# Patient Record
Sex: FEMALE | Race: WHITE | HISPANIC OR LATINO | Employment: OTHER | URBAN - METROPOLITAN AREA
[De-identification: names, ages, dates, MRNs, and addresses within clinical notes are randomized per-mention and may not be internally consistent; named-entity substitution may affect disease eponyms.]

---

## 2017-02-02 ENCOUNTER — ALLSCRIPTS OFFICE VISIT (OUTPATIENT)
Dept: OTHER | Facility: OTHER | Age: 72
End: 2017-02-02

## 2017-02-02 DIAGNOSIS — M54.2 CERVICALGIA: ICD-10-CM

## 2017-02-02 DIAGNOSIS — M54.50 LOW BACK PAIN: ICD-10-CM

## 2017-02-28 ENCOUNTER — GENERIC CONVERSION - ENCOUNTER (OUTPATIENT)
Dept: OTHER | Facility: OTHER | Age: 72
End: 2017-02-28

## 2017-03-28 ENCOUNTER — GENERIC CONVERSION - ENCOUNTER (OUTPATIENT)
Dept: OTHER | Facility: OTHER | Age: 72
End: 2017-03-28

## 2017-04-12 ENCOUNTER — GENERIC CONVERSION - ENCOUNTER (OUTPATIENT)
Dept: OTHER | Facility: OTHER | Age: 72
End: 2017-04-12

## 2017-04-17 ENCOUNTER — ALLSCRIPTS OFFICE VISIT (OUTPATIENT)
Dept: RADIOLOGY | Facility: MEDICAL CENTER | Age: 72
End: 2017-04-17
Payer: MEDICARE

## 2017-05-01 ENCOUNTER — GENERIC CONVERSION - ENCOUNTER (OUTPATIENT)
Dept: OTHER | Facility: OTHER | Age: 72
End: 2017-05-01

## 2017-08-23 ENCOUNTER — ALLSCRIPTS OFFICE VISIT (OUTPATIENT)
Dept: OTHER | Facility: OTHER | Age: 72
End: 2017-08-23

## 2017-08-23 DIAGNOSIS — Z12.31 ENCOUNTER FOR SCREENING MAMMOGRAM FOR MALIGNANT NEOPLASM OF BREAST: ICD-10-CM

## 2017-08-23 DIAGNOSIS — Z78.0 ASYMPTOMATIC MENOPAUSAL STATE: ICD-10-CM

## 2017-08-23 DIAGNOSIS — Z00.00 ENCOUNTER FOR GENERAL ADULT MEDICAL EXAMINATION WITHOUT ABNORMAL FINDINGS: ICD-10-CM

## 2017-08-24 ENCOUNTER — GENERIC CONVERSION - ENCOUNTER (OUTPATIENT)
Dept: OTHER | Facility: OTHER | Age: 72
End: 2017-08-24

## 2017-10-31 ENCOUNTER — ANESTHESIA EVENT (OUTPATIENT)
Dept: GASTROENTEROLOGY | Facility: HOSPITAL | Age: 72
End: 2017-10-31
Payer: MEDICARE

## 2017-11-02 ENCOUNTER — ANESTHESIA (OUTPATIENT)
Dept: GASTROENTEROLOGY | Facility: HOSPITAL | Age: 72
End: 2017-11-02
Payer: MEDICARE

## 2017-11-02 ENCOUNTER — GENERIC CONVERSION - ENCOUNTER (OUTPATIENT)
Dept: OTHER | Facility: OTHER | Age: 72
End: 2017-11-02

## 2017-11-02 ENCOUNTER — HOSPITAL ENCOUNTER (OUTPATIENT)
Facility: HOSPITAL | Age: 72
Setting detail: OUTPATIENT SURGERY
Discharge: HOME | End: 2017-11-02
Attending: INTERNAL MEDICINE | Admitting: INTERNAL MEDICINE
Payer: MEDICARE

## 2017-11-02 VITALS
DIASTOLIC BLOOD PRESSURE: 56 MMHG | OXYGEN SATURATION: 100 % | SYSTOLIC BLOOD PRESSURE: 108 MMHG | HEIGHT: 55 IN | BODY MASS INDEX: 37.03 KG/M2 | TEMPERATURE: 97.5 F | HEART RATE: 70 BPM | WEIGHT: 160 LBS | RESPIRATION RATE: 16 BRPM

## 2017-11-02 DIAGNOSIS — Z12.11 ENCOUNTER FOR SCREENING FOR MALIGNANT NEOPLASM OF COLON: ICD-10-CM

## 2017-11-02 DIAGNOSIS — Z12.12 ENCOUNTER FOR SCREENING FOR MALIGNANT NEOPLASM OF RECTUM: ICD-10-CM

## 2017-11-02 PROCEDURE — 88305 TISSUE EXAM BY PATHOLOGIST: CPT | Performed by: INTERNAL MEDICINE

## 2017-11-02 RX ORDER — SODIUM CHLORIDE 9 MG/ML
125 INJECTION, SOLUTION INTRAVENOUS CONTINUOUS
Status: DISCONTINUED | OUTPATIENT
Start: 2017-11-02 | End: 2017-11-02 | Stop reason: HOSPADM

## 2017-11-02 RX ORDER — PROPOFOL 10 MG/ML
INJECTION, EMULSION INTRAVENOUS AS NEEDED
Status: DISCONTINUED | OUTPATIENT
Start: 2017-11-02 | End: 2017-11-02 | Stop reason: SURG

## 2017-11-02 RX ORDER — IBUPROFEN 800 MG/1
600 TABLET ORAL AS NEEDED
COMMUNITY
End: 2019-01-04 | Stop reason: SDUPTHER

## 2017-11-02 RX ADMIN — PROPOFOL 20 MG: 10 INJECTION, EMULSION INTRAVENOUS at 14:05

## 2017-11-02 RX ADMIN — SODIUM CHLORIDE 125 ML/HR: 0.9 INJECTION, SOLUTION INTRAVENOUS at 13:15

## 2017-11-02 RX ADMIN — SODIUM CHLORIDE: 0.9 INJECTION, SOLUTION INTRAVENOUS at 13:45

## 2017-11-02 RX ADMIN — PROPOFOL 20 MG: 10 INJECTION, EMULSION INTRAVENOUS at 13:55

## 2017-11-02 RX ADMIN — PROPOFOL 80 MG: 10 INJECTION, EMULSION INTRAVENOUS at 13:52

## 2017-11-02 RX ADMIN — PROPOFOL 20 MG: 10 INJECTION, EMULSION INTRAVENOUS at 14:02

## 2017-11-02 RX ADMIN — PROPOFOL 20 MG: 10 INJECTION, EMULSION INTRAVENOUS at 13:59

## 2017-11-02 NOTE — H&P
History and Physical - [facility]  Radha Johnson 74 y o  female MRN: [id number]    HPI: Radha Johnson is a 74y o  year old female who presents for his screening colonoscopy  Her last colonoscopy was approximately more than 10 years ago  Review of Systems    Historical Information   Past Medical History:   Diagnosis Date    Arthritis     Chronic pain disorder     sciatic    Kidney stone      Past Surgical History:   Procedure Laterality Date    CHOLECYSTECTOMY      KNEE ARTHROSCOPY Left     TUBAL LIGATION       Social History   History   Alcohol Use No     History   Drug Use No     History   Smoking Status    Never Smoker   Smokeless Tobacco    Never Used     History reviewed  No pertinent family history  Meds/Allergies     Prescriptions Prior to Admission   Medication    ibuprofen (MOTRIN) 800 mg tablet    Multiple Vitamins-Minerals (WOMENS 50+ MULTI VITAMIN/MIN PO)       Allergies   Allergen Reactions    Diclofenac Sodium      Other reaction(s): GI Upset    Meperidine     Tramadol      Other reaction(s): GI Upset       Objective     Blood pressure 129/59, pulse 96, temperature 97 5 °F (36 4 °C), temperature source Tympanic, resp  rate 18, height 4' 6" (1 372 m), weight 72 6 kg (160 lb), SpO2 99 %  PHYSICAL EXAM    Gen: NAD  CV: RRR  CHEST: Clear  ABD: soft, NT/ND  EXT: no edema  Neuro: AAO      ASSESSMENT/PLAN:  This is a 74y o  year old female here for screening colonoscopy    Last colonoscopy approximately more than 10 years ago    PLAN:   Procedure:  Colonoscopy with biopsy and possible polypectomy

## 2017-11-02 NOTE — ANESTHESIA PREPROCEDURE EVALUATION
Review of Systems/Medical History  Patient summary reviewed        Cardiovascular   Pulmonary       GI/Hepatic       Kidney stones,        Endo/Other  Arthritis     GYN       Hematology   Musculoskeletal  Obesity  morbid obesity, Back pain , chronic back pain and lumbar pain,        Neurology   Psychology           Physical Exam    Airway    Mallampati score: III  TM Distance: >3 FB  Neck ROM: full     Dental   No notable dental hx     Cardiovascular  Rhythm: regular, Rate: normal, Cardiovascular exam normal    Pulmonary  Pulmonary exam normal Breath sounds clear to auscultation,     Other Findings        Anesthesia Plan  ASA Score- 2       Anesthesia Type- IV sedation with anesthesia with ASA Monitors  Additional Monitors:   Airway Plan:           Induction-       Informed Consent- Anesthetic plan and risks discussed with patient

## 2017-11-02 NOTE — DISCHARGE INSTRUCTIONS
Impression:    Two diminutive polyps removed from transverse and sigmoid colon  Severe left-sided diverticulosis  Internal hemorrhoids  Recommendations:  -high-fiber diet  Follow-up biopsy result  Colonoscopy recommended in 5 years if the pathology shows adenomatous polyp otherwise in 10 years  If you have abdominal pain, nausea, fever or bleeding call my office at [phone]            Colorectal Polyps   WHAT YOU NEED TO KNOW:   Colorectal polyps are small growths of tissue in the lining of the colon and rectum  Most polyps are hyperplastic polyps and are usually benign (noncancerous)  Certain types of polyps, called adenomatous polyps, may turn into cancer  DISCHARGE INSTRUCTIONS:   Follow up with your healthcare provider or gastroenterologist as directed: You may need to return for more tests, such as another colonoscopy  Write down your questions so you remember to ask them during your visits  Reduce your risk for colorectal polyps:   · Eat a variety of healthy foods:  Healthy foods include fruit, vegetables, whole-grain breads, low-fat dairy products, beans, lean meat, and fish  Ask if you need to be on a special diet  · Maintain a healthy weight:  Ask your healthcare provider if you need to lose weight and how much you need to lose  Ask for help with a weight loss program     · Exercise:  Begin to exercise slowly and do more as you get stronger  Talk with your healthcare provider before you start an exercise program      · Limit alcohol:  Your risk for polyps increases the more you drink  · Do not smoke: If you smoke, it is never too late to quit  Ask for information about how to stop  For support and more information:   · Rod Mccorm[facility] ([facility])  [address], [place] [address]  Phone: 2- [phone]  Web Address: www digestive  [facility]  Contact your healthcare provider or gastroenterologist if:   · You have a fever  · You have chills, a cough, or feel weak and achy  · You have abdominal pain that does not go away or gets worse after you take medicine  · Your abdomen is swollen  · You are losing weight without trying  · You have questions or concerns about your condition or care  Seek care immediately or call 911 if:   · You have sudden shortness of breath  · You have a fast heart rate, fast breathing, or are too dizzy to stand up  · You have severe abdominal pain  · You see blood in your bowel movement  © 2017 [facility] Information is for End User's use only and may not be sold, redistributed or otherwise used for commercial purposes  All illustrations and images included in CareNotes® are the copyrighted property of [employer] M [employer]  or [employer]  The above information is an  only  It is not intended as medical advice for individual conditions or treatments  Talk to your doctor, nurse or pharmacist before following any medical regimen to see if it is safe and effective for you  Diverticulosis   WHAT YOU NEED TO KNOW:   Diverticulosis is a condition that causes small pockets called diverticula to form in your intestine  These pockets make it difficult for bowel movements to pass through your digestive system  DISCHARGE INSTRUCTIONS:   Seek care immediately if:   · You have severe pain on the left side of your lower abdomen  · Your bowel movements are bright or dark red  Contact your healthcare provider if:   · You have a fever and chills  · You feel dizzy or lightheaded  · You have nausea, or you are vomiting  · You have a change in your bowel movements  · You have questions or concerns about your condition or care  Medicines:   · Medicines  to soften your bowel movements may be given  You may also need medicines to treat symptoms such as bloating and pain  · Take your medicine as directed    Contact your healthcare provider if you think your medicine is not helping or if you have side effects  Tell him or her if you are allergic to any medicine  Keep a list of the medicines, vitamins, and herbs you take  Include the amounts, and when and why you take them  Bring the list or the pill bottles to follow-up visits  Carry your medicine list with you in case of an emergency  Self-care: The goal of treatment is to manage any symptoms you have and prevent other problems such as diverticulitis  Diverticulitis is swelling or infection of the diverticula  Your healthcare provider may recommend any of the following:  · Eat a variety of high-fiber foods  High-fiber foods help you have regular bowel movements  High-fiber foods include cooked beans, fruits, vegetables, and some cereals  Most adults need 25 to 35 grams of fiber each day  Your healthcare provider may recommend that you have more  Ask your healthcare provider how much fiber you need  Increase fiber slowly  You may have abdominal discomfort, bloating, and gas if you add fiber to your diet too quickly  You may need to take a fiber supplement if you are not getting enough fiber from food  · Drink liquids as directed  You may need to drink 2 to 3 liters (8 to 12 cups) of liquids every day  Ask your healthcare provider how much liquid to drink each day and which liquids are best for you  · Apply heat  on your abdomen for 20 to 30 minutes every 2 hours for as many days as directed  Heat helps decrease pain and muscle spasms  Help prevent diverticulitis or other symptoms: The following may help decrease your risk for diverticulitis or symptoms, such as bleeding  Talk to your provider about these or other things you can do to prevent problems that may occur with diverticulosis  · Exercise regularly  Ask your healthcare provider about the best exercise plan for you  Exercise can help you have regular bowel movements   Get 30 minutes of exercise on most days of the week      · Maintain a healthy weight  Ask your healthcare provider how much you should weigh  Ask him or her to help you create a weight loss plan if you are overweight  · Do not smoke  Nicotine and other chemicals in cigarettes increase your risk for diverticulitis  Ask your healthcare provider for information if you currently smoke and need help to quit  E-cigarettes or smokeless tobacco still contain nicotine  Talk to your healthcare provider before you use these products  · Ask your healthcare provider if it is safe to take NSAIDs  NSAIDs may increase your risk of diverticulitis  Follow up with your healthcare provider as directed:  Write down your questions so you remember to ask them during your visits  © 2017 [address] [employer]  Information is for End User's use only and may not be sold, redistributed or otherwise used for commercial purposes  All illustrations and images included in CareNotes® are the copyrighted property of A [employer] M , Inc  or [employer] Mabry  The above information is an  only  It is not intended as medical advice for individual conditions or treatments  Talk to your doctor, nurse or pharmacist before following any medical regimen to see if it is safe and effective for you  Colonoscopy   WHAT YOU NEED TO KNOW:   A colonoscopy is a procedure to examine the inside of your colon (intestine) with a scope  Polyps or tissue growths may have been removed during your colonoscopy  It is normal to feel bloated and to have some abdominal discomfort  You should be passing gas  If you have hemorrhoids or you had polyps removed, you may have a small amount of bleeding  DISCHARGE INSTRUCTIONS:   Seek care immediately if:   · You have a large amount of bright red blood in your bowel movements  · Your abdomen is hard and firm and you have severe pain  · You have sudden trouble breathing    Contact your healthcare provider if:   · You develop a rash or hives  · You have a fever within 24 hours of your procedure  · You have not had a bowel movement for 3 days after your procedure  · You have questions or concerns about your condition or care  Activity:   · Do not lift, strain, or run  for 3 days after your procedure  · Rest after your procedure  You have been given medicine to relax you  Do not  drive or make important decisions until the day after your procedure  Return to your normal activity as directed  · Relieve gas and discomfort from bloating  by lying on your right side with a heating pad on your abdomen  You may need to take short walks to help the gas move out  Eat small meals until bloating is relieved  If you had polyps removed: For 7 days after your procedure:  · Do not  take aspirin  · Do not  go on long car rides  Help prevent constipation:   · Eat a variety of healthy foods  Healthy foods include fruit, vegetables, whole-grain breads, low-fat dairy products, beans, lean meat, and fish  Ask if you need to be on a special diet  Your healthcare provider may recommend that you eat high-fiber foods such as cooked beans  Fiber helps you have regular bowel movements  · Drink liquids as directed  Adults should drink between 9 and 13 eight-ounce cups of liquid every day  Ask what amount is best for you  For most people, good liquids to drink are water, juice, and milk  · Exercise as directed  Talk to your healthcare provider about the best exercise plan for you  Exercise can help prevent constipation, decrease your blood pressure and improve your health  Follow up with your healthcare provider as directed:  Write down your questions so you remember to ask them during your visits  © 2017 [employer]  Information is for End User's use only and may not be sold, redistributed or otherwise used for commercial purposes   All illustrations and images included in CareNotes® are the copyrighted property of A  [employer]  or [employer] Mab[employer]  The above information is an  only  It is not intended as medical advice for individual conditions or treatments  Talk to your doctor, nurse or pharmacist before following any medical regimen to see if it is safe and effective for you  High Fiber Diet   WHAT YOU NEED TO KNOW:   A high-fiber diet includes foods that have a high amount of fiber  Fiber is the part of fruits, vegetables, and grains that is not broken down by your body  Fiber keeps your bowel movements regular  Fiber can also help lower your cholesterol level, control blood sugar in people with diabetes, and relieve constipation  Fiber can also help you control your weight because it helps you feel full faster  Most adults should eat 25 to 35 grams of fiber each day  Talk to your dietitian or healthcare provider about the amount of fiber you need  DISCHARGE INSTRUCTIONS:   Good sources of fiber:   · Foods with at least 4 grams of fiber per serving:      ¨ ? to ½ cup of high-fiber cereal (check the nutrition label on the box)    ¨ ½ cup of blackberries or raspberries    ¨ 4 dried prunes    ¨ 1 cooked artichoke    ¨ ½ cup of cooked legumes, such as lentils, or red, kidney, and choi beans    · Foods with 1 to 3 grams of fiber per serving:      ¨ 1 slice of whole-wheat, pumpernickel, or rye bread    ¨ ½ cup of cooked brown rice    ¨ 4 whole-wheat crackers    ¨ 1 cup of oatmeal    ¨ ½ cup of cereal with 1 to 3 grams of fiber per serving (check the nutrition label on the box)    ¨ 1 small piece of fruit, such as an apple, banana, pear, kiwi, or orange    ¨ 3 dates    ¨ ½ cup of canned apricots, fruit cocktail, peaches, or pears    ¨ ½ cup of raw or cooked vegetables, such as carrots, cauliflower, cabbage, spinach, squash, or corn  Ways that you can increase fiber in your diet:   · Choose brown or wild rice instead of white rice       · Use whole wheat flour in recipes instead of white or all-purpose flour  · Add beans and peas to casseroles or soups  · Choose fresh fruit and vegetables with peels or skins on instead of juices  Other diet guidelines to follow:   · Add fiber to your diet slowly  You may have abdominal discomfort, bloating, and gas if you add fiber to your diet too quickly  · Drink plenty of liquids as you add fiber to your diet  You may have nausea or develop constipation if you do not drink enough water  Ask how much liquid to drink each day and which liquids are best for you  © 2017 [facility] Information is for End User's use only and may not be sold, redistributed or otherwise used for commercial purposes  All illustrations and images included in CareNotes® are the copyrighted property of [employer]  or [employer] Mabry  The above information is an  only  It is not intended as medical advice for individual conditions or treatments  Talk to your doctor, nurse or pharmacist before following any medical regimen to see if it is safe and effective for you

## 2017-11-02 NOTE — OP NOTE
OPERATIVE REPORT  PATIENT NAME: Anna Marie Hays    :  1945  MRN: [id number]  Pt Location: [facility] 01    SURGERY DATE: 2017    Surgeon(s) and Role:     * Ayaka Islas MD - Primary    Colonoscopy Procedure Note    Procedure: Colonoscopy    Sedation: Monitored anesthesia care, check anesthesia records      ASA Class: 2    INDICATIONS: Screening for Colon Cancer  Last colonoscopy 10 years ago  POST-OP DIAGNOSIS: See the impression below    Procedure Details     Informed consent was obtained for the procedure, including sedation  Risks of perforation, hemorrhage, adverse drug reaction and aspiration were discussed  The patient was placed in the left lateral decubitus position  Based on the pre-procedure assessment, including review of the patient's medical history, medications, allergies, and review of systems, she had been deemed to be an appropriate candidate for conscious sedation; she was therefore sedated with the medications listed below  The patient was monitored continuously with telemetry, pulse oximetry, blood pressure monitoring, and direct observations  A rectal examination was performed  The colonoscope was inserted into the rectum and advanced under direct vision to the cecum, which was identified by the ileocecal valve and appendiceal orifice  The quality of the colonic preparation was excellent  A careful inspection was made as the colonoscope was withdrawn, including a retroflexed view of the rectum; findings and interventions are described below  Findings:  Severe diverticulosis in the sigmoid and descending colon  Two flat polyps measuring 4 mm were removed from the transverse and sigmoid colon using cold biopsy forceps  Small internal hemorrhoids otherwise normal colonoscopy           Complications:  None; patient tolerated the procedure well  Impression:    Two diminutive polyps removed from transverse and sigmoid colon  Severe left-sided diverticulosis  Internal hemorrhoids  Recommendations:  -high-fiber diet  Follow-up biopsy result  Colonoscopy recommended in 5 years if the pathology shows adenomatous polyp otherwise in 10 years    If you have abdominal pain, nausea, fever or bleeding call my office at [phone]  SIGNATURE: Sammy Staton MD  DATE: November 2, 2017  TIME: 2:18 PM

## 2017-11-09 ENCOUNTER — GENERIC CONVERSION - ENCOUNTER (OUTPATIENT)
Dept: OTHER | Facility: OTHER | Age: 72
End: 2017-11-09

## 2018-01-10 NOTE — RESULT NOTES
Message   Please offer the patient bilateral L4-5, L5-S1 MBB #1      M [id number] [phone]   [id number], [id number]     Verified Results  * MRI LUMBAR SPINE WO CONTRAST [id number] 07:37AM Fili Randolph     Test Name Result Flag Reference   MRI LUMBAR SPINE WO CONTRAST (Report)     MRI LUMBAR SPINE WITHOUT CONTRAST     INDICATION: 1 month of low back pain  M 54 5, M 45 816  COMPARISON: None  TECHNIQUE: Sagittal T1, sagittal T2, sagittal inversion recovery, axial T1 and axial T2, coronal T2       IMAGE QUALITY: Diagnostic     FINDINGS:     ALIGNMENT: Normal alignment of the lumbar spine  No compression fracture  No spondylolysis or spondylolisthesis  No scoliosis  MARROW SIGNAL: Normal marrow signal is identified within the visualized bony structures  No discrete marrow lesion  DISTAL CORD AND CONUS: Normal size and signal within the distal cord and conus  The conus ends at the L1 level  PARASPINAL SOFT TISSUES: Paraspinal soft tissues are unremarkable  SACRUM: Normal signal within the sacrum  No evidence of insufficiency or stress fracture  LOWER THORACIC DISC SPACES: Normal disc height and signal  No disc herniation, canal stenosis or foraminal narrowing  LUMBAR DISC SPACES:      L1-L2:   Mild degenerative disc disease  Small broad-based right foraminal/lateral disc protrusion with endplate hypertrophic osteophyte formation  There is no canal stenosis  Minimal right foraminal narrowing without discrete nerve impingement  L2-L3: No disc herniation, canal stenosis or foraminal narrowing  L3-L4: Mild annular bulging  Slight facet degenerative change  No disc herniation  No canal stenosis  Mild bilateral foraminal narrowing  L4-L5: Mild annular bulging  Mild endplate and facet hypertrophic degenerative change  No canal stenosis  Mild right greater than left foraminal narrowing  L5-S1: Normal disc height and signal  Moderate facet hypertrophic degenerative change   No disc herniation, canal stenosis or foraminal narrowing  IMPRESSION:     Mild lumbar degenerative disc disease  Mild right foraminal narrowing at L1-2 and L4-5  No canal stenosis or cauda equina compression         Workstation performed: [id number]     Signed by:   Bruno Bassett DO   9/29/16

## 2018-01-11 NOTE — RESULT NOTES
Verified Results  (1) TISSUE EXAM [id number] 02:02PM Glenis Hernandez     Test Name Result Flag Reference   LAB AP CASE REPORT (Report)     Surgical Pathology Report             Case: [id number]                   Authorizing Provider: Dewey Rubi MD      Collected:      11/02/2017 1402        Ordering Location:   [facility]    Received:      11/02/2017 [address] [place] [address] Endoscopy                              Pathologist:      Rk Calvert MD                              Specimens:  A) - Colon, transverse colon polyp                                   B) - Large Intestine, Sigmoid Colon, polyp   LAB AP FINAL DIAGNOSIS (Report)     A  Colon, transverse, polyp:    - Hyperplastic polyp     - No dysplasia or carcinoma identified  B  Colon, sigmoid, polyp:    - Colonic mucosa with lymphoid aggregate consistent with redundant   mucosal fold  - Mild melanosis coli     - No dysplasia or neoplasia identified  Electronically signed by Rk Calvert MD on 11/8/2017 at 9:32 AM   LAB AP SURGICAL ADDITIONAL INFORMATION (Report)     All controls performed with the immunohistochemical stains reported above   reacted appropriately  These tests were developed and their performance   characteristics determined by [address] [place] [address] or   [facility]  They may not be cleared or approved by the [place]  Food and Drug Administration  The FDA has determined that such clearance   or approval is not necessary  These tests are used for clinical purposes  They should not be regarded as investigational or for research  This   laboratory has been approved by [place] 88, designated as a high-complexity   laboratory and is qualified to perform these tests  - Interpretation performed at [facility]   LAB AP GROSS DESCRIPTION (Report)     A   The specimen is received in formalin, labeled with the patient's name   and hospital number, and is designated transverse colon polyp  The   specimen consists of 2 tan soft tissue fragments measuring 0 2 and 0 3 cm   in greatest dimension  Entirely submitted in one cassette  B  The specimen is received in formalin, labeled with the patient's name   and hospital number, and is designated sigmoid polyp  The specimen   consists of 2 tan soft tissue fragments 0 3 and 0 4 cm in greatest   dimension  Entirely submitted in one cassette  Note: The estimated total formalin fixation time based upon information   provided by the submitting clinician and the standard processing schedule   is less than 72 hours      Kaley

## 2018-01-11 NOTE — MISCELLANEOUS
Message   Recorded as Task   Date: 10/26/2016 02:29 PM, Created By: Lupe Means   Task Name: Follow Up   Assigned To: [facility] clerical,Team   Regarding Patient: Lucian Rodriguez, Status: Active   Comment:    Lupe Means - 26 Oct 2016 2:29 PM     TASK CREATED  please schedule follow up to review lack of response to Eulalia Moffett - 27 Oct 2016 8:32 AM     TASK EDITED  Can you please schedule F/U per MIGUEL A javier   [facility] [place] - 28 Oct 2016 9:56 AM     TASK REPLIED TO: Previously Assigned To Lupe Means  Scheduled with [facility] on 11/9/16   Delroy Villavicencio - 28 Oct 2016 11:00 AM     TASK REPLIED TO: Previously Assigned To Lupe Means                      aware agree        Active Problems    1  Arthralgia (719 40) (M25 50)   2  Back pain (724 5) (M54 9)   3  Chronic low back pain (724 2,338 29) (M54 5,G89 29)   4  Cough (786 2) (R05)   5  Degenerative disc disease, thoracic (722 51) (M51 34)   6  Edema (782 3) (R60 9)   7  Flu vaccine need (V04 81) (Z23)   8  Hydronephrosis with renal and ureteral calculus obstruction (591) (N13 2)   9  Kidney stones (592 0) (N20 0)   10  Lumbar spondylosis (721 3) (M47 816)   11  Need for pneumococcal vaccination (V03 82) (Z23)   12  Nephrolithiasis (592 0) (N20 0)   13  Vertigo (780 4) (R42)   14  Weight gain (783 1) (R63 5)    Current Meds   1  Cyclobenzaprine HCl - 5 MG Oral Tablet; take one tablet three times daily as needed for   pain; Therapy: 83Wkt0090 to (Evaluate:23Oct2016)  Requested for: 18Qeo9261; Last   Rx:53Mtn9341 Ordered   2  Daily Multiple Vitamin TABS; Therapy: (Recorded:20Nov2015) to Recorded   3  Ibuprofen 800 MG Oral Tablet; take one tablet every 8 hours as needed for pain; Therapy: 82CVU0318 to (Evaluate:92Ydl1421)  Requested for: 16SVJ6979; Last   Rx:20Nov2015 Ordered   4  MethylPREDNISolone 4 MG Oral Tablet Therapy Pack; take as directed;    Therapy: 16Ghx0426 to (Evaluate:30Aug2016)  Requested for: 24Aug2016; Sunday Carlton Ordered    Allergies    1  TraMADol HCl TABS   2  Voltaren   3  Demerol TABS    Signatures   Electronically signed by :  Mario Pastor, ; Oct 28 2016 11:01AM EST                       (Author)

## 2018-01-12 NOTE — MISCELLANEOUS
Assessment    1  Back pain (724 5) (M54 9)   2  Degenerative disc disease, thoracic (722 51) (M51 34)    Plan  Back pain    · There are many exercise options for seniors ; Status:Complete;   Done: 95ZIB4793  12:55PM   Ordered; For:Back pain; Ordered By:Helena Ervin;  Back pain, Degenerative disc disease, thoracic    · Ketorolac Tromethamine 60 MG/2ML Injection Solution; INJECT 2  ML  Intramuscular; To Be Done: 04OUU6814   Rx By: Dee Banks; For: Back pain, Degenerative disc disease, thoracic; XOCHITL = N; Request Administration   · XR SPINE LUMBAR COMPLETE W BENDING MINIMUM 6 VIEWS; Status:Active; Requested [id number];    Perform: [facility]; VHS:45CJH6615;YRAOOGF; For:Back pain, Degenerative disc disease, thoracic; Ordered By:Helena Ervin;  Degenerative disc disease, thoracic    · *8 - [address] [place] Physician Referral  Consult  Status: Active  Requested  for: 89PKO2574   Ordered; For: Degenerative disc disease, thoracic; Ordered By: Dee Banks Performed:  Due: 21LTT0156  Care Summary provided  : Yes    Discussion/Summary  Discussion Summary:   Await xrays , give Toradol shot  Chief Complaint  Chief Complaint Free Text Note Form: pt is here for hospital follow up on back pain      History of Present Illness  TCM Communication [facility] records were reviewed  The date of discharge: 16  Diagnosis: STRAIN OF BACK MUSCLE  She was discharged to home  She scheduled a follow up appointment  Counseling was provided to the patient  Communication performed and completed by   [id number]: The patient is being seen for follow-up after hospitalization and Er visit back pain  Hospital records were not available  She was hospitalized at [facility]  She was discharged to home  She was discharged on the following medications: Tramadol  Symptoms:  shortness of breath, nausea and back pain     HPI: PATIENT STATES SHE Unable TO TOLERATE PAIN ANYMORE, UNABLE TO RIDE IN A CAR Without SYMPTOMS  Review of Systems  Complete-Female:   Constitutional: feeling poorly  Cardiovascular: no chest pain  Respiratory: shortness of breath  Gastrointestinal: no nausea and no vomiting  Musculoskeletal: arthralgias and BACK PAIN  Neurological: numbness, tingling, difficulty walking and RIGHT LEG  Psychiatric: no sleep disturbances  feelings of weakness   ROS Reviewed:   ROS reviewed  Active Problems    1  Arthralgia (719 40) (M25 50)   2  Back pain (724 5) (M54 9)   3  Cough (786 2) (R05)   4  Degenerative disc disease, thoracic (722 51) (M51 34)   5  Edema (782 3) (R60 9)   6  Flu vaccine need (V04 81) (Z23)   7  Hydronephrosis with renal and ureteral calculus obstruction (591) (N13 2)   8  Kidney stones (592 0) (N20 0)   9  Need for pneumococcal vaccination (V03 82) (Z23)   10  Nephrolithiasis (592 0) (N20 0)   11  Vertigo (780 4) (R42)   12  Weight gain (783 1) (R63 5)    Past Medical History    1  History of gastroesophageal reflux (GERD) (V12 79) (Z87 19)   2  History of osteoarthritis (V13 4) (Z87 39)   3  Personal history of osteoporosis (V13 59) (Z87 39)    Surgical History    1  History of Cholecystectomy   2  History of Knee Surgery   3  History of Tubal Ligation  Surgical History Reviewed: The surgical history was reviewed and updated today  Family History  Father    1  FH: mental illness (V17 0) (Z81 8)   2  Family history of Prostate cancer  Daughter    3  FH: mental illness (V17 0) (Z81 8)  Family History Reviewed: The family history was reviewed and updated today  Social History    · Denied: History of Alcohol use   · Daily caffeine consumption   · Does not have living will   · Denied: History of Exercises regularly   · Denied: History of domestic violence   ·    · Never a smoker   · No advance directives (V49 89) (Z78 9)   · Retired   · Two children  Social History Reviewed: The social history was reviewed and is unchanged        Current Meds   1  Cyclobenzaprine HCl - 5 MG Oral Tablet; take one tablet three times daily as needed for   pain; Therapy: 62Ojp7501 to (Evaluate:23Oct2016)  Requested for: 21Rbm5548; Last   Rx:86Xfz1791 Ordered   2  Daily Multiple Vitamin TABS; Therapy: (Recorded:20Nov2015) to Recorded   3  Ibuprofen 800 MG Oral Tablet; take one tablet every 8 hours as needed for pain; Therapy: 27FFG1429 to (Evaluate:35Bck0814)  Requested for: 80BEA8774; Last   Rx:20Nov2015 Ordered   4  MethylPREDNISolone 4 MG Oral Tablet Therapy Pack; take as directed; Therapy: 51Xsq8346 to (Evaluate:32Whd8151)  Requested for: 93Wel9208; Last   Rx:94Owm4279 Ordered  Medication List Reviewed: The medication list was reviewed and updated today  Allergies    1  TraMADol HCl TABS   2  Voltaren   3  Demerol TABS    Vitals  Signs   Recorded: 65NUB3203 04:38DZ   Systolic: 145  Diastolic: 84  Heart Rate: 87  Height: 4 ft 5 in  Weight: 164 lb 4 00 oz  BMI Calculated: 41 11  BSA Calculated: 1 57    Physical Exam    Constitutional   General appearance: No acute distress, well appearing and well nourished  Pulmonary   Respiratory effort: No increased work of breathing or signs of respiratory distress  Auscultation of lungs: Clear to auscultation  Cardiovascular   Auscultation of heart: Normal rate and rhythm, normal S1 and S2, without murmurs  Abdomen   Abdomen: Non-tender, no masses  Lymphatic   Palpation of lymph nodes in neck: No lymphadenopathy  Musculoskeletal   Gait and station: Abnormal   Gait evaluation demonstrated limping on the right  Inspection/palpation of joints, bones, and muscles: Abnormal   Appearance - right mid-lumbar, right lower lumbar, right sacral, L3, L4, L5, S1 and POINT TENDERNESS swelling, but no ecchymosis  Palpation - right mid-lumbar and right lower lumbar tenderness  POINT TENDERNESS AND MUSCLE TENSION ON PALPATION  PATIENT IN 8/10 PAIN     Neurologic   Reflexes: Abnormal   Deep tendon reflexes: 1+ right patella and 1+ left patella  Sensation: No sensory loss  Psychiatric   Orientation to person, place, and time: Normal     Mood and affect: Normal     Additional Exam:  PREVIOUS MRI 2009 SHOWS JOINT FACET CHANGES WITH MILD ARTHROPATHY, L3-S1 L1-L2 FORAMINAL CANAL NARROWING WITH MID DISC PROTRUSION  Results/Data  Falls Risk Assessment (Dx Z13 89 Screen for Neurologic Disorder) 08FHD3054 11:47AM User, Ahs     Test Name Result Flag Reference   Falls Risk      No falls in the past year     PHQ-9 Adult Depression Screening 60Zdv5030 11:47AM User, Ahs     Test Name Result Flag Reference   PHQ-9 Adult Depression Score 11     Over the last two weeks, how often have you been bothered by any of the following problems? Little interest or pleasure in doing things: Nearly every day - 3  Feeling down, depressed, or hopeless: Nearly every day - 3  Trouble falling or staying asleep, or sleeping too much: Not at all - 0  Feeling tired or having little energy: Nearly every day - 3  Poor appetite or over eating: Not at all - 0  Feeling bad about yourself - or that you are a failure or have let yourself or your family down: Several days - 1  Trouble concentrating on things, such as reading the newspaper or watching television: Not at all - 0  Moving or speaking so slowly that other people could have noticed   Or the opposite -  being so fidgety or restless that you have been moving around a lot more than usual: Several days - 1  Thoughts that you would be better off dead, or of hurting yourself in some way: Not at all - 0   PHQ-9 Adult Depression Screening Positive     PHQ-9 Difficulty Level Somewhat difficult     PHQ-9 Severity Moderate Depression         Future Appointments    Date/Time Provider Specialty Site   08/29/2017 11:15 AM Cynthia Tellez, [address] [place]  Urology [facility] FOR UROLOGY [facility]     Signatures   Electronically signed by : Nuno Mejia, ; Sep 16 2016 12:32PM EST                       (Author) Electronically signed by : Andry Pappas, ; Sep 16 2016 12:32PM EST                       (Author)    Electronically signed by :  Emily Pino MD; Sep 16 2016 12:58PM EST                       (Author)

## 2018-01-12 NOTE — RESULT NOTES
Message   Recorded as Task   Date: 04/12/2017 08:22 AM, Created By: Moises Juarez   Task Name: Miscellaneous   Assigned To: [facility] surgery sched,Team   Regarding Patient: Azalea Boas, Status: In Progress   MooseHallie Riddle - 12 Apr 2017 8:22 AM     TASK CREATED  Pt is having a lot of pain and would like to make an appt  possibly for a procedure  Not sure what she should do  Please call [phone]   Mitch Allred - 12 Apr 2017 9:09 AM     TASK REPLIED TO: Previously Assigned To Manjula Green  S/w pt  who states that since Friday, she has been experiencing pain again  Pt  states that it is not as severe as it was before her last inj , but she "can't move fast," pain increases when going to sit down, getting in and out of car, when pt  sneezes, etc  Pt  states that the pain currently is located in her R lower back and hip  Per pt  no pain currently on her L side  Pt  denies any recent injury  Pt  states that she is currently taking Motrin and Advil, but it is "messing up my stomach " Per pt  her current pain level is a 5/10  Pt  states that the last inj  she had really helped, and she is looking to see if she can repeat that before her pain continues to worsen  Pt  was advised that RN will relay information to Dr Estrella Hernandez and c/b w/ his recommendations  Pt  verbalized understanding and was appreciative  Please advise  Thank you     Manjula Green - 12 Apr 2017 11:02 AM     TASK REPLIED TO: Previously Assigned To Manjula Green                      yes ok to repeat (B) SIJ injection   Shira Malave - 12 Apr 2017 11:17 AM     TASK REASSIGNED: Previously Assigned To [id number] [place] [address] clinical,Team   Glo Garcia - 12 Apr 2017 12:18 PM     TASK EDITED  noted, will coordinate   Glo Garcia - 12 Apr 2017 3:19 PM     TASK EDITED  Spoke with patient and scheduled:     bilateral SIJ injection on 4/17    Reviewed instructions: , NPO 1 hour prior, loose-fitting/comfortable clothing, if ill/start abx/infx/fever to call and reschedule  She stated verbal understanding

## 2018-01-13 NOTE — PROGRESS NOTES
Assessment    1  Hydronephrosis with renal and ureteral calculus obstruction (591) (N13 2)   2  Nephrolithiasis (592 0) (N20 0)    Plan   Nephrolithiasis    · * XR ABDOMEN 1 VIEW KUB; Status:Active; Requested for:61Qks7371;    Perform:[facility]; SSQ:85TNN7390; Ordered;  For:Nephrolithiasis; Ordered By:Nye, Claudell Loyal;   · Urine Dip Non-Automated- POC; Status:Active - Perform Order; Requested  for:09Anc3740;    Performed: In Office; ERC:47KBO4917;ZDEEVJN;  For:Nephrolithiasis; Ordered By:Nye, Claudell Loyal; Follow-up visit in 1 year Evaluation and Treatment  Follow-up  Status: Hold For - Scheduling  Requested for: 18Jom7866  Ordered; For: Nephrolithiasis; Ordered By: Tulio Mobley  Performed:   Due: 60KGJ7198     Discussion/Summary  Discussion Summary:   Nephrolithiasis    The patient is doing well with no complaints  Her KUB was reviewed and there are two small left nonobstructing stones visualized  She will return in 1 year for follow up with a KUB  Diet and hydration modification to prevent stone formation discussed  All questions answered  Chief Complaint  Chief Complaint Free Text Note Form: Nephrolithiasis,Hydronephrosis      History of Present Illness  HPI: 65 y/o female with nephrolithiasis presents today for follow up  She was scheduled for ureteroscopy in January but then spontaneously passed her stone  She denies any stone episodes since  She denies any pain or gross hematuria  She denies any lower urinary tract symptoms  She is doing well with no complaints  Review of Systems  Complete-Female Urology:   Constitutional: No fever, no chills, feels well, no tiredness, no recent weight gain or weight loss  Respiratory: No complaints of shortness of breath, no wheezing, no cough, no SOB on exertion, no orthopnea, no PND  Cardiovascular: No complaints of slow heart rate, no fast heart rate, no chest pain, no palpitations, no leg claudication, no lower extremity edema     Gastrointestinal: No complaints of abdominal pain, no constipation, no nausea or vomiting, no diarrhea, no bloody stools  Genitourinary: Empty sensation and stream quality good, but no dysuria, no urinary hesitancy, no feelings of urinary urgency, no incontinence, no hematuria, no nocturia, urinary stream does not start and stop  Musculoskeletal: No complaints of arthralgias, no myalgias, no joint swelling or stiffness, no limb pain or swelling  Integumentary: No complaints of skin rash or lesions, no itching, no skin wounds, no breast pain or lump  Hematologic/Lymphatic: No complaints of swollen glands, no swollen glands in the neck, does not bleed easily, does not bruise easily  Neurological: No complaints of headache, no confusion, no convulsions, no numbness, no dizziness or fainting, no tingling, no limb weakness, no difficulty walking  ROS Reviewed:   ROS reviewed  Active Problems    1  Arthralgia (719 40) (M25 50)   2  Back pain (724 5) (M54 9)   3  Cough (786 2) (R05)   4  Degenerative disc disease, thoracic (722 51) (M51 34)   5  Edema (782 3) (R60 9)   6  Flu vaccine need (V04 81) (Z23)   7  Hydronephrosis with renal and ureteral calculus obstruction (591) (N13 2)   8  Kidney stones (592 0) (N20 0)   9  Need for pneumococcal vaccination (V03 82) (Z23)   10  Nephrolithiasis (592 0) (N20 0)   11  Vertigo (780 4) (R42)   12  Weight gain (783 1) (R63 5)    Past Medical History    1  History of gastroesophageal reflux (GERD) (V12 79) (Z87 19)   2  History of osteoarthritis (V13 4) (Z87 39)   3  Personal history of osteoporosis (V13 59) (Z87 39)  Active Problems And Past Medical History Reviewed: The active problems and past medical history were reviewed and updated today  Surgical History    1  History of Cholecystectomy   2  History of Knee Surgery   3  History of Tubal Ligation  Surgical History Reviewed: The surgical history was reviewed and updated today  Family History  Father    1   FH: mental illness (V17 0) (Z81 8)   2  Family history of Prostate cancer  Daughter    3  FH: mental illness (V17 0) (Z81 8)  Family History Reviewed: The family history was reviewed and updated today  Social History    · Denied: History of Alcohol use   · Daily caffeine consumption   · Denied: History of Exercises regularly   · Denied: History of domestic violence   ·    · Never a smoker   · Two children  Social History Reviewed: The social history was reviewed and updated today  The social history was reviewed and is unchanged  Current Meds   1  Cyclobenzaprine HCl - 5 MG Oral Tablet; take one tablet three times daily as needed for   pain; Therapy: 50Anp2620 to (Evaluate:23Oct2016)  Requested for: 90Vjl8148; Last   Rx:54Big6084 Ordered   2  Daily Multiple Vitamin TABS; Therapy: (Recorded:20Nov2015) to Recorded   3  Ibuprofen 800 MG Oral Tablet; take one tablet every 8 hours as needed for pain; Therapy: 12FAT6811 to (Evaluate:98Ahn6466)  Requested for: 22CSA2845; Last   Rx:20Nov2015 Ordered   4  MethylPREDNISolone 4 MG Oral Tablet Therapy Pack; take as directed; Therapy: 73Xsk2680 to (Evaluate:51Fbt3138)  Requested for: 67Uwx3786; Last   Rx:95Brt7164 Ordered  Medication List Reviewed: The medication list was reviewed and updated today  Allergies    1  TraMADol HCl TABS   2  Voltaren   3  Demerol TABS    Vitals  Vital Signs    Recorded: 61ENR0454 75:98CY   Systolic 499   Diastolic 80   Heart Rate 88   Height 4 ft 5 in   Weight 163 lb 4 00 oz   BMI Calculated 40 86   BSA Calculated 1 56     Physical Exam    Constitutional   General appearance: No acute distress, well appearing and well nourished  Pulmonary   Respiratory effort: No increased work of breathing or signs of respiratory distress  Cardiovascular   Palpation of heart: Normal PMI, no thrills  Examination of extremities for edema and/or varicosities: Normal     Abdomen   Abdomen: Non-tender, no masses  Musculoskeletal   Gait and station: Normal     Skin   Skin and subcutaneous tissue: Normal without rashes or lesions  Results/Data  * XR ABDOMEN 1 VIEW KUB 77YNF0984 10:51AM Sabrina Mejia    Order Number: [id number]     Test Name Result Flag Reference   XR ABDOMEN 1 VIEW KUB (Report)     ABDOMEN     INDICATION: History of previous calculus  COMPARISON: None     VIEWS: AP supine; 2 images     FINDINGS:     2 small calculi overlie the lower pole the left kidney, largest measures 4 mm  No definite right renal calculi  No ureteral calculi identified  Surgical clips are noted in the right upper quadrant of the abdomen  Nonobstructive bowel gas pattern  Mild to moderate degenerative changes, thoracolumbar junction  IMPRESSION:     Lower pole left renal calculi (4 mm)  Workstation performed: [id number]     Signed by:   Michael Sadler MD   5/24/16     Future Appointments    Date/Time Provider Specialty Site   08/29/2017 11:15 AM Carmine Woodson, 10 [facility] Urology [facility] FOR UROLOGY Manav Eller     Signatures   Electronically signed by : Mart Slaughter, 10 [facility];  Aug 26 2016 11:28AM EST                       (Author)    Electronically signed by : Juan Otoole MD; Aug 26 2016  3:58PM EST                       (Author)

## 2018-01-14 VITALS
HEIGHT: 55 IN | BODY MASS INDEX: 38.09 KG/M2 | SYSTOLIC BLOOD PRESSURE: 124 MMHG | WEIGHT: 164.6 LBS | HEART RATE: 74 BPM | DIASTOLIC BLOOD PRESSURE: 74 MMHG

## 2018-01-14 VITALS
SYSTOLIC BLOOD PRESSURE: 110 MMHG | HEART RATE: 70 BPM | WEIGHT: 160 LBS | DIASTOLIC BLOOD PRESSURE: 80 MMHG | BODY MASS INDEX: 37.03 KG/M2 | HEIGHT: 55 IN

## 2018-01-15 NOTE — MISCELLANEOUS
Message   Recorded as Task   Date: 09/29/2016 04:39 PM, Created By: Vania Stafford   Task Name: Call Patient with results   Assigned To: [id number] [place] [address] clinical,Team   Regarding Patient: Jose Guadalupe Arnett, Status: In Progress   Comment:    Vania Stafford - 29 Sep 2016 4:39 PM     Patient Phone: [phone]    Degenerative facet joint changes as expected    Please offer the patient bilateral L4-5, L5-S1 MBB #1    [phone], [id number]   Helena Cid - 30 Sep 2016 9:56 AM     TASK REASSIGNED: Previously Assigned To Yenni Durham - 86 Oct 2016 2:52 PM     TASK REPLIED TO: Previously Assigned To [id number] [place] [address] clinical,Team                        I spoke with pt, advised above  Pt did wish to proceed  Scheduled for B/L L4-5m L5-S1 MBB #1 10/19/16 at 1345  Pt will have , be NPO 1 hr prior, will have pain level 5 or greater and will not take pain medications the day of the procedure  Pt denies anticoag  Will call if she becomes ill or goes on antibx  Pt verbalizes understanding  *********   Helena Cid - 03 Oct 2016 2:53 PM     TASK IN PROGRESS        Active Problems    1  Arthralgia (719 40) (M25 50)   2  Back pain (724 5) (M54 9)   3  Chronic low back pain (724 2,338 29) (M54 5,G89 29)   4  Cough (786 2) (R05)   5  Degenerative disc disease, thoracic (722 51) (M51 34)   6  Edema (782 3) (R60 9)   7  Flu vaccine need (V04 81) (Z23)   8  Hydronephrosis with renal and ureteral calculus obstruction (591) (N13 2)   9  Kidney stones (592 0) (N20 0)   10  Lumbar spondylosis (721 3) (M47 816)   11  Need for pneumococcal vaccination (V03 82) (Z23)   12  Nephrolithiasis (592 0) (N20 0)   13  Vertigo (780 4) (R42)   14  Weight gain (783 1) (R63 5)    Current Meds   1  Cyclobenzaprine HCl - 5 MG Oral Tablet; take one tablet three times daily as needed for   pain; Therapy: 59Eda6594 to (Evaluate:23Oct2016)  Requested for: 00Emu3845; Last   Rx:31Ezh7934 Ordered   2   Daily Multiple Vitamin TABS; Therapy: (Recorded:20Nov2015) to Recorded   3  Ibuprofen 800 MG Oral Tablet; take one tablet every 8 hours as needed for pain; Therapy: 76OHW1456 to (Evaluate:71Mru5135)  Requested for: 44GGZ9497; Last   Rx:20Nov2015 Ordered   4  MethylPREDNISolone 4 MG Oral Tablet Therapy Pack; take as directed; Therapy: 90Jff7271 to (Evaluate:43Rqi1301)  Requested for: 69Zls1956; Last   Rx:52Www2110 Ordered    Allergies    1  TraMADol HCl TABS   2  Voltaren   3  Demerol TABS    Signatures   Electronically signed by :  Dallas Talley, ; Oct  3 2016  2:53PM EST                       (Author)

## 2018-01-15 NOTE — MISCELLANEOUS
Message     Recorded as Task   Date: 08/23/2017 04:12 PM, Created By: Silvia Metz   Task Name: Intake   Assigned To: GI Procedure,TEAM   Regarding Patient: Veronica Otto, Status: In Progress   Comment:    Nora Hernandez - 23 Aug 2017 4:12 PM     TASK CREATED  Date: [8/23/17  ]  Screened by: [place]   ]    Referring Provider: Yuni Rasmussen    Pre- Screening:   Has patient been referred for a routine screening Colonoscopy? Yes   Is the patient over 48years of age? Yes     If the answer is YES to both questions, proceed to the medical questions  Do you have a family history of colon cancer? No    Do you have a personal history of colon polyps? No    Do you have any of the following symptoms? Have you had a coronary or vascular stent within the last year? No    Have you had a heart attack or stroke in the last 6 months? No    Have you had intestinal surgery in the last 3 months? No    Do you have problems with:       Do you use:      Have you been hospitalized in the last Month? No    Have you been diagnosed with a bleeding disorder or anemia? No    Have you had chest pain (angina) or breathing problems  (COPD) in the last 3 months? No    Do you have any difficulty walking up a flight of stairs? No    Have you had Kidney failure or insufficiency? No    Have you had heart valve surgery? No    Are you confined to a wheelchair? No     Do you take,,,,, or other blood thinning medication? No    Have you been diagnosed with Diabetes or are you taking any   Diabetic medications? No    : If patient answers NO to medical questions, then schedule procedure  If patient answers YES to medical questions, then schedule office appointment  Previous Colonoscopy ( if yes)    Date and Facility of last colonoscopy?  [   ]    Patient scheduled for procedure: [ Yes ]  Scheduled by: [ Carmelo Lucasr ]  Date: [11/2/17  ]  Time: Jamaal Mann  ]  Provider: Michoacano Tian  ]  Location: [facility]  ]    Referral Obtained for procedure:  NO  Were instructions Mailed? YES  Was the prep sent to Pharmacy? NO    Comments: [  ]   Franck Helton - 24 Aug 2017 10:26 AM     TASK IN PROGRESS        Active Problems    1  Arthralgia (719 40) (M25 50)   2  Asymptomatic postmenopausal state (V49 81) (Z78 0)   3  Back pain (724 5) (M54 9)   4  Chronic low back pain (724 2,338 29) (M54 5,G89 29)   5  Cough (786 2) (R05)   6  Degenerative disc disease, thoracic (722 51) (M51 34)   7  Edema (782 3) (R60 9)   8  Encounter for colorectal cancer screening (V76 51) (Z12 11,Z12 12)   9  Encounter for screening mammogram for breast cancer (V76 12) (Z12 31)   10  Encounter for special screening examination for genitourinary disorder (V81 6) (Z13 89)   11  Flu vaccine need (V04 81) (Z23)   12  Hydronephrosis with renal and ureteral calculus obstruction (591) (N13 2)   13  Kidney stones (592 0) (N20 0)   14  Lumbar spondylosis (721 3) (M47 816)   15  Neck pain (723 1) (M54 2)   16  Need for pneumococcal vaccination (V03 82) (Z23)   17  Nephrolithiasis (592 0) (N20 0)   18  Sacroiliitis (720 2) (M46 1)   19  Vertigo (780 4) (R42)   20  Weight gain (783 1) (R63 5)    Current Meds   1  Daily Multiple Vitamin TABS; Therapy: (Recorded:20Nov2015) to Recorded   2  Gabapentin 100 MG Oral Capsule; 1 PO TID prn pain; Therapy: 76WVR9569 to (Evaluate:94Sam8618)  Requested for: 83HKN6787; Last   Rx:82Kkh3807 Ordered   3  Ibuprofen 800 MG Oral Tablet; take 1 tablet by mouth every 8 hours as needed for pain; Therapy: 24CHS7213 to (Evaluate:02Jun2017)  Requested for: 26MNQ0970; Last   Rx:25Xes1878 Ordered    Allergies    1  TraMADol HCl TABS   2  Voltaren   3  Demerol TABS    Plan  Encounter for colorectal cancer screening    · COLONOSCOPY (GI, SURG); Status:Active - Retrospective By Protocol Authorization;   Requested for:15Tti6395;     Signatures   Electronically signed by : Josefina Gonzalez, ; Aug 24 2017 11:02AM EST                       (Author)

## 2018-01-15 NOTE — RESULT NOTES
Message   Recorded as Task   Date: 04/12/2017 08:22 AM, Created By: Keny Marley   Task Name: Miscellaneous   Assigned To: [facility] surgery sched,Team   Regarding Patient: Mitch Lu, Status: In Progress   MooseClaudette Scott - 12 Apr 2017 8:22 AM     TASK CREATED  Pt is having a lot of pain and would like to make an appt  possibly for a procedure  Not sure what she should do  Please call [phone]   Ben Tracy - 12 Apr 2017 9:09 AM     TASK REPLIED TO: Previously Assigned To Rosa Sanchez  S/w pt  who states that since Friday, she has been experiencing pain again  Pt  states that it is not as severe as it was before her last inj , but she "can't move fast," pain increases when going to sit down, getting in and out of car, when pt  sneezes, etc  Pt  states that the pain currently is located in her R lower back and hip  Per pt  no pain currently on her L side  Pt  denies any recent injury  Pt  states that she is currently taking Motrin and Advil, but it is "messing up my stomach " Per pt  her current pain level is a 5/10  Pt  states that the last inj  she had really helped, and she is looking to see if she can repeat that before her pain continues to worsen  Pt  was advised that RN will relay information to Dr Faith Philippe and c/b w/ his recommendations  Pt  verbalized understanding and was appreciative  Please advise  Thank you     Rosa Sanchez - 12 Apr 2017 11:02 AM     TASK REPLIED TO: Previously Assigned To Rosa Sanchez                      yes ok to repeat (B) SIJ injection   Shira Malave - 12 Apr 2017 11:17 AM     TASK REASSIGNED: Previously Assigned To [id number] [place] [address] clinical,Team   Glo Garcia - 12 Apr 2017 12:18 PM     TASK EDITED  noted, will coordinate   Glo Garcia - 12 Apr 2017 3:19 PM     TASK EDITED  Spoke with patient and scheduled:     bilateral SIJ injection on 4/17    Reviewed instructions: , NPO 1 hour prior, loose-fitting/comfortable clothing, if ill/start abx/infx/fever to call and reschedule  She stated verbal understanding

## 2018-01-16 NOTE — RESULT NOTES
Message   Recorded as Task   Date: 04/21/2017 12:15 PM, Created By: Elidia Nova   Task Name: Follow Up   Assigned To: [id number] [address] end procedure,Team   Regarding Patient: Yuliana Rubio, Status: Active   Comment:    Trae Mata - 21 Apr 2017 12:15 PM     TASK CREATED  Pt  is S/P B/L SIJ INJ ON 4/17  F/U is PRN   Trae Mata - 24 Apr 2017 3:27 PM     TASK EDITED  Pt  reports no relief post inj  F/U is PRN  If she isn't feeling more relief next week she will call for an appt     Harpal Dallas - 01 May 2017 7:55 AM     TASK REPLIED TO: Previously Assigned To Harpal Dallas                      agree        Signatures   Electronically signed by : Fabian Smallwood, ; May  1 2017  1:26PM EST                       (Author)

## 2018-01-16 NOTE — RESULT NOTES
Message   Recorded as Task   Date: 11/23/2016 11:58 AM, Created By: Rock Deluna   Task Name: Follow Up   Assigned To: [id number] [address] end procedure,Team   Regarding Patient: Lucian Rodriguez, Status: Active   CommentJena Rodriguez - 23 Nov 2016 11:58 AM     TASK CREATED  Pt  is S/P B/L SIJ INJ on 11/14 by Dr Shimon Pan  F/U is 12/16  Rock Deluna - 25 Nov 2016 2:04 PM     TASK EDITED  Pt  reports 70% relief post inj , with no s/s of infection and can perform ADL's without pain  Pt  now reports right sided pain by the lowwer ribs  Pt  has F/U on 1/16     Vaishnavi Shah - 28 Nov 2016 8:28 AM     TASK REPLIED TO: Previously Assigned To Vaishnavi Shah                     aware follow up as planned        Signatures   Electronically signed by : Jolly Jasmine, ; Nov 28 2016 12:44PM EST                       (Author)

## 2018-01-16 NOTE — RESULT NOTES
Verified Results  XR SPINE LUMBAR COMPLETE W [facility] MINIMUM 6 VIEWS [id number] 01:53PM Christian Dwyer Order Number: [id number]     Test Name Result Flag Reference   XR SPINE LUMBAR COMPLETE W BENDING MINIMUM 6 VIEWS (Report)     LUMBAR SPINE     INDICATION: Mid back pain and right-sided back pain and some numbness for about a month  COMPARISON: None     VIEWS: AP, bilateral oblique, coned down and lateral projections in neutral, flexion and extension; 7 images     FINDINGS:     Alignment is normal      There is no subluxation and alignment is stable in flexion, extension, and neutral positioning  There is no radiographic evidence of acute fracture or destructive osseous lesion  There is degenerative arthritis of the facet joints from L3 to S1  Intervertebral disc spaces appear relatively well-maintained other than perhaps slight narrowing at L5-S1 and L1-L2  There is moderate vertebral endplate bone spur formation anteriorly    from L1 to L5  There appears to be some narrowing of the lower thoracic discs and some endplate bone spur formation in that region as well  Visualized soft tissues appear unremarkable  IMPRESSION:     Degenerative arthritis of the facet joints from L3 to S1  Disc spaces appear relatively well-maintained         Workstation performed: [id number]     Signed by:   Howie Joel MD   9/19/16

## 2018-01-18 NOTE — PROGRESS NOTES
Assessment    1  Encounter for preventive health examination (V70 0) (Z00 00)    Plan  Asymptomatic postmenopausal state    · * DXA BONE DENSITY SPINE HIP AND PELVIS; Status:Hold For - Scheduling; Requested  for:08Owi8286;   Encounter for colorectal cancer screening    · *1 -  [facility]  *  Status: Active   Requested for: 73Mdw2415  Care Summary provided  : Yes  Encounter for screening mammogram for breast cancer    · * MAMMO SCREENING BILATERAL W CAD; Status:Hold For - Scheduling; Requested  for:23Aug2017;   Encounter for special screening examination for genitourinary disorder    · *VB - Urinary Incontinence Screen (Dx Z13 89 Screen for UI); Status:Resulted - Requires  Verification,Retrospective By Protocol Authorization;   Done: 63MWA3243 10:06AM  Health Maintenance    · (1) COMPREHENSIVE METABOLIC PANEL; Status:Active; Requested for:86Rjk3014;    · (1) LIPID PANEL FASTING W DIRECT LDL REFLEX; Status:Active; Requested  for:19Jav3516;     Discussion/Summary    Rec tetanus shot and shingles shot-check prices at pharmacies, physical exam unremarkable  Impression: Initial Annual Wellness Visit, with preventive exam as well as age and risk appropriate counseling completed  Cardiovascular screening and counseling: counseling was given on maintaining a healthy diet, counseling was given on maintaining a healthy weight and due for a lipid panel  Diabetes screening and counseling: counseling was given on maintaining a healthy diet, counseling was given on maintaining a healthy weight and due for blood glucose  Colorectal cancer screening and counseling: due for a colonoscopy (low risk)  Breast cancer screening and counseling: due for a screening mammogram    Cervical cancer screening and counseling: screening not indicated  Osteoporosis screening and counseling: the risks and benefits of screening were discussed and due for DXA axial skeleton     Abdominal aortic aneurysm screening and counseling: screening not indicated  Glaucoma screening and counseling: screening is current  HIV screening and counseling: screening not indicated  Immunizations: the risks and benefits of influenza vaccination were discussed with the patient, the risks and benefits of the Zostavax vaccine were discussed with the patient and the risks and benefits of the Tdap vaccine were discussed with the patient  Advance Directive Planning: not complete, she was encouraged to follow-up with me to discuss her questions and/or decisions  Patient Discussion: follow-up visit needed in one year  Chief Complaint  pt is here for AWV      History of Present Illness  The patient is being seen for the initial annual wellness visit  Medicare Screening and Risk Factors   Hospitalizations: she has been previously hospitalizied and she has been hospitalized 7- 5 surgeries, 2 childbirth times  Medicare Screening Tests Risk Questions   Abdominal aortic aneurysm risk assessment: none indicated  Osteoporosis risk assessment: female gender and over 48years of age  HIV risk assessment: none indicated  Drug and Alcohol Use: The patient has never smoked cigarettes  The patient reports occasional alcohol use  She has never used illicit drugs  Diet and Physical Activity: Current diet includes well balanced meals, 1 servings of fruit per day, 1 servings of vegetables per day, 1 servings of meat per day and 2 cups of coffee per day  The patient does not exercise  Exercise: walking, goes up and down steps at home  Mood Disorder and Cognitive Impairment Screening: PHQ-9 Depression Scale   Over the past 2 weeks, how often have you been bothered by the following problems? 1 ) Little interest or pleasure in doing things? Not at all    2 ) Feeling down, depressed or hopeless? Not at all  Depression screening  no significant symptoms     Cognitive impairment screening: denies difficulty learning/retaining new information, denies difficulty handling complex tasks, denies difficulty with reasoning, denies difficulty with spatial ability and orientation, denies difficulty with language, denies difficulty with behavior and occ forgetful  Functional Ability/Level of Safety: Hearing is slightly decreased, slightly decreased in the right ear and significantly decreased in the left ear  She uses a hearing aid  The patient is currently able to do activities of daily living without limitations, able to do instrumental activities of daily living without limitations, able to participate in social activities without limitations and able to drive without limitations  Activities of daily living details: does not need help using the phone, no transportation help needed, does not need help shopping, no meal preparation help needed, does not need help doing housework, does not need help doing laundry, does not need help managing medications and does not need help managing money  Fall risk factors: The patient fell 0 times in the past 12 months  Injury History: antihypertensive use  Home safety risk factors:  no unfamiliar surroundings, no loose rugs, no poor household lighting, no uneven floors, no household clutter, grab bars in the bathroom and handrails on the stairs  Advance Directives: Advance directives: no living will, no durable power of  for health care directives and no advance directives  Co-Managers and Medical Equipment/Suppliers: See Patient Care Team     The patient currently has no urinary incontinence symptoms  Patient Care Team    Care Team Member Role Specialty Office Number   Ashia Salas MD  Urology [phone]   Liz Montgomery DO  Pain Management [phone]   Guille CURRAN  Pain Management [phone]     Review of Systems    Constitutional: no malaise and no fatigue  ENT: no nasal congestion  Cardiovascular: no chest pain  Respiratory: no shortness of breath  Gastrointestinal: no abdominal pain  Integumentary and Breasts: no rashes  Neurological: no headache  Hematologic and Lymphatic: no swollen glands  Active Problems    1  Arthralgia (719 40) (M25 50)   2  Back pain (724 5) (M54 9)   3  Chronic low back pain (724 2,338 29) (M54 5,G89 29)   4  Cough (786 2) (R05)   5  Degenerative disc disease, thoracic (722 51) (M51 34)   6  Edema (782 3) (R60 9)   7  Flu vaccine need (V04 81) (Z23)   8  Hydronephrosis with renal and ureteral calculus obstruction (591) (N13 2)   9  Kidney stones (592 0) (N20 0)   10  Lumbar spondylosis (721 3) (M47 816)   11  Neck pain (723 1) (M54 2)   12  Need for pneumococcal vaccination (V03 82) (Z23)   13  Nephrolithiasis (592 0) (N20 0)   14  Sacroiliitis (720 2) (M46 1)   15  Vertigo (780 4) (R42)   16  Weight gain (783 1) (R63 5)    Past Medical History    1  History of gastroesophageal reflux (GERD) (V12 79) (Z87 19)   2  History of osteoarthritis (V13 4) (Z87 39)   3  Personal history of osteoporosis (V13 59) (Z87 39)    The active problems and past medical history were reviewed and updated today  Surgical History    1  History of Cholecystectomy   2  History of Knee Surgery   3  History of Tubal Ligation    The surgical history was reviewed and updated today  Family History  Father    1  FH: mental illness (V17 0) (Z81 8)   2  Family history of Prostate cancer  Daughter    3  FH: mental illness (V17 0) (Z81 8)    The family history was reviewed and updated today  Social History    · Denied: History of Alcohol use   · Daily caffeine consumption   · Does not have living will   · Denied: History of Exercises regularly   · Denied: History of domestic violence   ·    · Never a smoker   · No advance directives (V49 89) (Z78 9)   · Retired   · Two children  The social history was reviewed and updated today  Current Meds   1  Daily Multiple Vitamin TABS; Therapy: (Recorded:69Irz2386) to Recorded   2  Gabapentin 100 MG Oral Capsule; 1 PO TID prn pain; Therapy: 08ECX6792 to (Evaluate:01Aug2017)  Requested for: 39TLE7505; Last   Rx:02Feb2017 Ordered   3  Ibuprofen 800 MG Oral Tablet; take 1 tablet by mouth every 8 hours as needed for pain; Therapy: 88AVR8242 to (Evaluate:02Jun2017)  Requested for: 57OID3861; Last   Rx:65Asi3916 Ordered    The medication list was reviewed and updated today  Allergies    1  TraMADol HCl TABS   2  Voltaren   3  Demerol TABS    Immunizations  Influenza --- Pablo Dux: 16-Oct-2014  (69y); Series2: 20-Nov-2015  (70y)   PCV --- Pablo Dux: 20-Nov-2015  (70y)   PPSV --- Pablo Dux: 30-Sep-2011  (66y)     Vitals  Signs   Recorded: 23Aug2017 10:03AM   Heart Rate: 70  Systolic: 010  Diastolic: 80  Height: 4 ft 5 in  Weight: 160 lb   BMI Calculated: 40 05  BSA Calculated: 1 55    Results/Data  Falls Risk Assessment (Dx Z13 89 Screen for Neurologic Disorder) 95Qcz5067 10:09AM User, Agrivis     Test Name Result Flag Reference   Falls Risk      No falls in the past year     PHQ-9 Adult Depression Screening 20Wbd8866 10:09AM User, Agrivis     Test Name Result Flag Reference   PHQ-9 Adult Depression Score 0     Over the last two weeks, how often have you been bothered by any of the following problems? Little interest or pleasure in doing things: Not at all - 0  Feeling down, depressed, or hopeless: Not at all - 0  Trouble falling or staying asleep, or sleeping too much: Not at all - 0  Feeling tired or having little energy: Not at all - 0  Poor appetite or over eating: Not at all - 0  Feeling bad about yourself - or that you are a failure or have let yourself or your family down: Not at all - 0  Trouble concentrating on things, such as reading the newspaper or watching television: Not at all - 0  Moving or speaking so slowly that other people could have noticed   Or the opposite -  being so fidgety or restless that you have been moving around a lot more than usual: Not at all - 0  Thoughts that you would be better off dead, or of hurting yourself in some way: Not at all - 0   PHQ-9 Adult Depression Screening Negative     PHQ-9 Difficulty Level Not difficult at all     PHQ-9 Severity No Depression       *VB - Urinary Incontinence Screen (Dx Z13 89 Screen for UI) 73Hsp6494 10:06AM Omid Ego     Test Name Result Flag Reference   Urinary Incontinence Assessment 56Gcj0689                     Future Appointments    Date/Time Provider Specialty Site   08/29/2017 11:15 AM Skinny Ryan, [address]  Urology [facility]     Signatures   Electronically signed by :  Camille Peter MD; Aug 23 2017 10:49AM EST                       (Author)

## 2018-01-24 ENCOUNTER — TELEPHONE (OUTPATIENT)
Dept: FAMILY MEDICINE CLINIC | Facility: CLINIC | Age: 73
End: 2018-01-24

## 2018-01-24 DIAGNOSIS — R42 DIZZINESS: Primary | ICD-10-CM

## 2018-01-24 RX ORDER — MECLIZINE HYDROCHLORIDE 25 MG/1
25 TABLET ORAL 3 TIMES DAILY PRN
Qty: 30 TABLET | Refills: 2 | Status: SHIPPED | OUTPATIENT
Start: 2018-01-24 | End: 2019-03-18 | Stop reason: SDUPTHER

## 2018-06-11 ENCOUNTER — OFFICE VISIT (OUTPATIENT)
Dept: PAIN MEDICINE | Facility: MEDICAL CENTER | Age: 73
End: 2018-06-11
Payer: MEDICARE

## 2018-06-11 VITALS
RESPIRATION RATE: 14 BRPM | HEART RATE: 68 BPM | HEIGHT: 55 IN | SYSTOLIC BLOOD PRESSURE: 140 MMHG | TEMPERATURE: 98.3 F | WEIGHT: 155.6 LBS | BODY MASS INDEX: 36.01 KG/M2 | DIASTOLIC BLOOD PRESSURE: 68 MMHG

## 2018-06-11 DIAGNOSIS — M46.1 SACROILIITIS (HCC): Primary | ICD-10-CM

## 2018-06-11 PROCEDURE — 99214 OFFICE O/P EST MOD 30 MIN: CPT | Performed by: NURSE PRACTITIONER

## 2018-06-11 RX ORDER — NAPROXEN 250 MG/1
250 TABLET ORAL AS NEEDED
COMMUNITY
End: 2019-03-08 | Stop reason: HOSPADM

## 2018-06-11 NOTE — PROGRESS NOTES
Pt reports for follow-up for L lower back, hip, leg, and foot pain  Assessment:  1  Sacroiliitis ([address] ) - Bilateral        Plan:  Based on patient report and physical exam, the patient's symptomatology does seem to be consistent with sacroiliac mediated pain from sacroiliitis  We will schedule the patient for a bilateral SIJ injection to decrease any inflammatory components of the patient's pain symptoms  Complete risks and benefits including bleeding, infection, tissue reaction, nerve injury and allergic reaction were discussed  The approach was demonstrated using models and literature was provided  Verbal and written consent was obtained  I did encourage the patient to use her 800 milligrams of ibuprofen until her procedure  She can take this up to 3 times a day as needed  [place] Prescription Drug Monitoring Program report was reviewed and was appropriate     Follow up after procedure  History of Present Illness: The patient is a 74 y o  female who presents for a follow up office visit in regards to Back Pain (L lower back); Hip Pain (L hip); Leg Pain (L leg); and Foot Pain (L foot w/ edema)  The patients current symptoms include low back pain over her bilateral PSIS regions with radiation down her posterior thigh mainly on the left side  The patient states that the left side is worse than the right at this point  She last had bilateral sacroiliac joint injections in April of 2017  The patient reports that her pain just returned about 2 weeks ago  She is here to be evaluated for repeat injection    Patient tells me that she does take Motrin or Aleve on an as-needed basis  I have personally reviewed and/or updated the patient's past medical history, past surgical history, family history, social history, current medications, allergies, and vital signs today  Review of Systems  Review of Systems   Musculoskeletal: Positive for gait problem          Decreased ROM, joint stiffness Neurological: Positive for dizziness  Past Medical History:   Diagnosis Date    Arthritis     Chronic pain disorder     sciatic    Kidney stone        Past Surgical History:   Procedure Laterality Date    CHOLECYSTECTOMY      KNEE ARTHROSCOPY Left     [place] COLONOSCOPY FLX DX W/COLLJ SPEC WHEN PFRMD N/A 11/2/2017    Procedure: COLONOSCOPY with polypectomy x2;  Surgeon: Phoenix Mcgill MD;  Location: [facility]; Service: Gastroenterology    TUBAL LIGATION         No family history on file  Social History     Occupational History    Not on file  Social History Main Topics    Smoking status: Never Smoker    Smokeless tobacco: Never Used    Alcohol use No    Drug use: No    Sexual activity: Not on file         Current Outpatient Prescriptions:     ibuprofen (MOTRIN) 800 mg tablet, Take by mouth as needed for mild pain, Disp: , Rfl:     meclizine (ANTIVERT) 25 mg tablet, Take 1 tablet by mouth 3 (three) times a day as needed for dizziness, Disp: 30 tablet, Rfl: 2    naproxen (NAPROSYN) 250 mg tablet, Take 250 mg by mouth as needed for mild pain, Disp: , Rfl:     Multiple Vitamins-Minerals (WOMENS 50+ MULTI VITAMIN/MIN PO), Take by mouth, Disp: , Rfl:     Allergies   Allergen Reactions    Diclofenac Sodium      Other reaction(s): GI Upset    Meperidine     Tramadol      Other reaction(s): GI Upset       Physical Exam:    /68 (BP Location: Left arm, Patient Position: Sitting, Cuff Size: Standard)   Pulse 68   Temp 98 3 °F (36 8 °C) (Oral)   Resp 14   Ht 4' 5" (1 346 m)   Wt 70 6 kg (155 lb 9 6 oz)   BMI 38 95 kg/m²     Constitutional:normal, well developed, well nourished, alert, in no distress and non-toxic and no overt pain behavior    Eyes:anicteric  HEENT:grossly intact  Neck:supple, symmetric, trachea midline and no masses   Pulmonary:even and unlabored  Cardiovascular:No edema or pitting edema present  Skin:Normal without rashes or lesions and well hydrated  Psychiatric:Mood and affect appropriate  Neurologic:Cranial Nerves II-XII grossly intact  Musculoskeletal:normal   Lumbar Spine Exam    Appearance:  Normal lordosis  Palpation/Tenderness:  left sacroiliac joint tenderness  right sacroiliac joint tenderness    Range of Motion:  Flexion:  Minimally limited  with pain  Extension:  Minimally limited  with pain  Lateral Flexion - Left:  Minimally limited  with pain  Lateral Flexion - Right:  Minimally limited  with pain  Rotation - Left:  Minimally limited  with pain  Rotation - Right:  Minimally limited  with pain  Motor Strength:  Left hip flexion:  5/5  Left hip extension:  5/5  Right hip flexion:  5/5  Right hip extension:  5/5  Left knee flexion:  5/5  Left knee extension:  5/5  Right knee flexion:  5/5  Right knee extension:  5/5  Left foot dorsiflexion:  5/5  Left foot plantar flexion:  5/5  Right foot dorsiflexion:  5/5  Right foot plantar flexion:  5/5    Special Tests:  Left Straight Leg Test:  negative  Right Straight Leg Test:  negative  Left Brandon's Maneuver:  positive  Right Brandon's Maneuver:  positive      Imaging  FL spine and pain procedure    (Results Pending)     MRI LUMBAR SPINE WITHOUT CONTRAST     INDICATION:  1 month of low back pain  M 54 5, M 47 816      COMPARISON:  None      TECHNIQUE:  Sagittal T1, sagittal T2, sagittal inversion recovery, axial T1 and axial T2, coronal T2        IMAGE QUALITY:  Diagnostic     FINDINGS:     ALIGNMENT:  Normal alignment of the lumbar spine  No compression fracture  No spondylolysis or spondylolisthesis  No scoliosis      MARROW SIGNAL:  Normal marrow signal is identified within the visualized bony structures  No discrete marrow lesion      DISTAL CORD AND CONUS:  Normal size and signal within the distal cord and conus  The conus ends at the L1 level      PARASPINAL SOFT TISSUES:  Paraspinal soft tissues are unremarkable      SACRUM:  Normal signal within the sacrum   No evidence of insufficiency or stress fracture      LOWER THORACIC DISC SPACES:  Normal disc height and signal   No disc herniation, canal stenosis or foraminal narrowing      LUMBAR DISC SPACES:       L1-L2:     Mild degenerative disc disease  Small broad-based right foraminal/lateral disc protrusion with endplate hypertrophic osteophyte formation  There is no canal stenosis  Minimal right foraminal narrowing without discrete nerve impingement      L2-L3:  No disc herniation, canal stenosis or foraminal narrowing      L3-L4:  Mild annular bulging  Slight facet degenerative change  No disc herniation  No canal stenosis  Mild bilateral foraminal narrowing      L4-L5:  Mild annular bulging  Mild endplate and facet hypertrophic degenerative change  No canal stenosis  Mild right greater than left foraminal narrowing      L5-S1:  Normal disc height and signal   Moderate facet hypertrophic degenerative change  No disc herniation, canal stenosis or foraminal narrowing      IMPRESSION:     Mild lumbar degenerative disc disease  Mild right foraminal narrowing at L1-2 and L4-5    No canal stenosis or cauda equina compression      Orders Placed This Encounter   Procedures    FL spine and pain procedure

## 2018-06-25 ENCOUNTER — HOSPITAL ENCOUNTER (OUTPATIENT)
Dept: RADIOLOGY | Facility: MEDICAL CENTER | Age: 73
Discharge: HOME | End: 2018-06-25
Payer: MEDICARE

## 2018-06-25 VITALS
TEMPERATURE: 97.9 F | OXYGEN SATURATION: 99 % | SYSTOLIC BLOOD PRESSURE: 134 MMHG | HEART RATE: 72 BPM | DIASTOLIC BLOOD PRESSURE: 75 MMHG | RESPIRATION RATE: 18 BRPM

## 2018-06-25 DIAGNOSIS — M46.1 SACROILIITIS (HCC): ICD-10-CM

## 2018-06-25 PROCEDURE — 27096 INJECT SACROILIAC JOINT: CPT | Performed by: PHYSICAL MEDICINE & REHABILITATION

## 2018-06-25 RX ORDER — METHYLPREDNISOLONE ACETATE 40 MG/ML
80 INJECTION, SUSPENSION INTRA-ARTICULAR; INTRALESIONAL; INTRAMUSCULAR; PARENTERAL; SOFT TISSUE ONCE
Status: COMPLETED | OUTPATIENT
Start: 2018-06-25 | End: 2018-06-25

## 2018-06-25 RX ORDER — LIDOCAINE HYDROCHLORIDE 10 MG/ML
5 INJECTION, SOLUTION EPIDURAL; INFILTRATION; INTRACAUDAL; PERINEURAL ONCE
Status: COMPLETED | OUTPATIENT
Start: 2018-06-25 | End: 2018-06-25

## 2018-06-25 RX ORDER — BUPIVACAINE HCL/PF 2.5 MG/ML
10 VIAL (ML) INJECTION ONCE
Status: COMPLETED | OUTPATIENT
Start: 2018-06-25 | End: 2018-06-25

## 2018-06-25 RX ADMIN — LIDOCAINE HYDROCHLORIDE 2.5 ML: 10 INJECTION, SOLUTION EPIDURAL; INFILTRATION; INTRACAUDAL; PERINEURAL at 14:36

## 2018-06-25 RX ADMIN — IOHEXOL 1 ML: 300 INJECTION, SOLUTION INTRAVENOUS at 14:37

## 2018-06-25 RX ADMIN — METHYLPREDNISOLONE ACETATE 80 MG: 40 INJECTION, SUSPENSION INTRA-ARTICULAR; INTRALESIONAL; INTRAMUSCULAR; SOFT TISSUE at 14:37

## 2018-06-25 RX ADMIN — Medication 3 ML: at 14:37

## 2018-06-25 NOTE — H&P
History of Present Illness: The patient is a 74 y o  female who presents with complaints of   Bilateral lower back pain    Patient Active Problem List   Diagnosis    Sacroiliitis ([address] ) - Bilateral       Past Medical History:   Diagnosis Date    Arthritis     Chronic pain disorder     sciatic    Kidney stone        Past Surgical History:   Procedure Laterality Date    CHOLECYSTECTOMY      KNEE ARTHROSCOPY Left     CT COLONOSCOPY FLX DX W/COLLJ SPEC WHEN PFRMD N/A 11/2/2017    Procedure: COLONOSCOPY with polypectomy x2;  Surgeon: Diana Fischer MD;  Location: [facility]; Service: Gastroenterology    TUBAL LIGATION           Current Outpatient Prescriptions:     ibuprofen (MOTRIN) 800 mg tablet, Take 600 mg by mouth as needed for mild pain  , Disp: , Rfl:     meclizine (ANTIVERT) 25 mg tablet, Take 1 tablet by mouth 3 (three) times a day as needed for dizziness, Disp: 30 tablet, Rfl: 2    Multiple Vitamins-Minerals (WOMENS 50+ MULTI VITAMIN/MIN PO), Take 1 tablet by mouth as needed  , Disp: , Rfl:     naproxen (NAPROSYN) 250 mg tablet, Take 250 mg by mouth as needed for mild pain, Disp: , Rfl:     Allergies   Allergen Reactions    Diclofenac Sodium      Other reaction(s): GI Upset    Meperidine     Tramadol      Other reaction(s): GI Upset       Physical Exam:   Vitals:    06/25/18 1423   BP: 125/80   Pulse: 74   Resp: 20   Temp: 97 9 °F (36 6 °C)   SpO2: 98%     General: Awake, Alert, Oriented x 3, Mood and affect appropriate  Respiratory: Respirations even and unlabored  Cardiovascular: Peripheral pulses intact; no edema  Musculoskeletal Exam:  Bilateral lower back pain    ASA Score:  2  Patient/Chart Verification  Patient ID Verified: Verbal  Consents Confirmed: Procedural, To be obtained in the Pre-Procedure area  H&P( within 30 days) Verified: To be obtained in the Pre-Procedure area  Interval H&P(within 24 hr) Complete (required for Outpatients and Surgery Admit only):  To be obtained in the Pre-Procedure area  Allergies Reviewed: Yes  Anticoag/NSAID held?: NA  Currently on antibiotics?: No  Pregnancy denied?: NA    Assessment:   1   Sacroiliitis ([facility]) - Bilateral        Plan: B/L SIJ injection

## 2018-06-25 NOTE — DISCHARGE INSTRUCTIONS
Steroid Joint Injection   WHAT YOU NEED TO KNOW:   A steroid joint injection is a procedure to inject steroid medicine into a joint  Steroid medicine decreases pain and inflammation  The injection may also contain an anesthetic (numbing medicine) to decrease pain  It may be done to treat conditions such as arthritis, gout, or carpal tunnel syndrome  The injections may be given in your knee, ankle, shoulder, elbow, wrist, ankle or sacroiliac joint  1  Do not apply heat to any area that is numb  If you have discomfort or soreness at the injection site, you may apply ice today, 20 minutes on and 20 minutes off  Tomorrow you may use ice or warm, moist heat  Do not apply ice or heat directly to the skin  2  You may have an increase or change in the discomfort for 36-48 hours after your treatment  Apply ice and continue with any pain medicine you have been prescribed  3  Do not do anything strenuous today  You may shower, but no tub baths or hot tubs today  You may resume your normal activities tomorrow, but do not overdo it  Resume normal activities slowly when you are feeling better  4  If you experience redness, drainage or swelling at the injection site, or if you develop a fever above 100 degrees, please call [facility] at [phone] or go to the Emergency Room  5  Continue to take all routine medicines prescribed by your primary care physician unless otherwise instructed by our staff  Most blood thinners should be started again according to your regularly scheduled dosing  If you have any questions, please give our office a call  If you have a problem specifically related to your procedure, please call our office at [phone]  Problems not related to your procedure should be directed to your primary care physician

## 2018-07-03 ENCOUNTER — TELEPHONE (OUTPATIENT)
Dept: PAIN MEDICINE | Facility: MEDICAL CENTER | Age: 73
End: 2018-07-03

## 2018-12-08 ENCOUNTER — APPOINTMENT (EMERGENCY)
Dept: RADIOLOGY | Facility: HOSPITAL | Age: 73
End: 2018-12-08
Payer: MEDICARE

## 2018-12-08 ENCOUNTER — HOSPITAL ENCOUNTER (EMERGENCY)
Facility: HOSPITAL | Age: 73
Discharge: HOME | End: 2018-12-08
Attending: EMERGENCY MEDICINE | Admitting: EMERGENCY MEDICINE
Payer: MEDICARE

## 2018-12-08 VITALS
DIASTOLIC BLOOD PRESSURE: 77 MMHG | TEMPERATURE: 97.6 F | SYSTOLIC BLOOD PRESSURE: 148 MMHG | HEART RATE: 111 BPM | WEIGHT: 160.27 LBS | OXYGEN SATURATION: 98 % | BODY MASS INDEX: 40.12 KG/M2 | RESPIRATION RATE: 16 BRPM

## 2018-12-08 DIAGNOSIS — J01.90 ACUTE NON-RECURRENT SINUSITIS, UNSPECIFIED LOCATION: Primary | ICD-10-CM

## 2018-12-08 LAB
FLUAV + FLUBV RNA ISLT NAA+PROBE: NOT DETECTED
FLUAV + FLUBV RNA ISLT NAA+PROBE: NOT DETECTED

## 2018-12-08 PROCEDURE — 87502 INFLUENZA DNA AMP PROBE: CPT | Performed by: NURSE PRACTITIONER

## 2018-12-08 PROCEDURE — 71046 X-RAY EXAM CHEST 2 VIEWS: CPT

## 2018-12-08 PROCEDURE — 99283 EMERGENCY DEPT VISIT LOW MDM: CPT

## 2018-12-08 RX ORDER — AMOXICILLIN AND CLAVULANATE POTASSIUM 875; 125 MG/1; MG/1
1 TABLET, FILM COATED ORAL EVERY 12 HOURS
Qty: 14 TABLET | Refills: 0 | Status: SHIPPED | OUTPATIENT
Start: 2018-12-08 | End: 2018-12-15

## 2018-12-08 RX ORDER — LORATADINE 10 MG/1
10 TABLET ORAL DAILY
Qty: 30 TABLET | Refills: 0 | Status: SHIPPED | OUTPATIENT
Start: 2018-12-08 | End: 2019-03-18 | Stop reason: SDUPTHER

## 2018-12-08 NOTE — DISCHARGE INSTRUCTIONS
Rhinosinusitis   WHAT YOU NEED TO KNOW:   Rhinosinusitis (RS) is inflammation of your nose and sinuses  It commonly begins as a virus, often as a common cold  Viruses usually last 7 to 10 days and do not need treatment  When the virus does not get better on its own, you may have bacterial RS  This means that bacteria have begun to grow inside your sinuses  Acute RS lasts less than 4 weeks  Chronic RS lasts 12 weeks or more  Recurrent RS is when you have 4 or more episodes of RS in one year  DISCHARGE INSTRUCTIONS:   Return to the emergency department if:   · Your eye and eyelid are red, swollen, and painful  · You cannot open your eye  · You have double vision or you cannot see  · Your eyeball bulges out or you cannot move your eye  · You are more sleepy than normal or you notice changes in your ability to think, move, or talk  · You have a stiff neck, a fever, or a bad headache  · You have swelling of your forehead or scalp  Contact your healthcare provider if:   · Your symptoms are worse or do not improve after 3 to 5 days of treatment  · You have questions or concerns about your condition or care  Medicines: You may need any of the following:  · Acetaminophen  decreases pain and fever  It is available without a doctor's order  Ask how much to take and how often to take it  Follow directions  Acetaminophen can cause liver damage if not taken correctly  · NSAIDs , such as ibuprofen, help decrease swelling, pain, and fever  This medicine is available with or without a doctor's order  NSAIDs can cause stomach bleeding or kidney problems in certain people  If you take blood thinner medicine, always ask your healthcare provider if NSAIDs are safe for you  Always read the medicine label and follow directions  · Nasal steroid sprays  decrease inflammation in your nose and sinuses  · Decongestants  reduce swelling and drain mucus in the nose and sinuses   They may help you breathe easier  · Antihistamines  dry mucus in the nose and relieve sneezing  · Antibiotics  treat a bacterial infection and may be needed if your symptoms do not improve or they get worse  · Take your medicine as directed  Contact your healthcare provider if you think your medicine is not helping or if you have side effects  Tell him or her if you are allergic to any medicine  Keep a list of the medicines, vitamins, and herbs you take  Include the amounts, and when and why you take them  Bring the list or the pill bottles to follow-up visits  Carry your medicine list with you in case of an emergency  Self-care:   · Rinse your sinuses  Use a sinus rinse device to rinse your nasal passages with a saline (salt water) solution  This will help thin the mucus in your nose and rinse away pollen and dirt  It will also help reduce swelling so you can breathe normally  Ask your healthcare provider how often to do this  · Breathe in steam   Heat a bowl of water until you see steam  Lean over the bowl and make a tent over your head with a large towel  Breathe deeply for about 20 minutes  Be careful not to get too close to the steam or burn yourself  Do this 3 times a day  You can also breathe deeply when you take a hot shower  · Sleep with your head elevated  Place an extra pillow under your head before you go to sleep to help your sinuses drain  · Drink liquids as directed  Ask your healthcare provider how much liquid to drink each day and which liquids are best for you  Liquids will thin the mucus in your nose and help it drain  Avoid drinks that contain alcohol or caffeine  · Do not smoke, and avoid secondhand smoke  Nicotine and other chemicals in cigarettes and cigars can make your symptoms worse  Ask your healthcare provider for information if you currently smoke and need help to quit  E-cigarettes or smokeless tobacco still contain nicotine   Talk to your healthcare provider before you use these products  Follow up with your healthcare provider as directed: Follow up if your symptoms are worse or not better after 3 to 5 days of treatment  Write down your questions so you remember to ask them during your visits  © 2017 [address] Information is for End User's use only and may not be sold, redistributed or otherwise used for commercial purposes  All illustrations and images included in CareNotes® are the copyrighted property of [employer]  or [employer]  The above information is an  only  It is not intended as medical advice for individual conditions or treatments  Talk to your doctor, nurse or pharmacist before following any medical regimen to see if it is safe and effective for you

## 2019-01-04 ENCOUNTER — TELEPHONE (OUTPATIENT)
Dept: FAMILY MEDICINE CLINIC | Facility: CLINIC | Age: 74
End: 2019-01-04

## 2019-01-04 ENCOUNTER — OFFICE VISIT (OUTPATIENT)
Dept: FAMILY MEDICINE CLINIC | Facility: CLINIC | Age: 74
End: 2019-01-04
Payer: MEDICARE

## 2019-01-04 VITALS
WEIGHT: 158 LBS | SYSTOLIC BLOOD PRESSURE: 130 MMHG | BODY MASS INDEX: 36.57 KG/M2 | HEIGHT: 55 IN | TEMPERATURE: 97.5 F | DIASTOLIC BLOOD PRESSURE: 74 MMHG

## 2019-01-04 DIAGNOSIS — Z12.39 SCREENING FOR BREAST CANCER: ICD-10-CM

## 2019-01-04 DIAGNOSIS — Z78.0 POSTMENOPAUSAL: Primary | ICD-10-CM

## 2019-01-04 DIAGNOSIS — M51.34 DEGENERATIVE DISC DISEASE, THORACIC: ICD-10-CM

## 2019-01-04 DIAGNOSIS — Z13.6 SCREENING FOR CARDIOVASCULAR CONDITION: ICD-10-CM

## 2019-01-04 DIAGNOSIS — J03.90 TONSILLITIS: ICD-10-CM

## 2019-01-04 DIAGNOSIS — Z13.1 SCREENING FOR DIABETES MELLITUS (DM): ICD-10-CM

## 2019-01-04 DIAGNOSIS — Z00.00 MEDICARE ANNUAL WELLNESS VISIT, SUBSEQUENT: ICD-10-CM

## 2019-01-04 PROCEDURE — G0439 PPPS, SUBSEQ VISIT: HCPCS | Performed by: NURSE PRACTITIONER

## 2019-01-04 PROCEDURE — 99213 OFFICE O/P EST LOW 20 MIN: CPT | Performed by: NURSE PRACTITIONER

## 2019-01-04 RX ORDER — IBUPROFEN 800 MG/1
800 TABLET ORAL AS NEEDED
Qty: 30 TABLET | Refills: 3 | Status: SHIPPED | OUTPATIENT
Start: 2019-01-04 | End: 2019-01-04 | Stop reason: SDUPTHER

## 2019-01-04 RX ORDER — IBUPROFEN 800 MG/1
800 TABLET ORAL EVERY 8 HOURS PRN
Qty: 30 TABLET | Refills: 3 | Status: SHIPPED | OUTPATIENT
Start: 2019-01-04 | End: 2019-03-08 | Stop reason: HOSPADM

## 2019-01-04 RX ORDER — AMOXICILLIN 500 MG/1
500 CAPSULE ORAL EVERY 12 HOURS SCHEDULED
Qty: 20 CAPSULE | Refills: 0 | Status: SHIPPED | OUTPATIENT
Start: 2019-01-04 | End: 2019-01-14

## 2019-01-04 NOTE — PATIENT INSTRUCTIONS
Obesity   AMBULATORY CARE:   Obesity  is when your body mass index (BMI) is greater than 30  Your healthcare provider will use your height and weight to measure your BMI  The risks of obesity include  many health problems, such as injuries or physical disability  You may need tests to check for the following:  · Diabetes     · High blood pressure or high cholesterol     · Heart disease     · Gallbladder or liver disease     · Cancer of the colon, breast, prostate, liver, or kidney     · Sleep apnea     · Arthritis or gout  Seek care immediately if:   · You have a severe headache, confusion, or difficulty speaking  · You have weakness on one side of your body  · You have chest pain, sweating, or shortness of breath  Contact your healthcare provider if:   · You have symptoms of gallbladder or liver disease, such as pain in your upper abdomen  · You have knee or hip pain and discomfort while walking  · You have symptoms of diabetes, such as intense hunger and thirst, and frequent urination  · You have symptoms of sleep apnea, such as snoring or daytime sleepiness  · You have questions or concerns about your condition or care  Treatment for obesity  focuses on helping you lose weight to improve your health  Even a small decrease in BMI can reduce the risk for many health problems  Your healthcare provider will help you set a weight-loss goal   · Lifestyle changes  are the first step in treating obesity  These include making healthy food choices and getting regular physical activity  Your healthcare provider may suggest a weight-loss program that involves coaching, education, and therapy  · Medicine  may help you lose weight when it is used with a healthy diet and physical activity  · Surgery  can help you lose weight if you are very obese and have other health problems  There are several types of weight-loss surgery  Ask your healthcare provider for more information    Be successful losing weight:   · Set small, realistic goals  An example of a small goal is to walk for 20 minutes 5 days a week  Anther goal is to lose 5% of your body weight  · Tell friends, family members, and coworkers about your goals  and ask for their support  Ask a friend to lose weight with you, or join a weight-loss support group  · Identify foods or triggers that may cause you to overeat , and find ways to avoid them  Remove tempting high-calorie foods from your home and workplace  Place a bowl of fresh fruit on your kitchen counter  If stress causes you to eat, then find other ways to cope with stress  · Keep a diary to track what you eat and drink  Also write down how many minutes of physical activity you do each day  Weigh yourself once a week and record it in your diary  Eating changes: You will need to eat 500 to 1,000 fewer calories each day than you currently eat to lose 1 to 2 pounds a week  The following changes will help you cut calories:  · Eat smaller portions  Use small plates, no larger than 9 inches in diameter  Fill your plate half full of fruits and vegetables  Measure your food using measuring cups until you know what a serving size looks like  · Eat 3 meals and 1 or 2 snacks each day  Plan your meals in advance  Dionis[place] and eat at home most of the time  Eat slowly  · Eat fruits and vegetables at every meal   They are low in calories and high in fiber, which makes you feel full  Do not add butter, margarine, or cream sauce to vegetables  Use herbs to season steamed vegetables  · Eat less fat and fewer fried foods  Eat more baked or grilled chicken and fish  These protein sources are lower in calories and fat than red meat  Limit fast food  Dress your salads with olive oil and vinegar instead of bottled dressing  · Limit the amount of sugar you eat  Do not drink sugary beverages  Limit alcohol  Activity changes:  Physical activity is good for your body in many ways   It helps you burn calories and build strong muscles  It decreases stress and depression, and improves your mood  It can also help you sleep better  Talk to your healthcare provider before you begin an exercise program   · Exercise for at least 30 minutes 5 days a week  Start slowly  Set aside time each day for physical activity that you enjoy and that is convenient for you  It is best to do both weight training and an activity that increases your heart rate, such as walking, bicycling, or swimming  · Find ways to be more active  Do yard work and housecleaning  Walk up the stairs instead of using elevators  Spend your leisure time going to events that require walking, such as outdoor festivals or fairs  This extra physical activity can help you lose weight and keep it off  Follow up with your healthcare provider as directed: You may need to meet with a dietitian  Write down your questions so you remember to ask them during your visits  © 2017 [address] Information is for End User's use only and may not be sold, redistributed or otherwise used for commercial purposes  All illustrations and images included in CareNotes® are the copyrighted property of [employer]  or Jeremi Mabry  The above information is an  only  It is not intended as medical advice for individual conditions or treatments  Talk to your doctor, nurse or pharmacist before following any medical regimen to see if it is safe and effective for you  Urinary Incontinence   WHAT YOU NEED TO KNOW:   What is urinary incontinence? Urinary incontinence (UI) is when you lose control of your bladder  What causes UI? UI occurs because your bladder cannot store or empty urine properly  The following are the most common types of UI:  · Stress incontinence  is when you leak urine due to increased bladder pressure  This may happen when you cough, sneeze, or exercise       · Urge incontinence  is when you feel the need to urinate right away and leak urine accidentally  · Mixed incontinence  is when you have both stress and urge UI  What are the signs and symptoms of UI?   · You feel like your bladder does not empty completely when you urinate  · You urinate often and need to urinate immediately  · You leak urine when you sleep, or you wake up with the urge to urinate  · You leak urine when you cough, sneeze, exercise, or laugh  How is UI diagnosed? Your healthcare provider will ask how often you leak urine and whether you have stress or urge symptoms  Tell him which medicines you take, how often you urinate, and how much liquid you drink each day  You may need any of the following tests:  · Urine tests  may show infection or kidney function  · A pelvic exam  may be done to check for blockages  A pelvic exam will also show if your bladder, uterus, or other organs have moved out of place  · An x-ray, ultrasound, or CT  may show problems with parts of your urinary system  You may be given contrast liquid to help your organs show up better in the pictures  Tell the healthcare provider if you have ever had an allergic reaction to contrast liquid  Do not enter the MRI room with anything metal  Metal can cause serious injury  Tell the healthcare provider if you have any metal in or on your body  · A bladder scan  will show how much urine is left in your bladder after you urinate  You will be asked to urinate and then healthcare providers will use a small ultrasound machine to check the urine left in your bladder  · Cystometry  is used to check the function of your urinary system  Your healthcare provider checks the pressure in your bladder while filling it with fluid  Your bladder pressure may also be tested when your bladder is full and while you urinate  How is UI treated? · Medicines  can help strengthen your bladder control      · Electrical stimulation  is used to send a small amount of electrical energy to your pelvic floor muscles  This helps control your bladder function  Electrodes may be placed outside your body or in your rectum  For women, the electrodes may be placed in the vagina  · A bulking agent  may be injected into the wall of your urethra to make it thicker  This helps keep your urethra closed and decreases urine leakage  · Devices  such as a clamp, pessary, or tampon may help stop urine leaks  Ask your healthcare provider for more information about these and other devices  · Surgery  may be needed if other treatments do not work  Several types of surgery can help improve your bladder control  Ask your healthcare provider for more information about the surgery you may need  How can I manage my symptoms? · Do pelvic muscle exercises often  Your pelvic muscles help you stop urinating  Squeeze these muscles tight for 5 seconds, then relax for 5 seconds  Gradually work up to squeezing for 10 seconds  Do 3 sets of 15 repetitions a day, or as directed  This will help strengthen your pelvic muscles and improve bladder control  · A catheter  may be used to help empty your bladder  A catheter is a tiny, plastic tube that is put into your bladder to drain your urine  Your healthcare provider may tell you to use a catheter to prevent your bladder from getting too full and leaking urine  · Keep a UI record  Write down how often you leak urine and how much you leak  Make a note of what you were doing when you leaked urine  · Train your bladder  Go to the bathroom at set times, such as every 2 hours, even if you do not feel the urge to go  You can also try to hold your urine when you feel the urge to go  For example, hold your urine for 5 minutes when you feel the urge to go  As that becomes easier, hold your urine for 10 minutes  · Drink liquids as directed  Ask your healthcare provider how much liquid to drink each day and which liquids are best for you   You may need to limit the amount of liquid you drink to help control your urine leakage  Limit or do not have drinks that contain caffeine or alcohol  Do not drink any liquid right before you go to bed  · Prevent constipation  Eat a variety of high-fiber foods  Good examples are high-fiber cereals, beans, vegetables, and whole-grain breads  Prune juice may help make your bowel movement softer  Walking is the best way to trigger your intestines to have a bowel movement  · Exercise regularly and maintain a healthy weight  Ask your healthcare provider how much you should weigh and about the best exercise plan for you  Weight loss and exercise will decrease pressure on your bladder and help you control your leakage  Ask him to help you create a weight loss plan if you are overweight  When should I seek immediate care? · You have severe pain  · You are confused or cannot think clearly  When should I contact my healthcare provider? · You have a fever  · You see blood in your urine  · You have pain when you urinate  · You have new or worse pain, even after treatment  · Your mouth feels dry or you have vision changes  · Your urine is cloudy or smells bad  · You have questions or concerns about your condition or care  CARE AGREEMENT:   You have the right to help plan your care  Learn about your health condition and how it may be treated  Discuss treatment options with your caregivers to decide what care you want to receive  You always have the right to refuse treatment  The above information is an  only  It is not intended as medical advice for individual conditions or treatments  Talk to your doctor, nurse or pharmacist before following any medical regimen to see if it is safe and effective for you  © 2017 [address] Information is for End User's use only and may not be sold, redistributed or otherwise used for commercial purposes   All illustrations and images included in CareNotes® are the copyrighted property of [employer]  or Jeremi Mabry  Cigarette Smoking and Your Health   AMBULATORY CARE:   Risks to your health if you smoke:  Nicotine and other chemicals found in tobacco damage every cell in your body  Even if you are a light smoker, you have an increased risk for cancer, heart disease, and lung disease  If you are pregnant or have diabetes, smoking increases your risk for complications  Benefits to your health if you stop smoking:   · You decrease respiratory symptoms such as coughing, wheezing, and shortness of breath  · You reduce your risk for cancers of the lung, mouth, throat, kidney, bladder, pancreas, stomach, and cervix  If you already have cancer, you increase the benefits of chemotherapy  You also reduce your risk for cancer returning or a second cancer from developing  · You reduce your risk for heart disease, blood clots, heart attack, and stroke  · You reduce your risk for lung infections, and diseases such as pneumonia, asthma, chronic bronchitis, and emphysema  · Your circulation improves  More oxygen can be delivered to your body  If you have diabetes, you lower your risk for complications, such as kidney, artery, and eye diseases  You also lower your risk for nerve damage  Nerve damage can lead to amputations, poor vision, and blindness  · You improve your body's ability to heal and to fight infections  Benefits to the health of others if you stop smoking:  Tobacco is harmful to nonsmokers who breathe in your secondhand smoke  The following are ways the health of others around you may improve when you stop smoking:  · You lower the risks for lung cancer and heart disease in nonsmoking adults  · If you are pregnant, you lower the risk for miscarriage, early delivery, low birth weight, and stillbirth  You also lower your baby's risk for SIDS, obesity, developmental delay, and neurobehavioral problems, such as ADHD  · If you have children, you lower their risk for ear infections, colds, pneumonia, bronchitis, and asthma  For more information and support to stop smoking:   · Smokefree  gov  Phone: 8- 349 - 044-7420  Web Address: www smokefree  gov  Follow up with your healthcare provider as directed:  Write down your questions so you remember to ask them during your visits  © 2017 [address] Information is for End User's use only and may not be sold, redistributed or otherwise used for commercial purposes  All illustrations and images included in CareNotes® are the copyrighted property of [employer]  or Jeremi Mabry  The above information is an  only  It is not intended as medical advice for individual conditions or treatments  Talk to your doctor, nurse or pharmacist before following any medical regimen to see if it is safe and effective for you  Fall Prevention   AMBULATORY CARE:   Fall prevention  includes ways to make your home and other areas safer  It also includes ways you can move more carefully to prevent a fall  Health conditions that cause changes in your blood pressure, vision, or muscle strength and coordination may increase your risk for falls  Medicines may also increase your risk for falls if they make you dizzy, weak, or sleepy  Call 911 or have someone else call if:   · You have fallen and are unconscious  · You have fallen and cannot move part of your body  Contact your healthcare provider if:   · You have fallen and have pain or a headache  · You have questions or concerns about your condition or care  Fall prevention tips:   · Stand or sit up slowly  This may help you keep your balance and prevent falls  · Use assistive devices as directed  Your healthcare provider may suggest that you use a cane or walker to help you keep your balance  You may need to have grab bars put in your bathroom near the toilet or in the shower      · Wear shoes that fit well and have soles that   Wear shoes both inside and outside  Use slippers with good   Do not wear shoes with high heels  · Wear a personal alarm  This is a device that allows you to call 911 if you fall and need help  Ask your healthcare provider for more information  · Stay active  Exercise can help strengthen your muscles and improve your balance  Your healthcare provider may recommend water aerobics or walking  He or she may also recommend physical therapy to improve your coordination  Never start an exercise program without talking to your healthcare provider first      · Manage your medical conditions  Keep all appointments with your healthcare providers  Visit your eye doctor as directed  Home safety tips:   · Add items to prevent falls in the bathroom  Put nonslip strips on your bath or shower floor to prevent you from slipping  Use a bath mat if you do not have carpet in the bathroom  This will prevent you from falling when you step out of the bath or shower  Use a shower seat so you do not need to stand while you shower  Sit on the toilet or a chair in your bathroom to dry yourself and put on clothing  This will prevent you from losing your balance from drying or dressing yourself while you are standing  · Keep paths clear  Remove books, shoes, and other objects from walkways and stairs  Place cords for telephones and lamps out of the way so that you do not need to walk over them  Tape them down if you cannot move them  Remove small rugs  If you cannot remove a rug, secure it with double-sided tape  This will prevent you from tripping  · Install bright lights in your home  Use night lights to help light paths to the bathroom or kitchen  Always turn on the light before you start walking  · Keep items you use often on shelves within reach  Do not use a step stool to help you reach an item  · Paint or place reflective tape on the edges of your stairs    This will help you see the stairs better  Follow up with your healthcare provider as directed:  Write down your questions so you remember to ask them during your visits  © 2017 [address] Information is for End User's use only and may not be sold, redistributed or otherwise used for commercial purposes  All illustrations and images included in CareNotes® are the copyrighted property of [employer]  or Jeremi Mabry  The above information is an  only  It is not intended as medical advice for individual conditions or treatments  Talk to your doctor, nurse or pharmacist before following any medical regimen to see if it is safe and effective for you  Advance Directives   WHAT YOU NEED TO KNOW:   What are advance directives? Advance directives are legal documents that state your wishes and plans for medical care  These plans are made ahead of time in case you lose your ability to make decisions for yourself  Advance directives can apply to any medical decision, such as the treatments you want, and if you want to donate organs  What are the types of advance directives? There are many types of advance directives, and each state has rules about how to use them  You may choose a combination of any of the following:  · Living will: This is a written record of the treatment you want  You can also choose which treatments you do not want, which to limit, and which to stop at a certain time  This includes surgery, medicine, IV fluid, and tube feedings  · Durable power of  for healthcare [facility]): This is a written record that states who you want to make healthcare choices for you when you are unable to make them for yourself  This person, called a proxy, is usually a family member or a friend  You may choose more than 1 proxy  · Do not resuscitate (DNR) order:  A DNR order is used in case your heart stops beating or you stop breathing   It is a request not to have certain forms of treatment, such as CPR  A DNR order may be included in other types of advance directives  · Medical directive: This covers the care that you want if you are in a coma, near death, or unable to make decisions for yourself  You can list the treatments you want for each condition  Treatment may include pain medicine, surgery, blood transfusions, dialysis, IV or tube feedings, and a ventilator (breathing machine)  · Values history: This document has questions about your views, beliefs, and how you feel and think about life  This information can help others choose the care that you would choose  Why are advance directives important? An advance directive helps you control your care  Although spoken wishes may be used, it is better to have your wishes written down  Spoken wishes can be misunderstood, or not followed  Treatments may be given even if you do not want them  An advance directive may make it easier for your family to make difficult choices about your care  How do I decide what to put in my advance directives? · Make informed decisions:  Make sure you fully understand treatments or care you may receive  Think about the benefits and problems your decisions could cause for you or your family  Talk to healthcare providers if you have concerns or questions before you write down your wishes  You may also want to talk with your [employer] or , or a   Check your state laws to make sure that what you put in your advance directive is legal      · Sign all forms:  Sign and date your advance directive when you have finished  You may also need 2 witnesses to sign the forms  Witnesses cannot be your doctor or his staff, your spouse, heirs or beneficiaries, people you owe money to, or your chosen proxy  Talk to your family, proxy, and healthcare providers about your advance directive  Give each person a copy, and keep one for yourself in a place you can get to easily   Do not keep it hidden or locked away  · Review and revise your plans: You can revise your advance directive at any time, as long as you are able to make decisions  Review your plan every year, and when there are changes in your life, or your health  When you make changes, let your family, proxy, and healthcare providers know  Give each a new copy  Where can I find more information? · [employer]  [address] , [address]  Phone: 7- [phone] - [phone]  Phone: 3- 373 - [phone]  Web Address: http://www  aafp org  · [address] [facility])  [address] [address] [facility], [address] [facility] , [address] [place] [address]  Phone: [phone]  Phone: [phone]  Web Address: [employer]  CARE AGREEMENT:   You have the right to help plan your care  To help with this plan, you must learn about your health condition and treatment options  You must also learn about advance directives and how they are used  Work with your healthcare providers to decide what care will be used to treat you  You always have the right to refuse treatment  The above information is an  only  It is not intended as medical advice for individual conditions or treatments  Talk to your doctor, nurse or pharmacist before following any medical regimen to see if it is safe and effective for you  © 2017 [address] Information is for End User's use only and may not be sold, redistributed or otherwise used for commercial purposes  All illustrations and images included in CareNotes® are the copyrighted property of [employer] M [employer]  or Jeremi Mabry

## 2019-01-04 NOTE — PROGRESS NOTES
Assessment and Plan:    Problem List Items Addressed This Visit     None      Visit Diagnoses     Postmenopausal    -  Primary    Relevant Orders    DXA bone density spine hip and pelvis    Screening for breast cancer        Relevant Orders    Mammo screening bilateral w 3d & cad    Medicare annual wellness visit, subsequent        Screening for cardiovascular condition        Relevant Orders    Lipid panel    Screening for diabetes mellitus (DM)        Relevant Orders    Glucose, fasting    Tonsillitis            Health Maintenance Due   Topic Date Due    Depression Screening PHQ  1945    Medicare Annual Wellness Visit (AWV)  1945    Fall Risk  08/14/2008    Urinary Incontinence Screening  08/14/2008    INFLUENZA VACCINE  07/01/2018         HPI:  Roger Garcia is a 74 y o  female here for her Subsequent Wellness Visit  Patient Active Problem List   Diagnosis    Sacroiliitis ([address] ) - Bilateral    Lumbar spondylosis    Idiopathic osteoporosis    Degenerative disc disease, thoracic     Past Medical History:   Diagnosis Date    Arthritis     Chronic pain disorder     sciatic    GERD (gastroesophageal reflux disease)     Kidney stone     Osteoarthritis     Osteoporosis      Past Surgical History:   Procedure Laterality Date    CHOLECYSTECTOMY      KNEE ARTHROSCOPY Left     CT COLONOSCOPY FLX DX W/COLLJ SPEC WHEN PFRMD N/A 11/2/2017    Procedure: COLONOSCOPY with polypectomy x2;  Surgeon: Gabriel Mota MD;  Location: [facility];   Service: Gastroenterology    TUBAL LIGATION       Family History   Problem Relation Age of Onset    Mental illness Father     Prostate cancer Father     Mental illness Daughter      History   Smoking Status    Never Smoker   Smokeless Tobacco    Never Used     History   Alcohol Use No      History   Drug Use No       Current Outpatient Prescriptions   Medication Sig Dispense Refill    ibuprofen (MOTRIN) 800 mg tablet Take 600 mg by mouth as needed for mild pain        loratadine (CLARITIN) 10 mg tablet Take 1 tablet (10 mg total) by mouth daily for 30 days 30 tablet 0    meclizine (ANTIVERT) 25 mg tablet Take 1 tablet by mouth 3 (three) times a day as needed for dizziness 30 tablet 2    Multiple Vitamins-Minerals (WOMENS 50+ MULTI VITAMIN/MIN PO) Take 1 tablet by mouth as needed        naproxen (NAPROSYN) 250 mg tablet Take 250 mg by mouth as needed for mild pain       No current facility-administered medications for this visit  Allergies   Allergen Reactions    Diclofenac Sodium      Other reaction(s): GI Upset    Meperidine     Tramadol      Other reaction(s): GI Upset     Immunization History   Administered Date(s) Administered    H1N1, All Formulations 01/15/2010    Influenza 10/01/2009    Influenza Split High Dose Preservative Free IM 10/16/2014, 11/20/2015    Pneumococcal Conjugate 13-Valent 11/20/2015    Pneumococcal Polysaccharide PPV23 09/30/2011       Patient Care Team:  Romelia Keith MD as PCP - General  Dearl DO Seferino Zavala MD    Medicare Screening Tests and Risk Assessments:  Kate Pittman is here for her Subsequent Wellness visit  Last Medicare Wellness visit information reviewed, patient interviewed and updates made to the record today  Health Risk Assessment:  Patient rates overall health as fair  Patient feels that their physical health rating is Slightly better  Eyesight was rated as Same  Hearing was rated as Same  Patient feels that their emotional and mental health rating is Slightly better  Pain experienced by patient in the last 7 days has been Some  Patient's pain rating has been 4/10  Patient states that she has experienced no weight loss or gain in last 6 months  Emotional/Mental Health:  Patient has been feeling nervous/anxious  PHQ-9 Depression Screening:    Frequency of the following problems over the past two weeks:      1   Little interest or pleasure in doing things: 0 - not at all      2  Feeling down, depressed, or hopeless: 0 - not at all  PHQ-2 Score: 0          Broken Bones/Falls: Fall Risk Assessment:    In the past year, patient has experienced: No history of falling in past year          Bladder/Bowel:  Patient has not leaked urine accidently in the last six months  Patient reports loss of bowel control  Immunizations:  Patient has not had a flu vaccination within the last year  Patient has not received a pneumonia shot  Patient has not received a shingles shot  Patient has not received tetanus/diphtheria shot  Home Safety:  Patient has trouble with stairs inside or outside of their home  Patient currently reports that there are no safety hazards present in home, working smoke alarms, no working carbon monoxide detectors  Preventative Screenings:   No breast cancer screening performed, colon cancer screen completed, no cholesterol screen completed, glaucoma eye exam completed,     Nutrition:  Current diet: Regular with servings of the following:    Medications:  Patient is currently taking over-the-counter supplements  List of OTC medications includes: multi-vitamin   Patient is able to manage medications  Lifestyle Choices:  Patient reports no tobacco use  Patient has not smoked or used tobacco in the past   Patient reports no alcohol use  Patient drives a vehicle  Patient wears seat belt  Activities of Daily Living:  Can get out of bed by his or her self, able to dress self, able to make own meals, able to do own shopping, able to bathe self, can do own laundry/housekeeping, can manage own money, pay bills and track expenses    Advanced Directives:  Patient has not decided on power of   Patient has not completed advanced directive          Preventative Screening/Counseling:      Cardiovascular:      General: Risks and Benefits Discussed     Due for Labs/Analytes/Optional EKG: Lipid Panel          Diabetes:      General: Risks and Benefits Discussed      Due for labs: Blood Glucose          Colorectal Cancer:      General: Risks and Benefits Discussed and Screening Not Indicated          Breast Cancer:      General: Risks and Benefits Discussed          Cervical Cancer:      General: Risks and Benefits Discussed and Screening Not Indicated          Osteoporosis:      General: Risks and Benefits Discussed      Counseling: Calcium and Vitamin D Intake and Regular Weightbearing Exercise      Due for studies: DXA Axial          AAA:      General: Screening Not Indicated          Glaucoma:      General: Risks and Benefits Discussed      Comments: Dr Michelle [facility] on [facility]        HIV:      General: Screening Not Indicated          Hepatitis C:      General: Screening Not Indicated        Advanced Directives:   Patient has no living will for healthcare, does not have durable POA for healthcare, patient does not have an advanced directive  Information on ACP and/or AD provided       Immunizations:      Influenza: Influenza Recommended Annually      Pneumococcal: Lifetime Vaccine Completed

## 2019-01-04 NOTE — PROGRESS NOTES
Chief Complaint   Patient presents with   [facility]     75 y/o female is here for Medicare wellness visit   Cough     pt states that she has been sick x2mo and has a residual cough that will not go away  Assessment/Plan:     Diagnoses and all orders for this visit:    Postmenopausal  -     DXA bone density spine hip and pelvis; Future    Screening for breast cancer  -     Mammo screening bilateral w 3d & cad; Future    Medicare annual wellness visit, subsequent    Screening for cardiovascular condition  -     Lipid panel    Screening for diabetes mellitus (DM)  -     Glucose, fasting    Tonsillitis  -     amoxicillin (AMOXIL) 500 mg capsule; Take 1 capsule (500 mg total) by mouth every 12 (twelve) hours for 10 days    Degenerative disc disease, thoracic  -     ibuprofen (MOTRIN) 800 mg tablet; Take 1 tablet (800 mg total) by mouth as needed for mild pain          Subjective:      Patient ID: Seymour Matute is a 74 y o  female  Cough   This is a recurrent problem  The current episode started more than 1 month ago  The problem has been waxing and waning  The problem occurs every few minutes  The cough is non-productive  Associated symptoms include a sore throat  Pertinent negatives include no chest pain, ear pain, fever, headaches, nasal congestion, postnasal drip, rhinorrhea or wheezing  The symptoms are aggravated by dust (travel )  Risk factors for lung disease include travel  She has tried OTC cough suppressant for the symptoms  The treatment provided no relief  The following portions of the patient's history were reviewed and updated as appropriate: allergies, current medications, past family history, past medical history, past social history, past surgical history and problem list     Review of Systems   Constitutional: Negative for diaphoresis, fatigue and fever  HENT: Positive for sore throat, trouble swallowing and voice change   Negative for congestion, ear pain, postnasal drip and rhinorrhea  Respiratory: Positive for cough (moist)  Negative for wheezing  Cardiovascular: Negative for chest pain  Gastrointestinal: Negative for diarrhea, nausea and vomiting  Neurological: Negative for headaches  Objective:      /74   Temp 97 5 °F (36 4 °C)   Ht 4' 5" (1 346 m)   Wt 71 7 kg (158 lb)   BMI 39 55 kg/m²          Physical Exam   Constitutional: She is oriented to person, place, and time  She does not have a sickly appearance  She does not appear ill  Neck: No JVD present  No thyromegaly present  Cardiovascular: Normal rate, regular rhythm and intact distal pulses  No murmur heard  Pulmonary/Chest: Effort normal and breath sounds normal    Abdominal: Soft  Bowel sounds are normal    Lymphadenopathy:        Head (right side): Tonsillar adenopathy present  Head (left side): Tonsillar adenopathy present  She has no cervical adenopathy  Neurological: She is alert and oriented to person, place, and time  Psychiatric: She has a normal mood and affect   Thought content normal

## 2019-01-04 NOTE — TELEPHONE ENCOUNTER
Pt pharmacy called requesting clarification on SIG  Pharmacist wants to confirm frequency of medication intake every 4 or 5 or 8 hours, please advise

## 2019-03-03 ENCOUNTER — HOSPITAL ENCOUNTER (EMERGENCY)
Facility: HOSPITAL | Age: 74
Discharge: HOME | End: 2019-03-03
Attending: EMERGENCY MEDICINE | Admitting: EMERGENCY MEDICINE
Payer: MEDICARE

## 2019-03-03 ENCOUNTER — APPOINTMENT (EMERGENCY)
Dept: RADIOLOGY | Facility: HOSPITAL | Age: 74
End: 2019-03-03
Payer: MEDICARE

## 2019-03-03 VITALS
BODY MASS INDEX: 37.03 KG/M2 | SYSTOLIC BLOOD PRESSURE: 163 MMHG | WEIGHT: 160 LBS | RESPIRATION RATE: 16 BRPM | HEIGHT: 55 IN | TEMPERATURE: 97.1 F | DIASTOLIC BLOOD PRESSURE: 90 MMHG | HEART RATE: 80 BPM | OXYGEN SATURATION: 100 %

## 2019-03-03 DIAGNOSIS — M72.2 PLANTAR FASCIITIS: ICD-10-CM

## 2019-03-03 DIAGNOSIS — M79.673 FOOT PAIN: Primary | ICD-10-CM

## 2019-03-03 PROCEDURE — 73610 X-RAY EXAM OF ANKLE: CPT

## 2019-03-03 PROCEDURE — 99283 EMERGENCY DEPT VISIT LOW MDM: CPT

## 2019-03-03 PROCEDURE — 73630 X-RAY EXAM OF FOOT: CPT

## 2019-03-03 RX ORDER — ACETAMINOPHEN 325 MG/1
650 TABLET ORAL EVERY 6 HOURS PRN
Qty: 30 TABLET | Refills: 0 | Status: SHIPPED | OUTPATIENT
Start: 2019-03-03 | End: 2019-10-08

## 2019-03-03 RX ORDER — ACETAMINOPHEN 325 MG/1
650 TABLET ORAL ONCE
Status: COMPLETED | OUTPATIENT
Start: 2019-03-03 | End: 2019-03-03

## 2019-03-03 RX ADMIN — ACETAMINOPHEN 650 MG: 325 TABLET ORAL at 13:42

## 2019-03-03 NOTE — ED PROVIDER NOTES
History  Chief Complaint   Patient presents with    Foot Pain     Since Wednesday I am having foot pain, it is my arthritis  Denies any trauma  History provided by:  Patient   used: No    Medical Problem   Location:  Pt with left foot and arch pain for 4 days  no known injury   Severity:  Moderate  Onset quality:  Gradual  Duration:  4 days  Timing:  Constant  Progression:  Unchanged  Chronicity:  New  Associated symptoms: no abdominal pain, no chest pain, no congestion, no cough, no diarrhea, no ear pain, no fatigue, no fever, no headaches, no loss of consciousness, no myalgias, no nausea, no rash, no rhinorrhea, no shortness of breath, no sore throat, no vomiting and no wheezing        Prior to Admission Medications   Prescriptions Last Dose Informant Patient Reported? Taking? Multiple Vitamins-Minerals (WOMENS 50+ MULTI VITAMIN/MIN PO)  Self Yes No   Sig: Take 1 tablet by mouth as needed     ibuprofen (MOTRIN) 800 mg tablet   No No   Sig: Take 1 tablet (800 mg total) by mouth every 8 (eight) hours as needed for mild pain   loratadine (CLARITIN) 10 mg tablet   No No   Sig: Take 1 tablet (10 mg total) by mouth daily for 30 days   meclizine (ANTIVERT) 25 mg tablet   No No   Sig: Take 1 tablet by mouth 3 (three) times a day as needed for dizziness   naproxen (NAPROSYN) 250 mg tablet  Self Yes No   Sig: Take 250 mg by mouth as needed for mild pain      Facility-Administered Medications: None       Past Medical History:   Diagnosis Date    Arthritis     Chronic pain disorder     sciatic    GERD (gastroesophageal reflux disease)     Kidney stone     Osteoarthritis     Osteoporosis        Past Surgical History:   Procedure Laterality Date    CHOLECYSTECTOMY      KNEE ARTHROSCOPY Left     CO COLONOSCOPY FLX DX W/COLLJ SPEC WHEN PFRMD N/A 11/2/2017    Procedure: COLONOSCOPY with polypectomy x2;  Surgeon: Keny Louis MD;  Location: [facility];   Service: Gastroenterology    TUBAL LIGATION         Family History   Problem Relation Age of Onset    Mental illness Father     Prostate cancer Father     Mental illness Daughter      I have reviewed and agree with the history as documented  Social History     Tobacco Use    Smoking status: Never Smoker    Smokeless tobacco: Never Used   Substance Use Topics    Alcohol use: No    Drug use: No        Review of Systems   Constitutional: Negative  Negative for fatigue and fever  HENT: Negative  Negative for congestion, ear pain, rhinorrhea and sore throat  Eyes: Negative  Respiratory: Negative  Negative for cough, shortness of breath and wheezing  Cardiovascular: Negative  Negative for chest pain  Gastrointestinal: Negative  Negative for abdominal pain, diarrhea, nausea and vomiting  Endocrine: Negative  Genitourinary: Negative  Musculoskeletal: Negative  Negative for myalgias  Skin: Negative for rash  Allergic/Immunologic: Negative  Neurological: Negative  Negative for loss of consciousness and headaches  Psychiatric/Behavioral: Negative  All other systems reviewed and are negative  Physical Exam  Physical Exam   Constitutional: She appears well-developed and well-nourished  HENT:   Head: Normocephalic and atraumatic  Right Ear: External ear normal    Left Ear: External ear normal    Nose: Nose normal    Mouth/Throat: Oropharynx is clear and moist    Eyes: Pupils are equal, round, and reactive to light  Conjunctivae and EOM are normal    Neck: Normal range of motion  Neck supple  Cardiovascular: Normal rate, regular rhythm and normal heart sounds  Pulmonary/Chest: Effort normal and breath sounds normal    Abdominal: Soft  Bowel sounds are normal    Musculoskeletal: Normal range of motion  Left foot and  Arch pain  dp pulses strong bilat toes from     Skin: Skin is warm  Psychiatric: She has a normal mood and affect  Nursing note and vitals reviewed        Vital Signs  ED Triage Vitals Temperature Pulse Respirations Blood Pressure SpO2   03/03/19 1336 03/03/19 1336 03/03/19 1336 03/03/19 1336 03/03/19 1336   (!) 97 1 °F (36 2 °C) 80 16 163/90 100 %      Temp Source Heart Rate Source Patient Position - Orthostatic VS BP Location FiO2 (%)   03/03/19 1336 03/03/19 1336 03/03/19 1336 03/03/19 1336 --   Tympanic Monitor Sitting Left arm       Pain Score       03/03/19 1342       9           Vitals:    03/03/19 1336   BP: 163/90   Pulse: 80   Patient Position - Orthostatic VS: Sitting       Visual Acuity      ED Medications  Medications   acetaminophen (TYLENOL) tablet 650 mg (650 mg Oral Given 3/3/19 1342)       Diagnostic Studies  Results Reviewed     None                 XR ankle 3+ views LEFT   Final Result by King Adore MD (03/03 1529)   No acute displaced fracture seen   No gross lytic lesion seen            Workstation performed: [id number]         XR foot 3+ views LEFT   Final Result by King Adore MD (03/03 1527)      No acute displaced fracture seen            Workstation performed: [id number]                    Procedures  Procedures       Phone Contacts  ED Phone Contact    ED Course                               MDM    Disposition  Final diagnoses: Foot pain   Plantar fasciitis     Time reflects when diagnosis was documented in both MDM as applicable and the Disposition within this note     Time User Action Codes Description Comment    3/3/2019  2:18 PM Le Mock  Add [M22 181] Foot pain     3/3/2019  2:18 PM Le Mock  Add [M72 2] Plantar fasciitis       ED Disposition     ED Disposition Condition Date/Time Comment    Discharge Stable Sun Mar 3, 2019  2:18 PM Fredy Dupont discharge to home/self care              Follow-up Information     Follow up With Specialties Details Why [address], MD Orthopedic Surgery   [address] [place] [address]  [facility] [facility]  [phone]            Discharge Medication List as of 3/3/2019  2:20 PM      START taking these medications    Details   acetaminophen (TYLENOL) 325 mg tablet Take 2 tablets (650 mg total) by mouth every 6 (six) hours as needed for mild pain, Starting Sun 3/3/2019, Print         CONTINUE these medications which have NOT CHANGED    Details   ibuprofen (MOTRIN) 800 mg tablet Take 1 tablet (800 mg total) by mouth every 8 (eight) hours as needed for mild pain, Starting Fri 1/4/2019, Normal      loratadine (CLARITIN) 10 mg tablet Take 1 tablet (10 mg total) by mouth daily for 30 days, Starting Sat 12/8/2018, Until Mon 1/7/2019, Print      meclizine (ANTIVERT) 25 mg tablet Take 1 tablet by mouth 3 (three) times a day as needed for dizziness, Starting Wed 1/24/2018, Normal      Multiple Vitamins-Minerals (WOMENS 50+ MULTI VITAMIN/MIN PO) Take 1 tablet by mouth as needed  , Historical Med      naproxen (NAPROSYN) 250 mg tablet Take 250 mg by mouth as needed for mild pain, Historical Med           No discharge procedures on file      ED Provider  Electronically Signed by           Julia Hinds PA-C  03/03/19 204

## 2019-03-04 ENCOUNTER — TELEPHONE (OUTPATIENT)
Dept: FAMILY MEDICINE CLINIC | Facility: CLINIC | Age: 74
End: 2019-03-04

## 2019-03-04 DIAGNOSIS — M79.673 PAIN OF FOOT, UNSPECIFIED LATERALITY: Primary | ICD-10-CM

## 2019-03-06 ENCOUNTER — TELEPHONE (OUTPATIENT)
Dept: UROLOGY | Facility: CLINIC | Age: 74
End: 2019-03-06

## 2019-03-06 ENCOUNTER — HOSPITAL ENCOUNTER (EMERGENCY)
Facility: HOSPITAL | Age: 74
Discharge: HOME | End: 2019-03-06
Attending: EMERGENCY MEDICINE
Payer: MEDICARE

## 2019-03-06 ENCOUNTER — APPOINTMENT (EMERGENCY)
Dept: CT IMAGING | Facility: HOSPITAL | Age: 74
End: 2019-03-06
Payer: MEDICARE

## 2019-03-06 VITALS
SYSTOLIC BLOOD PRESSURE: 147 MMHG | HEART RATE: 76 BPM | TEMPERATURE: 97.2 F | BODY MASS INDEX: 39.9 KG/M2 | OXYGEN SATURATION: 100 % | WEIGHT: 159.39 LBS | DIASTOLIC BLOOD PRESSURE: 77 MMHG | RESPIRATION RATE: 20 BRPM

## 2019-03-06 DIAGNOSIS — N20.0 KIDNEY STONE ON LEFT SIDE: Primary | ICD-10-CM

## 2019-03-06 DIAGNOSIS — N13.30 HYDRONEPHROSIS: ICD-10-CM

## 2019-03-06 PROBLEM — N20.1 URETERAL CALCULUS, LEFT: Status: ACTIVE | Noted: 2019-03-06

## 2019-03-06 LAB
ALBUMIN SERPL BCP-MCNC: 4.3 G/DL (ref 3–5.2)
ALP SERPL-CCNC: 127 U/L (ref 43–122)
ALT SERPL W P-5'-P-CCNC: 21 U/L (ref 9–52)
ANION GAP SERPL CALCULATED.3IONS-SCNC: 8 MMOL/L (ref 5–14)
AST SERPL W P-5'-P-CCNC: 34 U/L (ref 14–36)
ATRIAL RATE: 72 BPM
BACTERIA UR QL AUTO: ABNORMAL /HPF
BACTERIA UR QL AUTO: ABNORMAL /HPF
BASOPHILS # BLD AUTO: 0 THOUSANDS/ΜL (ref 0–0.1)
BASOPHILS NFR BLD AUTO: 1 % (ref 0–1)
BILIRUB SERPL-MCNC: 0.8 MG/DL
BILIRUB UR QL STRIP: NEGATIVE
BILIRUB UR QL STRIP: NEGATIVE
BUN SERPL-MCNC: 21 MG/DL (ref 5–25)
CALCIUM SERPL-MCNC: 9.4 MG/DL (ref 8.4–10.2)
CAOX CRY URNS QL MICRO: ABNORMAL /HPF
CHLORIDE SERPL-SCNC: 102 MMOL/L (ref 97–108)
CLARITY UR: ABNORMAL
CLARITY UR: CLEAR
CO2 SERPL-SCNC: 26 MMOL/L (ref 22–30)
COLOR UR: ABNORMAL
COLOR UR: YELLOW
CREAT SERPL-MCNC: 0.99 MG/DL (ref 0.6–1.2)
EOSINOPHIL # BLD AUTO: 0.1 THOUSAND/ΜL (ref 0–0.4)
EOSINOPHIL NFR BLD AUTO: 1 % (ref 0–6)
ERYTHROCYTE [DISTWIDTH] IN BLOOD BY AUTOMATED COUNT: 14.4 %
GFR SERPL CREATININE-BSD FRML MDRD: 56 ML/MIN/1.73SQ M
GLUCOSE SERPL-MCNC: 133 MG/DL (ref 70–99)
GLUCOSE UR STRIP-MCNC: NEGATIVE MG/DL
GLUCOSE UR STRIP-MCNC: NEGATIVE MG/DL
HCT VFR BLD AUTO: 40.7 % (ref 36–46)
HGB BLD-MCNC: 13.3 G/DL (ref 12–16)
HGB UR QL STRIP.AUTO: 250
HGB UR QL STRIP.AUTO: 50
KETONES UR STRIP-MCNC: NEGATIVE MG/DL
KETONES UR STRIP-MCNC: NEGATIVE MG/DL
LACTATE SERPL-SCNC: 1.7 MMOL/L (ref 0.7–2)
LEUKOCYTE ESTERASE UR QL STRIP: 100
LEUKOCYTE ESTERASE UR QL STRIP: NEGATIVE
LYMPHOCYTES # BLD AUTO: 0.6 THOUSANDS/ΜL (ref 0.5–4)
LYMPHOCYTES NFR BLD AUTO: 8 % (ref 25–45)
MCH RBC QN AUTO: 29 PG (ref 26–34)
MCHC RBC AUTO-ENTMCNC: 32.8 G/DL (ref 31–36)
MCV RBC AUTO: 89 FL (ref 80–100)
MONOCYTES # BLD AUTO: 0.4 THOUSAND/ΜL (ref 0.2–0.9)
MONOCYTES NFR BLD AUTO: 5 % (ref 1–10)
NEUTROPHILS # BLD AUTO: 7 THOUSANDS/ΜL (ref 1.8–7.8)
NEUTS SEG NFR BLD AUTO: 85 % (ref 45–65)
NITRITE UR QL STRIP: NEGATIVE
NITRITE UR QL STRIP: NEGATIVE
NON-SQ EPI CELLS URNS QL MICRO: ABNORMAL /HPF
NON-SQ EPI CELLS URNS QL MICRO: ABNORMAL /HPF
P AXIS: 69 DEGREES
PH UR STRIP.AUTO: 5 [PH]
PH UR STRIP.AUTO: 7 [PH]
PLATELET # BLD AUTO: 187 THOUSANDS/UL (ref 150–450)
PMV BLD AUTO: 9.9 FL (ref 8.9–12.7)
POTASSIUM SERPL-SCNC: 4.3 MMOL/L (ref 3.6–5)
PR INTERVAL: 142 MS
PROT SERPL-MCNC: 7.9 G/DL (ref 5.9–8.4)
PROT UR STRIP-MCNC: ABNORMAL MG/DL
PROT UR STRIP-MCNC: NEGATIVE MG/DL
QRS AXIS: -14 DEGREES
QRSD INTERVAL: 58 MS
QT INTERVAL: 370 MS
QTC INTERVAL: 405 MS
RBC # BLD AUTO: 4.59 MILLION/UL (ref 4–5.2)
RBC #/AREA URNS AUTO: ABNORMAL /HPF
RBC #/AREA URNS AUTO: ABNORMAL /HPF
SODIUM SERPL-SCNC: 136 MMOL/L (ref 137–147)
SP GR UR STRIP.AUTO: 1.01 (ref 1–1.04)
SP GR UR STRIP.AUTO: 1.01 (ref 1–1.04)
T WAVE AXIS: 28 DEGREES
UROBILINOGEN UA: 1 MG/DL
UROBILINOGEN UA: NEGATIVE MG/DL
VENTRICULAR RATE: 72 BPM
WBC # BLD AUTO: 8.3 THOUSAND/UL (ref 4.5–11)
WBC #/AREA URNS AUTO: ABNORMAL /HPF
WBC #/AREA URNS AUTO: ABNORMAL /HPF

## 2019-03-06 PROCEDURE — 74177 CT ABD & PELVIS W/CONTRAST: CPT

## 2019-03-06 PROCEDURE — 96361 HYDRATE IV INFUSION ADD-ON: CPT

## 2019-03-06 PROCEDURE — 83605 ASSAY OF LACTIC ACID: CPT | Performed by: EMERGENCY MEDICINE

## 2019-03-06 PROCEDURE — 93010 ELECTROCARDIOGRAM REPORT: CPT | Performed by: INTERNAL MEDICINE

## 2019-03-06 PROCEDURE — 81003 URINALYSIS AUTO W/O SCOPE: CPT | Performed by: EMERGENCY MEDICINE

## 2019-03-06 PROCEDURE — 96360 HYDRATION IV INFUSION INIT: CPT

## 2019-03-06 PROCEDURE — 81001 URINALYSIS AUTO W/SCOPE: CPT | Performed by: EMERGENCY MEDICINE

## 2019-03-06 PROCEDURE — 93005 ELECTROCARDIOGRAM TRACING: CPT

## 2019-03-06 PROCEDURE — 85025 COMPLETE CBC W/AUTO DIFF WBC: CPT | Performed by: EMERGENCY MEDICINE

## 2019-03-06 PROCEDURE — 99284 EMERGENCY DEPT VISIT MOD MDM: CPT

## 2019-03-06 PROCEDURE — 80053 COMPREHEN METABOLIC PANEL: CPT | Performed by: EMERGENCY MEDICINE

## 2019-03-06 PROCEDURE — 36415 COLL VENOUS BLD VENIPUNCTURE: CPT | Performed by: EMERGENCY MEDICINE

## 2019-03-06 PROCEDURE — 87086 URINE CULTURE/COLONY COUNT: CPT | Performed by: EMERGENCY MEDICINE

## 2019-03-06 RX ORDER — LIDOCAINE 50 MG/G
1 PATCH TOPICAL ONCE
Status: DISCONTINUED | OUTPATIENT
Start: 2019-03-06 | End: 2019-03-06 | Stop reason: HOSPADM

## 2019-03-06 RX ORDER — OXYCODONE HYDROCHLORIDE 5 MG/1
5 TABLET ORAL EVERY 4 HOURS PRN
Qty: 6 TABLET | Refills: 0 | Status: SHIPPED | OUTPATIENT
Start: 2019-03-06 | End: 2019-03-16

## 2019-03-06 RX ORDER — TAMSULOSIN HYDROCHLORIDE 0.4 MG/1
0.4 CAPSULE ORAL
Qty: 7 CAPSULE | Refills: 0 | Status: SHIPPED | OUTPATIENT
Start: 2019-03-06 | End: 2019-05-01 | Stop reason: ALTCHOICE

## 2019-03-06 RX ORDER — TAMSULOSIN HYDROCHLORIDE 0.4 MG/1
0.4 CAPSULE ORAL ONCE
Status: COMPLETED | OUTPATIENT
Start: 2019-03-06 | End: 2019-03-06

## 2019-03-06 RX ORDER — MORPHINE SULFATE 4 MG/ML
4 INJECTION, SOLUTION INTRAMUSCULAR; INTRAVENOUS ONCE
Status: DISCONTINUED | OUTPATIENT
Start: 2019-03-06 | End: 2019-03-06

## 2019-03-06 RX ORDER — OXYCODONE HYDROCHLORIDE 5 MG/1
5 TABLET ORAL ONCE
Status: COMPLETED | OUTPATIENT
Start: 2019-03-06 | End: 2019-03-06

## 2019-03-06 RX ADMIN — OXYCODONE HYDROCHLORIDE 5 MG: 5 TABLET ORAL at 06:07

## 2019-03-06 RX ADMIN — LIDOCAINE 1 PATCH: 50 PATCH TOPICAL at 04:58

## 2019-03-06 RX ADMIN — TAMSULOSIN HYDROCHLORIDE 0.4 MG: 0.4 CAPSULE ORAL at 06:07

## 2019-03-06 RX ADMIN — IOHEXOL 85 ML: 350 INJECTION, SOLUTION INTRAVENOUS at 05:37

## 2019-03-06 RX ADMIN — SODIUM CHLORIDE 1000 ML: 9 INJECTION, SOLUTION INTRAVENOUS at 04:52

## 2019-03-06 NOTE — DISCHARGE INSTRUCTIONS
Please follow up with the Urology office within the next 2-3 days for re-evaluation, your information was given to the urologist and the office will be contacting you with further instructions however if your pain worsens, you developed fevers, chills, pain when you pee or any other concerning symptoms please return to the emergency department for evaluation  If you do not hear from the Urology office within the next 2 days please call them at the number provided for prompt follow-up

## 2019-03-06 NOTE — ED PROVIDER NOTES
History  Chief Complaint   Patient presents with    Flank Pain     75-year-old female with chronic pain presents for evaluation of left-sided lower back pain since yesterday evening  Patient states that this feels similar to her previous kidney stones and is associated with some darkness in her urine  No frequency urgency dysuria  She does state that she had several episodes of nonbloody nonbilious vomiting and is having some nausea  Otherwise no diarrhea constipation  She did not take any medications for this prior to arrival   She states that she last had a kidney stone 2 years ago which she passed on her own did not require any medications or instrumentation  Otherwise she was recently seen for left foot pain for for which she has a follow-up appointment at 09:00 this morning and has been taking Tylenol with some relief  No change in pain characteristics  Prior to Admission Medications   Prescriptions Last Dose Informant Patient Reported? Taking?    Multiple Vitamins-Minerals (WOMENS 50+ MULTI VITAMIN/MIN PO)  Self Yes No   Sig: Take 1 tablet by mouth as needed     acetaminophen (TYLENOL) 325 mg tablet   No No   Sig: Take 2 tablets (650 mg total) by mouth every 6 (six) hours as needed for mild pain   ibuprofen (MOTRIN) 800 mg tablet   No No   Sig: Take 1 tablet (800 mg total) by mouth every 8 (eight) hours as needed for mild pain   loratadine (CLARITIN) 10 mg tablet   No No   Sig: Take 1 tablet (10 mg total) by mouth daily for 30 days   meclizine (ANTIVERT) 25 mg tablet   No No   Sig: Take 1 tablet by mouth 3 (three) times a day as needed for dizziness   naproxen (NAPROSYN) 250 mg tablet  Self Yes No   Sig: Take 250 mg by mouth as needed for mild pain      Facility-Administered Medications: None       Past Medical History:   Diagnosis Date    Arthritis     Chronic pain disorder     sciatic    GERD (gastroesophageal reflux disease)     Kidney stone     Osteoarthritis     Osteoporosis Past Surgical History:   Procedure Laterality Date    CHOLECYSTECTOMY      KNEE ARTHROSCOPY Left     MS COLONOSCOPY FLX DX W/COLLJ SPEC WHEN PFRMD N/A 11/2/2017    Procedure: COLONOSCOPY with polypectomy x2;  Surgeon: Nik Kate MD;  Location: [facility]; Service: Gastroenterology    TUBAL LIGATION         Family History   Problem Relation Age of Onset    Mental illness Father     Prostate cancer Father     Mental illness Daughter      I have reviewed and agree with the history as documented  Social History     Tobacco Use    Smoking status: Never Smoker    Smokeless tobacco: Never Used   Substance Use Topics    Alcohol use: No    Drug use: No        Review of Systems   Constitutional: Negative for appetite change and fever  HENT: Negative for rhinorrhea and sore throat  Eyes: Negative for photophobia and visual disturbance  Respiratory: Negative for cough, chest tightness and wheezing  Cardiovascular: Negative for chest pain, palpitations and leg swelling  Gastrointestinal: Positive for abdominal pain, nausea and vomiting  Negative for abdominal distention, blood in stool, constipation and diarrhea  Genitourinary: Negative for dysuria, flank pain, frequency, hematuria and urgency  Musculoskeletal: Negative for back pain  Skin: Negative for rash  Neurological: Negative for dizziness, weakness and headaches  All other systems reviewed and are negative  Physical Exam  Physical Exam   Constitutional: She is oriented to person, place, and time  She appears well-developed and well-nourished  HENT:   Head: Normocephalic and atraumatic  Eyes: Pupils are equal, round, and reactive to light  EOM are normal    Neck: Normal range of motion  Neck supple  Cardiovascular: Normal rate and regular rhythm  Exam reveals no gallop and no friction rub  No murmur heard  Pulmonary/Chest: Effort normal  She has no wheezes  She has no rales  She exhibits no tenderness     Abdominal: Soft  She exhibits no distension and no mass  There is no rebound and no guarding  Tenderness to palpation over left lower flank without any associated abdominal tenderness, no rebound or guarding, no overlying skin lesions  Musculoskeletal:   Patient ambulates with walker at baseline, baseline gait   Neurological: She is alert and oriented to person, place, and time  Skin: Skin is warm and dry  Psychiatric: She has a normal mood and affect  Nursing note and vitals reviewed        Vital Signs  ED Triage Vitals [03/06/19 0354]   Temperature Pulse Respirations Blood Pressure SpO2   (!) 97 2 °F (36 2 °C) 94 18 163/83 100 %      Temp Source Heart Rate Source Patient Position - Orthostatic VS BP Location FiO2 (%)   Tympanic Monitor Sitting Left arm --      Pain Score       Worst Possible Pain           Vitals:    03/06/19 0354 03/06/19 0541   BP: 163/83 147/77   Pulse: 94 76   Patient Position - Orthostatic VS: Sitting Lying       Visual Acuity      ED Medications  Medications   lidocaine (LIDODERM) 5 % patch 1 patch (1 patch Topical Medication Applied 3/6/19 0458)   sodium chloride 0 9 % bolus 1,000 mL (1,000 mL Intravenous New Bag 3/6/19 0452)   iohexol (OMNIPAQUE) 350 MG/ML injection (MULTI-DOSE) 85 mL (85 mL Intravenous Given 3/6/19 0537)   tamsulosin (FLOMAX) capsule 0 4 mg (0 4 mg Oral Given 3/6/19 0607)   oxyCODONE (ROXICODONE) IR tablet 5 mg (5 mg Oral Given 3/6/19 0607)       Diagnostic Studies  Results Reviewed     Procedure Component Value Units Date/Time    Urine Microscopic [[id number]]  (Abnormal) Collected:  03/06/19 0610    Lab Status:  Final result Specimen:  Urine, Clean Catch Updated:  03/06/19 0665     RBC, UA 1-2 /hpf      WBC, UA 0-1 /hpf      Epithelial Cells Occasional /hpf      Bacteria, UA None Seen /hpf     UA w Reflex to Microscopic w Reflex to Culture [[id number]]  (Abnormal) Collected:  03/06/19 0610    Lab Status:  Final result Specimen:  Urine, Clean Catch Updated:  03/06/19 7979 Color, UA Straw     Clarity, UA Clear     Specific Gravity, UA 1 010     pH, UA 7 0     Leukocytes, UA Negative     Nitrite, UA Negative     Protein, UA Negative mg/dl      Glucose, UA Negative mg/dl      Ketones, UA Negative mg/dl      Bilirubin, UA Negative     Blood, UA 50 0     UROBILINOGEN UA Negative mg/dL     Urine Microscopic [[id number]]  (Abnormal) Collected:  03/06/19 0453    Lab Status:  Final result Specimen:  Urine, Clean Catch Updated:  03/06/19 0544     RBC, UA 10-20 /hpf      WBC, UA 10-20 /hpf      Epithelial Cells Moderate /hpf      Bacteria, UA Occasional /hpf      Ca Oxalate Marcie, UA Occasional /hpf     Urine culture [[id number]] Collected:  03/06/19 0453    Lab Status:   In process Specimen:  Urine, Clean Catch Updated:  03/06/19 0544    UA w Reflex to Microscopic w Reflex to Culture [[id number]]  (Abnormal) Collected:  03/06/19 0453    Lab Status:  Final result Specimen:  Urine, Clean Catch Updated:  03/06/19 0535     Color, UA Yellow     Clarity, UA Slightly Cloudy     Specific Gravity, UA 1 015     pH, UA 5 0     Leukocytes,  0     Nitrite, UA Negative     Protein, UA 30 (1+) mg/dl      Glucose, UA Negative mg/dl      Ketones, UA Negative mg/dl      Bilirubin, UA Negative     Blood,  0     UROBILINOGEN UA 1 0 mg/dL     Comprehensive metabolic panel [[id number]]  (Abnormal) Collected:  03/06/19 0427    Lab Status:  Final result Specimen:  Blood from Line, Venous Updated:  03/06/19 0448     Sodium 136 mmol/L      Potassium 4 3 mmol/L      Chloride 102 mmol/L      CO2 26 mmol/L      ANION GAP 8 mmol/L      BUN 21 mg/dL      Creatinine 0 99 mg/dL      Glucose 133 mg/dL      Calcium 9 4 mg/dL      AST 34 U/L      ALT 21 U/L      Alkaline Phosphatase 127 U/L      Total Protein 7 9 g/dL      Albumin 4 3 g/dL      Total Bilirubin 0 80 mg/dL      eGFR 56 ml/min/1 73sq m     Narrative:       Hemolysis  National Kidney Disease Education Program recommendations are as follows:  GFR calculation is accurate only with a steady state creatinine  Chronic Kidney disease less than 60 ml/min/1 73 sq  meters  Kidney failure less than 15 ml/min/1 73 sq  meters  Lactic acid, plasma [[id number]]  (Normal) Collected:  03/06/19 0427    Lab Status:  Final result Specimen:  Blood from Line, Venous Updated:  03/06/19 0445     LACTIC ACID 1 7 mmol/L     Narrative:       Result may be elevated if tourniquet was used during collection  CBC and differential [[id number]]  (Abnormal) Collected:  03/06/19 0427    Lab Status:  Final result Specimen:  Blood from Line, Venous Updated:  03/06/19 0440     WBC 8 30 Thousand/uL      RBC 4 59 Million/uL      Hemoglobin 13 3 g/dL      Hematocrit 40 7 %      MCV 89 fL      MCH 29 0 pg      MCHC 32 8 g/dL      RDW 14 4 %      MPV 9 9 fL      Platelets 801 Thousands/uL      Neutrophils Relative 85 %      Lymphocytes Relative 8 %      Monocytes Relative 5 %      Eosinophils Relative 1 %      Basophils Relative 1 %      Neutrophils Absolute 7 00 Thousands/µL      Lymphocytes Absolute 0 60 Thousands/µL      Monocytes Absolute 0 40 Thousand/µL      Eosinophils Absolute 0 10 Thousand/µL      Basophils Absolute 0 00 Thousands/µL                  CT abdomen pelvis with contrast   Final Result by Diana Rock DO (03/06 0551)   1   6 x 7 mm moderately obstructing proximal left ureteric calculus at the level of the left transverse process of L4    2   Mild abnormal appearance of the urinary bladder  Correlate for cystitis  3   Myomatous changes of the uterus with prominence of the endometrium for a postmenopausal female  Probable fluid within the endometrial cavity, likely related to cervical stenosis  A routine, follow-up, nonemergent, outpatient pelvic ultrasound is    suggested for further evaluation  The study was marked in [facility] for immediate notification        Workstation performed: [id number]                    Procedures  Procedures       Phone Contacts  ED Phone Contact    ED Course  ED Course as of Mar 06 0717   Wed Mar 06, 2019   0403 Procedure Note: EKG  Date/Time: 03/06/19 4:03 AM   Performed by: Enio France  Authorized by: Enio France  Indications / Diagnosis: CP  ECG reviewed by me, the ED Provider: yes   The EKG demonstrates:  Rhythm:  sinus arrythmia  Intervals: normal intervals  Axis: normal axis  QRS/Blocks: normal QRS  ST Changes: No acute ST Changes, no STD/JERICHO         6046 On evaluation patient resting comfortably, no acute complaints, pain improved after oxycodone  No further episodes of vomiting  Patient does state the last time she Peed in burned a little bit, no acute evidence of infection on her urinalysis with no bacteria seen  There is no leukocytosis and patient is afebrile  However there is CT evidence of his cystitis in setting of obstructive nephrolithiasis  Will contact Urology for further recommendations      [phone] Discussed with Urology, will follow up with the patient within the next 2 days in the office, information sent to urologist, Dr Davin Pérez to reach out to patient for appointment                                  MDM  Number of Diagnoses or Management Options  Diagnosis management comments: 77-year-old female presents for evaluation of left-sided flank pain, will obtain lab work will obtain CT of the abdomen to evaluate for etiology of her pain, will obtain urinalysis as well as EKG    Will treat symptomatically and re-evaluate      Disposition  Final diagnoses:   Kidney stone on left side   Hydronephrosis     Time reflects when diagnosis was documented in both MDM as applicable and the Disposition within this note     Time User Action Codes Description Comment    3/6/2019  7:02 AM Kishan Mendoza [N20 0] Kidney stone on left side     3/6/2019  7:02 AM Kishan Mendoza [N13 30] Hydronephrosis       ED Disposition     ED Disposition Condition Date/Time Comment    Discharge Stable Wed Mar 6, 2019  6:59 AM Devendra Serum discharge to home/self care  Follow-up Information     Follow up With Specialties Details Why Contact Info    Romelia Keith MD Family Medicine  As needed [address] [place] [address] [phone]      [facility] Emergency Department Emergency Medicine  If symptoms worsen [address] [facility] [address] [phone]    Austin Ochoa MD Urology Schedule an appointment as soon as possible for a visit in 2 days  [address] [address]  [phone]            Patient's Medications   Discharge Prescriptions    OXYCODONE (ROXICODONE) 5 MG IMMEDIATE RELEASE TABLET    Take 1 tablet (5 mg total) by mouth every 4 (four) hours as needed for moderate pain for up to 10 daysMax Daily Amount: 30 mg       Start Date: 3/6/2019  End Date: 3/16/2019       Order Dose: 5 mg       Quantity: 6 tablet    Refills: 0    TAMSULOSIN (FLOMAX) 0 4 MG    Take 1 capsule (0 4 mg total) by mouth daily with dinner       Start Date: 3/6/2019  End Date: --       Order Dose: 0 4 mg       Quantity: 7 capsule    Refills: 0     No discharge procedures on file      ED Provider  Electronically Signed by           Cecille Kinsey MD  03/06/19 8771

## 2019-03-06 NOTE — TELEPHONE ENCOUNTER
I was called about this patient by the [facility] Emergency Room  She has no clinical signs of infection and has a 7 millimeter proximal stone on the left  Her pain is currently controlled  She has passed similarly sized stones previously      The patient will follow up with us in a number of days at which time we will arrange for ureteroscopic stone intervention which can be canceled if she passes the stone on her own

## 2019-03-07 ENCOUNTER — TELEPHONE (OUTPATIENT)
Dept: UROLOGY | Facility: CLINIC | Age: 74
End: 2019-03-07

## 2019-03-07 ENCOUNTER — ANESTHESIA (OUTPATIENT)
Dept: PERIOP | Facility: HOSPITAL | Age: 74
End: 2019-03-07
Payer: MEDICARE

## 2019-03-07 ENCOUNTER — APPOINTMENT (OUTPATIENT)
Dept: RADIOLOGY | Facility: HOSPITAL | Age: 74
End: 2019-03-07
Payer: MEDICARE

## 2019-03-07 ENCOUNTER — HOSPITAL ENCOUNTER (OUTPATIENT)
Facility: HOSPITAL | Age: 74
Setting detail: OBSERVATION
Discharge: HOME | End: 2019-03-08
Attending: EMERGENCY MEDICINE | Admitting: INTERNAL MEDICINE
Payer: MEDICARE

## 2019-03-07 ENCOUNTER — ANESTHESIA EVENT (OUTPATIENT)
Dept: PERIOP | Facility: HOSPITAL | Age: 74
End: 2019-03-07
Payer: MEDICARE

## 2019-03-07 DIAGNOSIS — N20.1 URETERAL CALCULUS, LEFT: Primary | ICD-10-CM

## 2019-03-07 DIAGNOSIS — N20.1 URETEROLITHIASIS: ICD-10-CM

## 2019-03-07 DIAGNOSIS — R10.9 FLANK PAIN: ICD-10-CM

## 2019-03-07 DIAGNOSIS — N20.0 KIDNEY STONE: ICD-10-CM

## 2019-03-07 DIAGNOSIS — N20.2 NEPHROURETEROLITHIASIS: ICD-10-CM

## 2019-03-07 PROBLEM — R11.0 NAUSEA: Status: ACTIVE | Noted: 2019-03-07

## 2019-03-07 LAB
ALBUMIN SERPL BCP-MCNC: 3.2 G/DL (ref 3.5–5)
ALP SERPL-CCNC: 132 U/L (ref 46–116)
ALT SERPL W P-5'-P-CCNC: 23 U/L (ref 12–78)
ANION GAP SERPL CALCULATED.3IONS-SCNC: 10 MMOL/L (ref 4–13)
AST SERPL W P-5'-P-CCNC: 40 U/L (ref 5–45)
BACTERIA UR CULT: NORMAL
BASOPHILS # BLD AUTO: 0.02 THOUSANDS/ΜL (ref 0–0.1)
BASOPHILS NFR BLD AUTO: 0 % (ref 0–1)
BILIRUB SERPL-MCNC: 0.51 MG/DL (ref 0.2–1)
BUN SERPL-MCNC: 17 MG/DL (ref 5–25)
CALCIUM SERPL-MCNC: 8.4 MG/DL (ref 8.3–10.1)
CHLORIDE SERPL-SCNC: 104 MMOL/L (ref 100–108)
CO2 SERPL-SCNC: 26 MMOL/L (ref 21–32)
CREAT SERPL-MCNC: 1 MG/DL (ref 0.6–1.3)
EOSINOPHIL # BLD AUTO: 0.08 THOUSAND/ΜL (ref 0–0.61)
EOSINOPHIL NFR BLD AUTO: 1 % (ref 0–6)
ERYTHROCYTE [DISTWIDTH] IN BLOOD BY AUTOMATED COUNT: 14.1 % (ref 11.6–15.1)
GFR SERPL CREATININE-BSD FRML MDRD: 55 ML/MIN/1.73SQ M
GLUCOSE SERPL-MCNC: 104 MG/DL (ref 65–140)
HCT VFR BLD AUTO: 37.6 % (ref 34.8–46.1)
HGB BLD-MCNC: 11.9 G/DL (ref 11.5–15.4)
IMM GRANULOCYTES # BLD AUTO: 0.03 THOUSAND/UL (ref 0–0.2)
IMM GRANULOCYTES NFR BLD AUTO: 0 % (ref 0–2)
LYMPHOCYTES # BLD AUTO: 1.14 THOUSANDS/ΜL (ref 0.6–4.47)
LYMPHOCYTES NFR BLD AUTO: 15 % (ref 14–44)
MCH RBC QN AUTO: 29.1 PG (ref 26.8–34.3)
MCHC RBC AUTO-ENTMCNC: 31.6 G/DL (ref 31.4–37.4)
MCV RBC AUTO: 92 FL (ref 82–98)
MONOCYTES # BLD AUTO: 0.57 THOUSAND/ΜL (ref 0.17–1.22)
MONOCYTES NFR BLD AUTO: 8 % (ref 4–12)
NEUTROPHILS # BLD AUTO: 5.63 THOUSANDS/ΜL (ref 1.85–7.62)
NEUTS SEG NFR BLD AUTO: 76 % (ref 43–75)
NRBC BLD AUTO-RTO: 0 /100 WBCS
PLATELET # BLD AUTO: 196 THOUSANDS/UL (ref 149–390)
PMV BLD AUTO: 11.6 FL (ref 8.9–12.7)
POTASSIUM SERPL-SCNC: 3.7 MMOL/L (ref 3.5–5.3)
PROT SERPL-MCNC: 7.4 G/DL (ref 6.4–8.2)
RBC # BLD AUTO: 4.09 MILLION/UL (ref 3.81–5.12)
SODIUM SERPL-SCNC: 140 MMOL/L (ref 136–145)
WBC # BLD AUTO: 7.47 THOUSAND/UL (ref 4.31–10.16)

## 2019-03-07 PROCEDURE — 80053 COMPREHEN METABOLIC PANEL: CPT | Performed by: EMERGENCY MEDICINE

## 2019-03-07 PROCEDURE — C1769 GUIDE WIRE: HCPCS | Performed by: UROLOGY

## 2019-03-07 PROCEDURE — 99285 EMERGENCY DEPT VISIT HI MDM: CPT

## 2019-03-07 PROCEDURE — 52356 CYSTO/URETERO W/LITHOTRIPSY: CPT | Performed by: UROLOGY

## 2019-03-07 PROCEDURE — C2617 STENT, NON-COR, TEM W/O DEL: HCPCS | Performed by: UROLOGY

## 2019-03-07 PROCEDURE — 74420 UROGRAPHY RTRGR +-KUB: CPT

## 2019-03-07 PROCEDURE — 85025 COMPLETE CBC W/AUTO DIFF WBC: CPT | Performed by: EMERGENCY MEDICINE

## 2019-03-07 PROCEDURE — C1894 INTRO/SHEATH, NON-LASER: HCPCS | Performed by: UROLOGY

## 2019-03-07 PROCEDURE — 82360 CALCULUS ASSAY QUANT: CPT | Performed by: UROLOGY

## 2019-03-07 PROCEDURE — 99214 OFFICE O/P EST MOD 30 MIN: CPT | Performed by: UROLOGY

## 2019-03-07 PROCEDURE — 99220 PR INITIAL OBSERVATION CARE/DAY 70 MINUTES: CPT | Performed by: INTERNAL MEDICINE

## 2019-03-07 PROCEDURE — 36415 COLL VENOUS BLD VENIPUNCTURE: CPT | Performed by: EMERGENCY MEDICINE

## 2019-03-07 PROCEDURE — 96374 THER/PROPH/DIAG INJ IV PUSH: CPT

## 2019-03-07 PROCEDURE — 96375 TX/PRO/DX INJ NEW DRUG ADDON: CPT

## 2019-03-07 PROCEDURE — 1124F ACP DISCUSS-NO DSCNMKR DOCD: CPT | Performed by: INTERNAL MEDICINE

## 2019-03-07 DEVICE — STENT CONTOUR INJ 6FR 30CM: Type: IMPLANTABLE DEVICE | Site: URETER | Status: FUNCTIONAL

## 2019-03-07 RX ORDER — TAMSULOSIN HYDROCHLORIDE 0.4 MG/1
0.4 CAPSULE ORAL
Status: DISCONTINUED | OUTPATIENT
Start: 2019-03-07 | End: 2019-03-08 | Stop reason: HOSPADM

## 2019-03-07 RX ORDER — ACETAMINOPHEN 325 MG/1
650 TABLET ORAL EVERY 6 HOURS PRN
Status: DISCONTINUED | OUTPATIENT
Start: 2019-03-07 | End: 2019-03-08 | Stop reason: HOSPADM

## 2019-03-07 RX ORDER — ONDANSETRON 2 MG/ML
4 INJECTION INTRAMUSCULAR; INTRAVENOUS ONCE
Status: COMPLETED | OUTPATIENT
Start: 2019-03-07 | End: 2019-03-07

## 2019-03-07 RX ORDER — ACETAMINOPHEN 325 MG/1
650 TABLET ORAL EVERY 6 HOURS PRN
Status: DISCONTINUED | OUTPATIENT
Start: 2019-03-07 | End: 2019-03-07 | Stop reason: SDUPTHER

## 2019-03-07 RX ORDER — KETOROLAC TROMETHAMINE 30 MG/ML
INJECTION, SOLUTION INTRAMUSCULAR; INTRAVENOUS AS NEEDED
Status: DISCONTINUED | OUTPATIENT
Start: 2019-03-07 | End: 2019-03-07 | Stop reason: SURG

## 2019-03-07 RX ORDER — NEOSTIGMINE METHYLSULFATE 1 MG/ML
INJECTION INTRAVENOUS AS NEEDED
Status: DISCONTINUED | OUTPATIENT
Start: 2019-03-07 | End: 2019-03-07 | Stop reason: SURG

## 2019-03-07 RX ORDER — GLYCOPYRROLATE 0.2 MG/ML
INJECTION INTRAMUSCULAR; INTRAVENOUS AS NEEDED
Status: DISCONTINUED | OUTPATIENT
Start: 2019-03-07 | End: 2019-03-07 | Stop reason: SURG

## 2019-03-07 RX ORDER — FENTANYL CITRATE 50 UG/ML
INJECTION, SOLUTION INTRAMUSCULAR; INTRAVENOUS AS NEEDED
Status: DISCONTINUED | OUTPATIENT
Start: 2019-03-07 | End: 2019-03-07 | Stop reason: SURG

## 2019-03-07 RX ORDER — SODIUM CHLORIDE 9 MG/ML
100 INJECTION, SOLUTION INTRAVENOUS CONTINUOUS
Status: DISCONTINUED | OUTPATIENT
Start: 2019-03-07 | End: 2019-03-08 | Stop reason: HOSPADM

## 2019-03-07 RX ORDER — ONDANSETRON 2 MG/ML
4 INJECTION INTRAMUSCULAR; INTRAVENOUS ONCE AS NEEDED
Status: DISCONTINUED | OUTPATIENT
Start: 2019-03-07 | End: 2019-03-07 | Stop reason: HOSPADM

## 2019-03-07 RX ORDER — PROPOFOL 10 MG/ML
INJECTION, EMULSION INTRAVENOUS AS NEEDED
Status: DISCONTINUED | OUTPATIENT
Start: 2019-03-07 | End: 2019-03-07 | Stop reason: SURG

## 2019-03-07 RX ORDER — FENTANYL CITRATE/PF 50 MCG/ML
25 SYRINGE (ML) INJECTION
Status: DISCONTINUED | OUTPATIENT
Start: 2019-03-07 | End: 2019-03-07 | Stop reason: HOSPADM

## 2019-03-07 RX ORDER — MORPHINE SULFATE 4 MG/ML
4 INJECTION, SOLUTION INTRAMUSCULAR; INTRAVENOUS ONCE
Status: COMPLETED | OUTPATIENT
Start: 2019-03-07 | End: 2019-03-07

## 2019-03-07 RX ORDER — ONDANSETRON 2 MG/ML
4 INJECTION INTRAMUSCULAR; INTRAVENOUS EVERY 4 HOURS PRN
Status: DISCONTINUED | OUTPATIENT
Start: 2019-03-07 | End: 2019-03-08 | Stop reason: HOSPADM

## 2019-03-07 RX ORDER — MORPHINE SULFATE 4 MG/ML
4 INJECTION, SOLUTION INTRAMUSCULAR; INTRAVENOUS ONCE AS NEEDED
Status: COMPLETED | OUTPATIENT
Start: 2019-03-07 | End: 2019-03-07

## 2019-03-07 RX ORDER — DEXAMETHASONE SODIUM PHOSPHATE 4 MG/ML
INJECTION, SOLUTION INTRA-ARTICULAR; INTRALESIONAL; INTRAMUSCULAR; INTRAVENOUS; SOFT TISSUE AS NEEDED
Status: DISCONTINUED | OUTPATIENT
Start: 2019-03-07 | End: 2019-03-07 | Stop reason: SURG

## 2019-03-07 RX ORDER — ROCURONIUM BROMIDE 10 MG/ML
INJECTION, SOLUTION INTRAVENOUS AS NEEDED
Status: DISCONTINUED | OUTPATIENT
Start: 2019-03-07 | End: 2019-03-07 | Stop reason: SURG

## 2019-03-07 RX ADMIN — MORPHINE SULFATE 4 MG: 4 INJECTION INTRAVENOUS at 13:05

## 2019-03-07 RX ADMIN — FENTANYL CITRATE 25 MCG: 50 INJECTION, SOLUTION INTRAMUSCULAR; INTRAVENOUS at 16:05

## 2019-03-07 RX ADMIN — FENTANYL CITRATE 25 MCG: 50 INJECTION, SOLUTION INTRAMUSCULAR; INTRAVENOUS at 16:28

## 2019-03-07 RX ADMIN — ONDANSETRON 4 MG: 2 INJECTION INTRAMUSCULAR; INTRAVENOUS at 14:15

## 2019-03-07 RX ADMIN — ONDANSETRON 4 MG: 2 INJECTION INTRAMUSCULAR; INTRAVENOUS at 09:40

## 2019-03-07 RX ADMIN — SODIUM CHLORIDE 100 ML/HR: 0.9 INJECTION, SOLUTION INTRAVENOUS at 22:09

## 2019-03-07 RX ADMIN — DEXAMETHASONE SODIUM PHOSPHATE 4 MG: 4 INJECTION, SOLUTION INTRAMUSCULAR; INTRAVENOUS at 15:19

## 2019-03-07 RX ADMIN — FENTANYL CITRATE 25 MCG: 50 INJECTION, SOLUTION INTRAMUSCULAR; INTRAVENOUS at 15:54

## 2019-03-07 RX ADMIN — CEFTRIAXONE 1000 MG: 1 INJECTION, POWDER, FOR SOLUTION INTRAMUSCULAR; INTRAVENOUS at 10:25

## 2019-03-07 RX ADMIN — FENTANYL CITRATE 50 MCG: 50 INJECTION, SOLUTION INTRAMUSCULAR; INTRAVENOUS at 15:19

## 2019-03-07 RX ADMIN — NEOSTIGMINE METHYLSULFATE 2 MG: 1 INJECTION INTRAVENOUS at 16:48

## 2019-03-07 RX ADMIN — IOHEXOL 50 ML: 240 INJECTION, SOLUTION INTRATHECAL; INTRAVASCULAR; INTRAVENOUS; ORAL at 17:05

## 2019-03-07 RX ADMIN — SODIUM CHLORIDE: 0.9 INJECTION, SOLUTION INTRAVENOUS at 16:16

## 2019-03-07 RX ADMIN — MORPHINE SULFATE 4 MG: 4 INJECTION INTRAVENOUS at 09:40

## 2019-03-07 RX ADMIN — ONDANSETRON 4 MG: 2 INJECTION INTRAMUSCULAR; INTRAVENOUS at 15:54

## 2019-03-07 RX ADMIN — ENOXAPARIN SODIUM 40 MG: 40 INJECTION SUBCUTANEOUS at 22:08

## 2019-03-07 RX ADMIN — KETOROLAC TROMETHAMINE 15 MG: 30 INJECTION, SOLUTION INTRAMUSCULAR at 16:25

## 2019-03-07 RX ADMIN — LIDOCAINE HYDROCHLORIDE 60 MG: 20 INJECTION, SOLUTION INTRAVENOUS at 15:19

## 2019-03-07 RX ADMIN — SODIUM CHLORIDE 100 ML/HR: 0.9 INJECTION, SOLUTION INTRAVENOUS at 14:21

## 2019-03-07 RX ADMIN — ROCURONIUM BROMIDE 30 MG: 10 INJECTION INTRAVENOUS at 15:19

## 2019-03-07 RX ADMIN — PROPOFOL 140 MG: 10 INJECTION, EMULSION INTRAVENOUS at 15:19

## 2019-03-07 RX ADMIN — Medication 1000 MG: at 15:28

## 2019-03-07 RX ADMIN — GLYCOPYRROLATE 0.4 MG: 0.2 INJECTION, SOLUTION INTRAMUSCULAR; INTRAVENOUS at 16:48

## 2019-03-07 NOTE — ED PROVIDER NOTES
History  Chief Complaint   Patient presents with    Flank Pain     Patient presents with lower back pain since monday, with hx kidney stones  Patient reports episode of "red urine" PTA and vomit  History provided by:  Patient  Flank Pain   Pain location:  L flank  Pain quality: sharp    Pain radiates to:  Does not radiate  Pain severity:  Severe  Onset quality:  Sudden  Duration:  2 days  Timing:  Constant  Progression:  Worsening  Context comment:  Recently diagnosed kidney stone on CT at [facility]  Relieved by:  Nothing  Worsened by:  Nothing  Associated symptoms: no anorexia, no belching, no chest pain, no chills, no constipation, no cough, no diarrhea, no dysuria, no fatigue, no fever, no hematuria, no melena, no nausea, no shortness of breath, no sore throat and no vomiting        Prior to Admission Medications   Prescriptions Last Dose Informant Patient Reported? Taking?    Multiple Vitamins-Minerals (WOMENS 50+ MULTI VITAMIN/MIN PO)  Self Yes No   Sig: Take 1 tablet by mouth as needed     acetaminophen (TYLENOL) 325 mg tablet   No No   Sig: Take 2 tablets (650 mg total) by mouth every 6 (six) hours as needed for mild pain   ibuprofen (MOTRIN) 800 mg tablet   No No   Sig: Take 1 tablet (800 mg total) by mouth every 8 (eight) hours as needed for mild pain   loratadine (CLARITIN) 10 mg tablet   No No   Sig: Take 1 tablet (10 mg total) by mouth daily for 30 days   meclizine (ANTIVERT) 25 mg tablet   No No   Sig: Take 1 tablet by mouth 3 (three) times a day as needed for dizziness   naproxen (NAPROSYN) 250 mg tablet  Self Yes No   Sig: Take 250 mg by mouth as needed for mild pain   oxyCODONE (ROXICODONE) 5 mg immediate release tablet   No No   Sig: Take 1 tablet (5 mg total) by mouth every 4 (four) hours as needed for moderate pain for up to 10 daysMax Daily Amount: 30 mg   tamsulosin (FLOMAX) 0 4 mg   No No   Sig: Take 1 capsule (0 4 mg total) by mouth daily with dinner      Facility-Administered Medications: None       Past Medical History:   Diagnosis Date    Arthritis     Chronic pain disorder     sciatic    GERD (gastroesophageal reflux disease)     Kidney stone     Kidney stone     Osteoarthritis     Osteoporosis        Past Surgical History:   Procedure Laterality Date    CHOLECYSTECTOMY      KNEE ARTHROSCOPY Left     SC COLONOSCOPY FLX DX W/COLLJ SPEC WHEN PFRMD N/A 11/2/2017    Procedure: COLONOSCOPY with polypectomy x2;  Surgeon: Gabby Aguirre MD;  Location: [facility]; Service: Gastroenterology    TUBAL LIGATION         Family History   Problem Relation Age of Onset    Mental illness Father     Prostate cancer Father     Mental illness Daughter      I have reviewed and agree with the history as documented  Social History     Tobacco Use    Smoking status: Never Smoker    Smokeless tobacco: Never Used   Substance Use Topics    Alcohol use: No    Drug use: No        Review of Systems   Constitutional: Negative for activity change, appetite change, chills, fatigue and fever  HENT: Negative for congestion, dental problem, ear pain, rhinorrhea and sore throat  Eyes: Negative for pain and redness  Respiratory: Negative for cough, chest tightness, shortness of breath and wheezing  Cardiovascular: Negative for chest pain and palpitations  Gastrointestinal: Negative for abdominal pain, anorexia, blood in stool, constipation, diarrhea, melena, nausea and vomiting  Endocrine: Negative for cold intolerance and heat intolerance  Genitourinary: Positive for flank pain  Negative for dysuria, frequency and hematuria  Musculoskeletal: Negative for arthralgias and myalgias  Skin: Negative for color change, pallor and rash  Neurological: Negative for weakness and numbness  Hematological: Does not bruise/bleed easily  Psychiatric/Behavioral: Negative for agitation, hallucinations and suicidal ideas         Physical Exam  Physical Exam   Constitutional: She is oriented to person, place, and time  She appears well-developed and well-nourished  HENT:   Mouth/Throat: No oropharyngeal exudate  TMs normal bilaterally no pharyngeal erythema no rhinorrhea nontender palpation of sinuses, normal looking turbinates   Eyes: Conjunctivae and EOM are normal    Neck: Normal range of motion  Neck supple  No meningeal signs   Cardiovascular: Normal rate, regular rhythm, normal heart sounds and intact distal pulses  Pulmonary/Chest: Effort normal and breath sounds normal  No respiratory distress  She has no wheezes  She has no rales  She exhibits no tenderness  Abdominal: Soft  Bowel sounds are normal  She exhibits no distension and no mass  There is no tenderness  No hernia  No cvat   Musculoskeletal: Normal range of motion  She exhibits no edema  Lymphadenopathy:     She has no cervical adenopathy  Neurological: She is alert and oriented to person, place, and time  No cranial nerve deficit  Skin: No rash noted  No erythema  No edema   Psychiatric: She has a normal mood and affect  Her behavior is normal    Nursing note and vitals reviewed        Vital Signs  ED Triage Vitals [03/07/19 0859]   Temperature Pulse Respirations Blood Pressure SpO2   98 4 °F (36 9 °C) 73 19 169/74 100 %      Temp Source Heart Rate Source Patient Position - Orthostatic VS BP Location FiO2 (%)   Oral Monitor Lying Right arm --      Pain Score       Worst Possible Pain           Vitals:    03/07/19 0859   BP: 169/74   Pulse: 73   Patient Position - Orthostatic VS: Lying       Visual Acuity      ED Medications  Medications   ceftriaxone (ROCEPHIN) 1 g/50 mL in dextrose IVPB (has no administration in time range)   ondansetron (ZOFRAN) injection 4 mg (4 mg Intravenous Given 3/7/19 0940)   morphine (PF) 4 mg/mL injection 4 mg (4 mg Intravenous Given 3/7/19 0940)       Diagnostic Studies  Results Reviewed     Procedure Component Value Units Date/Time    Comprehensive metabolic panel [[id number]]  (Abnormal) Collected: 03/07/19 0943    Lab Status:  Final result Specimen:  Blood from Arm, Left Updated:  03/07/19 1007     Sodium 140 mmol/L      Potassium 3 7 mmol/L      Chloride 104 mmol/L      CO2 26 mmol/L      ANION GAP 10 mmol/L      BUN 17 mg/dL      Creatinine 1 00 mg/dL      Glucose 104 mg/dL      Calcium 8 4 mg/dL      AST 40 U/L      ALT 23 U/L      Alkaline Phosphatase 132 U/L      Total Protein 7 4 g/dL      Albumin 3 2 g/dL      Total Bilirubin 0 51 mg/dL      eGFR 55 ml/min/1 73sq m     Narrative:       National Kidney Disease Education Program recommendations are as follows:  GFR calculation is accurate only with a steady state creatinine  Chronic Kidney disease less than 60 ml/min/1 73 sq  meters  Kidney failure less than 15 ml/min/1 73 sq  meters      CBC and differential [[id number]]  (Abnormal) Collected:  03/07/19 0943    Lab Status:  Final result Specimen:  Blood from Arm, Left Updated:  03/07/19 0950     WBC 7 47 Thousand/uL      RBC 4 09 Million/uL      Hemoglobin 11 9 g/dL      Hematocrit 37 6 %      MCV 92 fL      MCH 29 1 pg      MCHC 31 6 g/dL      RDW 14 1 %      MPV 11 6 fL      Platelets 337 Thousands/uL      nRBC 0 /100 WBCs      Neutrophils Relative 76 %      Immat GRANS % 0 %      Lymphocytes Relative 15 %      Monocytes Relative 8 %      Eosinophils Relative 1 %      Basophils Relative 0 %      Neutrophils Absolute 5 63 Thousands/µL      Immature Grans Absolute 0 03 Thousand/uL      Lymphocytes Absolute 1 14 Thousands/µL      Monocytes Absolute 0 57 Thousand/µL      Eosinophils Absolute 0 08 Thousand/µL      Basophils Absolute 0 02 Thousands/µL     POCT urinalysis dipstick [[id number]]     Lab Status:  No result Specimen:  Urine                  No orders to display              Procedures  Procedures       Phone Contacts  ED Phone Contact    ED Course                               MDM  Number of Diagnoses or Management Options  Diagnosis management comments: L sided flank pain with known kidney stone-will do labs, admit to [facility] w/ urology consult       Disposition  Final diagnoses:   Flank pain   Kidney stone   Ureterolithiasis     Time reflects when diagnosis was documented in both MDM as applicable and the Disposition within this note     Time User Action Codes Description Comment    3/7/2019  9:00 AM Petroleum Bautista B Add [N20 1] Ureteral calculus, left     3/7/2019  9:00 AM Seth Bautista B Modify [N20 1] Ureteral calculus, left     3/7/2019 10:13 AM Sriram Barclay Add [R10 9] Flank pain     3/7/2019 10:13 AM Jn [place] Add [N20 0] Kidney stone     3/7/2019 10:13 AM Jn Emma Add [N20 1] Ureterolithiasis       ED Disposition     ED Disposition Condition Date/Time Comment    Admit Stable Thu Mar 7, 2019 10:13 AM Case was discussed with Dr Vanessa Arias and the patient's admission status was agreed to be Admission Status: observation status to the service of Dr Vanessa Arias   Follow-up Information    None         Patient's Medications   Discharge Prescriptions    No medications on file     No discharge procedures on file      ED Provider  Electronically Signed by           Carola Gonzalez MD  03/07/19 9607

## 2019-03-07 NOTE — INTERVAL H&P NOTE
H&P reviewed  After examining the patient I find no changes in the patients condition since the H&P had been written  Procedure reviewed in holding unit  CT reviewed- Laterality marked  (L)

## 2019-03-07 NOTE — SOCIAL WORK
Met with patient at bedside in order to discuss d/c role  Patient admitted under observation as a result of flank pain  Patient is bilingual   Patient was alert and oriented times 3  Patient identify her  Nevin Echevarria as emergency    Patient resides at [address] with 3 steps to reach main entrance and bedroom and bathroom located on second floor with 13 steps to access, handrail on L   Patient livw=es with her   Patient is independent with all ADL'S, does not drives depending on her  for transport  Patient is under medical care at [facility] [address], [place]  Dr Arun Fernandez and uses [facility]  Patient reports no legal issues, denies any use of illicit drugs and/or alcohol intake  Patient denies any psychiatric diagnosis  CM reviewed d/c planning process including the following: identifying help at home, patient preference for d/c planning needs  [facility] Meds to [facility] program, availability of treatment team to discuss questions or concerns patient and/or family may have regarding understanding medications and recognizing signs and symptoms once discharged  CM also encouraged patient to follow up with all recommended appointments after discharge  Patient advised of importance for patient and family to participate in managing patients medical well being

## 2019-03-07 NOTE — ANESTHESIA PREPROCEDURE EVALUATION
Review of Systems/Medical History  Patient summary reviewed  Chart reviewed      Cardiovascular  Exercise tolerance (METS): <4,     Pulmonary  Negative pulmonary ROS        GI/Hepatic    GERD ,        Kidney stones,        Endo/Other    Obesity    GYN  Negative gynecology ROS          Hematology  Negative hematology ROS      Musculoskeletal    Arthritis     Neurology  Negative neurology ROS      Psychology   Negative psychology ROS              Physical Exam    Airway    Mallampati score: II  TM Distance: <3 FB  Neck ROM: full     Dental       Cardiovascular  Rhythm: regular, Rate: normal,     Pulmonary  Breath sounds clear to auscultation,     Other Findings        Anesthesia Plan  ASA Score- 3 Emergent    Anesthesia Type- general with ASA Monitors  Additional Monitors:   Airway Plan:         Plan Factors- Patient instructed to abstain from smoking on day of procedure  Patient did not smoke on day of surgery  Induction- intravenous  Postoperative Plan-     Informed Consent- Anesthetic plan and risks discussed with patient

## 2019-03-07 NOTE — CONSULTS
UROLOGY CONSULTATION NOTE     Patient Identifiers: Jd Rosario (MRN [de-identified])  Service Requesting Consultation: Emergency Medicine  Service Providing Consultation:  Urology, BINA Bonilla    Date of Service: 3/7/2019  Consults    Reason for Consultation:  Nephrolithiasis    ASSESSMENT:     Nephrolithiasis  · CT scan of abdomen and pelvis performed on 03/06/2019 reveals a 7 mm moderately obstructing left proximal ureteral stone at the level of left transverse process process of L4  · Patient was seen at [facility] and discharged with pain medication and tamsulosin 0 4 mg p o  Daily  · NPO for add for cystoscopy, retrograde pyelogram and left ureteroscopy  · IV fluids, analgesics and antiemetics per Medicine    History of Present Illness:     Jd Rosario is a 74 y o  old with a history of nephrolithiasis, and arthritis  Patient reports a history of kidney stones approximately 2 years ago that did not require  instrumentation  Patient presents to the emergency department this morning with a complaint of left flank pain worsening through the course of the evening and today this morning  She reports the flank pain to be similar to previous pain she had with a prior kidney stone  She reports hematuria, nausea and vomiting and chills  Patient denies dysuria  The patient denies diarrhea and constipation  Patient reports being evaluated in the emergency department on 03/06/2019 for the complaint of left flank pain  At that time she was diagnosed with a 7 mm obstructing stone in the left proximal ureter  Due to her pain being under control and nausea vomiting at [place] [address], patient was discharged home with Urology follow-up  Patient denies a history of complications with anesthesia  Patient denies cardiac and respiratory history  Patient denies smoking, alcohol and illicit drug use      Past Medical, Past Surgical History:     Past Medical History:   Diagnosis Date    Arthritis     Chronic pain disorder sciatic    GERD (gastroesophageal reflux disease)     Kidney stone     Kidney stone     Osteoarthritis     Osteoporosis    :    Past Surgical History:   Procedure Laterality Date    CHOLECYSTECTOMY      KNEE ARTHROSCOPY Left     [place] COLONOSCOPY FLX DX W/COLLJ SPEC WHEN PFRMD N/A 2017    Procedure: COLONOSCOPY with polypectomy x2;  Surgeon: Jovan Camejo MD;  Location: [facility]; Service: Gastroenterology    TUBAL LIGATION     :    Medications, Allergies:     No current facility-administered medications for this encounter  Allergies: Allergies   Allergen Reactions    Diclofenac Sodium      Other reaction(s): GI Upset    Meperidine     Tramadol      Other reaction(s): GI Upset   :    Social and Family History:   Social History:   Social History     Tobacco Use    Smoking status: Never Smoker    Smokeless tobacco: Never Used   Substance Use Topics    Alcohol use: No    Drug use: No        Social History     Tobacco Use   Smoking Status Never Smoker   Smokeless Tobacco Never Used       Family History:  Family History   Problem Relation Age of Onset    Mental illness Father     Prostate cancer Father     Mental illness Daughter    :     Review of Systems:     General: Fever, chills, or night sweats: positive  Cardiac: Negative for chest pain  Pulmonary: Negative for shortness of breath  Gastrointestinal: Abdominal pain negative  Nausea, vomiting, or diarrhea positive,  Genitourinary: See HPI above  Patient does have hematuria  All other systems queried were negative  Physical Exam:   General: Patient is pleasant and in NAD  Awake and alert  /74 (BP Location: Right arm)   Pulse 73   Temp 98 4 °F (36 9 °C) (Oral)   Resp 19   Wt 71 7 kg (158 lb 1 1 oz)   SpO2 100%   BMI 39 56 kg/m² Temp (24hrs), Av 4 °F (36 9 °C), Min:98 4 °F (36 9 °C), Max:98 4 °F (36 9 °C)  current; Temperature: 98 4 °F (36 9 °C)  No intake/output data recorded    Skin: warm, dry, intact  Cardiac: S1S2, HRR, Peripheral edema: negative  Pulmonary: Non-labored breathing  Abdomen: Soft, non-tender, non-distended  No surgical scars  No masses, tenderness, hernias noted  Musculoskeletal: AROM with no joint deformity or tenderness  Neurology: alert, oriented x3, affect appropriate, no focal neurological deficits, moves all extremities well and no involuntary movements  Genitourinary: Positive CVA tenderness, negative suprapubic tenderness  EISENBERG:   None    Labs:     Lab Results   Component Value Date    HGB 13 3 03/06/2019    HCT 40 7 03/06/2019    WBC 8 30 03/06/2019     03/06/2019       Lab Results   Component Value Date    K 4 3 03/06/2019     03/06/2019    CO2 26 03/06/2019    BUN 21 03/06/2019    CREATININE 0 99 03/06/2019    CALCIUM 9 4 03/06/2019       Imaging:   I personally reviewed the images and report of the following studies, and reviewed them with the patient:    CT ABDOMEN AND PELVIS WITH IV CONTRAST     INDICATION:   abdominal pain  Left flank pain  History of kidney stones      COMPARISON:  CT stone study January 19, 2016     TECHNIQUE:  CT examination of the abdomen and pelvis was performed  Axial, sagittal, and coronal 2D reformatted images were created from the source data and submitted for interpretation      Radiation dose length product (DLP) for this visit:  630 mGy-cm     This examination, like all CT scans performed in the [facility], was performed utilizing techniques to minimize radiation dose exposure, including the use of iterative   reconstruction and automated exposure control      IV Contrast:  85 mL of iohexol (OMNIPAQUE)  Enteric Contrast:  Enteric contrast was not administered      FINDINGS:     ABDOMEN     LOWER CHEST:    Dependent atelectasis in the lower lobes of the lungs bilaterally      The heart is enlarged      LIVER/BILIARY TREE:    Fatty infiltrative changes in the liver      GALLBLADDER:  Surgically absent      SPLEEN:  Unremarkable      PANCREAS:  Unremarkable      ADRENAL GLANDS:  Unremarkable         KIDNEYS/URETERS:    RIGHT KIDNEY:  Normal      LEFT KIDNEY:  There is moderate hydronephrosis and hydroureter up to the level of a 6 x 7 mm calculus in the proximal ureter located at the level of the left transverse process of L4 (series 2, image 41 and series 3, image 72)  There is moderate perinephric and   periureteric inflammation up to the level of the calculus  There is mild delayed enhancement of the left kidney  The calculus has migrated from the lower pole on the prior examination      Distally, no ureteric calculi are identified         STOMACH AND BOWEL:    Evaluation limited due to lack of oral contrast material      Stomach decompressed  Hiatal hernia      No evidence of small bowel obstruction      Multiple diverticula throughout the distal transverse colon, descending colon and sigmoid colon  Nothing to suggest acute diverticulitis      APPENDIX:  No findings to suggest appendicitis      ABDOMINOPELVIC CAVITY:  No ascites or free intraperitoneal air  No lymphadenopathy      VESSELS:  Atherosclerotic changes are present  No evidence of aneurysm      PELVIS     REPRODUCTIVE ORGANS:    The uterus is heterogeneous in CT density  Several uterine myomata are present  The endometrial stripe is prominent for a postmenopausal female  Probable fluid in the endometrial cavity secondary to cervical stenosis      URINARY BLADDER:  Normally distended  Circumferential wall thickening      ABDOMINAL WALL/INGUINAL REGIONS:  Moderate sized umbilical hernia containing fat      OSSEOUS STRUCTURES:  No acute fracture or destructive osseous lesion      IMPRESSION:  1   6 x 7 mm moderately obstructing proximal left ureteric calculus at the level of the left transverse process of L4   2   Mild abnormal appearance of the urinary bladder  Correlate for cystitis    3   Myomatous changes of the uterus with prominence of the endometrium for a postmenopausal female  Probable fluid within the endometrial cavity, likely related to cervical stenosis  A routine, follow-up, nonemergent, outpatient pelvic ultrasound is   suggested for further evaluation      Thank you for allowing me to participate in this patients care  Please do not hesitate to call with any additional questions      BINA Correa

## 2019-03-07 NOTE — H&P (VIEW-ONLY)
UROLOGY CONSULTATION NOTE     Patient Identifiers: Bruno Maxwell (MRN [de-identified])  Service Requesting Consultation: Emergency Medicine  Service Providing Consultation:  Urology, BINA Dhaliwal    Date of Service: 3/7/2019  Consults    Reason for Consultation:  Nephrolithiasis    ASSESSMENT:     Nephrolithiasis  · CT scan of abdomen and pelvis performed on 03/06/2019 reveals a 7 mm moderately obstructing left proximal ureteral stone at the level of left transverse process process of L4  · Patient was seen at [facility] and discharged with pain medication and tamsulosin 0 4 mg p o  Daily  · NPO for add for cystoscopy, retrograde pyelogram and left ureteroscopy  · IV fluids, analgesics and antiemetics per Medicine    History of Present Illness:     Bruno Maxwell is a 74 y o  old with a history of nephrolithiasis, and arthritis  Patient reports a history of kidney stones approximately 2 years ago that did not require  instrumentation  Patient presents to the emergency department this morning with a complaint of left flank pain worsening through the course of the evening and today this morning  She reports the flank pain to be similar to previous pain she had with a prior kidney stone  She reports hematuria, nausea and vomiting and chills  Patient denies dysuria  The patient denies diarrhea and constipation  Patient reports being evaluated in the emergency department on 03/06/2019 for the complaint of left flank pain  At that time she was diagnosed with a 7 mm obstructing stone in the left proximal ureter  Due to her pain being under control and nausea vomiting at [place] [address], patient was discharged home with Urology follow-up  Patient denies a history of complications with anesthesia  Patient denies cardiac and respiratory history  Patient denies smoking, alcohol and illicit drug use      Past Medical, Past Surgical History:     Past Medical History:   Diagnosis Date    Arthritis     Chronic pain disorder sciatic    GERD (gastroesophageal reflux disease)     Kidney stone     Kidney stone     Osteoarthritis     Osteoporosis    :    Past Surgical History:   Procedure Laterality Date    CHOLECYSTECTOMY      KNEE ARTHROSCOPY Left     [place] COLONOSCOPY FLX DX W/COLLJ SPEC WHEN PFRMD N/A 2017    Procedure: COLONOSCOPY with polypectomy x2;  Surgeon: Camille Jewell MD;  Location: [facility]; Service: Gastroenterology    TUBAL LIGATION     :    Medications, Allergies:     No current facility-administered medications for this encounter  Allergies: Allergies   Allergen Reactions    Diclofenac Sodium      Other reaction(s): GI Upset    Meperidine     Tramadol      Other reaction(s): GI Upset   :    Social and Family History:   Social History:   Social History     Tobacco Use    Smoking status: Never Smoker    Smokeless tobacco: Never Used   Substance Use Topics    Alcohol use: No    Drug use: No        Social History     Tobacco Use   Smoking Status Never Smoker   Smokeless Tobacco Never Used       Family History:  Family History   Problem Relation Age of Onset    Mental illness Father     Prostate cancer Father     Mental illness Daughter    :     Review of Systems:     General: Fever, chills, or night sweats: positive  Cardiac: Negative for chest pain  Pulmonary: Negative for shortness of breath  Gastrointestinal: Abdominal pain negative  Nausea, vomiting, or diarrhea positive,  Genitourinary: See HPI above  Patient does have hematuria  All other systems queried were negative  Physical Exam:   General: Patient is pleasant and in NAD  Awake and alert  /74 (BP Location: Right arm)   Pulse 73   Temp 98 4 °F (36 9 °C) (Oral)   Resp 19   Wt 71 7 kg (158 lb 1 1 oz)   SpO2 100%   BMI 39 56 kg/m² Temp (24hrs), Av 4 °F (36 9 °C), Min:98 4 °F (36 9 °C), Max:98 4 °F (36 9 °C)  current; Temperature: 98 4 °F (36 9 °C)  No intake/output data recorded    Skin: warm, dry, intact  Cardiac: S1S2, HRR, Peripheral edema: negative  Pulmonary: Non-labored breathing  Abdomen: Soft, non-tender, non-distended  No surgical scars  No masses, tenderness, hernias noted  Musculoskeletal: AROM with no joint deformity or tenderness  Neurology: alert, oriented x3, affect appropriate, no focal neurological deficits, moves all extremities well and no involuntary movements  Genitourinary: Positive CVA tenderness, negative suprapubic tenderness  EISENBERG:   None    Labs:     Lab Results   Component Value Date    HGB 13 3 03/06/2019    HCT 40 7 03/06/2019    WBC 8 30 03/06/2019     03/06/2019       Lab Results   Component Value Date    K 4 3 03/06/2019     03/06/2019    CO2 26 03/06/2019    BUN 21 03/06/2019    CREATININE 0 99 03/06/2019    CALCIUM 9 4 03/06/2019       Imaging:   I personally reviewed the images and report of the following studies, and reviewed them with the patient:    CT ABDOMEN AND PELVIS WITH IV CONTRAST     INDICATION:   abdominal pain  Left flank pain  History of kidney stones      COMPARISON:  CT stone study January 19, 2016     TECHNIQUE:  CT examination of the abdomen and pelvis was performed  Axial, sagittal, and coronal 2D reformatted images were created from the source data and submitted for interpretation      Radiation dose length product (DLP) for this visit:  630 mGy-cm     This examination, like all CT scans performed in the [facility], was performed utilizing techniques to minimize radiation dose exposure, including the use of iterative   reconstruction and automated exposure control      IV Contrast:  85 mL of iohexol (OMNIPAQUE)  Enteric Contrast:  Enteric contrast was not administered      FINDINGS:     ABDOMEN     LOWER CHEST:    Dependent atelectasis in the lower lobes of the lungs bilaterally      The heart is enlarged      LIVER/BILIARY TREE:    Fatty infiltrative changes in the liver      GALLBLADDER:  Surgically absent      SPLEEN:  Unremarkable      PANCREAS:  Unremarkable      ADRENAL GLANDS:  Unremarkable         KIDNEYS/URETERS:    RIGHT KIDNEY:  Normal      LEFT KIDNEY:  There is moderate hydronephrosis and hydroureter up to the level of a 6 x 7 mm calculus in the proximal ureter located at the level of the left transverse process of L4 (series 2, image 41 and series 3, image 72)  There is moderate perinephric and   periureteric inflammation up to the level of the calculus  There is mild delayed enhancement of the left kidney  The calculus has migrated from the lower pole on the prior examination      Distally, no ureteric calculi are identified         STOMACH AND BOWEL:    Evaluation limited due to lack of oral contrast material      Stomach decompressed  Hiatal hernia      No evidence of small bowel obstruction      Multiple diverticula throughout the distal transverse colon, descending colon and sigmoid colon  Nothing to suggest acute diverticulitis      APPENDIX:  No findings to suggest appendicitis      ABDOMINOPELVIC CAVITY:  No ascites or free intraperitoneal air  No lymphadenopathy      VESSELS:  Atherosclerotic changes are present  No evidence of aneurysm      PELVIS     REPRODUCTIVE ORGANS:    The uterus is heterogeneous in CT density  Several uterine myomata are present  The endometrial stripe is prominent for a postmenopausal female  Probable fluid in the endometrial cavity secondary to cervical stenosis      URINARY BLADDER:  Normally distended  Circumferential wall thickening      ABDOMINAL WALL/INGUINAL REGIONS:  Moderate sized umbilical hernia containing fat      OSSEOUS STRUCTURES:  No acute fracture or destructive osseous lesion      IMPRESSION:  1   6 x 7 mm moderately obstructing proximal left ureteric calculus at the level of the left transverse process of L4   2   Mild abnormal appearance of the urinary bladder  Correlate for cystitis    3   Myomatous changes of the uterus with prominence of the endometrium for a postmenopausal female  Probable fluid within the endometrial cavity, likely related to cervical stenosis  A routine, follow-up, nonemergent, outpatient pelvic ultrasound is   suggested for further evaluation      Thank you for allowing me to participate in this patients care  Please do not hesitate to call with any additional questions      BINA Gillespie

## 2019-03-07 NOTE — PLAN OF CARE
Problem: Potential for Falls  Goal: Patient will remain free of falls  Description  INTERVENTIONS:  - Assess patient frequently for physical needs  -  Identify cognitive and physical deficits and behaviors that affect risk of falls    -  [place] fall precautions as indicated by assessment   - Educate patient/family on patient safety including physical limitations  - Instruct patient to call for assistance with activity based on assessment  - Modify environment to reduce risk of injury  - Consider OT/PT consult to assist with strengthening/mobility  Outcome: Progressing     Problem: DISCHARGE PLANNING - CARE MANAGEMENT  Goal: Discharge to post-acute care or home with appropriate resources  Description  INTERVENTIONS:  - Conduct assessment to determine patient/family and health care team treatment goals, and need for post-acute services based on payer coverage, community resources, and patient preferences, and barriers to discharge  - Address psychosocial, clinical, and financial barriers to discharge as identified in assessment in conjunction with the patient/family and health care team  - Arrange appropriate level of post-acute services according to patient's   needs and preference and payer coverage in collaboration with the physician and health care team  - Communicate with and update the patient/family, physician, and health care team regarding progress on the discharge plan  - Arrange appropriate transportation to post-acute venues  - Patient d/c with appropriate resources once reaches medical stability,   Outcome: Progressing     Problem: PAIN - ADULT  Goal: Verbalizes/displays adequate comfort level or baseline comfort level  Description  Interventions:  - Encourage patient to monitor pain and request assistance  - Assess pain using appropriate pain scale  - Administer analgesics based on type and severity of pain and evaluate response  - Implement non-pharmacological measures as appropriate and evaluate response  - Consider cultural and social influences on pain and pain management  - Notify physician/advanced practitioner if interventions unsuccessful or patient reports new pain  Outcome: Progressing     Problem: INFECTION - ADULT  Goal: Absence or prevention of progression during hospitalization  Description  INTERVENTIONS:  - Assess and monitor for signs and symptoms of infection  - Monitor lab/diagnostic results  - Monitor all insertion sites, i e  indwelling lines, tubes, and drains  - Monitor endotracheal (as able) and nasal secretions for changes in amount and color  - [place] appropriate cooling/warming therapies per order  - Administer medications as ordered  - Instruct and encourage patient and family to use good hand hygiene technique  - Identify and instruct in appropriate isolation precautions for identified infection/condition  Outcome: Progressing  Goal: Absence of fever/infection during neutropenic period  Description  INTERVENTIONS:  - Monitor WBC  - Implement neutropenic guidelines  Outcome: Progressing     Problem: SAFETY ADULT  Goal: Patient will remain free of falls  Description  INTERVENTIONS:  - Assess patient frequently for physical needs  -  Identify cognitive and physical deficits and behaviors that affect risk of falls    -  [place] fall precautions as indicated by assessment   - Educate patient/family on patient safety including physical limitations  - Instruct patient to call for assistance with activity based on assessment  - Modify environment to reduce risk of injury  - Consider OT/PT consult to assist with strengthening/mobility  Outcome: Progressing  Goal: Maintain or return to baseline ADL function  Description  INTERVENTIONS:  -  Assess patient's ability to carry out ADLs; assess patient's baseline for ADL function and identify physical deficits which impact ability to perform ADLs (bathing, care of mouth/teeth, toileting, grooming, dressing, etc )  - Assess/evaluate cause of self-care deficits   - Assess range of motion  - Assess patient's mobility; develop plan if impaired  - Assess patient's need for assistive devices and provide as appropriate  - Encourage maximum independence but intervene and supervise when necessary  ¯ Involve family in performance of ADLs  ¯ Assess for home care needs following discharge   ¯ Request OT consult to assist with ADL evaluation and planning for discharge  ¯ Provide patient education as appropriate  Outcome: Progressing  Goal: Maintain or return mobility status to optimal level  Description  INTERVENTIONS:  - Assess patient's baseline mobility status (ambulation, transfers, stairs, etc )    - Identify cognitive and physical deficits and behaviors that affect mobility  - Identify mobility aids required to assist with transfers and/or ambulation (gait belt, sit-to-stand, lift, walker, cane, etc )  - [place] fall precautions as indicated by assessment  - Record patient progress and toleration of activity level on Mobility SBAR; progress patient to next Phase/Stage  - Instruct patient to call for assistance with activity based on assessment  - Request Rehabilitation consult to assist with strengthening/weightbearing, etc   Outcome: Progressing     Problem: DISCHARGE PLANNING  Goal: Discharge to home or other facility with appropriate resources  Description  INTERVENTIONS:  - Identify barriers to discharge w/patient and caregiver  - Arrange for needed discharge resources and transportation as appropriate  - Identify discharge learning needs (meds, wound care, etc )  - Arrange for interpretive services to assist at discharge as needed  - Refer to Case Management Department for coordinating discharge planning if the patient needs post-hospital services based on physician/advanced practitioner order or complex needs related to functional status, cognitive ability, or social support system  Outcome: Progressing     Problem: Knowledge Deficit  Goal: Patient/family/caregiver demonstrates understanding of disease process, treatment plan, medications, and discharge instructions  Description  Complete learning assessment and assess knowledge base    Interventions:  - Provide teaching at level of understanding  - Provide teaching via preferred learning methods  Outcome: Progressing     Problem: GENITOURINARY - ADULT  Goal: Maintains or returns to baseline urinary function  Description  INTERVENTIONS:  - Assess urinary function  - Encourage oral fluids to ensure adequate hydration  - Administer IV fluids as ordered to ensure adequate hydration  - Administer ordered medications as needed  - Offer frequent toileting  - Follow urinary retention protocol if ordered  Outcome: Progressing  Goal: Absence of urinary retention  Description  INTERVENTIONS:  - Assess patient?s ability to void and empty bladder  - Monitor I/O  - Bladder scan as needed  - Discuss with physician/AP medications to alleviate retention as needed  - Discuss catheterization for long term situations as appropriate  Outcome: Progressing

## 2019-03-07 NOTE — H&P
H&P Exam - Bruno Maxwell 74 y o  female MRN: [id number]    Unit/Bed#: E5 -01 Encounter: [id number]      Cc: left flank pain, n/v    History of Present Illness     HPI:  Bruno Maxwell is a 74 y o  female who presented to the ER yesterday with left flank pain  She was diagnosed with 7 mm left ureteral stone  She was started on Flomax, analgesia and discharged home  Patient however notes that her left flank pain, which initially began 2 days ago, has gotten persistently worse  She had intractable nausea and vomiting was not tolerating any p o  So she return to the ER  Patient was evaluated by the Urology service and scheduled for cystoscopy and intervention  Patient be admitted to the medical service  Patient relates she started having left flank pain 2 days ago  She notes it travels into her left groin  She denies any pain with urinating  She notes dark brown urine  Denies any other hematuria  She denies any pain anywhere else  She denies any headache, chest pain, back pain, abdominal pain  She notes she has had nausea and vomiting and has been unable to keep down any food in over 24 hours  She notes dry mouth and dehydration  She denies any shortness of breath, chest congestion or cough  She denies any dizziness or lightheadedness  Patient notes she had a history of kidney stones in the past that passed spontaneously  She denies any other health problems apart from arthritis  She denies any history of diabetes, hypertension, heart disease, strokes or cancer  At home she takes only ibuprofen as needed  She notes previous pain medication upset her stomach          Historical Information   Past Medical History:   Diagnosis Date    Arthritis     Chronic pain disorder     sciatic    GERD (gastroesophageal reflux disease)     Kidney stone     Kidney stone     Osteoarthritis     Osteoporosis      Patient Active Problem List   Diagnosis    Sacroiliitis ([facility] [address] ) - Bilateral  Lumbar spondylosis    Idiopathic osteoporosis    Degenerative disc disease, thoracic    Ureteral calculus, left     Past Surgical History:   Procedure Laterality Date    CHOLECYSTECTOMY      KNEE ARTHROSCOPY Left     NE COLONOSCOPY FLX DX W/COLLJ SPEC WHEN PFRMD N/A 11/2/2017    Procedure: COLONOSCOPY with polypectomy x2;  Surgeon: Corry Li MD;  Location: [facility]; Service: Gastroenterology    TUBAL LIGATION         Social History   Social History     Substance and Sexual Activity   Alcohol Use No    Frequency: Never     Social History     Substance and Sexual Activity   Drug Use No     Social History     Tobacco Use   Smoking Status Never Smoker   Smokeless Tobacco Never Used       Family History:   Family History   Problem Relation Age of Onset    Mental illness Father     Prostate cancer Father     Mental illness Daughter        Meds/Allergies       Current Facility-Administered Medications:     ceFAZolin (ANCEF) 1 g in sodium chloride 0 9% 50 ml IVPB, 1,000 mg, Intravenous, Once, Jazmyne Leone, MARJNP    ceftriaxone (ROCEPHIN) 1 g/50 mL in dextrose IVPB, 1,000 mg, Intravenous, Q24H, Ruby Coronel MD, Stopped at 03/07/19 1102    ondansetron (ZOFRAN) injection 4 mg, 4 mg, Intravenous, Q4H PRN, Ruby Coronel MD    sodium chloride 0 9 % infusion, 100 mL/hr, Intravenous, Continuous, Ruby Coronel MD    Allergies   Allergen Reactions    Diclofenac Sodium      Other reaction(s): GI Upset    Meperidine     Tramadol      Other reaction(s): GI Upset       Review of Systems  A detailed 12 point review of systems was conducted and is negative apart from those mentioned in the HPI  Objective   Vitals: Blood pressure 133/61, pulse 70, temperature 98 2 °F (36 8 °C), temperature source Temporal, resp  rate 18, weight 71 7 kg (158 lb 1 1 oz), SpO2 99 %  Physical Exam   HEENT: EOMI, sclera anicteric, dry mucous membranes, tongue mucosa dry without lesions    Neck: supple, no JVD, thyromegaly  Heart: Regular rate and rhythm, S1S2 present  No murmur, rub or gallop  Lungs; Clear to auscultation bilaterally  No wheezing, crackles or rhonchi  No accessory muscle use or respiratory distress  Abdomen: soft, non-tender with palpation, non-distended, NABS  No guarding or rebound  No peritoneal sound or mass  Extremities: no clubbing, cyanosis, or edema  2+ pedal pulses bilaterally  Neurologic:  Awake and alert  Fluent speech  Strength globally intact  Skin: warm and dry  No petechiae, purpura or rash  Lab Results:   Results from last 7 days   Lab Units 03/07/19  0943 03/06/19  0427   WBC Thousand/uL 7 47 8 30   HEMOGLOBIN g/dL 11 9 13 3   HEMATOCRIT % 37 6 40 7   PLATELETS Thousands/uL 196 187     Results from last 7 days   Lab Units 03/07/19  0943 03/06/19  0427   POTASSIUM mmol/L 3 7 4 3   CHLORIDE mmol/L 104 102   CO2 mmol/L 26 26   BUN mg/dL 17 21   CREATININE mg/dL 1 00 0 99   CALCIUM mg/dL 8 4 9 4         Imaging:  3/6:  CT abdomen/pelvis:  IMPRESSION:  1   6 x 7 mm moderately obstructing proximal left ureteric calculus at the level of the left transverse process of L4   2   Mild abnormal appearance of the urinary bladder  Correlate for cystitis  3   Myomatous changes of the uterus with prominence of the endometrium for a postmenopausal female  Probable fluid within the endometrial cavity, likely related to cervical stenosis  A routine, follow-up, nonemergent, outpatient pelvic ultrasound is   suggested for further evaluation  EKG:    Assessment/Plan   1-left obstructing nephroureterolithiasis:  Patient presented with a 6 x 7 mm moderately obstructing left ureteral stone  She has been evaluated by the Urology service    Await cystoscopy with stent placement and possible intervention    -continue IV fluids   -continue analgesia:  Patient relates morphine has improved her pain   -continue antiemetics   -urinalysis without acute infection:  Patient on empiric antibiotics due to upcoming intervention    2-left flank pain:  Secondary to 1  Intervention as described    3-nausea/vomiting:  Decreased p o  Intake:  Secondary to 1  Continue IV fluids, and antiemetics    4-incidental finding:  Myomatous changes of the uterus with fluid in the endometrial cavity:  Outpatient pelvic ultrasound and gyn follow-up  5-family: called  to given update- not home    Dispo:  Due to obstructing nephroureterolithiasis and need for cystoscopy in intervention, as well as her intractable nausea/vomiting, any need for pain control that failed outpatient therapy, patient be admitted to the hospital   Anticipate her length of stay will be 1 midnight she will be placed on observation status  Code Status:  Full        Portions of the record may have been created with voice recognition software

## 2019-03-07 NOTE — DISCHARGE INSTRUCTIONS
Ureteroscopy   WHAT YOU NEED TO KNOW:   A ureteroscopy is a procedure to examine in the inside of your urinary tract, which includes your urethra, bladder, ureters, and kidneys  A ureteroscope is a small, thin tube with a light and camera on the end  Ureteroscopy can help your healthcare provider diagnose and treat problems in your urinary tract, such as kidney stones  DISCHARGE INSTRUCTIONS:   Medicine:   · Antibiotics  may be given to treat or prevent an infection  · Take your medicine as directed  Contact your healthcare provider if you think your medicine is not helping or if you have side effects  Tell him or her if you are allergic to any medicine  Keep a list of the medicines, vitamins, and herbs you take  Include the amounts, and when and why you take them  Bring the list or the pill bottles to follow-up visits  Carry your medicine list with you in case of an emergency  Follow up with your healthcare provider as directed:  Write down your questions so you remember to ask them during your visits  Drink liquids as directed  Liquids can help prevent kidney stones and urinary tract infections  Drink water and limit the amount of caffeine you drink  Caffeine may be found in coffee, tea, soda, sports drinks, and foods  Ask your healthcare provider how much liquid to drink each day  Contact your healthcare provider if:   · You have a fever  · You cannot urinate  · You have blood in your urine  · You are vomiting  · You have pain in your abdomen or side  · You have questions or concerns about your condition or care  © 2017 [address] Information is for End User's use only and may not be sold, redistributed or otherwise used for commercial purposes  All illustrations and images included in CareNotes® are the copyrighted property of A D A M , Inc  or Jeremi Mabry  The above information is an  only   It is not intended as medical advice for individual conditions or treatments  Talk to your doctor, nurse or pharmacist before following any medical regimen to see if it is safe and effective for you  Colocación de catéter ureteral   LO QUE NECESITA SABER:   La colocación del catéter ureteral es un procedimiento que se realiza para abrir un uréter angosto o bloqueado  El uréter es el tubo que transporta la orina desde el [place] a la vejiga  El catéter es un tubo plástico mora hueco que se [place] para mantener abierto el uréter y así permitir el flujo de [place]  El catéter podría permanecer instalado por varias semanas  INSTRUCCIONES SOBRE EL RADHA HOSPITALARIA:   Medicamentos:   · Los analgésicos  podrían administrarse para quitar o disminuir el dolor  No espere a que el dolor sea muy intenso para gustavo el medicamento  · Antibióticos  ayudan a prevenir infecciones  Singh médico podría recetarle antibióticos mientras tenga colocado el catéter  · [place] karely medicamentos bao se le haya indicado  Consulte con singh médico si usted vnaia que singh medicamento no le está ayudando o si presenta efectos secundarios  Infórmele si es alérgico a cualquier medicamento  Mantenga brielle lista actualizada de los [place], las vitaminas y los productos herbales que rob  Incluya los siguientes datos de los medicamentos: cantidad, frecuencia y motivo de administración  Traiga con usted la lista o los envases de la [place] a karely citas de seguimiento  Lleve la lista de los medicamentos con usted en shanika de brielle emergencia  Programe brielle gomez con singh urólogo bao se le indique:  Usted necesitará citas de seguimiento regulares con singh urólogo nicholas el tiempo que tenga puesto el catéter  El urólogo lo revisará y se asegurará de que el catéter esté funcionando correctamente  Es probable que le nupur algunas pruebas de [place] para scotty si hay infección  Anote karely preguntas para que se acuerde de hacerlas nicholas karely visitas    Cuidados personales:   · [address] indicaciones  Pregunte a morin médico sobre la cantidad de líquido que necesita gustavo todos los días y cuáles le recomienda  Bebidas bao jugos de arándanos o [place] podrían ser de mucha utilidad en la prevención de infecciones urinarias  · Regrese a karely actividades regulares  el día después de que le coloquen el catéter o según las indicaciones de morin médico      · Usted puede bañarse  el día después de colocado el catéter, si morin médico lo autoriza a hacerlo  Comuníquese con morin médico o urólogo si:   · Usted tiene fiebre o escalofríos  · Usted siente que necesita orinar con frecuencia  · Usted tiene dolor cuando orina o dolor alrededor de la vejiga o el [place]  · Usted nota alex en la orina o la [place] se ve turbia  · Usted tiene preguntas o inquietudes acerca de morin condición o cuidado  Busque atención médica de inmediato o llame al 911 si:   · Usted orina poco o no orina nada  · Usted tiene dolor abdominal intenso  © 2017 [address] Information is for End User's use only and may not be sold, redistributed or otherwise used for commercial purposes  All illustrations and images included in CareNotes® are the copyrighted property of A D A M , Inc  or Jeremi Mabry  Esta información es sólo para uso en educación  Morin intención no es darle un consejo médico sobre enfermedades o tratamientos  Colsulte con morin [place]a [place] farmacéutico antes de seguir cualquier régimen médico para saber si es seguro y efectivo para usted

## 2019-03-07 NOTE — OP NOTE
OPERATIVE REPORT  PATIENT NAME: Roger Garcia    :  1945  MRN: [id number]  Pt Location: [facility]    SURGERY DATE: 3/7/2019    Surgeon(s) and Role:     * Missy Hernandez MD - Primary    Preop Diagnosis:  Ureteral calculus, left [N20 1]    Post-Op Diagnosis Codes:     * Ureteral calculus, left [N20 1]    Procedure(s) (LRB):  CYSTOSCOPY, URETEROSCOPY WITH LASER, BASKET STONE EXTRACTION, RETROGRADE PYELOGRAM WITH INSERTION STENT URETERAL (Left)    Specimen(s):  ID Type Source Tests Collected by Time Destination   A :  Calculus Ureter, Left STONE ANALYSIS Missy Hernandez MD 3/7/2019 1616        Estimated Blood Loss:   0 mL    Drains:  Ureteral Drain/Stent Left ureter 6 Fr  (Active)   Number of days: 0       Anesthesia Type:   Choice    Operative Indications:  Ureteral calculus, left [N20 1]      Operative Findings:  Normal bladder and orifices, no evidence of cystitis  On left retrograde pyelography the stone was evident in the mid upper ureter  There was moderate hydronephrosis  No other filling defects were identified  As there was no evidence of cystitis and the urine culture and white count were normal I proceeded with ureteroscopy  The stone was identified on rigid ureteroscopy in the mid upper ureter and was fragmented with the holmium laser  A large fragment was seen to migrate up into the kidney  Flexible ureteroscopy was performed and this fragment was treated with the holmium laser  Fragments were basketed for analysis  A 6 [place] stent was placed  Dangler was cut short to aid in the stents later removal cystoscopically  The patient should have a confirmatory CT scan done prior to stent removal in approximately 10 days  Complications:   None    Procedure and Technique:  The patient was brought to the operating room and properly identified  General anesthesia was administered  She was placed in lithotomy position prepped draped usual sterile fashion    Compression boots were employed  Intravenous antibiotic administered by Anesthesia  An appropriate time-out was performed  Cystourethroscopy was performed with 25 Russian cystoscope with findings as above  An open-ended ureteral catheter along with dilute Omnipaque were then used to perform a left retrograde pyelogram   Findings are as above  A 0 035 guidewire was then passed up the left ureter under fluoroscopic guidance past the stone and into the kidney  Rigid ureteroscopy was then performed with the long semi rigid scope  The stone was identified  The holmium laser was then used along with the 272 micron fiber  The stone was treated at low energy and was dusted into tiny fragments  1 large fragment was seen to migrate up into the kidney  An additional guidewire was then placed through the ureteral scope and the ureteral scope withdrawn  A 10-12 Russian ureteral access sheath was then placed  Flexible ureteroscopy was then performed  Contrast was again injected  There is no extravasation  Systematic pyeloscopy was performed  The large fragment was identified in the upper pole  This was then treated with the holmium laser and broken into multiple fragments  These were then basket extracted  Flexible cystoscopy at the conclusion the procedure did not reveal any significant fragments  The flexible ureteral scope and ureteral access sheath were then removed together and no ureteral stones or damage to the ureter noted  A 6 Russian stent was then easily placed over the safety wire  This was seen to be in good position with nice coils in the renal pelvis and in the bladder  The Dangler was cut  The bladder drained cystoscopically  The patient tolerated the procedure well  Blood loss was minimal   She was taken to the recovery room in satisfactory condition     I was present for the entire procedure    Patient Disposition:  PACU     SIGNATURE: Austin Evangelista MD  DATE: March 7, 2019  TIME: 4:57 PM

## 2019-03-07 NOTE — TELEPHONE ENCOUNTER
Patient seen in [address] [place] [address] ER yesterday, was found to have 7 mm left proximal obstructing stone  Patient scheduled for follow up this afternoon with Deangelo Sahni PA-C  Patient calling office today, reports severe, uncontrolled pain, chills and nausea  Patient reports she was going to go back to the ER  Per patient, she went to [address] [place] [address] yesterday, and was told she needed surgery, but that could not be done there and was sent home  Patient reports today she will be going to [facility]  Appointment for this afternoon cancelled

## 2019-03-08 ENCOUNTER — TELEPHONE (OUTPATIENT)
Dept: UROLOGY | Facility: MEDICAL CENTER | Age: 74
End: 2019-03-08

## 2019-03-08 VITALS
HEART RATE: 84 BPM | DIASTOLIC BLOOD PRESSURE: 66 MMHG | BODY MASS INDEX: 39.56 KG/M2 | OXYGEN SATURATION: 97 % | WEIGHT: 158.07 LBS | TEMPERATURE: 98.9 F | RESPIRATION RATE: 18 BRPM | SYSTOLIC BLOOD PRESSURE: 130 MMHG

## 2019-03-08 LAB
ANION GAP SERPL CALCULATED.3IONS-SCNC: 8 MMOL/L (ref 4–13)
ANION GAP SERPL CALCULATED.3IONS-SCNC: 8 MMOL/L (ref 4–13)
BUN SERPL-MCNC: 18 MG/DL (ref 5–25)
BUN SERPL-MCNC: 20 MG/DL (ref 5–25)
CALCIUM SERPL-MCNC: 7.7 MG/DL (ref 8.3–10.1)
CALCIUM SERPL-MCNC: 7.9 MG/DL (ref 8.3–10.1)
CHLORIDE SERPL-SCNC: 107 MMOL/L (ref 100–108)
CHLORIDE SERPL-SCNC: 107 MMOL/L (ref 100–108)
CO2 SERPL-SCNC: 24 MMOL/L (ref 21–32)
CO2 SERPL-SCNC: 25 MMOL/L (ref 21–32)
CREAT SERPL-MCNC: 1.04 MG/DL (ref 0.6–1.3)
CREAT SERPL-MCNC: 1.18 MG/DL (ref 0.6–1.3)
ERYTHROCYTE [DISTWIDTH] IN BLOOD BY AUTOMATED COUNT: 14.6 % (ref 11.6–15.1)
GFR SERPL CREATININE-BSD FRML MDRD: 45 ML/MIN/1.73SQ M
GFR SERPL CREATININE-BSD FRML MDRD: 53 ML/MIN/1.73SQ M
GLUCOSE SERPL-MCNC: 104 MG/DL (ref 65–140)
GLUCOSE SERPL-MCNC: 120 MG/DL (ref 65–140)
HCT VFR BLD AUTO: 35.9 % (ref 34.8–46.1)
HGB BLD-MCNC: 11.1 G/DL (ref 11.5–15.4)
MCH RBC QN AUTO: 29.1 PG (ref 26.8–34.3)
MCHC RBC AUTO-ENTMCNC: 30.9 G/DL (ref 31.4–37.4)
MCV RBC AUTO: 94 FL (ref 82–98)
PLATELET # BLD AUTO: 172 THOUSANDS/UL (ref 149–390)
PMV BLD AUTO: 12 FL (ref 8.9–12.7)
POTASSIUM SERPL-SCNC: 4 MMOL/L (ref 3.5–5.3)
POTASSIUM SERPL-SCNC: 5.3 MMOL/L (ref 3.5–5.3)
RBC # BLD AUTO: 3.81 MILLION/UL (ref 3.81–5.12)
SODIUM SERPL-SCNC: 139 MMOL/L (ref 136–145)
SODIUM SERPL-SCNC: 140 MMOL/L (ref 136–145)
WBC # BLD AUTO: 8.51 THOUSAND/UL (ref 4.31–10.16)

## 2019-03-08 PROCEDURE — 99225 PR SBSQ OBSERVATION CARE/DAY 25 MINUTES: CPT | Performed by: NURSE PRACTITIONER

## 2019-03-08 PROCEDURE — 85027 COMPLETE CBC AUTOMATED: CPT | Performed by: UROLOGY

## 2019-03-08 PROCEDURE — 99217 PR OBSERVATION CARE DISCHARGE MANAGEMENT: CPT | Performed by: INTERNAL MEDICINE

## 2019-03-08 PROCEDURE — 80048 BASIC METABOLIC PNL TOTAL CA: CPT | Performed by: INTERNAL MEDICINE

## 2019-03-08 PROCEDURE — 80048 BASIC METABOLIC PNL TOTAL CA: CPT | Performed by: UROLOGY

## 2019-03-08 RX ORDER — CEFUROXIME AXETIL 250 MG/1
250 TABLET ORAL EVERY 12 HOURS SCHEDULED
Qty: 10 TABLET | Refills: 0 | Status: SHIPPED | OUTPATIENT
Start: 2019-03-08 | End: 2019-03-13

## 2019-03-08 RX ORDER — CEFUROXIME AXETIL 250 MG/1
250 TABLET ORAL EVERY 12 HOURS SCHEDULED
Status: DISCONTINUED | OUTPATIENT
Start: 2019-03-08 | End: 2019-03-08 | Stop reason: HOSPADM

## 2019-03-08 RX ADMIN — CEFUROXIME AXETIL 250 MG: 250 TABLET ORAL at 10:11

## 2019-03-08 RX ADMIN — SODIUM CHLORIDE 100 ML/HR: 0.9 INJECTION, SOLUTION INTRAVENOUS at 07:48

## 2019-03-08 RX ADMIN — TAMSULOSIN HYDROCHLORIDE 0.4 MG: 0.4 CAPSULE ORAL at 16:34

## 2019-03-08 RX ADMIN — ENOXAPARIN SODIUM 40 MG: 40 INJECTION SUBCUTANEOUS at 10:10

## 2019-03-08 NOTE — NURSING NOTE
Discharge instructions reviewed with patient, all questions answered and patient verbalized understanding  Patient wheeled out via wheelchair accompanied by RN    Niurka Tolentino RN

## 2019-03-08 NOTE — TELEPHONE ENCOUNTER
Patient is status post stone surgery with Dr Darrell Quintero  With stent insertion  She needs to have her stents x2 weeks  Before removal of her stents and CT scan the abdomen and pelvis without contrast needs to be performed then an appointment needs to be scheduled for stent removal in the office cystoscopically  She will be discharged this afternoon    Please call to schedule appointment and CT scan- orders placed

## 2019-03-08 NOTE — PLAN OF CARE
Problem: Potential for Falls  Goal: Patient will remain free of falls  Description  INTERVENTIONS:  - Assess patient frequently for physical needs  -  Identify cognitive and physical deficits and behaviors that affect risk of falls    -  [place] fall precautions as indicated by assessment   - Educate patient/family on patient safety including physical limitations  - Instruct patient to call for assistance with activity based on assessment  - Modify environment to reduce risk of injury  - Consider OT/PT consult to assist with strengthening/mobility  Outcome: Progressing     Problem: DISCHARGE PLANNING - CARE MANAGEMENT  Goal: Discharge to post-acute care or home with appropriate resources  Description  INTERVENTIONS:  - Conduct assessment to determine patient/family and health care team treatment goals, and need for post-acute services based on payer coverage, community resources, and patient preferences, and barriers to discharge  - Address psychosocial, clinical, and financial barriers to discharge as identified in assessment in conjunction with the patient/family and health care team  - Arrange appropriate level of post-acute services according to patient's   needs and preference and payer coverage in collaboration with the physician and health care team  - Communicate with and update the patient/family, physician, and health care team regarding progress on the discharge plan  - Arrange appropriate transportation to post-acute venues  - Patient d/c with appropriate resources once reaches medical stability,   Outcome: Progressing     Problem: PAIN - ADULT  Goal: Verbalizes/displays adequate comfort level or baseline comfort level  Description  Interventions:  - Encourage patient to monitor pain and request assistance  - Assess pain using appropriate pain scale  - Administer analgesics based on type and severity of pain and evaluate response  - Implement non-pharmacological measures as appropriate and evaluate response  - Consider cultural and social influences on pain and pain management  - Notify physician/advanced practitioner if interventions unsuccessful or patient reports new pain  Outcome: Progressing     Problem: INFECTION - ADULT  Goal: Absence or prevention of progression during hospitalization  Description  INTERVENTIONS:  - Assess and monitor for signs and symptoms of infection  - Monitor lab/diagnostic results  - Monitor all insertion sites, i e  indwelling lines, tubes, and drains  - Monitor endotracheal (as able) and nasal secretions for changes in amount and color  - [place] appropriate cooling/warming therapies per order  - Administer medications as ordered  - Instruct and encourage patient and family to use good hand hygiene technique  - Identify and instruct in appropriate isolation precautions for identified infection/condition  Outcome: Progressing  Goal: Absence of fever/infection during neutropenic period  Description  INTERVENTIONS:  - Monitor WBC  - Implement neutropenic guidelines  Outcome: Progressing     Problem: SAFETY ADULT  Goal: Patient will remain free of falls  Description  INTERVENTIONS:  - Assess patient frequently for physical needs  -  Identify cognitive and physical deficits and behaviors that affect risk of falls    -  [place] fall precautions as indicated by assessment   - Educate patient/family on patient safety including physical limitations  - Instruct patient to call for assistance with activity based on assessment  - Modify environment to reduce risk of injury  - Consider OT/PT consult to assist with strengthening/mobility  Outcome: Progressing  Goal: Maintain or return to baseline ADL function  Description  INTERVENTIONS:  -  Assess patient's ability to carry out ADLs; assess patient's baseline for ADL function and identify physical deficits which impact ability to perform ADLs (bathing, care of mouth/teeth, toileting, grooming, dressing, etc )  - Assess/evaluate cause of self-care deficits   - Assess range of motion  - Assess patient's mobility; develop plan if impaired  - Assess patient's need for assistive devices and provide as appropriate  - Encourage maximum independence but intervene and supervise when necessary  ¯ Involve family in performance of ADLs  ¯ Assess for home care needs following discharge   ¯ Request OT consult to assist with ADL evaluation and planning for discharge  ¯ Provide patient education as appropriate  Outcome: Progressing  Goal: Maintain or return mobility status to optimal level  Description  INTERVENTIONS:  - Assess patient's baseline mobility status (ambulation, transfers, stairs, etc )    - Identify cognitive and physical deficits and behaviors that affect mobility  - Identify mobility aids required to assist with transfers and/or ambulation (gait belt, sit-to-stand, lift, walker, cane, etc )  - [place] fall precautions as indicated by assessment  - Record patient progress and toleration of activity level on Mobility SBAR; progress patient to next Phase/Stage  - Instruct patient to call for assistance with activity based on assessment  - Request Rehabilitation consult to assist with strengthening/weightbearing, etc   Outcome: Progressing     Problem: DISCHARGE PLANNING  Goal: Discharge to home or other facility with appropriate resources  Description  INTERVENTIONS:  - Identify barriers to discharge w/patient and caregiver  - Arrange for needed discharge resources and transportation as appropriate  - Identify discharge learning needs (meds, wound care, etc )  - Arrange for interpretive services to assist at discharge as needed  - Refer to Case Management Department for coordinating discharge planning if the patient needs post-hospital services based on physician/advanced practitioner order or complex needs related to functional status, cognitive ability, or social support system  Outcome: Progressing     Problem: Knowledge Deficit  Goal: Patient/family/caregiver demonstrates understanding of disease process, treatment plan, medications, and discharge instructions  Description  Complete learning assessment and assess knowledge base    Interventions:  - Provide teaching at level of understanding  - Provide teaching via preferred learning methods  Outcome: Progressing     Problem: GENITOURINARY - ADULT  Goal: Maintains or returns to baseline urinary function  Description  INTERVENTIONS:  - Assess urinary function  - Encourage oral fluids to ensure adequate hydration  - Administer IV fluids as ordered to ensure adequate hydration  - Administer ordered medications as needed  - Offer frequent toileting  - Follow urinary retention protocol if ordered  Outcome: Progressing  Goal: Absence of urinary retention  Description  INTERVENTIONS:  - Assess patient?s ability to void and empty bladder  - Monitor I/O  - Bladder scan as needed  - Discuss with physician/AP medications to alleviate retention as needed  - Discuss catheterization for long term situations as appropriate  Outcome: Progressing

## 2019-03-08 NOTE — PROGRESS NOTES
UROLOGY PROGRESS NOTE   Patient Identifiers: Seymour Matute (MRN [de-identified])  Date of Service: 3/8/2019    Assessment:     Nephrolithiasis  · Postop day 1, status post cystoscopy, left ureteroscopy with laser lithotripsy, stone extraction, retrograde pyelogram with insertion of left ureteral stent  · Patient doing well is morning with no complaints of pain  Patient does complain of hematuria  Voiding without difficulty  · Plan will be for stent to be in place x2 weeks; obtain CT scan of the abdomen and pelvis noncontrast to ensure no large fragments of stones left behind and patient to be followed up in the office for stent removal cystoscopically  · Okay to discharge from Urology standpoint  Afebrile, VSS      RN participated in rounds with AP  Plan of care discussed with patient and RN  Subjective:     24 HR EVENTS:   no significant events  Patient has  complaints of Hematuria  Patient denies pain  Awake alert and oriented x3  Sitting out of bed to the chair eating breakfast   Complains of hunger          Objective:     VITALS:    Vitals:    03/07/19 2300   BP: 122/70   Pulse: 62   Resp: 18   Temp: 97 7 °F (36 5 °C)   SpO2: 96%       INS & OUTS:  I/O last 24 hours:   In: 1100 [I V :1100]  Out: 50 [Urine:50]    LABS:  Lab Results   Component Value Date    HGB 11 1 (L) 03/08/2019    HCT 35 9 03/08/2019    WBC 8 51 03/08/2019     03/08/2019       Lab Results   Component Value Date    K 5 3 03/08/2019     03/08/2019    CO2 24 03/08/2019    BUN 18 03/08/2019    CREATININE 1 04 03/08/2019    CALCIUM 7 7 (L) 03/08/2019       INPATIENT MEDS:    Current Facility-Administered Medications:     acetaminophen (TYLENOL) tablet 650 mg, 650 mg, Oral, Q6H PRN, Hansel Acosta MD    ceftriaxone (ROCEPHIN) 1 g/50 mL in dextrose IVPB, 1,000 mg, Intravenous, Q24H, Hansel Acosta MD, Stopped at 03/07/19 1102    enoxaparin (LOVENOX) subcutaneous injection 40 mg, 40 mg, Subcutaneous, Daily, Flor Rascon Hammad Khan MD, 40 mg at 03/07/19 2208    morphine injection 2 mg, 2 mg, Intravenous, Q4H PRN, Linn Musa MD    ondansetron [facility]) injection 4 mg, 4 mg, Intravenous, Q4H PRN, Linn Musa MD, 4 mg at 03/07/19 1554    sodium chloride 0 9 % infusion, 100 mL/hr, Intravenous, Continuous, Linn Musa MD, Last Rate: 100 mL/hr at 03/08/19 0748, 100 mL/hr at 03/08/19 0748    tamsulosin (FLOMAX) capsule 0 4 mg, 0 4 mg, Oral, Daily With Mar Antonio MD      Physical Exam:     GEN: alert and oriented x 3    RESP: breathing comfortably with no accessory muscle use    CARDIO: Regular rate and rhythm, S1S2 present or without murmur or extra heart sounds  ABD: soft, non-tender, non-distended   EXT: no significant peripheral edema   EISENBERG:  None    BINA Ferreira

## 2019-03-08 NOTE — DISCHARGE SUMMARY
Discharge Summary - Medical Eduin Gloria 74 y o  female MRN: [id number]    [address] Room / Bed: [address] Encounter: [id number]    BRIEF OVERVIEW    Admitting Provider: Ayse Dickinson MD  Discharge Provider: Cesar Shukla DO  Admission Date: 3/7/2019     Discharge Date: No discharge date for patient encounter  Primary Care Physician at Discharge: Alba Lane [phone]    Primary Discharge Diagnosis  Obstructing nephroureterolithiasis  Hematuria      Other Problems Addressed  Patient Active Problem List    Diagnosis Date Noted    Flank pain 03/07/2019    Nausea 03/07/2019    Nephroureterolithiasis 03/06/2019    Sacroiliitis ([address] ) - Bilateral 06/11/2018    Lumbar spondylosis 09/22/2016    Degenerative disc disease, thoracic 11/17/2014    Idiopathic osteoporosis 12/07/2009       Consulting Providers   Urology  Therapeutic Operative Procedures Performed  Cystoscopy with basket stone extraction and insertion of stent ureter left    Discharge Disposition: home    Allergies  Allergies   Allergen Reactions    Diclofenac Sodium      Other reaction(s): GI Upset    Meperidine     Tramadol      Other reaction(s): GI Upset     Diet restrictions:  None  Activity restrictions: none   Discharge Condition:  good       Outpatient Follow-Up  Dr Escobar Held in 1 week  Follow up with consulting providers  Urology March 14th      [facility]    Presenting Problem/History of Present Illness  Nephroureterolithiasis  Hospital Course  24-year-old female presents with left flank pain similar to previous kidney stone presentation  Obstructing nephro ureter lithiasis emergency department workup revealed a 7 millimeter obstructing stone in the left proximal ureter  She was provided IV fluids and consult Urology  Proceeded to OR removing the stone and inserting stent  They will continue outpatient follow-up    She will complete 5 day course of antibiotics    Hematuria    to renal calculi and stent placement  Recommend increasing fluid intake, monitor urine and call Urology if hematuria not resolving  Acute blood loss anemia   related to hematuria and OR loss, hemoglobin 11 1 on discharge     Discharge  Statement   I spent 25 minutes discharging the patient  This time was spent on the day of discharge  I had direct contact with the patient on the day of discharge  Additional documentation is required if more than 30 minutes were spent on discharge  Discharge instructions/Information to patient and family:   See after visit summary for information provided to patient and family

## 2019-03-08 NOTE — PROGRESS NOTES
Progress Note - Jeff Dies 74 y o  female MRN: [id number]    Unit/Bed#: E5 -01 Encounter: [id number]    Assessment/Plan:    Obstructing nephroureterolithiasis status post removal and stent placement by Urology    Hematuria     related to calculi and stent placement, continue hydration and monitor    Intractable abdominal pain   resolved with stent and calculi removal     Acute blood loss anemia   OR loss and postop hematuria    Subjective:   Feels much better, still blood in urine, no abdominal flank pain denies chest pain shortness of breath nausea vomiting diarrhea no fevers chills  Appetite is good  Objective:     Vitals: Blood pressure 130/60, pulse 60, temperature 97 8 °F (36 6 °C), temperature source Temporal, resp  rate 18, weight 71 7 kg (158 lb 1 1 oz), SpO2 98 %  ,Body mass index is 39 56 kg/m²        Results from last 7 days   Lab Units 03/08/19  0539   WBC Thousand/uL 8 51   HEMOGLOBIN g/dL 11 1*   HEMATOCRIT % 35 9   PLATELETS Thousands/uL 172     Results from last 7 days   Lab Units 03/08/19  0539 03/07/19  0943   POTASSIUM mmol/L 5 3 3 7   CHLORIDE mmol/L 107 104   CO2 mmol/L 24 26   BUN mg/dL 18 17   CREATININE mg/dL 1 04 1 00   CALCIUM mg/dL 7 7* 8 4   ALK PHOS U/L  --  132*   ALT U/L  --  23   AST U/L  --  40       Scheduled Meds:    Current Facility-Administered Medications:  acetaminophen 650 mg Oral Q6H PRN Priyank Aburto MD    cefTRIAXone 1,000 mg Intravenous Q24H Priyank Aburto MD Last Rate: Stopped (03/07/19 1102)   enoxaparin 40 mg Subcutaneous Daily Priyank Aburto MD    morphine injection 2 mg Intravenous Q4H PRN Priyank Aburto MD    ondansetron 4 mg Intravenous Q4H PRN Priyank Aburto MD    sodium chloride 100 mL/hr Intravenous Continuous Priyank Aburto MD Last Rate: 100 mL/hr (03/08/19 0748)   tamsulosin 0 4 mg Oral Daily With Primitivo Honeycutt MD        Continuous Infusions:    sodium chloride 100 mL/hr Last Rate: 100 mL/hr (03/08/19 0748)         Physical exam:  General appearance:  Alert no distress interaction appropriate  Head/Eyes:  Nonicteric PERRL EOMI  Neck:  Supple  Lungs: CTA bilateral no wheezing rhonchi or rales  Heart: normal S1 S2 regular  Abdomen: Soft nontender with bowel sounds  Extremities: no edema  Skin: no rash    Invasive Devices     Peripheral Intravenous Line            Peripheral IV 03/07/19 Left Antecubital 1 day          Drain            Ureteral Drain/Stent Left ureter 6 Fr  less than 1 day                    Counseling / Coordination of Care  Total floor / unit time spent today  30   minutes  Greater than 50% of total time was spent with the patient and / or family counseling and / or coordination of care    A description of the counseling / coordination of care:

## 2019-03-08 NOTE — UTILIZATION REVIEW
Initial Clinical Review    Admission: Date/Time/Statement:     OBS  ORDER    3/7  @    1010   Orders Placed This Encounter   Procedures    Place in Observation (expected length of stay for this patient is less than two midnights)     Standing Status:   Standing     Number of Occurrences:   1     Order Specific Question:   Admitting Physician     Answer:   Niurka Hall [[id number]]     Order Specific Question:   Level of Care     Answer:   Med Surg [[id number]]     ED: Date/Time/Mode of Arrival:   ED Arrival Information     Expected Arrival Acuity Means of Arrival Escorted By Service Admission Type    - 3/7/2019 08:45 Urgent Walk-In Family Member General Medicine Urgent    Arrival Complaint    back pain        Chief Complaint:   Chief Complaint   Patient presents with    Flank Pain     Patient presents with lower back pain since monday, with hx kidney stones  Patient reports episode of "red urine" PTA and vomit  Assessment/Plan:    -left obstructing nephroureterolithiasis:  Patient presented with a 6 x 7 mm moderately obstructing left ureteral stone  She has been evaluated by the Urology service  Await cystoscopy with stent placement and possible intervention               -continue IV fluids              -continue analgesia:  Patient relates morphine has improved her pain              -continue antiemetics              -urinalysis without acute infection:  Patient on empiric antibiotics due to upcoming intervention     2-left flank pain:  Secondary to 1  Intervention as described     3-nausea/vomiting:  Decreased p o  Intake:  Secondary to 1   Continue IV fluids, and antiemetics     4-incidental finding:  Myomatous changes of the uterus with fluid in the endometrial cavity:  Outpatient pelvic ultrasound and gyn follow-up      5-family: called  to given update- not home     Dispo:  Due to obstructing nephroureterolithiasis and need for cystoscopy in intervention, as well as her intractable nausea/vomiting, any need for pain control that failed outpatient therapy, patient be admitted to the hospital   Anticipate her length of stay will be 1 midnight she will be placed on observation status  Fredy Dupont is a 74 y o  female who presented to the ER yesterday with left flank pain  She was diagnosed with 7 mm left ureteral stone  She was started on Flomax, analgesia and discharged home  Patient however notes that her left flank pain, which initially began 2 days ago, has gotten persistently worse  She had intractable nausea and vomiting was not tolerating any p o  So she return to the ER  Patient was evaluated by the Urology service and scheduled for cystoscopy and intervention  Patient be admitted to the medical service      Patient relates she started having left flank pain 2 days ago  She notes it travels into her left groin  She denies any pain with urinating  She notes dark brown urine  Denies any other hematuria      She denies any pain anywhere else  She denies any headache, chest pain, back pain, abdominal pain      She notes she has had nausea and vomiting and has been unable to keep down any food in over 24 hours  She notes dry mouth and dehydration      She denies any shortness of breath, chest congestion or cough  She denies any dizziness or lightheadedness      Patient notes she had a history of kidney stones in the past that passed spontaneously      She denies any other health problems apart from arthritis  She denies any history of diabetes, hypertension, heart disease, strokes or cancer  At home she takes only ibuprofen as needed  She notes previous pain medication upset her stomach            ED Vital Signs:   ED Triage Vitals [03/07/19 0859]   Temperature Pulse Respirations Blood Pressure SpO2   98 4 °F (36 9 °C) 73 19 169/74 100 %      Temp Source Heart Rate Source Patient Position - Orthostatic VS BP Location FiO2 (%)   Oral Monitor Lying Right arm --      Pain Score       Worst Possible Pain        Wt Readings from Last 1 Encounters:   03/07/19 71 7 kg (158 lb 1 1 oz)     Vital Signs (abnormal):    above    Pertinent Labs/Diagnostic Test Results: Albumin    3 2  Alk phos   132  Ct  Abd/pelvis:    6 x 7 mm moderately obstructing proximal left ureteric calculus at the level of the left transverse process of L4   2   Mild abnormal appearance of the urinary bladder   Correlate for cystitis  3   Myomatous changes of the uterus with prominence of the endometrium for a postmenopausal female  [place] fluid within the endometrial cavity, likely related to cervical stenosis   A routine, follow-up, nonemergent, outpatient pelvic ultrasound is   suggested for further evaluation  ED Treatment:   Medication Administration from 03/07/2019 0845 to 03/07/2019 1119       Date/Time Order Dose Route Action Action by Comments     03/07/2019 0940 ondansetron (ZOFRAN) injection 4 mg 4 mg Intravenous Given Joaquín Solomon RN      03/07/2019 0940 morphine (PF) 4 mg/mL injection 4 mg 4 mg Intravenous Given Joaquín Solomon RN      03/07/2019 1102 ceftriaxone (ROCEPHIN) 1 g/50 mL in dextrose IVPB 0 mg Intravenous Stopped Joaquín Solomon RN      03/07/2019 1025 ceftriaxone (ROCEPHIN) 1 g/50 mL in dextrose IVPB 1,000 mg Intravenous New Bag Joaquín Solomon RN         Past Medical/Surgical History:    Active Ambulatory Problems     Diagnosis Date Noted    Sacroiliitis ([facility] [address] ) - Bilateral 06/11/2018    Lumbar spondylosis 09/22/2016    Idiopathic osteoporosis 12/07/2009    Degenerative disc disease, thoracic 11/17/2014    Nephroureterolithiasis 03/06/2019     Resolved Ambulatory Problems     Diagnosis Date Noted    No Resolved Ambulatory Problems     Past Medical History:   Diagnosis Date    Arthritis     Chronic pain disorder     GERD (gastroesophageal reflux disease)     Kidney stone     Kidney stone     Nephrolithiasis     Osteoarthritis     Osteoporosis      Admitting Diagnosis: Kidney stone [N20 0]  Ureterolithiasis [N20 1]  Flank pain [R10 9]  Ureteral calculus, left [N20 1]     Age/Sex: 74 y o  female     Admission Orders:   OBS  ORDER  3/7  @  1010  Scheduled Meds:   Current Facility-Administered Medications:  acetaminophen 650 mg Oral Q6H PRN Eric Srinivasan MD    cefTRIAXone 1,000 mg Intravenous Q24H Eric Srinivasan MD Last Rate: Stopped (03/07/19 1102)   enoxaparin 40 mg Subcutaneous Daily Eric Srinivasan MD    morphine injection 2 mg Intravenous Q4H PRN Eric Srinivasan MD    ondansetron 4 mg Intravenous Q4H PRN Eric Srinivasan MD    sodium chloride 100 mL/hr Intravenous Continuous Eric Srinivasan MD Last Rate: 100 mL/hr (03/08/19 0748)   tamsulosin 0 4 mg Oral Daily With Johanny Whitfield MD      Continuous Infusions:   sodium chloride 100 mL/hr Last Rate: 100 mL/hr (03/08/19 0748)     PRN Meds:   acetaminophen    morphine injection    ondansetron     Retrograde  Pyelogram    SURGERY DATE: 3/7/2019    Preop Diagnosis:  Ureteral calculus, left [N20 1]     Post-Op Diagnosis Codes:     * Ureteral calculus, left [N20 1]     Procedure(s) (LRB):  CYSTOSCOPY, URETEROSCOPY WITH LASER, BASKET STONE EXTRACTION, RETROGRADE PYELOGRAM WITH INSERTION STENT URETERAL (Left)    Operative Findings:  Normal bladder and orifices, no evidence of cystitis  On left retrograde pyelography the stone was evident in the mid upper ureter  There was moderate hydronephrosis  No other filling defects were identified  As there was no evidence of cystitis and the urine culture and white count were normal I proceeded with ureteroscopy  The stone was identified on rigid ureteroscopy in the mid upper ureter and was fragmented with the holmium laser  A large fragment was seen to migrate up into the kidney  Flexible ureteroscopy was performed and this fragment was treated with the holmium laser  Fragments were basketed for analysis  A 6 [place] stent was placed    Dangler was cut short to aid in the stents later removal cystoscopically  The patient should have a confirmatory CT scan done prior to stent removal in approximately 10 days    POST OP PROGRESS  NOTE  3/8  Nephrolithiasis  ? Postop day 1, status post cystoscopy, left ureteroscopy with laser lithotripsy, stone extraction, retrograde pyelogram with insertion of left ureteral stent  ? Patient doing well is morning with no complaints of pain  Patient does complain of hematuria  Voiding without difficulty    ? Plan will be for stent to be in place x2 weeks; obtain CT scan of the abdomen and pelvis noncontrast to ensure no large fragments of stones left behind and patient to be followed up in the office for stent removal cystoscopically

## 2019-03-11 ENCOUNTER — TRANSITIONAL CARE MANAGEMENT (OUTPATIENT)
Dept: FAMILY MEDICINE CLINIC | Facility: CLINIC | Age: 74
End: 2019-03-11

## 2019-03-11 NOTE — TELEPHONE ENCOUNTER
Patient schedule her CT she would need cysto and I am unable to find one in the 2 week time frame with Dr Sudhakar Reilly

## 2019-03-13 LAB
CA PHOS MFR STONE: 5 %
CALCIUM OXALATE DIHYDRATE MFR STONE IR: 20 %
COLOR STONE: NORMAL
COM MFR STONE: 75 %
COMMENT-STONE3: NORMAL
COMPOSITION: NORMAL
LABORATORY COMMENT REPORT: NORMAL
NIDUS STONE QL: NORMAL
PHOTO: NORMAL
SIZE STONE: NORMAL MM
STONE ANALYSIS-IMP: NORMAL
STONE ANALYSIS-IMP: NORMAL
WT STONE: 43.9 MG

## 2019-03-15 ENCOUNTER — HOSPITAL ENCOUNTER (OUTPATIENT)
Dept: CT IMAGING | Facility: HOSPITAL | Age: 74
Discharge: HOME | End: 2019-03-15
Attending: UROLOGY
Payer: MEDICARE

## 2019-03-15 DIAGNOSIS — N20.1 URETEROLITHIASIS: ICD-10-CM

## 2019-03-15 DIAGNOSIS — N20.0 KIDNEY STONE: ICD-10-CM

## 2019-03-15 PROCEDURE — 74176 CT ABD & PELVIS W/O CONTRAST: CPT

## 2019-03-18 ENCOUNTER — OFFICE VISIT (OUTPATIENT)
Dept: FAMILY MEDICINE CLINIC | Facility: CLINIC | Age: 74
End: 2019-03-18
Payer: MEDICARE

## 2019-03-18 ENCOUNTER — TELEPHONE (OUTPATIENT)
Dept: UROLOGY | Facility: MEDICAL CENTER | Age: 74
End: 2019-03-18

## 2019-03-18 VITALS
BODY MASS INDEX: 36.06 KG/M2 | TEMPERATURE: 97.9 F | DIASTOLIC BLOOD PRESSURE: 76 MMHG | SYSTOLIC BLOOD PRESSURE: 122 MMHG | WEIGHT: 155.8 LBS | RESPIRATION RATE: 18 BRPM | HEIGHT: 55 IN | HEART RATE: 74 BPM

## 2019-03-18 DIAGNOSIS — N20.2 NEPHROURETEROLITHIASIS: Primary | ICD-10-CM

## 2019-03-18 DIAGNOSIS — R42 DIZZINESS: ICD-10-CM

## 2019-03-18 DIAGNOSIS — J31.0 CHRONIC RHINITIS: ICD-10-CM

## 2019-03-18 PROCEDURE — 99495 TRANSJ CARE MGMT MOD F2F 14D: CPT | Performed by: NURSE PRACTITIONER

## 2019-03-18 RX ORDER — LORATADINE 10 MG/1
10 TABLET ORAL DAILY
Qty: 30 TABLET | Refills: 0 | Status: SHIPPED | OUTPATIENT
Start: 2019-03-18 | End: 2019-04-15 | Stop reason: SDUPTHER

## 2019-03-18 RX ORDER — MECLIZINE HYDROCHLORIDE 25 MG/1
25 TABLET ORAL 3 TIMES DAILY PRN
Qty: 30 TABLET | Refills: 0 | Status: SHIPPED | OUTPATIENT
Start: 2019-03-18 | End: 2019-10-08

## 2019-03-18 NOTE — TELEPHONE ENCOUNTER
Patient of Dr India Brown had procedure on 03/07/2019 and is suppose to schedule a catheter removal   Patient states she is bleeding and is worried the catheter has been in to long  Please advise

## 2019-03-18 NOTE — TELEPHONE ENCOUNTER
Spoke with pt and told her she has an appt on 3-25-19 for cystoscopy stent removal in office  She had the CT done  Told her the hematuria is expected with the stent  Reassurance given

## 2019-03-18 NOTE — PROGRESS NOTES
Chief Complaint   Patient presents with    Follow-up     TCM ,pt was in ER for kidney stone     TCM Call (since 2/15/2019)     Date and time call was made  3/11/2019  9:02 AM    Hospital care reviewed  Records reviewed    Patient was hospitialized at  [facility] [address]    Date of Admission  03/07/19    Date of discharge  03/08/19    Diagnosis  Nephroureterolithiasis    Disposition  Home    Were the patients medications reviewed and updated  Yes    Current Symptoms  None      TCM Call (since 2/15/2019)     Post hospital issues  None    Should patient be enrolled in anticoag monitoring? No    Scheduled for follow up? Yes    Did you obtain your prescribed medications  Yes    Do you need help managing your prescriptions or medications  No    Is transportation to your appointment needed  No    I have advised the patient to call PCP with any new or worsening symptoms  [employer] MA    Living Arrangements  Spouse or Significiant other; [id number]    The type of support provided  Physical; Emotional    Do you have social support  Yes, as much as I need    Are you recieving any outpatient services  No    Are you recieving home care services  No    Are you using any community resources  No    Current waiver services  No    Have you fallen in the last 12 months  No    Interperter language line needed  No    Counseling  Patient            Assessment/Plan:     Diagnoses and all orders for this visit:    Nephroureterolithiasis    Dizziness  -     meclizine (ANTIVERT) 25 mg tablet; Take 1 tablet (25 mg total) by mouth 3 (three) times a day as needed for dizziness    Chronic rhinitis  -     loratadine (CLARITIN) 10 mg tablet; Take 1 tablet (10 mg total) by mouth daily for 30 days          Subjective:      Patient ID: Ronald Portillo is a 74 y o  female  Patient was in the hospital 3/7-3/8 for Nephroureterolithiasis  She underwent cystoscopy with laser stone removal and stent placement   She had some interval bleeding  She states she called the urologist  She has follow up appointment 3/25/1  The following portions of the patient's history were reviewed and updated as appropriate: allergies, current medications, past family history, past medical history, past social history, past surgical history and problem list     Review of Systems   Constitutional: Negative for chills, diaphoresis, fatigue and fever  HENT: Positive for postnasal drip  Respiratory: Positive for cough  Negative for chest tightness and shortness of breath  Cardiovascular: Negative for chest pain and palpitations  Gastrointestinal: Positive for abdominal pain (left side )  Genitourinary: Positive for hematuria  Negative for flank pain  Objective:      /76 (BP Location: Right arm, Patient Position: Sitting, Cuff Size: Adult)   Pulse 74   Temp 97 9 °F (36 6 °C) (Temporal)   Resp 18   Ht 4' 6 25" (1 378 m)   Wt 70 7 kg (155 lb 12 8 oz)   BMI 37 22 kg/m²          Physical Exam   Constitutional: She is oriented to person, place, and time  Vital signs are normal  She appears well-developed and well-nourished  She does not appear ill  HENT:   Head: Normocephalic and atraumatic  Cardiovascular: Normal rate, regular rhythm, S1 normal, S2 normal and normal heart sounds  No murmur heard  Pulmonary/Chest: Effort normal and breath sounds normal  No respiratory distress  Abdominal: Soft  Bowel sounds are normal  She exhibits no distension  There is no tenderness  There is no CVA tenderness  Neurological: She is alert and oriented to person, place, and time  Skin: Capillary refill takes less than 2 seconds

## 2019-03-25 ENCOUNTER — PROCEDURE VISIT (OUTPATIENT)
Dept: UROLOGY | Facility: MEDICAL CENTER | Age: 74
End: 2019-03-25
Payer: MEDICARE

## 2019-03-25 VITALS
SYSTOLIC BLOOD PRESSURE: 140 MMHG | HEART RATE: 94 BPM | BODY MASS INDEX: 35.87 KG/M2 | DIASTOLIC BLOOD PRESSURE: 82 MMHG | WEIGHT: 155 LBS | HEIGHT: 55 IN

## 2019-03-25 DIAGNOSIS — N20.2 NEPHROURETEROLITHIASIS: Primary | ICD-10-CM

## 2019-03-25 LAB
SL AMB  POCT GLUCOSE, UA: ABNORMAL
SL AMB LEUKOCYTE ESTERASE,UA: ABNORMAL
SL AMB POCT BILIRUBIN,UA: ABNORMAL
SL AMB POCT BLOOD,UA: ABNORMAL
SL AMB POCT CLARITY,UA: CLEAR
SL AMB POCT COLOR,UA: ABNORMAL
SL AMB POCT KETONES,UA: ABNORMAL
SL AMB POCT NITRITE,UA: POSITIVE
SL AMB POCT PH,UA: 6
SL AMB POCT SPECIFIC GRAVITY,UA: 1.02
SL AMB POCT URINE PROTEIN: ABNORMAL
SL AMB POCT UROBILINOGEN: 1

## 2019-03-25 PROCEDURE — 81003 URINALYSIS AUTO W/O SCOPE: CPT | Performed by: UROLOGY

## 2019-03-25 PROCEDURE — 87086 URINE CULTURE/COLONY COUNT: CPT | Performed by: UROLOGY

## 2019-03-25 PROCEDURE — 99214 OFFICE O/P EST MOD 30 MIN: CPT | Performed by: UROLOGY

## 2019-03-25 RX ORDER — CEPHALEXIN 500 MG/1
500 CAPSULE ORAL EVERY 12 HOURS SCHEDULED
Qty: 14 CAPSULE | Refills: 0 | Status: SHIPPED | OUTPATIENT
Start: 2019-03-25 | End: 2019-04-01

## 2019-03-25 NOTE — PATIENT INSTRUCTIONS
Cálculos renales   LO QUE NECESITA SABER:   ¿Qué son los cálculos renales? Los cálculos renales se thom en el sistema urinario cuando el agua y los residuos de la orina no están niko balanceados  Cuando esto sucede, ciertos tipos de lanie de desecho se separan de la orina  Los lanie se acumulan y thom piedras en los riñones  Los cálculos renales pueden estar compuestos de ácido úrico, calcio, fosfato o lanie de oxalato  Es posible que usted tenga 1 o más cálculos en los riñones  ¿Qué aumenta mi riesgo de cálculos renales? · Usted no rob suficientes líquidos (especialmente agua) cada día  · Tener infecciones frecuentes en el tracto urinario  · Usted sigue un cierto tipo de dieta  Por ejemplo, las personas que consumen brielle dieta karyn en gissel o en sal podrían tener mayor riesgo de cálculos renales  Las personas que consumen alimentos altos en oxalato también podrían tener un mayor riesgo  Los alimentos altos en oxalato incluyen a las nueces, chocolate, café y vegetales de hojas verdes  · Nena ciertos medicamentos bao diuréticos, esteroides y antiácidos  · Algún familiar sorensen sufrido de cálculos renales  · Nació con un trastorno renal o intestinal, o tiene otros problemas médicos, bao gota  ¿Cuáles son los signos y síntomas de los cálculos renales? · Dolor en la parte media de la espalda que se mueve a través de un costado o que podría propagarse a la julieta    · [place] y vómitos    · Necesidad de orinar con frecuencia, sensación de ardor al orinar u orina color rosetta o alfredo    · Sensibilidad en la parte inferior de la espalda, en el costado o en el estómago  ¿Cómo se diagnostican los cálculos renales? Morni médico le preguntará sobre morin jesus, Maikel Gibbs y   [place]  El podría referirlo con un urólogo  Es posible que usted necesite exámenes para determinar el tipo de cálculos renales que tiene   Los exámenes pueden mostrar el tamaño de los cálculos y en dónde se encuentran en el sistema urinario  Usted podría tener madelaine o más de los siguientes:  · Análisis de [place]  podrían mostrar si usted tiene alex en la orina  También podrían mostrar altas cantidades de la sustancia que forma los cálculos renales, bao el ácido úrico     · Los análisis de alex:  muestran lo niko que funcionan karely riñones  También podrían utilizarse para revisar los niveles de calcio o de ácido úrico en la alex  · Brielle tomografía computarizada helicoidal sin contraste  es un tipo de radiografía que [place] brielle computadora para gustavo imágenes de los riñones  [employer] las imágenes para revisar si existen cálculos renales u otros problemas  · Se pueden hacer radiografías  de los riñones, uréteres y vejiga  Es posible que le administren un medio de contraste antes de gustavo las imágenes para que los médicos las puedan scotty con mayor claridad  Usted podría necesitar que le realicen mas de brielle radiografía  Dígale al médico si usted alguna vez ha tenido brielle reacción alérgica al tinte de [place]  · Un ultrasonido abdominal  utiliza ondas sonoras para mostrar imágenes de singh abdomen en un monitor  ¿Cómo se tratan los cálculos renales? · AINEs (Analgésicos antiinflamatorios no esteroides) bao el ibuprofeno, ayudan a disminuir la inflamación, el dolor y la [place]  [place]idy medicamento esta disponible con o sin brielle receta médica  Los AINEs pueden causar sangrado estomacal o problemas renales en ciertas personas  Si usted rob un medicamento anticoagulante, siempre pregúntele a singh médico si los LAUREN son seguros para usted  Siempre josé manuel la etiqueta de kassidy medicamento y [place] instrucciones  · Podrían recetarle un medicamento  podrían ser [place]  Pregunte cómo gustavo estos medicamentos de brielle forma anaya  · Medicamentos,  para balancear el nivel de los electrolitos  · Un procedimiento o brielle cirugía  para remover los cálculos renales si no se eliminan por sí solos   El tratamiento dependerá del tamaño y [place] de los cálculos renales  ¿Cómo puedo controlar los síntomas? · [place] suficiente líquidos  Es probable que martinez médico le indique que tome al menos 8 a 12 vasos (de ocho onzas) de líquidos al día  [place] ayuda a CIG[employer] cálculos renales cuando usted orina  El agua es el mejor líquido para gustavo  · Cuele la orina cada vez que use el baño  [place] por medio de un colador o un pedazo de ana mora para así recolectar los cálculos  Lleve los cálculos donde martinez médico para que pueda enviarlos al laboratorio y realizarles exámenes  [place] ayudará a martinez médico a planear el mejor tratamiento para usted  · Consuma alimentos saludables y variados  Los alimentos sanos incluyen frutas, vegetales, panes integrales, productos lácteos bajos en grasas, frijoles y pescado  Es probable que usted tenga que limitar la cantidad de sodio (sal) o proteínas que usted come  Pida más información sobre los mejores alimentos para usted  · Realice actividad física con regularidad  Karely cálculos pueden pasar con más facilidad si usted permanece activo  Pregunte sobre cuáles son las mejores actividades para usted  ¿Cuándo ayesha buscar atención inmediata? · Usted tiene vómitos que no se alivian con medicación  ¿Cuándo ayesha comunicarme con mi médico?   · Usted tiene fiebre  · Usted tiene dificultad para orinar  · Usted orina con alex  · Usted tiene dolor intenso  · Tiene alguna pregunta o inquietud acerca de martinez condición o cuidado  ACUERDOS SOBRE MARTINEZ CUIDADO:   Usted tiene el derecho de ayudar a planear martinez cuidado  Aprenda todo lo que pueda sobre martinez condición y bao darle tratamiento  Discuta karely opciones de tratamiento con karely médicos para decidir el cuidado que usted desea recibir  Usted siempre tiene el derecho de rechazar el tratamiento  Esta información es sólo para uso en educación  Martinez intención no es darle un consejo médico sobre enfermedades o tratamientos   Colsulte con martinez [place] farmacéutico antes de seguir cualquier régimen médico para saber si es seguro y efectivo para usted  © 2017 [address] Information is for End User's use only and may not be sold, redistributed or otherwise used for commercial purposes  All illustrations and images included in CareNotes® are the copyrighted property of A D A LOLLY , Stefano  or Jeremi Mabry  After stent removal it is not unusual to have some discomfort    You should call or seek medical attention  for fever, nausea, vomiting or severe pain not relieved by pain medication

## 2019-03-25 NOTE — ASSESSMENT & PLAN NOTE
CT scan reveals no stones on the left side  The stent is in good position  There is a right renal stone that is not obstructing  Stone analysis reveals the stone to be calcium oxalate  Urinalysis does reveal pyuria and the patient we placed on empiric antibiotic therapy using Keflex 500 mg 2 times daily x1 week  He a urine culture will be sent  I have asked the patient to begin a metabolic stone evaluation with 24 urine testing  She will then return for follow-up and we will consider treatment of her remaining right renal stone

## 2019-03-25 NOTE — LETTER
March 25, 2019     Gina Pretty MD  [address]    Patient: Erika Arce   YOB: 1945   Date of Visit: 3/25/2019       Dear Dr Clayton Saint:    Thank you for referring Erika Arce to me for evaluation  Below are my notes for this consultation  If you have questions, please do not hesitate to call me  I look forward to following your patient along with you  Sincerely,        Bertha Montelongo MD        CC: No Recipients  Bertha Montelongo MD  3/25/2019 12:13 PM  Sign at close encounter  Assessment/Plan:    Nephroureterolithiasis  CT scan reveals no stones on the left side  The stent is in good position  There is a right renal stone that is not obstructing  Stone analysis reveals the stone to be calcium oxalate  Urinalysis does reveal pyuria and the patient we placed on empiric antibiotic therapy using Keflex 500 mg 2 times daily x1 week  He a urine culture will be sent  I have asked the patient to begin a metabolic stone evaluation with 24 urine testing  She will then return for follow-up and we will consider treatment of her remaining right renal stone  Diagnoses and all orders for this visit:    Nephroureterolithiasis  -     POCT urine dip auto non-scope  -     Urine culture  -     cephalexin (KEFLEX) 500 mg capsule; Take 1 capsule (500 mg total) by mouth every 12 (twelve) hours for 7 days          Subjective:      Patient ID: Erika Arce is a 74 y o  female  Chief complaint:  Left ureteral stone    HPI:  33-year-old female who underwent ureteroscopy with laser lithotripsy and stone extraction on March 7  There were no intraoperative complications under postoperative course has been uneventful  She notes that she is voiding with mild dysuria, frequency and gross hematuria  She has no fever    She has had a follow-up CT scan and presents today for follow-up and stent removal         The following portions of the patient's history were reviewed and updated as appropriate: allergies, current medications, past family history, past medical history, past social history, past surgical history and problem list     Review of Systems   Constitutional: Negative for activity change, appetite change, fatigue and fever  HENT: Negative  Eyes: Negative  Respiratory: Negative  Cardiovascular: Negative  Gastrointestinal: Negative for blood in stool, constipation, diarrhea and vomiting  Endocrine: Negative  Genitourinary:        See HPI   Musculoskeletal: Negative  Skin: Negative  Allergic/Immunologic: Negative  Neurological: Negative  Hematological: Negative  Psychiatric/Behavioral: Negative  Objective:      /82 (BP Location: Left arm, Patient Position: Sitting, Cuff Size: Adult)   Pulse 94   Ht 4' 6 25" (1 378 m)   Wt 70 3 kg (155 lb)   BMI 37 03 kg/m²           Physical Exam   Constitutional: She is oriented to person, place, and time  She appears well-developed and well-nourished  HENT:   Head: Normocephalic and atraumatic  Eyes: Conjunctivae are normal    Neck: Neck supple  Cardiovascular: Normal rate  Pulmonary/Chest: Effort normal    Abdominal: Soft  She exhibits no distension and no mass  There is no tenderness  There is no rebound, no guarding and no CVA tenderness  Musculoskeletal: She exhibits no edema  Neurological: She is alert and oriented to person, place, and time  Skin: Skin is warm and dry  Psychiatric: She has a normal mood and affect  Her behavior is normal  Judgment and thought content normal        After review of her CT scan was performed,   the patient was placed in lithotomy position  The stent string was visible  The string was grasped and the stent easily removed

## 2019-03-25 NOTE — PROGRESS NOTES
Assessment/Plan:    Nephroureterolithiasis  CT scan reveals no stones on the left side  The stent is in good position  There is a right renal stone that is not obstructing  Stone analysis reveals the stone to be calcium oxalate  Urinalysis does reveal pyuria and the patient we placed on empiric antibiotic therapy using Keflex 500 mg 2 times daily x1 week  He a urine culture will be sent  I have asked the patient to begin a metabolic stone evaluation with 24 urine testing  She will then return for follow-up and we will consider treatment of her remaining right renal stone  Diagnoses and all orders for this visit:    Nephroureterolithiasis  -     POCT urine dip auto non-scope  -     Urine culture  -     cephalexin (KEFLEX) 500 mg capsule; Take 1 capsule (500 mg total) by mouth every 12 (twelve) hours for 7 days          Subjective:      Patient ID: Betty Connelly is a 74 y o  female  Chief complaint:  Left ureteral stone    HPI:  53-year-old female who underwent ureteroscopy with laser lithotripsy and stone extraction on March 7  There were no intraoperative complications under postoperative course has been uneventful  She notes that she is voiding with mild dysuria, frequency and gross hematuria  She has no fever  She has had a follow-up CT scan and presents today for follow-up and stent removal         The following portions of the patient's history were reviewed and updated as appropriate: allergies, current medications, past family history, past medical history, past social history, past surgical history and problem list     Review of Systems   Constitutional: Negative for activity change, appetite change, fatigue and fever  HENT: Negative  Eyes: Negative  Respiratory: Negative  Cardiovascular: Negative  Gastrointestinal: Negative for blood in stool, constipation, diarrhea and vomiting  Endocrine: Negative  Genitourinary:        See HPI   Musculoskeletal: Negative      Skin: Negative  Allergic/Immunologic: Negative  Neurological: Negative  Hematological: Negative  Psychiatric/Behavioral: Negative  Objective:      /82 (BP Location: Left arm, Patient Position: Sitting, Cuff Size: Adult)   Pulse 94   Ht 4' 6 25" (1 378 m)   Wt 70 3 kg (155 lb)   BMI 37 03 kg/m²          Physical Exam   Constitutional: She is oriented to person, place, and time  She appears well-developed and well-nourished  HENT:   Head: Normocephalic and atraumatic  Eyes: Conjunctivae are normal    Neck: Neck supple  Cardiovascular: Normal rate  Pulmonary/Chest: Effort normal    Abdominal: Soft  She exhibits no distension and no mass  There is no tenderness  There is no rebound, no guarding and no CVA tenderness  Musculoskeletal: She exhibits no edema  Neurological: She is alert and oriented to person, place, and time  Skin: Skin is warm and dry  Psychiatric: She has a normal mood and affect  Her behavior is normal  Judgment and thought content normal        After review of her CT scan was performed,   the patient was placed in lithotomy position  The stent string was visible  The string was grasped and the stent easily removed

## 2019-03-26 LAB — BACTERIA UR CULT: NORMAL

## 2019-04-15 DIAGNOSIS — J31.0 CHRONIC RHINITIS: ICD-10-CM

## 2019-04-15 RX ORDER — LORATADINE 10 MG/1
TABLET ORAL
Qty: 30 TABLET | Refills: 3 | Status: SHIPPED | OUTPATIENT
Start: 2019-04-15 | End: 2020-07-13 | Stop reason: ALTCHOICE

## 2019-04-25 ENCOUNTER — OFFICE VISIT (OUTPATIENT)
Dept: PAIN MEDICINE | Facility: MEDICAL CENTER | Age: 74
End: 2019-04-25
Payer: MEDICARE

## 2019-04-25 VITALS
RESPIRATION RATE: 14 BRPM | HEIGHT: 55 IN | BODY MASS INDEX: 36.8 KG/M2 | DIASTOLIC BLOOD PRESSURE: 82 MMHG | HEART RATE: 76 BPM | WEIGHT: 159 LBS | SYSTOLIC BLOOD PRESSURE: 148 MMHG

## 2019-04-25 DIAGNOSIS — M46.1 SACROILIITIS (HCC): Primary | ICD-10-CM

## 2019-04-25 PROCEDURE — 99214 OFFICE O/P EST MOD 30 MIN: CPT | Performed by: PHYSICAL MEDICINE & REHABILITATION

## 2019-04-25 RX ORDER — IBUPROFEN 200 MG
200 TABLET ORAL EVERY 6 HOURS PRN
COMMUNITY
End: 2019-10-08

## 2019-04-30 ENCOUNTER — TRANSCRIBE ORDERS (OUTPATIENT)
Dept: ADMINISTRATIVE | Facility: HOSPITAL | Age: 74
End: 2019-04-30

## 2019-04-30 ENCOUNTER — APPOINTMENT (OUTPATIENT)
Dept: LAB | Facility: HOSPITAL | Age: 74
End: 2019-04-30
Attending: UROLOGY
Payer: MEDICARE

## 2019-04-30 DIAGNOSIS — N20.2 NEPHROURETEROLITHIASIS: ICD-10-CM

## 2019-04-30 PROCEDURE — 83735 ASSAY OF MAGNESIUM: CPT

## 2019-04-30 PROCEDURE — 82507 ASSAY OF CITRATE: CPT

## 2019-04-30 PROCEDURE — 82436 ASSAY OF URINE CHLORIDE: CPT

## 2019-04-30 PROCEDURE — 81003 URINALYSIS AUTO W/O SCOPE: CPT

## 2019-04-30 PROCEDURE — 83935 ASSAY OF URINE OSMOLALITY: CPT

## 2019-04-30 PROCEDURE — 84392 ASSAY OF URINE SULFATE: CPT

## 2019-04-30 PROCEDURE — 84300 ASSAY OF URINE SODIUM: CPT

## 2019-04-30 PROCEDURE — 82131 AMINO ACIDS SINGLE QUANT: CPT

## 2019-04-30 PROCEDURE — 83945 ASSAY OF OXALATE: CPT

## 2019-04-30 PROCEDURE — 84560 ASSAY OF URINE/URIC ACID: CPT

## 2019-04-30 PROCEDURE — 84105 ASSAY OF URINE PHOSPHORUS: CPT

## 2019-04-30 PROCEDURE — 82140 ASSAY OF AMMONIA: CPT

## 2019-04-30 PROCEDURE — 82340 ASSAY OF CALCIUM IN URINE: CPT

## 2019-04-30 PROCEDURE — 84133 ASSAY OF URINE POTASSIUM: CPT

## 2019-04-30 PROCEDURE — 82570 ASSAY OF URINE CREATININE: CPT

## 2019-05-01 ENCOUNTER — HOSPITAL ENCOUNTER (OUTPATIENT)
Dept: RADIOLOGY | Facility: MEDICAL CENTER | Age: 74
Discharge: HOME | End: 2019-05-01
Attending: PHYSICAL MEDICINE & REHABILITATION | Admitting: PHYSICAL MEDICINE & REHABILITATION
Payer: MEDICARE

## 2019-05-01 VITALS
OXYGEN SATURATION: 98 % | RESPIRATION RATE: 20 BRPM | HEART RATE: 71 BPM | DIASTOLIC BLOOD PRESSURE: 80 MMHG | TEMPERATURE: 98 F | SYSTOLIC BLOOD PRESSURE: 136 MMHG

## 2019-05-01 DIAGNOSIS — M46.1 SACROILIITIS (HCC): ICD-10-CM

## 2019-05-01 PROCEDURE — 27096 INJECT SACROILIAC JOINT: CPT | Performed by: PHYSICAL MEDICINE & REHABILITATION

## 2019-05-01 RX ORDER — METHYLPREDNISOLONE ACETATE 40 MG/ML
80 INJECTION, SUSPENSION INTRA-ARTICULAR; INTRALESIONAL; INTRAMUSCULAR; PARENTERAL; SOFT TISSUE ONCE
Status: COMPLETED | OUTPATIENT
Start: 2019-05-01 | End: 2019-05-01

## 2019-05-01 RX ORDER — BUPIVACAINE HCL/PF 2.5 MG/ML
10 VIAL (ML) INJECTION ONCE
Status: COMPLETED | OUTPATIENT
Start: 2019-05-01 | End: 2019-05-01

## 2019-05-01 RX ORDER — LIDOCAINE HYDROCHLORIDE 10 MG/ML
5 INJECTION, SOLUTION EPIDURAL; INFILTRATION; INTRACAUDAL; PERINEURAL ONCE
Status: COMPLETED | OUTPATIENT
Start: 2019-05-01 | End: 2019-05-01

## 2019-05-01 RX ADMIN — IOHEXOL 1 ML: 300 INJECTION, SOLUTION INTRAVENOUS at 10:02

## 2019-05-01 RX ADMIN — LIDOCAINE HYDROCHLORIDE 3 ML: 10 INJECTION, SOLUTION EPIDURAL; INFILTRATION; INTRACAUDAL; PERINEURAL at 10:02

## 2019-05-01 RX ADMIN — Medication 3 ML: at 10:03

## 2019-05-01 RX ADMIN — METHYLPREDNISOLONE ACETATE 80 MG: 40 INJECTION, SUSPENSION INTRA-ARTICULAR; INTRALESIONAL; INTRAMUSCULAR; SOFT TISSUE at 10:03

## 2019-05-07 LAB
AMMONIA 24H UR-MRATE: 22 MEQ/24 HR
AMMONIA UR-SCNC: ABNORMAL UG/DL
CA H2 PHOS DIHYD CRY URNS QL MICRO: 3.18 RATIO (ref 0–3)
CALCIUM 24H UR-MCNC: 19.5 MG/DL
CALCIUM 24H UR-MRATE: 136.5 MG/24 HR (ref 100–300)
CHLORIDE 24H UR-SCNC: 100 MMOL/L
CHLORIDE 24H UR-SRATE: 70 MMOL/24 HR (ref 110–250)
CITRATE 24H UR-MCNC: 350 MG/L
CITRATE 24H UR-MRATE: 245 MG/24 HR (ref 320–1240)
COM CRY STONE QL IR: 13.2 RATIO (ref 0–6)
CREAT 24H UR-MCNC: 126.8 MG/DL
CREAT 24H UR-MRATE: 887.6 MG/24 HR (ref 800–1800)
CYSTINE 24H UR-MCNC: 10.05 MG/L
CYSTINE 24H UR-MRATE: 7.04 MG/24 HR (ref 10–100)
MAGNESIUM 24H UR-MRATE: 38 MG/24 HR (ref 12–293)
MAGNESIUM UR-MCNC: 5.4 MG/DL
NA URATE CRY STONE QL IR: 9.35 RATIO (ref 0–4)
OSMOLALITY UR: 681 MOSMOL/KG (ref 300–900)
OXALATE 24H UR-MRATE: 23 MG/24 HR (ref 4–31)
OXALATE UR-MCNC: 33 MG/L
PH 24H UR: 6.1 [PH]
PHOSPHATE 24H UR-MCNC: 72.9 MG/DL
PHOSPHATE 24H UR-MRATE: 510.3 MG/24 HR (ref 400–1300)
PLEASE NOTE (STONE RISK): ABNORMAL
POTASSIUM 24H UR-SCNC: 22.2 MMOL/24 HR (ref 25–125)
POTASSIUM UR-SCNC: 31.7 MMOL/L
PRESERVED URINE: 700 ML/24 HR (ref 600–1600)
SODIUM 24H UR-SCNC: 135 MMOL/L
SODIUM 24H UR-SRATE: 95 MMOL/24 HR (ref 39–258)
SPECIMEN VOL 24H UR: 700 ML/24 HR (ref 600–1600)
SULFATE 24H UR-MCNC: 18 MEQ/24 HR (ref 0–30)
SULFATE UR-MCNC: 26 MEQ/L
TRI-PHOS CRY STONE MICRO: 0.05 RATIO (ref 0–1)
URATE 24H UR-MCNC: 65.5 MG/DL
URATE 24H UR-MRATE: 459 MG/24 HR (ref 250–750)
URATE DIHYD CRY STONE QL IR: 2.2 RATIO (ref 0–1.2)

## 2019-05-08 ENCOUNTER — TELEPHONE (OUTPATIENT)
Dept: PAIN MEDICINE | Facility: CLINIC | Age: 74
End: 2019-05-08

## 2019-05-14 ENCOUNTER — HOSPITAL ENCOUNTER (EMERGENCY)
Facility: HOSPITAL | Age: 74
Discharge: HOME | End: 2019-05-14
Attending: EMERGENCY MEDICINE
Payer: MEDICARE

## 2019-05-14 VITALS
HEART RATE: 90 BPM | DIASTOLIC BLOOD PRESSURE: 78 MMHG | BODY MASS INDEX: 40.03 KG/M2 | TEMPERATURE: 96.6 F | WEIGHT: 167.55 LBS | RESPIRATION RATE: 18 BRPM | OXYGEN SATURATION: 98 % | SYSTOLIC BLOOD PRESSURE: 157 MMHG

## 2019-05-14 DIAGNOSIS — M54.50 ACUTE EXACERBATION OF CHRONIC LOW BACK PAIN: Primary | ICD-10-CM

## 2019-05-14 DIAGNOSIS — G89.29 ACUTE EXACERBATION OF CHRONIC LOW BACK PAIN: Primary | ICD-10-CM

## 2019-05-14 PROCEDURE — 99283 EMERGENCY DEPT VISIT LOW MDM: CPT

## 2019-05-14 PROCEDURE — 99284 EMERGENCY DEPT VISIT MOD MDM: CPT | Performed by: EMERGENCY MEDICINE

## 2019-05-14 PROCEDURE — 96372 THER/PROPH/DIAG INJ SC/IM: CPT

## 2019-05-14 RX ORDER — LIDOCAINE 50 MG/G
1 PATCH TOPICAL DAILY
Qty: 15 PATCH | Refills: 0 | Status: SHIPPED | OUTPATIENT
Start: 2019-05-14 | End: 2019-06-19

## 2019-05-14 RX ORDER — CYCLOBENZAPRINE HCL 10 MG
10 TABLET ORAL ONCE
Status: COMPLETED | OUTPATIENT
Start: 2019-05-14 | End: 2019-05-14

## 2019-05-14 RX ORDER — LIDOCAINE 50 MG/G
1 PATCH TOPICAL ONCE
Status: DISCONTINUED | OUTPATIENT
Start: 2019-05-14 | End: 2019-05-14 | Stop reason: HOSPADM

## 2019-05-14 RX ORDER — MORPHINE SULFATE 10 MG/ML
5 INJECTION, SOLUTION INTRAMUSCULAR; INTRAVENOUS ONCE
Status: COMPLETED | OUTPATIENT
Start: 2019-05-14 | End: 2019-05-14

## 2019-05-14 RX ADMIN — MORPHINE SULFATE 5 MG: 10 INJECTION INTRAVENOUS at 20:48

## 2019-05-14 RX ADMIN — CYCLOBENZAPRINE HYDROCHLORIDE 10 MG: 10 TABLET, FILM COATED ORAL at 20:43

## 2019-05-14 RX ADMIN — LIDOCAINE 1 PATCH: 50 PATCH TOPICAL at 20:42

## 2019-05-17 ENCOUNTER — TELEPHONE (OUTPATIENT)
Dept: OBGYN CLINIC | Facility: HOSPITAL | Age: 74
End: 2019-05-17

## 2019-05-17 DIAGNOSIS — M54.50 CHRONIC BILATERAL LOW BACK PAIN WITHOUT SCIATICA: Primary | ICD-10-CM

## 2019-05-17 DIAGNOSIS — G89.29 CHRONIC BILATERAL LOW BACK PAIN WITHOUT SCIATICA: Primary | ICD-10-CM

## 2019-05-17 RX ORDER — CYCLOBENZAPRINE HCL 10 MG
10 TABLET ORAL 3 TIMES DAILY PRN
Qty: 45 TABLET | Refills: 0 | Status: SHIPPED | OUTPATIENT
Start: 2019-05-17 | End: 2019-05-28 | Stop reason: SDUPTHER

## 2019-05-24 ENCOUNTER — TELEPHONE (OUTPATIENT)
Dept: UROLOGY | Facility: MEDICAL CENTER | Age: 74
End: 2019-05-24

## 2019-05-28 DIAGNOSIS — M54.50 CHRONIC BILATERAL LOW BACK PAIN WITHOUT SCIATICA: ICD-10-CM

## 2019-05-28 DIAGNOSIS — G89.29 CHRONIC BILATERAL LOW BACK PAIN WITHOUT SCIATICA: ICD-10-CM

## 2019-05-28 RX ORDER — CYCLOBENZAPRINE HCL 10 MG
TABLET ORAL
Qty: 45 TABLET | Refills: 0 | Status: SHIPPED | OUTPATIENT
Start: 2019-05-28 | End: 2022-02-09

## 2019-06-19 ENCOUNTER — OFFICE VISIT (OUTPATIENT)
Dept: PAIN MEDICINE | Facility: MEDICAL CENTER | Age: 74
End: 2019-06-19
Payer: MEDICARE

## 2019-06-19 VITALS
BODY MASS INDEX: 36.84 KG/M2 | HEART RATE: 100 BPM | RESPIRATION RATE: 12 BRPM | DIASTOLIC BLOOD PRESSURE: 70 MMHG | SYSTOLIC BLOOD PRESSURE: 136 MMHG | WEIGHT: 159.2 LBS | HEIGHT: 55 IN

## 2019-06-19 DIAGNOSIS — M54.50 ACUTE LEFT-SIDED LOW BACK PAIN WITHOUT SCIATICA: ICD-10-CM

## 2019-06-19 DIAGNOSIS — M46.1 SACROILIITIS (HCC): Primary | ICD-10-CM

## 2019-06-19 DIAGNOSIS — M47.816 LUMBAR SPONDYLOSIS: ICD-10-CM

## 2019-06-19 PROCEDURE — 99213 OFFICE O/P EST LOW 20 MIN: CPT | Performed by: NURSE PRACTITIONER

## 2019-06-19 PROCEDURE — 1124F ACP DISCUSS-NO DSCNMKR DOCD: CPT | Performed by: NURSE PRACTITIONER

## 2019-06-19 RX ORDER — METHYLPREDNISOLONE 4 MG/1
TABLET ORAL
Qty: 1 EACH | Refills: 0 | Status: SHIPPED | OUTPATIENT
Start: 2019-06-19 | End: 2019-10-08

## 2019-07-02 ENCOUNTER — OFFICE VISIT (OUTPATIENT)
Dept: UROLOGY | Facility: MEDICAL CENTER | Age: 74
End: 2019-07-02
Payer: MEDICARE

## 2019-07-02 VITALS
WEIGHT: 159 LBS | BODY MASS INDEX: 36.8 KG/M2 | DIASTOLIC BLOOD PRESSURE: 72 MMHG | SYSTOLIC BLOOD PRESSURE: 128 MMHG | HEART RATE: 66 BPM | HEIGHT: 55 IN

## 2019-07-02 DIAGNOSIS — N20.2 NEPHROURETEROLITHIASIS: Primary | ICD-10-CM

## 2019-07-02 LAB
SL AMB  POCT GLUCOSE, UA: ABNORMAL
SL AMB LEUKOCYTE ESTERASE,UA: ABNORMAL
SL AMB POCT BILIRUBIN,UA: ABNORMAL
SL AMB POCT BLOOD,UA: ABNORMAL
SL AMB POCT CLARITY,UA: CLEAR
SL AMB POCT COLOR,UA: YELLOW
SL AMB POCT KETONES,UA: ABNORMAL
SL AMB POCT NITRITE,UA: ABNORMAL
SL AMB POCT PH,UA: 6
SL AMB POCT SPECIFIC GRAVITY,UA: 1.02
SL AMB POCT URINE PROTEIN: ABNORMAL
SL AMB POCT UROBILINOGEN: 1

## 2019-07-02 PROCEDURE — 99214 OFFICE O/P EST MOD 30 MIN: CPT | Performed by: UROLOGY

## 2019-07-02 PROCEDURE — 81003 URINALYSIS AUTO W/O SCOPE: CPT | Performed by: UROLOGY

## 2019-07-02 RX ORDER — POTASSIUM CITRATE 10 MEQ/1
TABLET, EXTENDED RELEASE ORAL
Qty: 180 TABLET | Refills: 3 | Status: SHIPPED | OUTPATIENT
Start: 2019-07-02 | End: 2019-10-08

## 2019-07-02 NOTE — PROGRESS NOTES
Assessment/Plan:    Nephroureterolithiasis  The patient's CT scan was reviewed  There is a solitary right mid pole renal stone that is nonobstructing  Her 24 urine testing was reviewed as well  She has low urine output and hypocitraturia  We had a long discussion regarding the importance of increasing her fluid intake  I also recommended treating her hypocitraturia with Urocit-K 10 mEq twice daily  We then reviewed her x-rays  Options for therapy of her renal stone were discussed  She elected observation  The patient will return in 6 months  We will plan to obtain a KUB prior to that visit to assess for stone growth and reconsider treatment of the renal stone  She will call should symptoms arise  Diagnoses and all orders for this visit:    Nephroureterolithiasis  -     POCT urine dip auto non-scope  -     potassium citrate (UROCIT-K) 10 mEq; 1 TABLET P O  TWICE DAILY  -     XR abdomen 1 view kub; Future          Subjective:      Patient ID: Erika Blevins is a 74 y o  female  CHIEF COMPLAINT:  KIDNEY STONES    HPI:  69-year-old female followed for the above complaints  She notes she is feeling well  She has no flank or back pain at this time  She is voiding well  She feels she empties her bladder adequately  She denies gross hematuria, dysuria or symptoms of infection  She is satisfied with her voiding pattern  She returns today for follow-up of her 24 urine testing and consideration of therapy for a right renal stone  The following portions of the patient's history were reviewed and updated as appropriate: allergies, current medications, past family history, past medical history, past social history, past surgical history and problem list     Review of Systems   Constitutional: Negative for activity change, appetite change, fatigue and fever  HENT: Negative  Eyes: Negative  Respiratory: Negative  Cardiovascular: Negative  Gastrointestinal: Negative    Negative for blood in stool, constipation, diarrhea and vomiting  Endocrine: Negative  Genitourinary:        See HPI   Musculoskeletal: Positive for arthralgias  Skin: Negative  Allergic/Immunologic: Negative  Neurological: Negative  Hematological: Negative  Psychiatric/Behavioral: Negative  Objective:      /72 (BP Location: Left arm, Patient Position: Sitting, Cuff Size: Adult)   Pulse 66   Ht 4' 6" (1 372 m)   Wt 72 1 kg (159 lb)   BMI 38 34 kg/m²          Physical Exam   Constitutional: She is oriented to person, place, and time  She appears well-developed and well-nourished  HENT:   Head: Normocephalic and atraumatic  Eyes: Conjunctivae are normal    Neck: Neck supple  Cardiovascular: Normal rate  Pulmonary/Chest: Effort normal    Abdominal: Soft  She exhibits no distension and no mass  There is no tenderness  There is no rebound, no guarding and no CVA tenderness  Musculoskeletal: She exhibits no edema  No CVAT   Neurological: She is alert and oriented to person, place, and time  Skin: Skin is warm and dry  Psychiatric: She has a normal mood and affect   Her behavior is normal  Judgment and thought content normal

## 2019-07-02 NOTE — LETTER
July 2, 2019     Luna Warren MD  [address]    Patient: Gabi Correa   YOB: 1945   Date of Visit: 7/2/2019       Dear Dr Cb Mcgovern:    Thank you for referring Gabi Correa to me for evaluation  Below are my notes for this consultation  If you have questions, please do not hesitate to call me  I look forward to following your patient along with you  Sincerely,        Philly Henriquez MD        CC: No Recipients  Philly Henriquez MD  7/2/2019 12:39 PM  Sign at close encounter  Assessment/Plan:    Nephroureterolithiasis  The patient's CT scan was reviewed  There is a solitary right mid pole renal stone that is nonobstructing  Her 24 urine testing was reviewed as well  She has low urine output and hypocitraturia  We had a long discussion regarding the importance of increasing her fluid intake  I also recommended treating her hypocitraturia with Urocit-K 10 mEq twice daily  We then reviewed her x-rays  Options for therapy of her renal stone were discussed  She elected observation  The patient will return in 6 months  We will plan to obtain a KUB prior to that visit to assess for stone growth and reconsider treatment of the renal stone  She will call should symptoms arise  Diagnoses and all orders for this visit:    Nephroureterolithiasis  -     POCT urine dip auto non-scope  -     potassium citrate (UROCIT-K) 10 mEq; 1 TABLET P O  TWICE DAILY  -     XR abdomen 1 view kub; Future          Subjective:      Patient ID: Gabi Correa is a 74 y o  female  CHIEF COMPLAINT:  KIDNEY STONES    HPI:  40-year-old female followed for the above complaints  She notes she is feeling well  She has no flank or back pain at this time  She is voiding well  She feels she empties her bladder adequately  She denies gross hematuria, dysuria or symptoms of infection  She is satisfied with her voiding pattern    She returns today for follow-up of her 24 urine testing and consideration of therapy for a right renal stone  The following portions of the patient's history were reviewed and updated as appropriate: allergies, current medications, past family history, past medical history, past social history, past surgical history and problem list     Review of Systems   Constitutional: Negative for activity change, appetite change, fatigue and fever  HENT: Negative  Eyes: Negative  Respiratory: Negative  Cardiovascular: Negative  Gastrointestinal: Negative  Negative for blood in stool, constipation, diarrhea and vomiting  Endocrine: Negative  Genitourinary:        See HPI   Musculoskeletal: Positive for arthralgias  Skin: Negative  Allergic/Immunologic: Negative  Neurological: Negative  Hematological: Negative  Psychiatric/Behavioral: Negative  Objective:      /72 (BP Location: Left arm, Patient Position: Sitting, Cuff Size: Adult)   Pulse 66   Ht 4' 6" (1 372 m)   Wt 72 1 kg (159 lb)   BMI 38 34 kg/m²           Physical Exam   Constitutional: She is oriented to person, place, and time  She appears well-developed and well-nourished  HENT:   Head: Normocephalic and atraumatic  Eyes: Conjunctivae are normal    Neck: Neck supple  Cardiovascular: Normal rate  Pulmonary/Chest: Effort normal    Abdominal: Soft  She exhibits no distension and no mass  There is no tenderness  There is no rebound, no guarding and no CVA tenderness  Musculoskeletal: She exhibits no edema  No CVAT   Neurological: She is alert and oriented to person, place, and time  Skin: Skin is warm and dry  Psychiatric: She has a normal mood and affect   Her behavior is normal  Judgment and thought content normal

## 2019-07-02 NOTE — ASSESSMENT & PLAN NOTE
The patient's CT scan was reviewed  There is a solitary right mid pole renal stone that is nonobstructing  Her 24 urine testing was reviewed as well  She has low urine output and hypocitraturia  We had a long discussion regarding the importance of increasing her fluid intake  I also recommended treating her hypocitraturia with Urocit-K 10 mEq twice daily  We then reviewed her x-rays  Options for therapy of her renal stone were discussed  She elected observation  The patient will return in 6 months  We will plan to obtain a KUB prior to that visit to assess for stone growth and reconsider treatment of the renal stone  She will call should symptoms arise

## 2019-07-02 NOTE — PATIENT INSTRUCTIONS
Potassium Citrate (Por la boca)   Trata y previene cálculos renales por reducción del nivel de ácido en la orina  Shane(s) : Urocit-K, Urocit-K 10, Urocit-K 5   Existen muchas otras marcas de Lucita  Chelita medicamento no debe ser usado cuando:   Chelita medicamento no es adecuado para todas las personas  No lo use si usted ha tenido brielle reacción alérgica al citrato de potasio, o si usted tiene brielle infección del tracto urinario o brielle Vidal Peal  Landon Harriet de usar chelita medicamento:   Tableta de liberación prolongada  · Singh médico le indicara cuanto medicamento necesita usar  No use más medicamento de lo indicado  · Tómese chelita medicamento con comida o brielle merienda nocturna, o dentro de los primeros 30 minutos de gustavo comido  · Siga cuidadosamente las instrucciones de singh médico, relacionadas con alguna dieta especial  Melissa Lawman líquidos para que pueda orinar con Abron Christian y ayudar a prevenir problemas en karely riñones  · Trague la tableta de liberación prolongada entera  No triture, rompa o mastique  · Informe a singh médico si usted tiene dificultad para tragar la tableta o si la tableta se pega o se queda atorada en singh garganta  · Si esta usando la tableta de liberación prolongada, parte de la tableta puede salir con karely evacuaciones intestinales  [place] es normal y no es motivo de preocupación  · Si olvida brielle dosis: Si olvida brielle dosis de singh medicamento, tómelo lo más pronto posible  Si es sara la hora para singh próxima dosis, espere hasta entonces para gustavo singh dosis regular  No use medicamento adicional para reponer la dosis olvidada  · Guarde el medicamento en un recipiente cerrado a temperatura ambiente y alejado del calor, la humedad y la lashonda directa  Medicamentos y [employer] que debe evitar:   Consulte con singh médico o farmacéutico antes de usar cualquier medicamento, incluyendo los que compra sin receta médica, las vitaminas y los productos herbales    · Algunos medicamentos y alimentos pueden afectar el funcionamiento del citrato de potasio  Dígale a singh médico si usted [employer] un diurético o medicamentos que le resecan la boca o causan estreñimiento  · Limite la cantidad de sal (sodio) que usted come y rob  No le agregue sal a karely comidas  Precauciones nicholas el uso de chelita medicamento:   · Dígale a singh médico si usted está embarazada o amamantando, o si tiene [employer] riñones, enfermedad en el corazón, insuficiencia cardíaca, diabetes, problemas con singh glándula adrenal, problemas digestivos o ha sufrido un ataque cardíaco previamente  · Chelita medicamento puede provocarle los siguientes problemas:  ¨ Demasiado potasio en la alex  ¨ Sangrado o daños en el sistema digestivo, bao brielle úlcera  · El médico solicitará exámenes de laboratorio nicholas las citas de rutina para revisar los efectos de Lucita  Asista a todas karely citas  · Guarde todos los medicamentos fuera del alcance de los niños  Nunca comparta karely medicamentos con [employer]  Efectos secundarios que pueden presentarse nicholas el uso de chelita medicamento:   Consulte inmediatamente con el médico si nota cualquiera de estos efectos secundarios:  · Reacción alérgica: Comezón o ronchas, hinchazón del marty o las leena, hinchazón u hormigueo en la boca o garganta, opresión en el pecho, dificultad para respirar  · Vomito severo, dolor de estómago, heces sangrientas, negras o alquitranadas  · Confusión, debilidad, ritmo cardíaco irregular, dificultad para respirar, adormecimiento en las leena, pies o labios  · Ritmo cardíaco rápido o irregular  Consulte con el médico si nota los siguientes efectos secundarios menos graves:   · Diarrea leve, náusea, vómitos o molestias estomacales  Consulte con el médico si nota otros efectos secundarios que vania son causados por chelita medicamento  Llame a singh médico para consultarle Navid Brady puede notificar karely efectos secundarios al FDA al [phone]    © 2017 [address] Information is for End User's use only and may not be sold, redistributed or otherwise used for commercial purposes  Esta información es sólo para uso en educación  Singh intención no es darle un consejo médico sobre enfermedades o tratamientos  Colsulte con singh [place] farmacéutico antes de seguir cualquier régimen médico para saber si es seguro y efectivo para usted

## 2019-10-08 ENCOUNTER — HOSPITAL ENCOUNTER (EMERGENCY)
Facility: HOSPITAL | Age: 74
Discharge: HOME | End: 2019-10-08
Attending: EMERGENCY MEDICINE | Admitting: EMERGENCY MEDICINE
Payer: MEDICARE

## 2019-10-08 VITALS
RESPIRATION RATE: 18 BRPM | SYSTOLIC BLOOD PRESSURE: 140 MMHG | OXYGEN SATURATION: 98 % | BODY MASS INDEX: 39.06 KG/M2 | DIASTOLIC BLOOD PRESSURE: 84 MMHG | WEIGHT: 162 LBS | TEMPERATURE: 97 F | HEART RATE: 84 BPM

## 2019-10-08 DIAGNOSIS — G89.29 CHRONIC BILATERAL LOW BACK PAIN WITHOUT SCIATICA: ICD-10-CM

## 2019-10-08 DIAGNOSIS — M54.50 CHRONIC BILATERAL LOW BACK PAIN WITHOUT SCIATICA: ICD-10-CM

## 2019-10-08 DIAGNOSIS — M62.838 TRAPEZIUS MUSCLE SPASM: Primary | ICD-10-CM

## 2019-10-08 PROCEDURE — 99284 EMERGENCY DEPT VISIT MOD MDM: CPT | Performed by: EMERGENCY MEDICINE

## 2019-10-08 PROCEDURE — 96372 THER/PROPH/DIAG INJ SC/IM: CPT

## 2019-10-08 PROCEDURE — 99283 EMERGENCY DEPT VISIT LOW MDM: CPT

## 2019-10-08 RX ORDER — KETOROLAC TROMETHAMINE 30 MG/ML
15 INJECTION, SOLUTION INTRAMUSCULAR; INTRAVENOUS ONCE
Status: COMPLETED | OUTPATIENT
Start: 2019-10-08 | End: 2019-10-08

## 2019-10-08 RX ORDER — ACETAMINOPHEN 325 MG/1
975 TABLET ORAL EVERY 6 HOURS PRN
Qty: 60 TABLET | Refills: 0 | Status: SHIPPED | OUTPATIENT
Start: 2019-10-08 | End: 2019-10-08 | Stop reason: SDUPTHER

## 2019-10-08 RX ORDER — CYCLOBENZAPRINE HCL 10 MG
10 TABLET ORAL 2 TIMES DAILY PRN
Qty: 20 TABLET | Refills: 0 | Status: SHIPPED | OUTPATIENT
Start: 2019-10-08 | End: 2019-10-08 | Stop reason: SDUPTHER

## 2019-10-08 RX ORDER — IBUPROFEN 600 MG/1
600 TABLET ORAL EVERY 6 HOURS PRN
Qty: 30 TABLET | Refills: 0 | Status: SHIPPED | OUTPATIENT
Start: 2019-10-08 | End: 2019-10-08 | Stop reason: SDUPTHER

## 2019-10-08 RX ORDER — ACETAMINOPHEN 325 MG/1
975 TABLET ORAL ONCE
Status: COMPLETED | OUTPATIENT
Start: 2019-10-08 | End: 2019-10-08

## 2019-10-08 RX ORDER — IBUPROFEN 600 MG/1
600 TABLET ORAL EVERY 6 HOURS PRN
Qty: 30 TABLET | Refills: 0 | Status: SHIPPED | OUTPATIENT
Start: 2019-10-08 | End: 2019-12-31 | Stop reason: SDUPTHER

## 2019-10-08 RX ORDER — LIDOCAINE 50 MG/G
1 PATCH TOPICAL ONCE
Status: DISCONTINUED | OUTPATIENT
Start: 2019-10-08 | End: 2019-10-08 | Stop reason: HOSPADM

## 2019-10-08 RX ORDER — CYCLOBENZAPRINE HCL 10 MG
10 TABLET ORAL 2 TIMES DAILY PRN
Qty: 20 TABLET | Refills: 0 | Status: SHIPPED | OUTPATIENT
Start: 2019-10-08 | End: 2020-07-13 | Stop reason: ALTCHOICE

## 2019-10-08 RX ORDER — ACETAMINOPHEN 325 MG/1
975 TABLET ORAL EVERY 6 HOURS PRN
Qty: 60 TABLET | Refills: 0 | Status: SHIPPED | OUTPATIENT
Start: 2019-10-08 | End: 2022-02-09

## 2019-10-08 RX ADMIN — ACETAMINOPHEN 975 MG: 325 TABLET ORAL at 08:34

## 2019-10-08 RX ADMIN — LIDOCAINE 1 PATCH: 50 PATCH TOPICAL at 08:34

## 2019-10-08 RX ADMIN — KETOROLAC TROMETHAMINE 15 MG: 30 INJECTION, SOLUTION INTRAMUSCULAR; INTRAVENOUS at 08:38

## 2019-10-08 RX ADMIN — DEXAMETHASONE SODIUM PHOSPHATE 10 MG: 10 INJECTION, SOLUTION INTRAMUSCULAR; INTRAVENOUS at 08:35

## 2019-10-08 NOTE — ED PROVIDER NOTES
History  Chief Complaint   Patient presents with    Neck Pain     Patient reports neck pain for 3 days started on left side and now on both sides, unable to sleep  Taking muscle relaxers at home with no relief  Patient is a 23-year-old female history of chronic back pain, arthritis and osteoporosis  Presents for complaints of cervical neck pain  States she slept funny last night and when she woke up was having which she should try is a muscle spasm to the right side of her neck  She says it is across the right-sided down over her right shoulder  Desires shortly breath, chest pain, weakness numbness or tingling  States she did not take any Tylenol or Motrin prior to arrival today  States she has a muscle relaxer which she try but did not have any significant relief  Denies any trauma to her neck  Feels similar to previous episodes she has had the past       Neck Pain   Associated symptoms: no chest pain, no fever, no numbness and no weakness        Prior to Admission Medications   Prescriptions Last Dose Informant Patient Reported? Taking?    Multiple Vitamins-Minerals (WOMENS 50+ MULTI VITAMIN/MIN PO)  Self Yes No   Sig: Take 1 tablet by mouth daily    cyclobenzaprine (FLEXERIL) 10 mg tablet   No No   Sig: TAKE 1 TABLET BY MOUTH THREE TIMES A DAY AS NEEDED FOR MUSCLE SPASM   loratadine (CLARITIN) 10 mg tablet   No No   Sig: TAKE 1 TABLET BY MOUTH EVERY DAY      Facility-Administered Medications: None       Past Medical History:   Diagnosis Date    Arthritis     Chronic pain disorder     sciatic    GERD (gastroesophageal reflux disease)     Kidney stone     Kidney stone     Nephrolithiasis     Osteoarthritis     Osteoporosis        Past Surgical History:   Procedure Laterality Date    CHOLECYSTECTOMY      FL RETROGRADE PYELOGRAM  3/7/2019    KIDNEY STONE SURGERY      KNEE ARTHROSCOPY Left     KS COLONOSCOPY FLX DX W/COLLJ SPEC WHEN PFRMD N/A 11/2/2017    Procedure: COLONOSCOPY with polypectomy x2;  Surgeon: Radha Mccoy MD;  Location: [facility]; Service: Gastroenterology    VT CYSTOURETHROSCOPY,URETER CATHETER Left 3/7/2019    Procedure: CYSTOSCOPY, URETEROSCOPY WITH LASER, BASKET STONE EXTRACTION, RETROGRADE PYELOGRAM WITH INSERTION STENT URETERAL;  Surgeon: Kurtis Morales MD;  Location: [facility];  Service: Urology    TUBAL LIGATION         Family History   Problem Relation Age of Onset    Mental illness Father     Prostate cancer Father     Mental illness Daughter      I have reviewed and agree with the history as documented  Social History     Tobacco Use    Smoking status: Never Smoker    Smokeless tobacco: Never Used   Substance Use Topics    Alcohol use: Yes     Frequency: Monthly or less    Drug use: No        Review of Systems   Constitutional: Negative  Negative for chills and fever  HENT: Negative  Negative for rhinorrhea, sore throat, trouble swallowing and voice change  Eyes: Negative  Negative for pain and visual disturbance  Respiratory: Negative  Negative for cough, shortness of breath and wheezing  Cardiovascular: Negative  Negative for chest pain and palpitations  Gastrointestinal: Negative for abdominal pain, diarrhea, nausea and vomiting  Genitourinary: Negative  Negative for dysuria and frequency  Musculoskeletal: Positive for neck pain  Negative for neck stiffness  Skin: Negative  Negative for rash  Neurological: Negative  Negative for dizziness, speech difficulty, weakness, light-headedness and numbness  Physical Exam  Physical Exam   Constitutional: She is oriented to person, place, and time  She appears well-developed and well-nourished  No distress  HENT:   Head: Normocephalic and atraumatic  Mouth/Throat: Oropharynx is clear and moist    Eyes: Pupils are equal, round, and reactive to light  Conjunctivae and EOM are normal    Neck: Normal range of motion  Neck supple  No tracheal deviation present  Cardiovascular: Normal rate, regular rhythm and intact distal pulses  Pulmonary/Chest: Effort normal and breath sounds normal  No respiratory distress  She has no wheezes  She has no rales  Abdominal: Soft  Bowel sounds are normal  She exhibits no distension  There is no tenderness  There is no rebound and no guarding  Musculoskeletal: Normal range of motion  She exhibits no tenderness or deformity  Arms:  Neurological: She is alert and oriented to person, place, and time  Skin: Skin is warm and dry  Capillary refill takes less than 2 seconds  No rash noted  Psychiatric: She has a normal mood and affect  Her behavior is normal    Nursing note and vitals reviewed  Vital Signs  ED Triage Vitals   Temperature Pulse Respirations Blood Pressure SpO2   10/08/19 0820 10/08/19 0820 10/08/19 0820 10/08/19 0820 10/08/19 0820   (!) 97 °F (36 1 °C) 94 16 (!) 181/93 96 %      Temp Source Heart Rate Source Patient Position - Orthostatic VS BP Location FiO2 (%)   10/08/19 0820 10/08/19 0948 10/08/19 0820 10/08/19 0820 --   Tympanic Monitor Sitting Left arm       Pain Score       10/08/19 0820       Worst Possible Pain           Vitals:    10/08/19 0820 10/08/19 0948   BP: (!) 181/93 140/84   Pulse: 94 84   Patient Position - Orthostatic VS: Sitting Sitting         Visual Acuity      ED Medications  Medications   ketorolac (TORADOL) injection 15 mg (15 mg Intramuscular Given 10/8/19 0838)   acetaminophen (TYLENOL) tablet 975 mg (975 mg Oral Given 10/8/19 0834)   dexamethasone 10 mg/mL oral liquid 10 mg 1 mL (10 mg Oral Given 10/8/19 7509)       Diagnostic Studies  Results Reviewed     None                 No orders to display              Procedures  Procedures       ED Course                               MDM  Number of Diagnoses or Management Options  Trapezius muscle spasm:   Diagnosis management comments: Patient is a 77-year-old female presenting for right-sided neck pain    Feels similar to previous episodes  Vitals okay, afebrile, no neurologic deficits appreciated on her examination  Symptoms improved after multi modal nonnarcotic pain management  Patient states her symptoms have resolved, ambulating without difficulty is requesting to leave the emergency room  Disposition  Final diagnoses:   Trapezius muscle spasm     Time reflects when diagnosis was documented in both MDM as applicable and the Disposition within this note     Time User Action Codes Description Comment    10/8/2019  9:40 AM Shanice Blowers Add [M62 838] Trapezius muscle spasm     10/8/2019  9:40 AM Shanice Blowers Add [M54 5,  G89 29] Chronic bilateral low back pain without sciatica       ED Disposition     ED Disposition Condition Date/Time Comment    Discharge Stable Tue Oct 8, 2019  9:40 AM Grady Bur discharge to home/self care  Follow-up Information     Follow up With Specialties Details Why Contact Info    Stephy Fischer MD Family Medicine In 1 week  [address] [place] [address] [address]  [phone]            Discharge Medication List as of 10/8/2019  9:44 AM      START taking these medications    Details   acetaminophen (TYLENOL) 325 mg tablet Take 3 tablets (975 mg total) by mouth every 6 (six) hours as needed for mild pain or moderate pain, Starting Tue 10/8/2019, Print      !! cyclobenzaprine (FLEXERIL) 10 mg tablet Take 1 tablet (10 mg total) by mouth 2 (two) times a day as needed for muscle spasms, Starting Tue 10/8/2019, Print      ibuprofen (MOTRIN) 600 mg tablet Take 1 tablet (600 mg total) by mouth every 6 (six) hours as needed for mild pain, Starting Tue 10/8/2019, Print       !! - Potential duplicate medications found  Please discuss with provider        CONTINUE these medications which have NOT CHANGED    Details   !! cyclobenzaprine (FLEXERIL) 10 mg tablet TAKE 1 TABLET BY MOUTH THREE TIMES A DAY AS NEEDED FOR MUSCLE SPASM, Normal      loratadine (CLARITIN) 10 mg tablet TAKE 1 TABLET BY MOUTH EVERY DAY, Normal      Multiple Vitamins-Minerals (WOMENS 50+ MULTI VITAMIN/MIN PO) Take 1 tablet by mouth daily , Historical Med       !! - Potential duplicate medications found  Please discuss with provider  No discharge procedures on file      ED Provider  Electronically Signed by           Aurora Espinoza DO  10/08/19 1440

## 2019-12-31 ENCOUNTER — OFFICE VISIT (OUTPATIENT)
Dept: FAMILY MEDICINE CLINIC | Facility: CLINIC | Age: 74
End: 2019-12-31
Payer: MEDICARE

## 2019-12-31 VITALS
WEIGHT: 159 LBS | DIASTOLIC BLOOD PRESSURE: 76 MMHG | SYSTOLIC BLOOD PRESSURE: 134 MMHG | HEART RATE: 76 BPM | BODY MASS INDEX: 36.8 KG/M2 | HEIGHT: 55 IN

## 2019-12-31 DIAGNOSIS — M54.50 ACUTE LEFT-SIDED LOW BACK PAIN WITHOUT SCIATICA: ICD-10-CM

## 2019-12-31 DIAGNOSIS — M51.34 DEGENERATIVE DISC DISEASE, THORACIC: Primary | ICD-10-CM

## 2019-12-31 PROCEDURE — 99213 OFFICE O/P EST LOW 20 MIN: CPT | Performed by: NURSE PRACTITIONER

## 2019-12-31 RX ORDER — IBUPROFEN 600 MG/1
600 TABLET ORAL EVERY 6 HOURS PRN
Qty: 60 TABLET | Refills: 0 | Status: SHIPPED | OUTPATIENT
Start: 2019-12-31 | End: 2020-08-05 | Stop reason: DRUGHIGH

## 2019-12-31 RX ORDER — IBUPROFEN 800 MG/1
800 TABLET ORAL EVERY 6 HOURS PRN
Qty: 60 TABLET | Refills: 1 | Status: SHIPPED | OUTPATIENT
Start: 2019-12-31 | End: 2020-07-13 | Stop reason: ALTCHOICE

## 2019-12-31 RX ORDER — METHYLPREDNISOLONE 4 MG/1
TABLET ORAL
Qty: 1 EACH | Refills: 0 | Status: SHIPPED | OUTPATIENT
Start: 2019-12-31 | End: 2020-07-13 | Stop reason: ALTCHOICE

## 2019-12-31 NOTE — PROGRESS NOTES
Assessment and Plan:    Problem List Items Addressed This Visit        Musculoskeletal and Integument    Degenerative disc disease, thoracic - Primary    Relevant Medications    ibuprofen (MOTRIN) 600 mg tablet    ibuprofen (MOTRIN) 800 mg tablet      Other Visit Diagnoses     Acute left-sided low back pain without sciatica        Relevant Medications    methylPREDNISolone 4 MG tablet therapy pack    ibuprofen (MOTRIN) 800 mg tablet                 Diagnoses and all orders for this visit:    Degenerative disc disease, thoracic  -     ibuprofen (MOTRIN) 600 mg tablet; Take 1 tablet (600 mg total) by mouth every 6 (six) hours as needed for mild pain  -     ibuprofen (MOTRIN) 800 mg tablet; Take 1 tablet (800 mg total) by mouth every 6 (six) hours as needed for mild pain Do not take with 600mg in the same day    Acute left-sided low back pain without sciatica  -     methylPREDNISolone 4 MG tablet therapy pack; Use as directed on package  -     ibuprofen (MOTRIN) 800 mg tablet; Take 1 tablet (800 mg total) by mouth every 6 (six) hours as needed for mild pain Do not take with 600mg in the same day              Subjective:      Patient ID: Rosa Jackson is a 66 y o  female  CC:    Chief Complaint   Patient presents with    Medication Refill     patient needs prescribed Motrin for her back, neck pain  patient is also asking for Prednisone  ak       HPI:    Patient states with the most recent rain she is having increased body aches  She states she does take medication it does help  She is requesting prednisone and motrin script  Patient states she was given ibuprofen rx  The following portions of the patient's history were reviewed and updated as appropriate: allergies, current medications, past family history, past medical history, past social history, past surgical history and problem list       Review of Systems   Musculoskeletal: Positive for arthralgias, back pain and myalgias   Negative for gait problem  Psychiatric/Behavioral: Negative for sleep disturbance  The patient is not nervous/anxious  Data to review:       Objective:    Vitals:    12/31/19 1107   BP: 134/76   Pulse: 76   Weight: 72 1 kg (159 lb)   Height: 4' 6" (1 372 m)        Physical Exam   Constitutional: She is oriented to person, place, and time  She appears well-developed and well-nourished  HENT:   Head: Normocephalic and atraumatic  Cardiovascular: Normal rate, regular rhythm and normal heart sounds  Pulmonary/Chest: Effort normal and breath sounds normal    Musculoskeletal:        Right shoulder: She exhibits tenderness  Left shoulder: She exhibits tenderness  She exhibits normal range of motion  Neurological: She is alert and oriented to person, place, and time  Nursing note and vitals reviewed

## 2020-07-13 ENCOUNTER — OFFICE VISIT (OUTPATIENT)
Dept: FAMILY MEDICINE CLINIC | Facility: CLINIC | Age: 75
End: 2020-07-13
Payer: MEDICARE

## 2020-07-13 VITALS
WEIGHT: 155 LBS | BODY MASS INDEX: 35.87 KG/M2 | SYSTOLIC BLOOD PRESSURE: 126 MMHG | HEIGHT: 55 IN | TEMPERATURE: 98.4 F | DIASTOLIC BLOOD PRESSURE: 58 MMHG | HEART RATE: 56 BPM | RESPIRATION RATE: 20 BRPM

## 2020-07-13 DIAGNOSIS — Z13.6 SCREENING FOR CARDIOVASCULAR CONDITION: ICD-10-CM

## 2020-07-13 DIAGNOSIS — K42.9 UMBILICAL HERNIA WITHOUT OBSTRUCTION AND WITHOUT GANGRENE: ICD-10-CM

## 2020-07-13 DIAGNOSIS — Z78.0 POSTMENOPAUSAL: ICD-10-CM

## 2020-07-13 DIAGNOSIS — Z00.00 MEDICARE ANNUAL WELLNESS VISIT, SUBSEQUENT: ICD-10-CM

## 2020-07-13 DIAGNOSIS — R10.12 LUQ PAIN: ICD-10-CM

## 2020-07-13 DIAGNOSIS — R10.2 PELVIC PAIN: Primary | ICD-10-CM

## 2020-07-13 DIAGNOSIS — M46.1 SACROILIITIS (HCC): ICD-10-CM

## 2020-07-13 PROCEDURE — 1170F FXNL STATUS ASSESSED: CPT | Performed by: NURSE PRACTITIONER

## 2020-07-13 PROCEDURE — G0439 PPPS, SUBSEQ VISIT: HCPCS | Performed by: NURSE PRACTITIONER

## 2020-07-13 PROCEDURE — 3008F BODY MASS INDEX DOCD: CPT | Performed by: NURSE PRACTITIONER

## 2020-07-13 PROCEDURE — 1036F TOBACCO NON-USER: CPT | Performed by: NURSE PRACTITIONER

## 2020-07-13 PROCEDURE — 99214 OFFICE O/P EST MOD 30 MIN: CPT | Performed by: NURSE PRACTITIONER

## 2020-07-13 PROCEDURE — 4040F PNEUMOC VAC/ADMIN/RCVD: CPT | Performed by: NURSE PRACTITIONER

## 2020-07-13 PROCEDURE — 1160F RVW MEDS BY RX/DR IN RCRD: CPT | Performed by: NURSE PRACTITIONER

## 2020-07-13 PROCEDURE — 1125F AMNT PAIN NOTED PAIN PRSNT: CPT | Performed by: NURSE PRACTITIONER

## 2020-07-13 RX ORDER — CHOLECALCIFEROL (VITAMIN D3) 50 MCG
2000 TABLET ORAL DAILY
Qty: 90 TABLET | Refills: 3 | Status: SHIPPED | OUTPATIENT
Start: 2020-07-13 | End: 2022-02-09

## 2020-07-13 NOTE — PROGRESS NOTES
Assessment and Plan:     Problem List Items Addressed This Visit        Musculoskeletal and Integument    Sacroiliitis ([address] ) - Bilateral     Patient did receive injections in the past  She reports being stable  Relevant Medications    Cholecalciferol (VITAMIN D) 50 MCG (2000 UT) tablet       Other    Umbilical hernia without obstruction and without gangrene     Patient has known moderate sized hernia seen on ct in 2019  Suspect this is most likely causing her pain  Relevant Orders    CT abdomen pelvis wo contrast    Pelvic pain - Primary     Patient over due for pap  She having pelvic pain with cancer history in family I have concerns about this and will check ct abdomen  Might need dedicated pelvic ultrasound  Relevant Orders    CBC and differential    Comprehensive metabolic panel    CT abdomen pelvis wo contrast    Medicare annual wellness visit, subsequent      Other Visit Diagnoses     LUQ pain        Relevant Orders    CBC and differential    Comprehensive metabolic panel    CT abdomen pelvis wo contrast    Screening for cardiovascular condition        Relevant Orders    Lipid panel    Postmenopausal        Relevant Orders    DXA bone density spine hip and pelvis        BMI Counseling: Body mass index is 37 37 kg/m²  The BMI is above normal  Nutrition recommendations include decreasing portion sizes, moderation in carbohydrate intake, reducing intake of saturated and trans fat and reducing intake of cholesterol  Exercise recommendations include moderate physical activity 150 minutes/week and strength training exercises  Preventive health issues were discussed with patient, and age appropriate screening tests were ordered as noted in patient's After Visit Summary  Personalized health advice and appropriate referrals for health education or preventive services given if needed, as noted in patient's After Visit Summary       History of Present Illness:     Patient presents for Medicare Annual Wellness visit    Patient Care Team:  Jason Blanchard MD as PCP - DO Geovanna Preston MD Terri Gully, MD as Endoscopist     Problem List:     Patient Active Problem List   Diagnosis    Sacroiliitis [facility]) - Bilateral    Lumbar spondylosis    Idiopathic osteoporosis    Degenerative disc disease, thoracic    Nephroureterolithiasis    Flank pain    Nausea    Chronic rhinitis    Umbilical hernia without obstruction and without gangrene    Pelvic pain    Medicare annual wellness visit, subsequent      Past Medical and Surgical History:     Past Medical History:   Diagnosis Date    Arthritis     Chronic pain disorder     sciatic    GERD (gastroesophageal reflux disease)     Kidney stone     Kidney stone     Nephrolithiasis     Osteoarthritis     Osteoporosis      Past Surgical History:   Procedure Laterality Date    CHOLECYSTECTOMY      FL RETROGRADE PYELOGRAM  3/7/2019    KIDNEY STONE SURGERY      KNEE ARTHROSCOPY Left     UT COLONOSCOPY FLX DX W/COLLJ SPEC WHEN PFRMD N/A 11/2/2017    Procedure: COLONOSCOPY with polypectomy x2;  Surgeon: Micheline Levy MD;  Location: [facility];   Service: Gastroenterology    UT CYSTOURETHROSCOPY,URETER CATHETER Left 3/7/2019    Procedure: CYSTOSCOPY, URETEROSCOPY WITH LASER, BASKET STONE EXTRACTION, RETROGRADE PYELOGRAM WITH INSERTION STENT URETERAL;  Surgeon: Daxa Otto MD;  Location: [facility];  Service: Urology    TUBAL LIGATION        Family History:     Family History   Problem Relation Age of Onset    Mental illness Father     Prostate cancer Father     Mental illness Daughter       Social History:        Social History     Socioeconomic History    Marital status: /Civil Union     Spouse name: None    Number of children: 2    Years of education: None    Highest education level: None   Occupational History    Occupation: Retired   Social Needs    Financial resource strain: None    Food insecurity:     Worry: None     Inability: None    Transportation needs:     Medical: None     Non-medical: None   Tobacco Use    Smoking status: Never Smoker    Smokeless tobacco: Never Used   Substance and Sexual Activity    Alcohol use: Yes     Frequency: Monthly or less    Drug use: No    Sexual activity: None   Lifestyle    Physical activity:     Days per week: None     Minutes per session: None    Stress: None   Relationships    Social connections:     Talks on phone: None     Gets together: None     Attends Zoroastrian service: None     Active member of club or organization: None     Attends meetings of clubs or organizations: None     Relationship status: None    Intimate partner violence:     Fear of current or ex partner: None     Emotionally abused: None     Physically abused: None     Forced sexual activity: None   Other Topics Concern    None   Social History Narrative    Daily caffeine consumption    Does not have living will    Denied:  History of exercises regularly    Denied:  History of domestic violence    No advance directives    Denied:  History of Power of  in existence    Flu shot: no      Medications and Allergies:     Current Outpatient Medications   Medication Sig Dispense Refill    acetaminophen (TYLENOL) 325 mg tablet Take 3 tablets (975 mg total) by mouth every 6 (six) hours as needed for mild pain or moderate pain 60 tablet 0    ibuprofen (MOTRIN) 600 mg tablet Take 1 tablet (600 mg total) by mouth every 6 (six) hours as needed for mild pain 60 tablet 0    Multiple Vitamins-Minerals (WOMENS 50+ MULTI VITAMIN/MIN PO) Take 1 tablet by mouth daily       Cholecalciferol (VITAMIN D) 50 MCG (2000 UT) tablet Take 1 tablet (2,000 Units total) by mouth daily 90 tablet 3    cyclobenzaprine (FLEXERIL) 10 mg tablet TAKE 1 TABLET BY MOUTH THREE TIMES A DAY AS NEEDED FOR MUSCLE SPASM (Patient not taking: Reported on 12/31/2019) 45 tablet 0     No current facility-administered medications for this visit  Allergies   Allergen Reactions    Diclofenac Sodium      Other reaction(s): GI Upset    Meperidine     Tramadol      Other reaction(s): GI Upset      Immunizations:     Immunization History   Administered Date(s) Administered    H1N1, All Formulations 01/15/2010    INFLUENZA 10/01/2009    Influenza Split High Dose Preservative Free IM 10/16/2014, 11/20/2015, 10/22/2019    Pneumococcal Conjugate 13-Valent 11/20/2015    Pneumococcal Polysaccharide PPV23 09/30/2011      Health Maintenance:         Topic Date Due    CRC Screening: Colonoscopy  11/02/2027         Topic Date Due    Influenza Vaccine  07/01/2020      Medicare Health Risk Assessment:     /58 (BP Location: Left arm, Patient Position: Sitting, Cuff Size: Standard)   Pulse 56   Temp 98 4 °F (36 9 °C) (Temporal)   Resp 20   Ht 4' 6" (1 372 m)   Wt 70 3 kg (155 lb)   BMI 37 37 kg/m²      Jaqueline Chinchilla is here for her Subsequent Wellness visit  Health Risk Assessment:   Patient rates overall health as good  Patient feels that their physical health rating is slightly better  Eyesight was rated as same  Hearing was rated as slightly worse  Patient feels that their emotional and mental health rating is slightly worse  Pain experienced in the last 7 days has been a lot  Patient's pain rating has been 6/10  Patient states that she has experienced no weight loss or gain in last 6 months  Depression Screening:   PHQ-2 Score: 0      Fall Risk Screening: In the past year, patient has experienced: no history of falling in past year      Urinary Incontinence Screening:   Patient has not leaked urine accidently in the last six months  Home Safety:  Patient has trouble with stairs inside or outside of their home  Patient has working smoke alarms and has no working carbon monoxide detector  Home safety hazards include: none  Nutrition:   Current diet is Regular       Medications:   Patient is currently taking over-the-counter supplements  OTC medications include: see medication list  Patient is able to manage medications  Activities of Daily Living (ADLs)/Instrumental Activities of Daily Living (IADLs):   Walk and transfer into and out of bed and chair?: Yes  Dress and groom yourself?: Yes    Bathe or shower yourself?: Yes    Feed yourself? Yes  Do your laundry/housekeeping?: Yes  Manage your money, pay your bills and track your expenses?: Yes  Make your own meals?: Yes    Do your own shopping?: Yes    Previous Hospitalizations:   Any hospitalizations or ED visits within the last 12 months?: No      PREVENTIVE SCREENINGS      Cardiovascular Screening:    General: Screening Current      Diabetes Screening:     General: Screening Current      Colorectal Cancer Screening:     General: Screening Current      Breast Cancer Screening:     General: Screening Not Indicated      Cervical Cancer Screening:    General: Risks and Benefits Discussed    Due for: Cervical Pap Smear      Osteoporosis Screening:    General: History Osteoporosis and Risks and Benefits Discussed    Due for: DXA Appendicular      Abdominal Aortic Aneurysm (AAA) Screening:        General: Screening Not Indicated      Lung Cancer Screening:     General: Screening Not Indicated      Hepatitis C Screening:    General: Screening Not Indicated    Other Counseling Topics:   Calcium and vitamin D intake         Ethyl [place]

## 2020-07-13 NOTE — PATIENT INSTRUCTIONS
Medicare Preventive Visit Patient Instructions  Thank you for completing your Welcome to Medicare Visit or Medicare Annual Wellness Visit today  Your next wellness visit will be due in one year (7/13/2021)  The screening/preventive services that you may require over the next 5-10 years are detailed below  Some tests may not apply to you based off risk factors and/or age  Screening tests ordered at today's visit but not completed yet may show as past due  Also, please note that scanned in results may not display below  Preventive Screenings:  Service Recommendations Previous Testing/Comments   Colorectal Cancer Screening  * Colonoscopy    * Fecal Occult Blood Test (FOBT)/Fecal Immunochemical Test (FIT)  * Fecal DNA/Cologuard Test  * Flexible Sigmoidoscopy Age: 54-65 years old   Colonoscopy: every 10 years (may be performed more frequently if at higher risk)  OR  FOBT/FIT: every 1 year  OR  Cologuard: every 3 years  OR  Sigmoidoscopy: every 5 years  Screening may be recommended earlier than age 48 if at higher risk for colorectal cancer  Also, an individualized decision between you and your healthcare provider will decide whether screening between the ages of 74-80 would be appropriate  Colonoscopy: 11/02/2017  FOBT/FIT: Not on file  Cologuard: Not on file  Sigmoidoscopy: Not on file    Screening Current     Breast Cancer Screening Age: 36 years old  Frequency: every 1-2 years  Not required if history of left and right mastectomy Mammogram: Not on file    Screening Not Indicated   Cervical Cancer Screening Between the ages of 21-29, pap smear recommended once every 3 years  Between the ages of 33-67, can perform pap smear with HPV co-testing every 5 years     Recommendations may differ for women with a history of total hysterectomy, cervical cancer, or abnormal pap smears in past  Pap Smear: Not on file    Risks and Benefits Discussed   Hepatitis C Screening Once for adults born between [facility] frequently in patients at high risk for Hepatitis C Hep C Antibody: Not on file    Screening Not Indicated   Diabetes Screening 1-2 times per year if you're at risk for diabetes or have pre-diabetes Fasting glucose: No results in last 5 years   A1C: No results in last 5 years    Screening Current   Cholesterol Screening Once every 5 years if you don't have a lipid disorder  May order more often based on risk factors  Lipid panel: Not on file    Screening Current     Other Preventive Screenings Covered by Medicare:  1  Abdominal Aortic Aneurysm (AAA) Screening: covered once if your at risk  You're considered to be at risk if you have a family history of AAA  2  Lung Cancer Screening: covers low dose CT scan once per year if you meet all of the following conditions: (1) Age 50-69; (2) No signs or symptoms of lung cancer; (3) Current smoker or have quit smoking within the last 15 years; (4) You have a tobacco smoking history of at least 30 pack years (packs per day multiplied by number of years you smoked); (5) You get a written order from a healthcare provider  3  Glaucoma Screening: covered annually if you're considered high risk: (1) You have diabetes OR (2) Family history of glaucoma OR (3)  aged 48 and older OR (3)  American aged 72 and older  3  Osteoporosis Screening: covered every 2 years if you meet one of the following conditions: (1) You're estrogen deficient and at risk for osteoporosis based off medical history and other findings; (2) Have a vertebral abnormality; (3) On glucocorticoid therapy for more than 3 months; (4) Have primary hyperparathyroidism; (5) On osteoporosis medications and need to assess response to drug therapy  · Last bone density test (DXA Scan): Not on file  5  HIV Screening: covered annually if you're between the age of 12-76  Also covered annually if you are younger than 13 and older than 72 with risk factors for HIV infection   For pregnant patients, it is covered up to 3 times per pregnancy  Immunizations:  Immunization Recommendations   Influenza Vaccine Annual influenza vaccination during flu season is recommended for all persons aged >= 6 months who do not have contraindications   Pneumococcal Vaccine (Prevnar and Pneumovax)  * Prevnar = PCV13  * Pneumovax = PPSV23   Adults 25-60 years old: 1-3 doses may be recommended based on certain risk factors  Adults 72 years old: Prevnar (PCV13) vaccine recommended followed by Pneumovax (PPSV23) vaccine  If already received PPSV23 since turning 65, then PCV13 recommended at least one year after PPSV23 dose  Hepatitis B Vaccine 3 dose series if at intermediate or high risk (ex: diabetes, end stage renal disease, liver disease)   Tetanus (Td) Vaccine - COST NOT COVERED BY MEDICARE PART B Following completion of primary series, a booster dose should be given every 10 years to maintain immunity against tetanus  Td may also be given as tetanus wound prophylaxis  Tdap Vaccine - COST NOT COVERED BY MEDICARE PART B Recommended at least once for all adults  For pregnant patients, recommended with each pregnancy  Shingles Vaccine (Shingrix) - COST NOT COVERED BY MEDICARE PART B  2 shot series recommended in those aged 48 and above     Health Maintenance Due:      Topic Date Due    CRC Screening: Colonoscopy  11/02/2027     Immunizations Due:      Topic Date Due    Influenza Vaccine  07/01/2020     Advance Directives   What are advance directives? Advance directives are legal documents that state your wishes and plans for medical care  These plans are made ahead of time in case you lose your ability to make decisions for yourself  Advance directives can apply to any medical decision, such as the treatments you want, and if you want to donate organs  What are the types of advance directives? There are many types of advance directives, and each state has rules about how to use them   You may choose a combination of any of the following:  · Living will: This is a written record of the treatment you want  You can also choose which treatments you do not want, which to limit, and which to stop at a certain time  This includes surgery, medicine, IV fluid, and tube feedings  · Durable power of  for healthcare [facility]): This is a written record that states who you want to make healthcare choices for you when you are unable to make them for yourself  This person, called a proxy, is usually a family member or a friend  You may choose more than 1 proxy  · Do not resuscitate (DNR) order:  A DNR order is used in case your heart stops beating or you stop breathing  It is a request not to have certain forms of treatment, such as CPR  A DNR order may be included in other types of advance directives  · Medical directive: This covers the care that you want if you are in a coma, near death, or unable to make decisions for yourself  You can list the treatments you want for each condition  Treatment may include pain medicine, surgery, blood transfusions, dialysis, IV or tube feedings, and a ventilator (breathing machine)  · Values history: This document has questions about your views, beliefs, and how you feel and think about life  This information can help others choose the care that you would choose  Why are advance directives important? An advance directive helps you control your care  Although spoken wishes may be used, it is better to have your wishes written down  Spoken wishes can be misunderstood, or not followed  Treatments may be given even if you do not want them  An advance directive may make it easier for your family to make difficult choices about your care  Weight Management   Why it is important to manage your weight:  Being overweight increases your risk of health conditions such as heart disease, high blood pressure, type 2 diabetes, and certain types of cancer   It can also increase your risk for osteoarthritis, sleep apnea, and other respiratory problems  Aim for a slow, steady weight loss  Even a small amount of weight loss can lower your risk of health problems  How to lose weight safely:  A safe and healthy way to lose weight is to eat fewer calories and get regular exercise  You can lose up about 1 pound a week by decreasing the number of calories you eat by 500 calories each day  Healthy meal plan for weight management:  A healthy meal plan includes a variety of foods, contains fewer calories, and helps you stay healthy  A healthy meal plan includes the following:  · Eat whole-grain foods more often  A healthy meal plan should contain fiber  Fiber is the part of grains, fruits, and vegetables that is not broken down by your body  Whole-grain foods are healthy and provide extra fiber in your diet  Some examples of whole-grain foods are whole-wheat breads and pastas, oatmeal, brown rice, and bulgur  · Eat a variety of vegetables every day  Include dark, leafy greens such as spinach, kale, delmer greens, and mustard greens  Eat yellow and orange vegetables such as carrots, sweet potatoes, and winter squash  · Eat a variety of fruits every day  Choose fresh or canned fruit (canned in its own juice or light syrup) instead of juice  Fruit juice has very little or no fiber  · Eat low-fat dairy foods  Drink fat-free (skim) milk or 1% milk  Eat fat-free yogurt and low-fat cottage cheese  Try low-fat cheeses such as mozzarella and other reduced-fat cheeses  · Choose meat and other protein foods that are low in fat  Choose beans or other legumes such as split peas or lentils  Choose fish, skinless poultry (chicken or turkey), or lean cuts of red meat (beef or pork)  Before you cook meat or poultry, cut off any visible fat  · Use less fat and oil  Try baking foods instead of frying them  Add less fat, such as margarine, sour cream, regular salad dressing and mayonnaise to foods  Eat fewer high-fat foods   Some examples of high-fat foods include french fries, doughnuts, ice cream, and cakes  · Eat fewer sweets  Limit foods and drinks that are high in sugar  This includes candy, cookies, regular soda, and sweetened drinks  Exercise:  Exercise at least 30 minutes per day on most days of the week  Some examples of exercise include walking, biking, dancing, and swimming  You can also fit in more physical activity by taking the stairs instead of the elevator or parking farther away from stores  Ask your healthcare provider about the best exercise plan for you  © Copyright [employer] 2018 Information is for End User's use only and may not be sold, redistributed or otherwise used for commercial purposes   All illustrations and images included in CareNotes® are the copyrighted property of A D A M , [employer]  or [address] [place], [place] [address]marcela

## 2020-07-13 NOTE — ASSESSMENT & PLAN NOTE
Patient over due for pap  She having pelvic pain with cancer history in family I have concerns about this and will check ct abdomen  Might need dedicated pelvic ultrasound

## 2020-07-13 NOTE — ASSESSMENT & PLAN NOTE
Patient has known moderate sized hernia seen on ct in 2019  Suspect this is most likely causing her pain

## 2020-07-13 NOTE — PROGRESS NOTES
Assessment and Plan:    Problem List Items Addressed This Visit        Musculoskeletal and Integument    Sacroiliitis ([address] ) - Bilateral     Patient did receive injections in the past  She reports being stable  Relevant Medications    Cholecalciferol (VITAMIN D) 50 MCG (2000 UT) tablet       Other    Umbilical hernia without obstruction and without gangrene     Patient has known moderate sized hernia seen on ct in 2019  Suspect this is most likely causing her pain  Relevant Orders    CT abdomen pelvis wo contrast    Pelvic pain - Primary     Patient over due for pap  She having pelvic pain with cancer history in family I have concerns about this and will check ct abdomen  Might need dedicated pelvic ultrasound  Relevant Orders    CBC and differential    Comprehensive metabolic panel    CT abdomen pelvis wo contrast    Medicare annual wellness visit, subsequent      Other Visit Diagnoses     LUQ pain        Relevant Orders    CBC and differential    Comprehensive metabolic panel    CT abdomen pelvis wo contrast    Screening for cardiovascular condition        Relevant Orders    Lipid panel    Postmenopausal        Relevant Orders    DXA bone density spine hip and pelvis                 Diagnoses and all orders for this visit:    Pelvic pain  -     CBC and differential; Future  -     Comprehensive metabolic panel  -     CT abdomen pelvis wo contrast; Future    LUQ pain  -     CBC and differential; Future  -     Comprehensive metabolic panel  -     CT abdomen pelvis wo contrast; Future    Umbilical hernia without obstruction and without gangrene  -     CT abdomen pelvis wo contrast; Future    Sacroiliitis (HCC)  -     Cholecalciferol (VITAMIN D) 50 MCG (2000 UT) tablet; Take 1 tablet (2,000 Units total) by mouth daily    Screening for cardiovascular condition  -     Lipid panel    Postmenopausal  -     DXA bone density spine hip and pelvis;  Future    Medicare annual wellness visit, subsequent    Other orders  -     Cancel: CT abdomen pelvis wo contrast; Future              Subjective:      Patient ID: Bernie Bradshaw is a 66 y o  female  CC:    Chief Complaint   Patient presents with    Generalized Body Aches     Patient complaints of body aches, abdominal pain, cramps and a lot of discomfort  HPI:    Patient reports she had upper abdominal pain for one month as well as lower abdominal pain  Patient still has her pelvic organs she has history of tubal ligation  Patient states her abdominal pain is described the pain as pressure in her left upper abdomen  She states she feels heaviness in her stomach  She notes in the lower abdomen she feels cramps across her abdomen  She notes she has not had pap smear in several years at least over 10 years  Patient has family history of prostate cancer in her father  Her daughter had uterine cancer and her sister colon cancer and her brother has throat cancer  Patient states she had urge for diarrhea and vomiting and states she did feel slightly better after this  This occurred for the whole day and then resolved  She states her stools are soft and she feels like she has to go more  Patient states she had colonoscopy with polyps removal in 2017  Review of medical records patient had CT abdomen 3/6/2019 which showed moderate umbilical hernia as well has some changes in her pelvic ultrasound but appears she did not have US complete  Patient notes she has noticed a lump in her stomach  Patient denies any blood in her urine or in her stool  The following portions of the patient's history were reviewed and updated as appropriate: allergies, current medications, past family history, past medical history, past social history, past surgical history and problem list       Review of Systems   Constitutional: Positive for appetite change  Negative for diaphoresis, fatigue and fever  HENT: Negative  Negative for trouble swallowing  Respiratory: Negative  Negative for choking  Cardiovascular: Negative  Gastrointestinal: Positive for abdominal pain, constipation, diarrhea, nausea and vomiting  Negative for abdominal distention and blood in stool  Genitourinary: Positive for pelvic pain  Negative for difficulty urinating  Musculoskeletal: Positive for arthralgias  Neurological: Negative for dizziness, light-headedness and headaches  Data to review:       Objective:    Vitals:    07/13/20 1149   BP: 126/58   BP Location: Left arm   Patient Position: Sitting   Cuff Size: Standard   Pulse: 56   Resp: 20   Temp: 98 4 °F (36 9 °C)   TempSrc: Temporal   Weight: 70 3 kg (155 lb)   Height: 4' 6" (1 372 m)        Physical Exam   Constitutional: Vital signs are normal  She appears well-developed and well-nourished  She does not have a sickly appearance  She does not appear ill  HENT:   Head: Normocephalic and atraumatic  Neck: Carotid bruit is not present  No thyromegaly present  Cardiovascular: Normal rate, regular rhythm, S1 normal and S2 normal    No murmur heard  Pulses:       Carotid pulses are 2+ on the right side, and 2+ on the left side  No Lower extremity Edema   Pulmonary/Chest: Effort normal and breath sounds normal    Abdominal: Soft  Normal appearance  She exhibits mass (2 fingerbreaths from umbilicus )  Bowel sounds are increased  There is tenderness in the epigastric area, left upper quadrant and left lower quadrant  There is guarding  There is no CVA tenderness  A hernia (umbilical ) is present  Lymphadenopathy:     She has no cervical adenopathy  Skin: Skin is warm, dry and intact

## 2020-07-22 ENCOUNTER — APPOINTMENT (OUTPATIENT)
Dept: LAB | Facility: HOSPITAL | Age: 75
End: 2020-07-22
Payer: MEDICARE

## 2020-07-22 ENCOUNTER — HOSPITAL ENCOUNTER (OUTPATIENT)
Dept: CT IMAGING | Facility: HOSPITAL | Age: 75
Discharge: HOME | End: 2020-07-22
Payer: MEDICARE

## 2020-07-22 ENCOUNTER — HOSPITAL ENCOUNTER (OUTPATIENT)
Dept: BONE DENSITY | Facility: CLINIC | Age: 75
Discharge: HOME | End: 2020-07-22
Payer: MEDICARE

## 2020-07-22 DIAGNOSIS — Z78.0 POSTMENOPAUSAL: ICD-10-CM

## 2020-07-22 DIAGNOSIS — R10.2 PELVIC PAIN: ICD-10-CM

## 2020-07-22 DIAGNOSIS — K42.9 UMBILICAL HERNIA WITHOUT OBSTRUCTION AND WITHOUT GANGRENE: ICD-10-CM

## 2020-07-22 DIAGNOSIS — R10.12 LUQ PAIN: ICD-10-CM

## 2020-07-22 LAB
ALBUMIN SERPL BCP-MCNC: 3.8 G/DL (ref 3–5.2)
ALP SERPL-CCNC: 135 U/L (ref 43–122)
ALT SERPL W P-5'-P-CCNC: 15 U/L (ref 9–52)
ANION GAP SERPL CALCULATED.3IONS-SCNC: 7 MMOL/L (ref 5–14)
AST SERPL W P-5'-P-CCNC: 30 U/L (ref 14–36)
BASOPHILS # BLD AUTO: 0 THOUSANDS/ΜL (ref 0–0.1)
BASOPHILS NFR BLD AUTO: 1 % (ref 0–1)
BILIRUB SERPL-MCNC: 0.9 MG/DL
BUN SERPL-MCNC: 15 MG/DL (ref 5–25)
CALCIUM SERPL-MCNC: 8.8 MG/DL (ref 8.4–10.2)
CHLORIDE SERPL-SCNC: 101 MMOL/L (ref 97–108)
CHOLEST SERPL-MCNC: 166 MG/DL
CO2 SERPL-SCNC: 27 MMOL/L (ref 22–30)
CREAT SERPL-MCNC: 0.74 MG/DL (ref 0.6–1.2)
EOSINOPHIL # BLD AUTO: 0.2 THOUSAND/ΜL (ref 0–0.4)
EOSINOPHIL NFR BLD AUTO: 3 % (ref 0–6)
ERYTHROCYTE [DISTWIDTH] IN BLOOD BY AUTOMATED COUNT: 14.5 %
GFR SERPL CREATININE-BSD FRML MDRD: 79 ML/MIN/1.73SQ M
GLUCOSE P FAST SERPL-MCNC: 96 MG/DL (ref 70–99)
HCT VFR BLD AUTO: 40.2 % (ref 36–46)
HDLC SERPL-MCNC: 49 MG/DL
HGB BLD-MCNC: 13.3 G/DL (ref 12–16)
LDLC SERPL CALC-MCNC: 104 MG/DL
LYMPHOCYTES # BLD AUTO: 1.5 THOUSANDS/ΜL (ref 0.5–4)
LYMPHOCYTES NFR BLD AUTO: 21 % (ref 25–45)
MCH RBC QN AUTO: 28.4 PG (ref 26–34)
MCHC RBC AUTO-ENTMCNC: 33.1 G/DL (ref 31–36)
MCV RBC AUTO: 86 FL (ref 80–100)
MONOCYTES # BLD AUTO: 0.5 THOUSAND/ΜL (ref 0.2–0.9)
MONOCYTES NFR BLD AUTO: 7 % (ref 1–10)
NEUTROPHILS # BLD AUTO: 4.8 THOUSANDS/ΜL (ref 1.8–7.8)
NEUTS SEG NFR BLD AUTO: 68 % (ref 45–65)
NONHDLC SERPL-MCNC: 117 MG/DL
PLATELET # BLD AUTO: 187 THOUSANDS/UL (ref 150–450)
PMV BLD AUTO: 10.4 FL (ref 8.9–12.7)
POTASSIUM SERPL-SCNC: 4.1 MMOL/L (ref 3.6–5)
PROT SERPL-MCNC: 7 G/DL (ref 5.9–8.4)
RBC # BLD AUTO: 4.68 MILLION/UL (ref 4–5.2)
SODIUM SERPL-SCNC: 135 MMOL/L (ref 137–147)
TRIGL SERPL-MCNC: 65 MG/DL
WBC # BLD AUTO: 7 THOUSAND/UL (ref 4.5–11)

## 2020-07-22 PROCEDURE — 77080 DXA BONE DENSITY AXIAL: CPT

## 2020-07-22 PROCEDURE — 85025 COMPLETE CBC W/AUTO DIFF WBC: CPT

## 2020-07-22 PROCEDURE — 80053 COMPREHEN METABOLIC PANEL: CPT | Performed by: NURSE PRACTITIONER

## 2020-07-22 PROCEDURE — 36415 COLL VENOUS BLD VENIPUNCTURE: CPT | Performed by: NURSE PRACTITIONER

## 2020-07-22 PROCEDURE — 80061 LIPID PANEL: CPT | Performed by: NURSE PRACTITIONER

## 2020-07-22 PROCEDURE — 74176 CT ABD & PELVIS W/O CONTRAST: CPT

## 2020-07-24 DIAGNOSIS — N20.0 KIDNEY STONE ON RIGHT SIDE: Primary | ICD-10-CM

## 2020-07-28 PROBLEM — M85.852 OSTEOPENIA OF LEFT HIP: Status: ACTIVE | Noted: 2020-07-28

## 2020-08-04 DIAGNOSIS — M51.34 DEGENERATIVE DISC DISEASE, THORACIC: ICD-10-CM

## 2020-08-04 NOTE — TELEPHONE ENCOUNTER
Pt called in because she needs refill for 800 mg motrin   Please send to [facility]

## 2020-08-05 DIAGNOSIS — M51.34 DEGENERATIVE DISC DISEASE, THORACIC: Primary | ICD-10-CM

## 2020-08-05 RX ORDER — IBUPROFEN 800 MG/1
800 TABLET ORAL EVERY 6 HOURS PRN
Qty: 60 TABLET | Refills: 3 | Status: SHIPPED | OUTPATIENT
Start: 2020-08-05 | End: 2020-12-16 | Stop reason: CLARIF

## 2020-08-05 RX ORDER — IBUPROFEN 600 MG/1
600 TABLET ORAL EVERY 6 HOURS PRN
Qty: 60 TABLET | Refills: 0 | OUTPATIENT
Start: 2020-08-05

## 2020-08-25 ENCOUNTER — OFFICE VISIT (OUTPATIENT)
Dept: UROLOGY | Age: 75
End: 2020-08-25
Payer: MEDICARE

## 2020-08-25 VITALS
WEIGHT: 154 LBS | HEIGHT: 55 IN | SYSTOLIC BLOOD PRESSURE: 140 MMHG | BODY MASS INDEX: 35.64 KG/M2 | DIASTOLIC BLOOD PRESSURE: 78 MMHG

## 2020-08-25 DIAGNOSIS — N20.0 KIDNEY STONE: Primary | ICD-10-CM

## 2020-08-25 PROCEDURE — 1160F RVW MEDS BY RX/DR IN RCRD: CPT | Performed by: UROLOGY

## 2020-08-25 PROCEDURE — 99214 OFFICE O/P EST MOD 30 MIN: CPT | Performed by: UROLOGY

## 2020-08-25 PROCEDURE — 3008F BODY MASS INDEX DOCD: CPT | Performed by: UROLOGY

## 2020-08-25 PROCEDURE — 1036F TOBACCO NON-USER: CPT | Performed by: UROLOGY

## 2020-08-25 PROCEDURE — 4040F PNEUMOC VAC/ADMIN/RCVD: CPT | Performed by: UROLOGY

## 2020-08-25 NOTE — PROGRESS NOTES
Assessment/Plan:    Kidney stone    Bilateral kidney stones are noted on CT scan  The CT scan was reviewed on the [facility] system  There is a 6 mm right mid-pole stone and a tiny 1 mm intraparenchymal left renal stone  Treatment options for the right sided stone were discussed in detail  She is not in favor proceeding with any procedure at this time  I recommended she greatly increase her fluid intake and that she increase her intake of  High citrate foods like lemon and lemonade  She will call should she have any new symptoms and otherwise will return in 6 months  There are no diagnoses linked to this encounter  Subjective:      Patient ID: Btety Connelly is a 66 y o  female  Chief complaint:  Kidney stones   HPI:  68-year-old female followed for kidney stones  She noted an episode of left lower quadrant discomfort  This resolved on its own  A CT scan was obtained  in July and she is here today for follow-up  Since the initial episode she no longer has any discomfort  She notes no  Change in her voiding pattern  There is no gross hematuria, dysuria or symptoms of infection  She does have chronic arthritis and back pain  At the present time there is no flank pain  She was unable to afford the Urocit-K prescribed her last visit  The following portions of the patient's history were reviewed and updated as appropriate: allergies, current medications, past family history, past medical history, past social history, past surgical history and problem list     Review of Systems   Constitutional: Negative for activity change, appetite change, fatigue and fever  HENT: Negative  Eyes: Negative  Respiratory: Negative  Cardiovascular: Negative  Gastrointestinal: Negative for blood in stool, constipation, diarrhea and vomiting  Endocrine: Negative  Genitourinary:        See HPI   Musculoskeletal: Positive for arthralgias and back pain  Skin: Negative      Allergic/Immunologic: Negative  Neurological: Positive for dizziness  Hematological: Negative  Psychiatric/Behavioral: Negative  Objective:      /78 (BP Location: Left arm, Patient Position: Sitting, Cuff Size: Adult)   Ht 4' 6" (1 372 m)   Wt 69 9 kg (154 lb)   BMI 37 13 kg/m²          Physical Exam  Vitals signs reviewed  Exam conducted with a chaperone present  Constitutional:       Appearance: She is well-developed  HENT:      Head: Normocephalic and atraumatic  Eyes:      Conjunctiva/sclera: Conjunctivae normal    Neck:      Musculoskeletal: Neck supple  Cardiovascular:      Rate and Rhythm: Normal rate  Pulmonary:      Effort: Pulmonary effort is normal    Abdominal:      General: There is no distension  Palpations: Abdomen is soft  There is no mass  Tenderness: There is no abdominal tenderness  There is no right CVA tenderness, left CVA tenderness, guarding or rebound  Hernia: No hernia is present  Skin:     General: Skin is warm and dry  Neurological:      Mental Status: She is alert and oriented to person, place, and time  Psychiatric:         Behavior: Behavior normal          Thought Content: Thought content normal          Judgment: Judgment normal            25 minutes was spent in review of x-rays and discussion regarding treatment options for her stones as well as review of her metabolic workup

## 2020-08-25 NOTE — PATIENT INSTRUCTIONS
Cálculos renales   LO QUE NECESITA SABER:   ¿Qué son los cálculos renales? Los cálculos renales se thom en el sistema urinario cuando el agua y los residuos de la orina no están niko balanceados  Cuando esto sucede, ciertos tipos de lanie de desecho se separan de la orina  Los lanie se acumulan y thom piedras en los riñones  Los cálculos renales pueden estar compuestos de ácido úrico, calcio, fosfato o lanie de oxalato  Es posible que usted tenga 1 o más cálculos en los riñones  ¿Qué aumenta mi riesgo de cálculos renales? · Usted no rob suficientes líquidos (especialmente agua) cada día  · Tener infecciones frecuentes en el tracto urinario  · Usted sigue un cierto tipo de dieta  Por ejemplo, las personas que consumen brielle dieta karyn en gissel o en sal podrían tener mayor riesgo de cálculos renales  Las personas que consumen alimentos altos en oxalato también podrían tener un mayor riesgo  Los alimentos altos en oxalato incluyen a las nueces, chocolate, café y vegetales de hojas verdes  · Nena ciertos medicamentos bao diuréticos, esteroides y antiácidos  · Algún familiar sorensen sufrido de cálculos renales  · Nació con un trastorno renal o intestinal, o tiene otros problemas médicos, bao gota  ¿Cuáles son los signos y síntomas de los cálculos renales? · Dolor en la parte media de la espalda que se mueve a través de un costado o que podría propagarse a la julieta    · [employer] y vómitos    · Necesidad de orinar con frecuencia, sensación de ardor al orinar u orina color rosetta o alfredo    · Sensibilidad en la parte inferior de la espalda, en el costado o en el estómago  ¿Cómo se diagnostican los cálculos renales? Morin médico le preguntará sobre morin jesus, Beb[place] Eddie y   [place]  El podría referirlo con un urólogo  Es posible que usted necesite exámenes para determinar el tipo de cálculos renales que tiene   Los exámenes pueden mostrar el tamaño de los cálculos y en dónde se encuentran en el sistema urinario  Usted podría tener madelaine o más de los siguientes:  · Análisis de [place]  podrían mostrar si usted tiene alex en la orina  También podrían mostrar altas cantidades de la sustancia que forma los cálculos renales, bao el ácido úrico     · Los análisis de alex:  muestran lo niko que funcionan karely riñones  También podrían utilizarse para revisar los niveles de calcio o de ácido úrico en la alex  · Brielle tomografía computarizada helicoidal sin contraste  es un tipo de radiografía que [place] brielle computadora para gustavo imágenes de los riñones  [employer] las imágenes para revisar si existen cálculos renales u otros problemas  · Se pueden hacer radiografías  de los riñones, uréteres y vejiga  Es posible que le administren un medio de contraste antes de gustavo las imágenes para que los médicos las puedan scotty con mayor claridad  Usted podría necesitar que le realicen mas de brielle radiografía  Dígale al médico si usted alguna vez ha tenido brielle reacción alérgica al tinte de [place]  · Un ultrasonido abdominal  utiliza ondas sonoras para mostrar imágenes de singh abdomen en un monitor  ¿Cómo se tratan los cálculos renales? · AINEs (Analgésicos antiinflamatorios no esteroides) bao el ibuprofeno, ayudan a disminuir la inflamación, el dolor y la [place]  Kassidy medicamento esta disponible con o sin brielle receta médica  Los AINEs pueden causar sangrado estomacal o problemas renales en ciertas personas  Si usted rob un medicamento anticoagulante, siempre pregúntele a singh médico si los LAUREN son seguros para usted  Siempre josé manuel la etiqueta de kassidy medicamento y [place] instrucciones  · Podrían recetarle un medicamento  podrían ser Noreene Suarez  Pregunte cómo gustavo estos medicamentos de brielle forma anaya  · Medicamentos,  para balancear el nivel de los electrolitos  · Un procedimiento o brielle cirugía  para remover los cálculos renales si no se eliminan por sí solos   El tratamiento dependerá del tamaño y [place] de los cálculos renales  ¿Cómo puedo controlar los síntomas? · [place] suficiente líquidos  Es probable que martinez médico le indique que tome al menos 8 a 12 vasos (de ocho onzas) de líquidos al día  [place] ayuda a [employer] cálculos renales cuando usted orina  El agua es el mejor líquido para gustavo  · Cuele la orina cada vez que use el baño  [place] por medio de un colador o un pedazo de ana mora para así recolectar los cálculos  Lleve los cálculos donde martinez médico para que pueda enviarlos al laboratorio y realizarles exámenes  [place] ayudará a martinez médico a planear el mejor tratamiento para usted  · Consuma alimentos saludables y variados  Los alimentos sanos incluyen frutas, vegetales, panes integrales, productos lácteos bajos en grasas, frijoles y pescado  Es probable que usted tenga que limitar la cantidad de sodio (sal) o proteínas que usted come  Pida más información sobre los mejores alimentos para usted  · Realice actividad física con regularidad  Karely cálculos pueden pasar con más facilidad si usted permanece activo  Pregunte sobre cuáles son las mejores actividades para usted  ¿Cuándo ayesha buscar atención inmediata? · Usted tiene vómitos que no se alivian con medicación  ¿Cuándo ayesha comunicarme con mi médico?   · Usted tiene fiebre  · Usted tiene dificultad para orinar  · Usted orina con alex  · Usted tiene dolor intenso  · Tiene alguna pregunta o inquietud acerca de martinez condición o cuidado  ACUERDOS SOBRE MARTINEZ CUIDADO:   Usted tiene el derecho de ayudar a planear martinez cuidado  Aprenda todo lo que pueda sobre martinez condición y bao darle tratamiento  Discuta karely opciones de tratamiento con karely médicos para decidir el cuidado que usted desea recibir  Usted siempre tiene el derecho de rechazar el tratamiento  Esta información es sólo para uso en educación  Martinez intención no es darle un consejo médico sobre enfermedades o tratamientos   Colsulte con martinez Carlo Patch farmacéutico antes de seguir cualquier régimen médico para saber si es seguro y efectivo para usted  © 2017 [address] Information is for End User's use only and may not be sold, redistributed or otherwise used for commercial purposes  All illustrations and images included in CareNotes® are the copyrighted property of A D A M , Inc  or Jeremi Mabry

## 2020-08-25 NOTE — ASSESSMENT & PLAN NOTE
Bilateral kidney stones are noted on CT scan  The CT scan was reviewed on the [facility] system  There is a 6 mm right mid-pole stone and a tiny 1 mm intraparenchymal left renal stone  Treatment options for the right sided stone were discussed in detail  She is not in favor proceeding with any procedure at this time  I recommended she greatly increase her fluid intake and that she increase her intake of  High citrate foods like lemon and lemonade  She will call should she have any new symptoms and otherwise will return in 6 months

## 2020-10-07 ENCOUNTER — TELEPHONE (OUTPATIENT)
Dept: UROLOGY | Facility: CLINIC | Age: 75
End: 2020-10-07

## 2020-10-07 DIAGNOSIS — N20.0 NEPHROLITHIASIS: Primary | ICD-10-CM

## 2020-10-08 ENCOUNTER — HOSPITAL ENCOUNTER (OUTPATIENT)
Dept: CT IMAGING | Facility: HOSPITAL | Age: 75
Discharge: HOME | End: 2020-10-08
Payer: MEDICARE

## 2020-10-08 DIAGNOSIS — N20.0 NEPHROLITHIASIS: ICD-10-CM

## 2020-10-08 PROCEDURE — G1004 CDSM NDSC: HCPCS

## 2020-10-08 PROCEDURE — 74176 CT ABD & PELVIS W/O CONTRAST: CPT

## 2020-10-28 ENCOUNTER — OFFICE VISIT (OUTPATIENT)
Dept: FAMILY MEDICINE CLINIC | Facility: CLINIC | Age: 75
End: 2020-10-28
Payer: MEDICARE

## 2020-10-28 VITALS
BODY MASS INDEX: 35.87 KG/M2 | HEIGHT: 55 IN | WEIGHT: 155 LBS | HEART RATE: 80 BPM | SYSTOLIC BLOOD PRESSURE: 130 MMHG | TEMPERATURE: 97.8 F | DIASTOLIC BLOOD PRESSURE: 78 MMHG

## 2020-10-28 DIAGNOSIS — H66.011 NON-RECURRENT ACUTE SUPPURATIVE OTITIS MEDIA OF RIGHT EAR WITH SPONTANEOUS RUPTURE OF TYMPANIC MEMBRANE: Primary | ICD-10-CM

## 2020-10-28 PROCEDURE — 99213 OFFICE O/P EST LOW 20 MIN: CPT | Performed by: NURSE PRACTITIONER

## 2020-10-28 RX ORDER — SULFAMETHOXAZOLE AND TRIMETHOPRIM 800; 160 MG/1; MG/1
1 TABLET ORAL EVERY 12 HOURS SCHEDULED
Qty: 20 TABLET | Refills: 0 | Status: SHIPPED | OUTPATIENT
Start: 2020-10-28 | End: 2020-11-07

## 2020-12-16 ENCOUNTER — TELEPHONE (OUTPATIENT)
Dept: FAMILY MEDICINE CLINIC | Facility: CLINIC | Age: 75
End: 2020-12-16

## 2020-12-16 DIAGNOSIS — M47.816 LUMBAR SPONDYLOSIS: ICD-10-CM

## 2020-12-16 DIAGNOSIS — M51.34 DEGENERATIVE DISC DISEASE, THORACIC: Primary | ICD-10-CM

## 2020-12-16 RX ORDER — NAPROXEN 500 MG/1
500 TABLET ORAL 2 TIMES DAILY WITH MEALS
Qty: 60 TABLET | Refills: 5 | Status: SHIPPED | OUTPATIENT
Start: 2020-12-16 | End: 2022-02-09

## 2021-01-27 ENCOUNTER — TELEPHONE (OUTPATIENT)
Dept: FAMILY MEDICINE CLINIC | Facility: CLINIC | Age: 76
End: 2021-01-27

## 2021-01-27 NOTE — TELEPHONE ENCOUNTER
Patient is in pain  Her back is really hurting her and she can't get into the pain specialist  She wanted to know if we could give her Prednisone that helped her before and then it would give her time to schedule an appointment with the pain Specialist  [facility]   Please call her to let her know if this can be done   [phone]

## 2021-01-27 NOTE — TELEPHONE ENCOUNTER
I have not seen pt in over a year-she has been seeing Neal Randolph lately so to prescribe Prednisone will need ov

## 2021-02-01 ENCOUNTER — TELEMEDICINE (OUTPATIENT)
Dept: FAMILY MEDICINE CLINIC | Facility: CLINIC | Age: 76
End: 2021-02-01
Payer: COMMERCIAL

## 2021-02-01 DIAGNOSIS — M46.1 SACROILIITIS (HCC): Primary | ICD-10-CM

## 2021-02-01 PROCEDURE — 99213 OFFICE O/P EST LOW 20 MIN: CPT | Performed by: NURSE PRACTITIONER

## 2021-02-01 RX ORDER — METHYLPREDNISOLONE 4 MG/1
TABLET ORAL
Qty: 21 EACH | Refills: 0 | Status: SHIPPED | OUTPATIENT
Start: 2021-02-01 | End: 2022-02-09

## 2021-02-01 NOTE — ASSESSMENT & PLAN NOTE
Patient has done really well with cortisone injection about 2 years ago  Has been stable until last week  Will give oral medrol chelsie  If not improved in 2 weeks we discussed she will need in office evaluation

## 2021-02-01 NOTE — PROGRESS NOTES
Virtual Regular Visit      Assessment/Plan:    Problem List Items Addressed This Visit        Musculoskeletal and Integument    Sacroiliitis ([address] ) - Bilateral - Primary     Patient has done really well with cortisone injection about 2 years ago  Has been stable until last week  Will give oral medrol chelsie  If not improved in 2 weeks we discussed she will need in office evaluation  Relevant Medications    methylPREDNISolone 4 MG tablet therapy pack               Reason for visit is   Chief Complaint   Patient presents with    Virtual Regular Visit        Encounter provider BINA Middleton    Provider located at [address] [place] [id number]      Recent Visits  Date Type Provider Dept   01/27/21 Telephone David Guzmán  [address] [facility]   Showing recent visits within past 7 days and meeting all other requirements     Today's Visits  Date Type Provider Dept   02/01/21 Telemedicine BINA Vasquez [facility]   Showing today's visits and meeting all other requirements     Future Appointments  No visits were found meeting these conditions  Showing future appointments within next 150 days and meeting all other requirements        The patient was identified by name and date of birth  Tearobert Flow was informed that this is a telemedicine visit and that the visit is being conducted through [employer] and patient was informed that this is not a secure, HIPAA-compliant platform  She agrees to proceed     My office door was closed  No one else was in the room  She acknowledged consent and understanding of privacy and security of the video platform  The patient has agreed to participate and understands they can discontinue the visit at any time  Patient is aware this is a billable service  Bianca Shine is a 66 y o  female    Patient presents today due to sciatica discomfort      She reports she having left sided discomfort  It has been present for abou tone week  patient reports prior she was seen by pain management and given injections about 2 years ago which helped significantly  She attempted to get back into their office but she has some difficulty with transportation currently  Patient states she has more difficult time moving from sitting to standing  She denies any back pain with the sciatica  Past Medical History:   Diagnosis Date    Arthritis     Chronic pain disorder     sciatic    GERD (gastroesophageal reflux disease)     Kidney stone     Kidney stone     Nephrolithiasis     Osteoarthritis     Osteoporosis        Past Surgical History:   Procedure Laterality Date    CHOLECYSTECTOMY      FL RETROGRADE PYELOGRAM  3/7/2019    KIDNEY STONE SURGERY      KNEE ARTHROSCOPY Left     WY COLONOSCOPY FLX DX W/COLLJ SPEC WHEN PFRMD N/A 11/2/2017    Procedure: COLONOSCOPY with polypectomy x2;  Surgeon: Karon Perez MD;  Location: [facility];   Service: Gastroenterology    WY CYSTOURETHROSCOPY,URETER CATHETER Left 3/7/2019    Procedure: CYSTOSCOPY, URETEROSCOPY WITH LASER, BASKET STONE EXTRACTION, RETROGRADE PYELOGRAM WITH INSERTION STENT URETERAL;  Surgeon: Florida Tan MD;  Location: [facility];  Service: Urology    TUBAL LIGATION         Current Outpatient Medications   Medication Sig Dispense Refill    acetaminophen (TYLENOL) 325 mg tablet Take 3 tablets (975 mg total) by mouth every 6 (six) hours as needed for mild pain or moderate pain (Patient not taking: Reported on 10/28/2020) 60 tablet 0    Cholecalciferol (VITAMIN D) 50 MCG (2000 UT) tablet Take 1 tablet (2,000 Units total) by mouth daily 90 tablet 3    cyclobenzaprine (FLEXERIL) 10 mg tablet TAKE 1 TABLET BY MOUTH THREE TIMES A DAY AS NEEDED FOR MUSCLE SPASM (Patient not taking: Reported on 12/31/2019) 45 tablet 0    methylPREDNISolone 4 MG tablet therapy pack Use as directed on package 21 each 0    Multiple Vitamins-Minerals (WOMENS 50+ MULTI VITAMIN/MIN PO) Take 1 tablet by mouth daily       naproxen (NAPROSYN) 500 mg tablet Take 1 tablet (500 mg total) by mouth 2 (two) times a day with meals 60 tablet 5    ofloxacin (FLOXIN) 0 3 % otic solution Administer 10 drops to the right ear daily X 10 days 5 mL 1     No current facility-administered medications for this visit  Allergies   Allergen Reactions    Diclofenac Sodium      Other reaction(s): GI Upset    Meperidine     Tramadol      Other reaction(s): GI Upset       Review of Systems   Constitutional: Positive for activity change  Respiratory: Negative  Cardiovascular: Negative  Gastrointestinal: Negative  Genitourinary: Negative  Musculoskeletal: Positive for gait problem and myalgias  Negative for back pain  Psychiatric/Behavioral: Negative  Video Exam    There were no vitals filed for this visit  Physical Exam  Constitutional:       General: She is not in acute distress  Appearance: She is well-developed  She is obese  She is not ill-appearing, toxic-appearing or diaphoretic  HENT:      Head: Normocephalic and atraumatic  Right Ear: External ear normal       Left Ear: External ear normal    Eyes:      Conjunctiva/sclera: Conjunctivae normal    Pulmonary:      Effort: Pulmonary effort is normal  No respiratory distress  Musculoskeletal:      Lumbar back: She exhibits decreased range of motion  Neurological:      Mental Status: She is alert and oriented to person, place, and time  Psychiatric:         Mood and Affect: Mood normal          Behavior: Behavior normal           I spent 8 minutes directly with the patient during this visit      VIRTUAL VISIT DISCLAIMER    Bridgette Michael acknowledges that she has consented to an online visit or consultation   She understands that the online visit is based solely on information provided by her, and that, in the absence of a face-to-face physical evaluation by the physician, the diagnosis she receives is both limited and provisional in terms of accuracy and completeness  This is not intended to replace a full medical face-to-face evaluation by the physician  Hilton Welsh understands and accepts these terms

## 2021-02-09 ENCOUNTER — OFFICE VISIT (OUTPATIENT)
Dept: UROLOGY | Age: 76
End: 2021-02-09
Payer: COMMERCIAL

## 2021-02-09 VITALS
BODY MASS INDEX: 35.87 KG/M2 | HEART RATE: 55 BPM | HEIGHT: 55 IN | SYSTOLIC BLOOD PRESSURE: 130 MMHG | DIASTOLIC BLOOD PRESSURE: 80 MMHG | WEIGHT: 155 LBS

## 2021-02-09 DIAGNOSIS — N20.0 NEPHROLITHIASIS: Primary | ICD-10-CM

## 2021-02-09 PROCEDURE — 99214 OFFICE O/P EST MOD 30 MIN: CPT | Performed by: UROLOGY

## 2021-02-09 PROCEDURE — 1036F TOBACCO NON-USER: CPT | Performed by: UROLOGY

## 2021-02-09 PROCEDURE — 1160F RVW MEDS BY RX/DR IN RCRD: CPT | Performed by: UROLOGY

## 2021-02-09 RX ORDER — POTASSIUM CITRATE 10 MEQ/1
10 TABLET, EXTENDED RELEASE ORAL 2 TIMES DAILY
Qty: 60 TABLET | Refills: 5 | Status: SHIPPED | OUTPATIENT
Start: 2021-02-09 | End: 2022-02-09

## 2021-02-09 NOTE — PATIENT INSTRUCTIONS
Potassium Citrate (Por la boca)   Potassium Citrate (hsy-ROY-it-um SIT-rate)  Trata y previene cálculos renales por reducción del nivel de ácido en la orina  Shane(s) : Urocit-K, Urocit-K 10, Urocit-K 5   Existen muchas otras marcas de [place]  Chelita medicamento no debe ser usado cuando:   Chelita medicamento no es adecuado para todas las personas  No lo use si usted ha tenido brielle reacción alérgica al citrato de potasio, o si usted tiene brielle infección del tracto urinario o brielle Soraya Brazeliazar Keye Clay de usar chelita medicamento:   Tableta de liberación prolongada  · Singh médico le indicara cuanto medicamento necesita usar  No use más medicamento de lo indicado  · Tómese chelita medicamento con comida o brielle merienda nocturna, o dentro de los primeros 30 minutos de gustavo comido  · Siga cuidadosamente las instrucciones de singh médico, relacionadas con alguna dieta especial [place] más líquidos para que pueda orinar con más frecuncia y ayudar a prevenir problemas en karely riñones  · Trague la tableta de liberación prolongada entera  No triture, rompa o mastique  · Informe a singh médico si usted tiene dificultad para tragar la tableta o si la tableta se pega o se queda atorada en singh garganta  · Si esta usando la tableta de liberación prolongada, parte de la tableta puede salir con karely evacuaciones intestinales  Reagan es normal y no es motivo de preocupación  · Si olvida brielle dosis: [place] la dosis tan pronto bao lo recuerde  Si es sara la hora para singh próxima dosis, espere hasta entonces para gustavo singh dosis regular  No tome medicamento adicional para reponer la dosis que omitió  · Guarde el medicamento en un recipiente cerrado a temperatura ambiente y alejado del calor, la humedad y la lashonda directa  Medicamentos y [employer] que debe evitar:   Consulte con singh médico o farmacéutico antes de usar cualquier medicamento, incluyendo los que compra sin receta médica, las vitaminas y los productos herbales    · Algunos medicamentos y alimentos pueden afectar el funcionamiento del citrato de [place]  Dígale a singh médico si usted [employer] un diurético o medicamentos que le resecan la boca o causan estreñimiento  · Limite la cantidad de sal (sodio) que usted come y rob  No le agregue sal a karely comidas  Precauciones nicholas el uso de chelita medicamento:   · Dígale a singh médico si usted está embarazada o amamantando, o si tiene [employer] riñones, enfermedad en el corazón, insuficiencia cardíaca, diabetes, problemas con singh glándula adrenal, problemas digestivos o ha sufrido un ataque cardíaco previamente  · Chelita medicamento puede causar los siguientes problemas:  ? Demasiado potasio en la alex  ? Sangrado o daños en el sistema digestivo, bao brielle úlcera  · El médico solicitará exámenes de laboratorio nicholas las citas de rutina para revisar los efectos de [place]  Asista a todas karely citas  · Guarde todos los medicamentos fuera del alcance de los niños  Nunca comparta karely medicamentos con [employer]  Efectos secundarios que pueden presentarse nicholas el uso de chelita medicamento:   Consulte inmediatamente con el médico si nota cualquiera de estos efectos secundarios:  · Reacción alérgica: Comezón o ronchas, hinchazón del marty o las leena, hinchazón u hormigueo en la boca o garganta, opresión en el pecho, dificultad para respirar  · Vomito severo, dolor de estómago, heces sangrientas, negras o alquitranadas  · Confusión, debilidad, ritmo cardíaco irregular, dificultad para respirar, adormecimiento en las leena, pies o labios  · Ritmo cardíaco rápido o irregular  Consulte con el médico si nota los siguientes efectos secundarios menos graves:   · Diarrea leve, náusea, vómitos o molestias estomacales  Consulte con el médico si nota otros efectos secundarios que vania son causados por chelita medicamento  Llame a singh médico para consultarle Navid   Usted puede notificar karely efectos secundarios al FDA al 1-800-FDA-1088  © [address] [employer] Information is for End User's use only and may not be sold, redistributed or otherwise used for commercial purposes  Esta información es sólo para uso en educación  Singh intención no es darle un consejo médico sobre enfermedades o tratamientos  Colsulte con singh Elie Mad farmacéutico antes de seguir cualquier régimen médico para saber si es seguro y efectivo para usted  Cálculos renales   LO QUE NECESITA SABER:   ¿Qué son los cálculos renales? Los cálculos renales se thom en el sistema urinario cuando el agua y los residuos de la orina no están niko balanceados  Cuando esto sucede, ciertos tipos de lanie de desecho se separan de la orina  Los lanie se acumulan y thom piedras en los riñones  Los cálculos renales pueden estar compuestos de ácido úrico, calcio, fosfato o lanie de oxalato  Podría tener más de un cálculo  ¿Qué aumenta mi riesgo de cálculos renales? · No gustavo suficientes líquidos (especialmente agua) todos los días    · Tener infecciones frecuentes del tracto urinario    · Demasiada cantidad de ciertos alimentos, bao carne, sal, nueces y chocolate    · Obesidad    · Ciertos medicamentos, bao diuréticos, esteroides y antiácidos    · Antecedentes familiares de cálculos renales    · Nacer con un trastorno renal o intestinal    ¿Cuáles son los signos y síntomas de los cálculos renales? · Dolor en la parte media de la espalda que se mueve a través de un costado o que podría propagarse a la julieta    · [employer] y vómitos    · Necesidad de orinar con frecuencia, sensación de ardor al orinar u orina color rosetta o alfredo    · Sensibilidad en la parte inferior de la espalda, en el costado o en el estómago    ¿Cómo se diagnostican los cálculos renales? El médico va a hacerle preguntas sobre singh jesus y alimentos habituales  El médico podría derivarlo a un urólogo   Es posible que usted necesite exámenes para determinar el tipo de cálculos renales que tiene  Los exámenes pueden mostrar el tamaño de los cálculos y en dónde se encuentran en el sistema urinario  Usted podría necesitar más de madelaine de los siguientes:  · Análisis de [place] podrían mostrar si usted tiene alex en la orina  También podrían mostrar altas cantidades de la sustancia que forma los cálculos renales, bao el ácido úrico     · Los análisis de alex muestran lo niko que funcionan karely riñones  También podrían utilizarse para revisar los niveles de calcio o de ácido úrico en la alex  · Janit Sax o ultrasonido se pueden gustavo de karely riñones, vejiga y uréteres  Es posible que le administren un líquido de contraste antes de la radiografía para que se vean con mayor claridad en las imágenes  Usted podría necesitar que le realicen mas de brielle radiografía  Dígale al médico si usted alguna vez ha tenido brielle reacción alérgica al líquido de [place]  ¿Cómo se tratan los cálculos renales? · [place], bao el [facility], [place] [facility] a disminuir la inflamación, el dolor y la [place]  Kassidy medicamento está disponible con o sin brielle receta médica  Los LAUREN pueden causar sangrado estomacal o problemas renales en ciertas personas  Si usted rob un medicamento anticoagulante, siempre pregúntele a singh médico si los LAUREN son seguros para usted  Siempre josé manuel la etiqueta de kassidy medicamento y [place] instrucciones  · Puede administrarse podrían administrarse  Pregunte al médico cómo debe gustavo kassidy medicamento de forma anaya  Algunos medicamentos recetados para el dolor contienen acetaminofén  No tome otros medicamentos que contengan acetaminofén sin consultarlo con singh médico  Demasiado acetaminofeno puede causar daño al hígado  Los medicamentos recetados para el dolor podrían causar estreñimiento  Pregunte a singh médico bao prevenir o tratar estreñimiento  · Los medicamentos para balancear el nivel de los electrolitos      · Un procedimiento o brielle cirugía para remover los cálculos renales si no se eliminan por sí solos  El tratamiento dependerá del tamaño y ubicación de los cálculos renales  ¿Qué puedo hacer para controlar mis cálculos? · Ingiera más líquidos  Es probable que martinez médico le indique que tome al menos 8 a 12 vasos (de ocho onzas) de líquidos al día  [place] ayuda a [employer] cálculos renales cuando usted orina  El agua es el mejor líquido para gustavo  · Cuele la orina cada vez que use el baño  Orine por medio de un colador o un pedazo de ana mora para así recolectar los cálculos  Lleve los cálculos donde martinez médico para que pueda enviarlos al laboratorio y realizarles exámenes  [place] ayudará a martinez médico a planear el mejor tratamiento para usted  · Consuma alimentos saludables y variados  Los alimentos sanos incluyen frutas, vegetales, panes integrales, productos lácteos bajos en grasas, frijoles y pescado  Es probable que usted tenga que limitar la cantidad de sodio (sal) o proteínas que usted come  Pida más información sobre los mejores alimentos para usted  · Manténgase activo  Karely cálculos pueden pasar con más facilidad si usted permanece activo  También puede ayudarle a controlar martinez peso  Pregunte sobre cuáles son las mejores actividades para usted  ¿Cuándo ayesha buscar atención inmediata? · Usted tiene vómitos que no se alivian con medicación  ¿Cuándo ayesha comunicarme con mi médico?  · Tiene fiebre  · Usted tiene dificultad para orinar  · Usted orina con alex  · Usted tiene dolor intenso  · Tiene alguna pregunta o inquietud acerca de martinez condición o cuidado  ACUERDOS SOBRE MARTINEZ CUIDADO:   Usted tiene el derecho de ayudar a planear martinez cuidado  Aprenda todo lo que pueda sobre martinez condición y bao darle tratamiento  Discuta karely opciones de tratamiento con karely médicos para decidir el cuidado que usted desea recibir  Usted siempre tiene el derecho de rechazar el tratamiento  Esta información es sólo para uso en educación   Martinez intención no es darle un consejo médico sobre enfermedades o tratamientos  Colsulte con singh Marcelina Parker farmacéutico antes de seguir cualquier régimen médico para saber si es seguro y efectivo para usted  © Copyright [address] [place] [address] Drive Information is for End User's use only and may not be sold, redistributed or otherwise used for commercial purposes   All illustrations and images included in CareNotes® are the copyrighted property of [employer] JESSICA A M [employer]  or [address] [place] [address]

## 2021-02-11 ENCOUNTER — IMMUNIZATIONS (OUTPATIENT)
Dept: FAMILY MEDICINE CLINIC | Facility: HOSPITAL | Age: 76
End: 2021-02-11

## 2021-02-11 DIAGNOSIS — Z23 ENCOUNTER FOR IMMUNIZATION: Primary | ICD-10-CM

## 2021-02-11 PROCEDURE — 0011A SARS-COV-2 / COVID-19 MRNA VACCINE (MODERNA) 100 MCG: CPT

## 2021-02-11 PROCEDURE — 91301 SARS-COV-2 / COVID-19 MRNA VACCINE (MODERNA) 100 MCG: CPT

## 2021-03-10 ENCOUNTER — IMMUNIZATIONS (OUTPATIENT)
Dept: FAMILY MEDICINE CLINIC | Facility: HOSPITAL | Age: 76
End: 2021-03-10

## 2021-03-10 DIAGNOSIS — Z23 ENCOUNTER FOR IMMUNIZATION: Primary | ICD-10-CM

## 2021-03-10 PROCEDURE — 91301 SARS-COV-2 / COVID-19 MRNA VACCINE (MODERNA) 100 MCG: CPT

## 2021-03-10 PROCEDURE — 0012A SARS-COV-2 / COVID-19 MRNA VACCINE (MODERNA) 100 MCG: CPT

## 2021-04-13 ENCOUNTER — APPOINTMENT (EMERGENCY)
Dept: CT IMAGING | Facility: HOSPITAL | Age: 76
End: 2021-04-13
Payer: COMMERCIAL

## 2021-04-13 ENCOUNTER — APPOINTMENT (EMERGENCY)
Dept: RADIOLOGY | Facility: HOSPITAL | Age: 76
End: 2021-04-13
Payer: COMMERCIAL

## 2021-04-13 ENCOUNTER — HOSPITAL ENCOUNTER (EMERGENCY)
Facility: HOSPITAL | Age: 76
Discharge: HOME | End: 2021-04-13
Attending: EMERGENCY MEDICINE
Payer: COMMERCIAL

## 2021-04-13 VITALS
BODY MASS INDEX: 39.95 KG/M2 | RESPIRATION RATE: 18 BRPM | TEMPERATURE: 97.7 F | OXYGEN SATURATION: 100 % | WEIGHT: 159.61 LBS | HEART RATE: 80 BPM | DIASTOLIC BLOOD PRESSURE: 75 MMHG | SYSTOLIC BLOOD PRESSURE: 166 MMHG

## 2021-04-13 DIAGNOSIS — N39.0 UTI (URINARY TRACT INFECTION): ICD-10-CM

## 2021-04-13 DIAGNOSIS — R07.89 ATYPICAL CHEST PAIN: Primary | ICD-10-CM

## 2021-04-13 LAB
ALBUMIN SERPL BCP-MCNC: 3.4 G/DL (ref 3–5.2)
ALP SERPL-CCNC: 137 U/L (ref 43–122)
ALT SERPL W P-5'-P-CCNC: 14 U/L
ANION GAP SERPL CALCULATED.3IONS-SCNC: 4 MMOL/L (ref 5–14)
AST SERPL W P-5'-P-CCNC: 27 U/L (ref 14–36)
ATRIAL RATE: 77 BPM
BACTERIA UR QL AUTO: ABNORMAL /HPF
BASOPHILS # BLD AUTO: 0.1 THOUSANDS/ΜL (ref 0–0.1)
BASOPHILS NFR BLD AUTO: 2 % (ref 0–1)
BILIRUB SERPL-MCNC: 0.79 MG/DL
BILIRUB UR QL STRIP: ABNORMAL
BUN SERPL-MCNC: 18 MG/DL (ref 5–25)
CALCIUM ALBUM COR SERPL-MCNC: 9.3 MG/DL (ref 8.3–10.1)
CALCIUM SERPL-MCNC: 8.8 MG/DL (ref 8.4–10.2)
CHLORIDE SERPL-SCNC: 103 MMOL/L (ref 97–108)
CLARITY UR: ABNORMAL
CO2 SERPL-SCNC: 29 MMOL/L (ref 22–30)
COLOR UR: ABNORMAL
CREAT SERPL-MCNC: 0.79 MG/DL (ref 0.6–1.2)
D DIMER PPP FEU-MCNC: 0.85 UG/ML FEU
EOSINOPHIL # BLD AUTO: 0.4 THOUSAND/ΜL (ref 0–0.4)
EOSINOPHIL NFR BLD AUTO: 5 % (ref 0–6)
ERYTHROCYTE [DISTWIDTH] IN BLOOD BY AUTOMATED COUNT: 14.8 %
GFR SERPL CREATININE-BSD FRML MDRD: 72 ML/MIN/1.73SQ M
GLUCOSE SERPL-MCNC: 94 MG/DL (ref 70–99)
GLUCOSE UR STRIP-MCNC: NEGATIVE MG/DL
HCT VFR BLD AUTO: 39.6 % (ref 36–46)
HGB BLD-MCNC: 13 G/DL (ref 12–16)
HGB UR QL STRIP.AUTO: 150
KETONES UR STRIP-MCNC: NEGATIVE MG/DL
LEUKOCYTE ESTERASE UR QL STRIP: 500
LYMPHOCYTES # BLD AUTO: 1.4 THOUSANDS/ΜL (ref 0.5–4)
LYMPHOCYTES NFR BLD AUTO: 20 % (ref 25–45)
MCH RBC QN AUTO: 28.8 PG (ref 26–34)
MCHC RBC AUTO-ENTMCNC: 32.7 G/DL (ref 31–36)
MCV RBC AUTO: 88 FL (ref 80–100)
MONOCYTES # BLD AUTO: 0.6 THOUSAND/ΜL (ref 0.2–0.9)
MONOCYTES NFR BLD AUTO: 8 % (ref 1–10)
MUCOUS THREADS UR QL AUTO: ABNORMAL
NEUTROPHILS # BLD AUTO: 4.6 THOUSANDS/ΜL (ref 1.8–7.8)
NEUTS SEG NFR BLD AUTO: 65 % (ref 45–65)
NITRITE UR QL STRIP: NEGATIVE
NON-SQ EPI CELLS URNS QL MICRO: ABNORMAL /HPF
P AXIS: 53 DEGREES
PH UR STRIP.AUTO: 6 [PH]
PLATELET # BLD AUTO: 178 THOUSANDS/UL (ref 150–450)
PMV BLD AUTO: 10.1 FL (ref 8.9–12.7)
POTASSIUM SERPL-SCNC: 4 MMOL/L (ref 3.6–5)
PR INTERVAL: 140 MS
PROT SERPL-MCNC: 6.6 G/DL (ref 5.9–8.4)
PROT UR STRIP-MCNC: ABNORMAL MG/DL
QRS AXIS: -16 DEGREES
QRSD INTERVAL: 60 MS
QT INTERVAL: 386 MS
QTC INTERVAL: 436 MS
RBC # BLD AUTO: 4.5 MILLION/UL (ref 4–5.2)
RBC #/AREA URNS AUTO: ABNORMAL /HPF
SODIUM SERPL-SCNC: 136 MMOL/L (ref 137–147)
SP GR UR STRIP.AUTO: 1.02 (ref 1–1.04)
T WAVE AXIS: 32 DEGREES
TROPONIN I SERPL-MCNC: <0.01 NG/ML (ref 0–0.03)
UROBILINOGEN UA: 4 MG/DL
VENTRICULAR RATE: 77 BPM
WBC # BLD AUTO: 7.2 THOUSAND/UL (ref 4.5–11)
WBC #/AREA URNS AUTO: ABNORMAL /HPF

## 2021-04-13 PROCEDURE — 74174 CTA ABD&PLVS W/CONTRAST: CPT

## 2021-04-13 PROCEDURE — 93005 ELECTROCARDIOGRAM TRACING: CPT

## 2021-04-13 PROCEDURE — 93010 ELECTROCARDIOGRAM REPORT: CPT | Performed by: INTERNAL MEDICINE

## 2021-04-13 PROCEDURE — 36415 COLL VENOUS BLD VENIPUNCTURE: CPT | Performed by: EMERGENCY MEDICINE

## 2021-04-13 PROCEDURE — 99285 EMERGENCY DEPT VISIT HI MDM: CPT

## 2021-04-13 PROCEDURE — 71046 X-RAY EXAM CHEST 2 VIEWS: CPT

## 2021-04-13 PROCEDURE — 99284 EMERGENCY DEPT VISIT MOD MDM: CPT | Performed by: EMERGENCY MEDICINE

## 2021-04-13 PROCEDURE — 96374 THER/PROPH/DIAG INJ IV PUSH: CPT

## 2021-04-13 PROCEDURE — 85025 COMPLETE CBC W/AUTO DIFF WBC: CPT | Performed by: EMERGENCY MEDICINE

## 2021-04-13 PROCEDURE — 80053 COMPREHEN METABOLIC PANEL: CPT | Performed by: EMERGENCY MEDICINE

## 2021-04-13 PROCEDURE — 71275 CT ANGIOGRAPHY CHEST: CPT

## 2021-04-13 PROCEDURE — 84484 ASSAY OF TROPONIN QUANT: CPT | Performed by: EMERGENCY MEDICINE

## 2021-04-13 PROCEDURE — 85379 FIBRIN DEGRADATION QUANT: CPT | Performed by: EMERGENCY MEDICINE

## 2021-04-13 PROCEDURE — 81001 URINALYSIS AUTO W/SCOPE: CPT | Performed by: EMERGENCY MEDICINE

## 2021-04-13 RX ORDER — KETOROLAC TROMETHAMINE 30 MG/ML
15 INJECTION, SOLUTION INTRAMUSCULAR; INTRAVENOUS ONCE
Status: COMPLETED | OUTPATIENT
Start: 2021-04-13 | End: 2021-04-13

## 2021-04-13 RX ORDER — KETOROLAC TROMETHAMINE 10 MG/1
10 TABLET, FILM COATED ORAL EVERY 6 HOURS PRN
Qty: 10 TABLET | Refills: 0 | Status: SHIPPED | OUTPATIENT
Start: 2021-04-13 | End: 2022-02-09

## 2021-04-13 RX ORDER — CEPHALEXIN 500 MG/1
500 CAPSULE ORAL EVERY 12 HOURS SCHEDULED
Qty: 10 CAPSULE | Refills: 0 | Status: SHIPPED | OUTPATIENT
Start: 2021-04-13 | End: 2021-04-18

## 2021-04-13 RX ADMIN — KETOROLAC TROMETHAMINE 15 MG: 30 INJECTION, SOLUTION INTRAMUSCULAR; INTRAVENOUS at 08:29

## 2021-04-13 RX ADMIN — IOHEXOL 100 ML: 350 INJECTION, SOLUTION INTRAVENOUS at 09:15

## 2021-04-13 NOTE — ED PROVIDER NOTES
History  Chief Complaint   Patient presents with    Chest Pain     staets that she started with left shoulder pain 1 week ago and is now having rt sided chest and shoulder pain  worse with movement  denies SOB, nausea, vomiting or diarrhea  69-year-old female presents with complaint of a one week history of chest discomfort  She states that the pain originally was on the left side of her chest   With taking naproxen and ibuprofen, the resolved but now she is experiencing the same pain in the right side of her chest   Pain is worse with palpation and movement along with deep inspiration  She denies feeling short of breath, URI symptoms, chest symptoms, or other acute issues  She reports that she does have a history of there is pain issues none quite like this  Chest Pain  Pain location:  R chest  Pain quality: aching and dull    Pain radiates to:  Does not radiate  Pain radiates to the back: no    Pain severity:  Severe  Onset quality:  Gradual  Timing:  Constant  Progression:  Waxing and waning  Chronicity:  New  Relieved by: initially improved with NSAIDs  Worsened by:  Deep breathing and movement (palpation)  Associated symptoms: no abdominal pain, no altered mental status, no anorexia, no anxiety, no back pain, no claudication, no cough, no diaphoresis, no dizziness (when she breaths too fast), no dysphagia, no fatigue, no fever, no headache, no heartburn, no lower extremity edema, no nausea, no near-syncope, no numbness, no orthopnea, no palpitations, no PND, no shortness of breath, no syncope, not vomiting and no weakness        Prior to Admission Medications   Prescriptions Last Dose Informant Patient Reported? Taking?    Cholecalciferol (VITAMIN D) 50 MCG (2000 UT) tablet  Self No No   Sig: Take 1 tablet (2,000 Units total) by mouth daily   Multiple Vitamins-Minerals (WOMENS 50+ MULTI VITAMIN/MIN PO)  Self Yes No   Sig: Take 1 tablet by mouth daily    acetaminophen (TYLENOL) 325 mg tablet  Self No No   Sig: Take 3 tablets (975 mg total) by mouth every 6 (six) hours as needed for mild pain or moderate pain   Patient not taking: Reported on 10/28/2020   cyclobenzaprine (FLEXERIL) 10 mg tablet  Self No No   Sig: TAKE 1 TABLET BY MOUTH THREE TIMES A DAY AS NEEDED FOR MUSCLE SPASM   Patient not taking: Reported on 12/31/2019   methylPREDNISolone 4 MG tablet therapy pack   No No   Sig: Use as directed on package   naproxen (NAPROSYN) 500 mg tablet   No No   Sig: Take 1 tablet (500 mg total) by mouth 2 (two) times a day with meals   ofloxacin (FLOXIN) 0 3 % otic solution  Self No No   Sig: Administer 10 drops to the right ear daily X 10 days   potassium citrate (Urocit-K 10) 10 mEq   No No   Sig: Take 1 tablet (10 mEq total) by mouth 2 (two) times a day      Facility-Administered Medications: None       Past Medical History:   Diagnosis Date    Arthritis     Chronic pain disorder     sciatic    GERD (gastroesophageal reflux disease)     Kidney stone     Kidney stone     Nephrolithiasis     Osteoarthritis     Osteoporosis     Sinus arrhythmia        Past Surgical History:   Procedure Laterality Date    CHOLECYSTECTOMY      FL RETROGRADE PYELOGRAM  3/7/2019    KIDNEY STONE SURGERY      KNEE ARTHROSCOPY Left     MO COLONOSCOPY FLX DX W/COLLJ SPEC WHEN PFRMD N/A 11/2/2017    Procedure: COLONOSCOPY with polypectomy x2;  Surgeon: Edward Chua MD;  Location: [facility]; Service: Gastroenterology    MO CYSTOURETHROSCOPY,URETER CATHETER Left 3/7/2019    Procedure: CYSTOSCOPY, URETEROSCOPY WITH LASER, BASKET STONE EXTRACTION, RETROGRADE PYELOGRAM WITH INSERTION STENT URETERAL;  Surgeon: Cosme Mora MD;  Location: [facility];  Service: Urology    TUBAL LIGATION         Family History   Problem Relation Age of Onset    Mental illness Father     Prostate cancer Father     Mental illness Daughter      I have reviewed and agree with the history as documented      E-Cigarette/Vaping    E-Cigarette Use Never User      E-Cigarette/Vaping Substances    Nicotine No     THC No     CBD No     Flavoring No     Other No     Unknown No      Social History     Tobacco Use    Smoking status: Never Smoker    Smokeless tobacco: Never Used   Substance Use Topics    Alcohol use: Not Currently     Frequency: Monthly or less    Drug use: No       Review of Systems   Constitutional: Negative for appetite change, chills, diaphoresis, fatigue and fever  HENT: Negative for postnasal drip, sinus pain and trouble swallowing  Eyes: Negative for redness and itching  Respiratory: Negative for cough, chest tightness, shortness of breath and wheezing  Cardiovascular: Positive for chest pain  Negative for palpitations, orthopnea, claudication, leg swelling, syncope, PND and near-syncope  Gastrointestinal: Negative for abdominal pain, anorexia, constipation, diarrhea, heartburn, nausea and vomiting  Endocrine: Negative  Genitourinary: Negative for difficulty urinating and dysuria  Musculoskeletal: Negative for back pain and myalgias  Skin: Negative for rash  Allergic/Immunologic: Negative  Neurological: Negative for dizziness (when she breaths too fast), weakness, numbness and headaches  Hematological: Negative  Psychiatric/Behavioral: Negative  Physical Exam  Physical Exam  Vitals signs and nursing note reviewed  Constitutional:       General: She is not in acute distress  Appearance: Normal appearance  She is well-developed  She is not ill-appearing, toxic-appearing or diaphoretic  HENT:      Head: Normocephalic and atraumatic  Right Ear: External ear normal       Left Ear: External ear normal       Nose: Nose normal  No congestion  Mouth/Throat:      Mouth: Mucous membranes are moist       Pharynx: Oropharynx is clear  Eyes:      Conjunctiva/sclera: Conjunctivae normal       Pupils: Pupils are equal, round, and reactive to light     Neck:      Musculoskeletal: Normal range of motion and neck supple  No neck rigidity  Cardiovascular:      Rate and Rhythm: Normal rate and regular rhythm  Heart sounds: Normal heart sounds  Comments: Initially no pain with palpation of the affected area-on secondary evaluation, point tenderness elicited with palpation of the right parasternal region  Pulmonary:      Effort: Pulmonary effort is normal  No respiratory distress  Breath sounds: Normal breath sounds  No wheezing  Abdominal:      General: Bowel sounds are normal       Palpations: Abdomen is soft  Tenderness: There is no abdominal tenderness  There is no guarding  Musculoskeletal:         General: No tenderness or deformity  Right lower leg: She exhibits no tenderness  No edema  Left lower leg: She exhibits no tenderness  No edema  Skin:     General: Skin is warm and dry  Capillary Refill: Capillary refill takes less than 2 seconds  Findings: No rash  Neurological:      General: No focal deficit present  Mental Status: She is alert and oriented to person, place, and time     Psychiatric:         Mood and Affect: Mood normal          Behavior: Behavior normal          Vital Signs  ED Triage Vitals [04/13/21 0736]   Temperature Pulse Respirations Blood Pressure SpO2   97 7 °F (36 5 °C) 80 18 105/90 100 %      Temp Source Heart Rate Source Patient Position - Orthostatic VS BP Location FiO2 (%)   Tympanic Monitor Lying Left arm --      Pain Score       Worst Possible Pain           Vitals:    04/13/21 0736 04/13/21 0831   BP: 105/90 138/77   Pulse: 80 70   Patient Position - Orthostatic VS: Lying Lying         Visual Acuity      ED Medications  Medications   ketorolac (TORADOL) injection 15 mg (15 mg Intravenous Given 4/13/21 0829)   iohexol (OMNIPAQUE) 350 MG/ML injection (SINGLE-DOSE) 100 mL (100 mL Intravenous Given 4/13/21 0915)       Diagnostic Studies  Results Reviewed     Procedure Component Value Units Date/Time    D-Dimer [[id number]] (Abnormal) Collected: 04/13/21 0750    Lab Status: Final result Specimen: Blood from Arm, Right Updated: 04/13/21 0909     D-Dimer, Quant 0 85 ug/ml FEU     Troponin I [[id number]]  (Normal) Collected: 04/13/21 0818    Lab Status: Final result Specimen: Blood from Arm, Right Updated: 04/13/21 0851     Troponin I <0 01 ng/mL     Urine Microscopic [[id number]]  (Abnormal) Collected: 04/13/21 0819    Lab Status: Final result Specimen: Urine, Clean Catch Updated: 04/13/21 0848     RBC, UA 4-10 /hpf      WBC, UA 20-30 /hpf      Epithelial Cells Moderate /hpf      Bacteria, UA Moderate /hpf      MUCUS THREADS Moderate    UA (URINE) with reflex to Scope [[id number]]  (Abnormal) Collected: 04/13/21 0819    Lab Status: Final result Specimen: Urine, Clean Catch Updated: 04/13/21 0831     Color, UA Zoe     Clarity, UA Slightly Cloudy     Specific Gravity, UA 1 025     pH, UA 6 0     Leukocytes,  0     Nitrite, UA Negative     Protein, UA 15 (Trace) mg/dl      Glucose, UA Negative mg/dl      Ketones, UA Negative mg/dl      Bilirubin, UA 1 mg/dL     Blood,  0     UROBILINOGEN UA 4 0 mg/dL     Comprehensive metabolic panel [[id number]]  (Abnormal) Collected: 04/13/21 0750    Lab Status: Final result Specimen: Blood from Arm, Right Updated: 04/13/21 0821     Sodium 136 mmol/L      Potassium 4 0 mmol/L      Chloride 103 mmol/L      CO2 29 mmol/L      ANION GAP 4 mmol/L      BUN 18 mg/dL      Creatinine 0 79 mg/dL      Glucose 94 mg/dL      Calcium 8 8 mg/dL      Corrected Calcium 9 3 mg/dL      AST 27 U/L      ALT 14 U/L      Alkaline Phosphatase 137 U/L      Total Protein 6 6 g/dL      Albumin 3 4 g/dL      Total Bilirubin 0 79 mg/dL      eGFR 72 ml/min/1 73sq m     Azra:      [place] guidelines for Chronic Kidney Disease (CKD):     Stage 1 with normal or high GFR (GFR > 90 mL/min/1 73 square meters)    Stage 2 Mild CKD (GFR = 60-89 mL/min/1 73 square meters)    Stage 3A Moderate CKD (GFR = 45-59 mL/min/1 73 square meters)    Stage 3B Moderate CKD (GFR = 30-44 mL/min/1 73 square meters)    Stage 4 Severe CKD (GFR = 15-29 mL/min/1 73 square meters)    Stage 5 End Stage CKD (GFR <15 mL/min/1 73 square meters)  Note: GFR calculation is accurate only with a steady state creatinine    CBC and differential [[id number]]  (Abnormal) Collected: 04/13/21 0750    Lab Status: Final result Specimen: Blood from Arm, Right Updated: 04/13/21 0808     WBC 7 20 Thousand/uL      RBC 4 50 Million/uL      Hemoglobin 13 0 g/dL      Hematocrit 39 6 %      MCV 88 fL      MCH 28 8 pg      MCHC 32 7 g/dL      RDW 14 8 %      MPV 10 1 fL      Platelets 540 Thousands/uL      Neutrophils Relative 65 %      Lymphocytes Relative 20 %      Monocytes Relative 8 %      Eosinophils Relative 5 %      Basophils Relative 2 %      Neutrophils Absolute 4 60 Thousands/µL      Lymphocytes Absolute 1 40 Thousands/µL      Monocytes Absolute 0 60 Thousand/µL      Eosinophils Absolute 0 40 Thousand/µL      Basophils Absolute 0 10 Thousands/µL                  CTA dissection protocol chest/abdomen/pelvis   Final Result by Carloz Abraham MD (04/13 8046)      No thoracic aortic dissection   No acute consolidation   No pulmonary embolism   Incidentally noted is mildly thickened endometrium measuring about 7 mm  Consider nonemergent evaluation with ultrasound evaluation with ultrasound   A significant finding notification has been created         Workstation performed: [id number]         XR chest 2 views   Final Result by Holly John MD (04/13 8284)      No acute cardiopulmonary disease  Workstation performed: [id number]                    Procedures  ECG 12 Lead Documentation Only    Date/Time: 4/13/2021 7:45 AM  Performed by: Shazia Fulton DO  Authorized by:  Shazia Fulton DO     ECG reviewed by me, the ED Provider: yes    Patient location:  ED  Rate:     ECG rate:  77    ECG rate assessment: normal    Rhythm: Rhythm comment:  Sinus arrhythmia  QRS:     QRS axis:  Left  Conduction:     Conduction: normal    ST segments:     ST segments:  Normal  T waves:     T waves: non-specific               ED Course  ED Course as of Apr 13 1024 Tue Apr 13, 2021 0827 Patient reported to the radiology tech that the pain feels like it is originating in her back and moving to her chest and feels somewhat like someone hitting her with a hammer  1004 Patient noted to sleeping on re-evaluation  When awoken, she reports that her pain has resolved  HEART Risk Score      Most Recent Value   Heart Score Risk Calculator   History  0 Filed at: 04/13/2021 0955   ECG  0 Filed at: 04/13/2021 0955   Age  2 Filed at: 04/13/2021 0955   Risk Factors  0 Filed at: 04/13/2021 0955   Troponin  0 Filed at: 04/13/2021 0955   HEART Score  2 Filed at: 04/13/2021 0882                      SBIRT 22yo+      Most Recent Value   SBIRT (23 yo +)   In order to provide better care to our patients, we are screening all of our patients for alcohol and drug use  Would it be okay to ask you these screening questions? Yes Filed at: 04/13/2021 0741   Initial Alcohol Screen: US AUDIT-C    1  How often do you have a drink containing alcohol? 1 Filed at: 04/13/2021 0741   2  How many drinks containing alcohol do you have on a typical day you are drinking? 0 Filed at: 04/13/2021 0741   3a  Male UNDER 65: How often do you have five or more drinks on one occasion? 0 Filed at: 04/13/2021 0741   3b  FEMALE Any Age, or MALE 65+: How often do you have 4 or more drinks on one occassion? 0 Filed at: 04/13/2021 0741   Audit-C Score  1 Filed at: 04/13/2021 4920   IVANA: How many times in the past year have you    Used an illegal drug or used a prescription medication for non-medical reasons?   Never Filed at: 04/13/2021 0741                    MDM  Number of Diagnoses or Management Options  Atypical chest pain:   UTI (urinary tract infection):   Diagnosis management comments: 75-year-old female presents with complaint of chest pain  Initially patient was having a somewhat unreliable physical exam at that time she was experiencing pain with palpation and at other time she was not  She received a dose of Toradol and was able to rest comfortably for the remainder of her time in the ER  Workup was essentially unremarkable  There was some endometrial thickening noted on CT for which she will be following up as an outpatient  She also is noted to have a large amount of bacteria and leukocytes in her urine  There does appear to be some contamination the sample the patient does report that her urine has become darker and had a stronger order than usual   Will treat for UTI in this regard  Patient will be following very closely with her primary care provider for her chest pain  Based on exam, it appears that there is at least some element of costochondritis  Amount and/or Complexity of Data Reviewed  Clinical lab tests: ordered and reviewed  Tests in the radiology section of CPT®: ordered and reviewed  Tests in the medicine section of CPT®: ordered and reviewed  Independent visualization of images, tracings, or specimens: yes        Disposition  Final diagnoses:   Atypical chest pain   UTI (urinary tract infection)     Time reflects when diagnosis was documented in both MDM as applicable and the Disposition within this note     Time User Action Codes Description Comment    4/13/2021 10:21 AM Sathish Cottrell Add [R07 89] Atypical chest pain     4/13/2021 10:21 AM Sathish Cottrell Add [N39 0] UTI (urinary tract infection)       ED Disposition     ED Disposition Condition Date/Time Comment    Discharge Stable Tue Apr 13, 2021 10:22 AM Neeraj Marsh discharge to home/self care              Follow-up Information     Follow up With Specialties Details Why Contact Info    Kailey Smith MD Family Medicine Schedule an appointment as soon as possible for a visit   [address] [place] [address] [address]  [phone]            Patient's Medications   Discharge Prescriptions    CEPHALEXIN (KEFLEX) 500 MG CAPSULE    Take 1 capsule (500 mg total) by mouth every 12 (twelve) hours for 5 days       Start Date: 4/13/2021 End Date: 4/18/2021       Order Dose: 500 mg       Quantity: 10 capsule    Refills: 0    KETOROLAC (TORADOL) 10 MG TABLET    Take 1 tablet (10 mg total) by mouth every 6 (six) hours as needed for moderate pain for up to 10 doses       Start Date: 4/13/2021 End Date: --       Order Dose: 10 mg       Quantity: 10 tablet    Refills: 0     No discharge procedures on file      PDMP Review     None          ED Provider  Electronically Signed by           Siobhan Tamez DO  04/13/21 Via Martin Dunn DO  04/13/21 Ghulam

## 2021-04-13 NOTE — ED NOTES
Patient sleeping comfortably in room when doctor went in, patient was given discharge information, and patient acknowledged understanding       Renée Suarez  04/13/21 8979

## 2021-04-14 ENCOUNTER — TELEPHONE (OUTPATIENT)
Dept: FAMILY MEDICINE CLINIC | Facility: CLINIC | Age: 76
End: 2021-04-14

## 2021-04-14 NOTE — TELEPHONE ENCOUNTER
PT WAS SEEN IN THE ED FOR SHOULDER AND ARM PAIN AND WAS TOLD THAT SHE HAS A UTI AND WAS GIVEN 2 MEDS, CETHALEXIN 500MG FOR HER UTI AND KETOROLAC 10MG FOR HER SHOULDER AND ARM PAIN  HER ARM AND SHOULDER HURT SO MUCH SHE CAN NOT SLEEP AND SHE IS UNABLE TO LIFT HER ARM OVER HER HEAD  SHE HAS BEEN USING ICE AND HEAT AND NOTHING SEEMS TO BE WORKING  SHE WAS TOLD AT THE ED THAT SHE HAS A STRAINED MUSCLE  THE PT IS ASKING IF DR Chaparro Ruiz WILL CALL HER IN [facility] AND IF SO CAN SHE PLEASE CALL IT INTO THE [facility] AT THE THE [facility]OSP  SHE LIVES JUST [address]    PT CAN BE REACHED AT HOME [phone]

## 2021-04-21 ENCOUNTER — APPOINTMENT (OUTPATIENT)
Dept: LAB | Facility: HOSPITAL | Age: 76
End: 2021-04-21
Attending: UROLOGY
Payer: COMMERCIAL

## 2021-04-21 DIAGNOSIS — N20.0 NEPHROLITHIASIS: ICD-10-CM

## 2021-04-21 PROCEDURE — 84392 ASSAY OF URINE SULFATE: CPT

## 2021-04-21 PROCEDURE — 83945 ASSAY OF OXALATE: CPT

## 2021-04-21 PROCEDURE — 84133 ASSAY OF URINE POTASSIUM: CPT

## 2021-04-21 PROCEDURE — 82340 ASSAY OF CALCIUM IN URINE: CPT

## 2021-04-21 PROCEDURE — 82507 ASSAY OF CITRATE: CPT

## 2021-04-21 PROCEDURE — 82131 AMINO ACIDS SINGLE QUANT: CPT

## 2021-04-21 PROCEDURE — 81003 URINALYSIS AUTO W/O SCOPE: CPT

## 2021-04-21 PROCEDURE — 84300 ASSAY OF URINE SODIUM: CPT

## 2021-04-21 PROCEDURE — 82140 ASSAY OF AMMONIA: CPT

## 2021-04-21 PROCEDURE — 83735 ASSAY OF MAGNESIUM: CPT

## 2021-04-21 PROCEDURE — 84560 ASSAY OF URINE/URIC ACID: CPT

## 2021-04-21 PROCEDURE — 82570 ASSAY OF URINE CREATININE: CPT

## 2021-04-21 PROCEDURE — 83935 ASSAY OF URINE OSMOLALITY: CPT

## 2021-04-21 PROCEDURE — 82436 ASSAY OF URINE CHLORIDE: CPT

## 2021-04-21 PROCEDURE — 84105 ASSAY OF URINE PHOSPHORUS: CPT

## 2021-05-07 LAB
AMMONIA 24H UR-MRATE: 31 MEQ/24 HR
AMMONIA UR-SCNC: ABNORMAL UG/DL
CA H2 PHOS DIHYD CRY URNS QL MICRO: 1.97 RATIO (ref 0–3)
CALCIUM 24H UR-MCNC: 17 MG/DL
CALCIUM 24H UR-MRATE: 182.8 MG/24 HR (ref 100–300)
CHLORIDE 24H UR-SCNC: 98 MMOL/L
CHLORIDE 24H UR-SRATE: 105 MMOL/24 HR (ref 110–250)
CITRATE 24H UR-MCNC: 495 MG/L
CITRATE 24H UR-MRATE: 532 MG/24 HR (ref 320–1240)
COM CRY STONE QL IR: 6.79 RATIO (ref 0–6)
CREAT 24H UR-MCNC: 90.8 MG/DL
CREAT 24H UR-MRATE: 976.1 MG/24 HR (ref 800–1800)
CYSTINE 24H UR-MCNC: 10.12 MG/L
CYSTINE 24H UR-MRATE: 10.88 MG/24 HR (ref 10–100)
MAGNESIUM 24H UR-MRATE: 51 MG/24 HR (ref 12–293)
MAGNESIUM UR-MCNC: 4.7 MG/DL
NA URATE CRY STONE QL IR: 4.24 RATIO (ref 0–4)
OSMOLALITY UR: 588 MOSMOL/KG (ref 300–900)
OXALATE 24H UR-MRATE: 19 MG/24 HR (ref 4–31)
OXALATE UR-MCNC: 18 MG/L
PH 24H UR: 6 [PH]
PHOSPHATE 24H UR-MCNC: 60.9 MG/DL
PHOSPHATE 24H UR-MRATE: 654.7 MG/24 HR (ref 400–1300)
PLEASE NOTE (STONE RISK): ABNORMAL
POTASSIUM 24H UR-SCNC: 32.4 MMOL/24 HR (ref 25–125)
POTASSIUM UR-SCNC: 30.1 MMOL/L
PRESERVED URINE: 1075 ML/24 HR (ref 600–1600)
SODIUM 24H UR-SCNC: 111 MMOL/L
SODIUM 24H UR-SRATE: 119 MMOL/24 HR (ref 39–258)
SPECIMEN VOL 24H UR: 1075 ML/24 HR (ref 600–1600)
SULFATE 24H UR-MCNC: 18 MEQ/24 HR (ref 0–30)
SULFATE UR-MCNC: 17 MEQ/L
TRI-PHOS CRY STONE MICRO: 0.02 RATIO (ref 0–1)
URATE 24H UR-MCNC: 37.3 MG/DL
URATE 24H UR-MRATE: 401 MG/24 HR (ref 250–750)
URATE DIHYD CRY STONE QL IR: 1.52 RATIO (ref 0–1.2)

## 2021-05-11 NOTE — ED NOTES
Patient has a physical appointment 5/15/2021. Patient is requesting an order for a mammogram to have done prior to appointment please approve or deny. Patient taken to CT scan     Mery Min  04/13/21 8394

## 2021-05-12 ENCOUNTER — TELEPHONE (OUTPATIENT)
Dept: UROLOGY | Facility: MEDICAL CENTER | Age: 76
End: 2021-05-12

## 2021-05-12 NOTE — TELEPHONE ENCOUNTER
Spoke with pt, advised of JG recommendation    She will try to increase fluids and will keep 8/10 appt

## 2021-05-12 NOTE — TELEPHONE ENCOUNTER
----- Message from Kapil Jha MD sent at 5/11/2021  4:04 PM EDT -----  Please call the patient regarding her abnormal result  She is doing better with her urine but still needs to continue to increase her fluid intake  She only had a volume of 1075 cc  Ideally we would like to see her double her urine output  Spoke with daughter about dcp and she is in agreement with dcp.

## 2021-07-08 ENCOUNTER — OFFICE VISIT (OUTPATIENT)
Dept: DENTISTRY | Facility: CLINIC | Age: 76
End: 2021-07-08

## 2021-07-08 VITALS — HEART RATE: 71 BPM | TEMPERATURE: 97 F | SYSTOLIC BLOOD PRESSURE: 133 MMHG | DIASTOLIC BLOOD PRESSURE: 83 MMHG

## 2021-07-08 DIAGNOSIS — K02.9 DENTAL CARIES: ICD-10-CM

## 2021-07-08 DIAGNOSIS — Z01.20 ENCOUNTER FOR DENTAL EXAMINATION: Primary | ICD-10-CM

## 2021-07-08 DIAGNOSIS — K05.6 PERIODONTAL DISEASE: ICD-10-CM

## 2021-07-08 DIAGNOSIS — K03.6 DENTAL CALCULUS: ICD-10-CM

## 2021-07-08 DIAGNOSIS — K03.6 ACCRETIONS ON TEETH: ICD-10-CM

## 2021-07-08 PROCEDURE — D0150 COMPREHENSIVE ORAL EVALUATION - NEW OR ESTABLISHED PATIENT: HCPCS | Performed by: DENTIST

## 2021-07-08 PROCEDURE — D0210 INTRAORAL - COMPLETE SERIES OF RADIOGRAPHIC IMAGES: HCPCS | Performed by: DENTAL HYGIENIST

## 2021-07-08 NOTE — PROGRESS NOTES
New Patient Exam     Exams:  Comprehensive exam and perio charted  Xrays:     FMX   Type of Treatment:     Reviewed OHI  Brush:  2X/day and Floss 1X/day  EO/OCS Exams:  No significant findings  IO:   Open spacing max  and justen  anterior teeth  Teeth splay buccally  Oral Hygiene:   Poor  Plaque: Moderate   Calculus: Moderate  To  Heavy  Bleeding:  Light to Moderate  Gingiva:  Redness and inflammation in posterior molars  Stain:  Light    Perio Charting:  Periocharting was completed and evaluated  1-5  mm w/  Moderate  BUP  Perio Findings:  Chronic Mild to Moderate Periodontitis; Bone loss evident on x-rays; Recession 1-3 mm noted on perio charting  Recommended 4 quads of SRP's - All [id number]  Caries Findings:  #19 - fractured whole lingual- not much tooth left - pt does not want a crown or ext at this time  No pain or discomfort with #19 - recommended temporizing for now with glass ionomer but patient did not want to have anything at all done to the tooth at this time  Caries Risk Assessment:  Moderate caries risk    Treatment Plan:  Updated    Dr Ben Montes:  Dr Mancilla  Referral:  No referral given  NV1:   SRP's UR/ LR - 120 min  NV2:   SRP's UL/LL - 120 min  NV3;   Post SRP eval in 4-6 wks - 60 min  NV4:  3 month perio maintenance - 60 min

## 2021-09-28 ENCOUNTER — OFFICE VISIT (OUTPATIENT)
Dept: UROLOGY | Age: 76
End: 2021-09-28
Payer: COMMERCIAL

## 2021-09-28 VITALS
SYSTOLIC BLOOD PRESSURE: 110 MMHG | WEIGHT: 155 LBS | DIASTOLIC BLOOD PRESSURE: 72 MMHG | BODY MASS INDEX: 35.87 KG/M2 | HEIGHT: 55 IN

## 2021-09-28 DIAGNOSIS — N20.0 NEPHROLITHIASIS: Primary | ICD-10-CM

## 2021-09-28 PROCEDURE — 1160F RVW MEDS BY RX/DR IN RCRD: CPT | Performed by: UROLOGY

## 2021-09-28 PROCEDURE — 1036F TOBACCO NON-USER: CPT | Performed by: UROLOGY

## 2021-09-28 PROCEDURE — 99213 OFFICE O/P EST LOW 20 MIN: CPT | Performed by: UROLOGY

## 2021-09-28 NOTE — PATIENT INSTRUCTIONS
Kidney Stones   WHAT YOU NEED TO KNOW:   What is a kidney stone? Kidney stones form in the urinary system when the water and waste in your urine are out of balance  When this happens, certain types of waste crystals separate from the urine  The crystals build up and form kidney stones  Kidney stones can be made of uric acid, calcium, phosphate, or oxalate crystals  You may have more than one kidney stone  What increases my risk for kidney stones? · Not drinking enough liquids (especially water) each day    · Having urinary tract infections often    · Too much of certain foods, such as meat, salt, nuts, and chocolate    · Obesity    · Certain medicines, such as diuretics, steroids, and antacids    · A family history of kidney stones    · Being born with a kidney or bowel disorder    What are the signs and symptoms of kidney stones? · Pain in the middle of your back that moves across to your side or that may spread to your groin    · Nausea and vomiting    · Urge to urinate often, burning feeling when you urinate, or pink or red urine    · Tenderness in your lower back, side, or stomach    How are kidney stones diagnosed? Your healthcare provider will ask about your health and usual foods  He or she may refer you to a urologist  Cesar Rhodes may need tests to find out what type of kidney stones you have  Tests can show the size of your kidney stones and where they are in your urinary system  You may need more than one of the following:  · Urine tests  may show if you have blood in your urine  They may also show high amounts of the substances that form kidney stones, such as uric acid  · Blood tests  show how well your kidneys are working  They may also be used to check the levels of calcium or uric acid in your blood  · X-ray or ultrasound pictures  may be taken of your kidneys, bladder, and ureters  You may be given contrast liquid before an x-ray to help these show up better in the pictures   You may need to have more than one x-ray  Tell the healthcare provider if you have ever had an allergic reaction to contrast liquid  How are kidney stones treated? · NSAIDs , such as ibuprofen, help decrease swelling, pain, and fever  This medicine is available with or without a doctor's order  NSAIDs can cause stomach bleeding or kidney problems in certain people  If you take blood thinner medicine, always ask your healthcare provider if NSAIDs are safe for you  Always read the medicine label and follow directions  · Acetaminophen  decreases pain and fever  It is available without a doctor's order  Ask how much to take and how often to take it  Follow directions  Read the labels of all other medicines you are using to see if they also contain acetaminophen, or ask your doctor or pharmacist  Acetaminophen can cause liver damage if not taken correctly  Do not use more than 4 grams (4,000 milligrams) total of acetaminophen in one day  · Prescription pain medicine  may be given  Ask your healthcare provider how to take this medicine safely  Some prescription pain medicines contain acetaminophen  Do not take other medicines that contain acetaminophen without talking to your healthcare provider  Too much acetaminophen may cause liver damage  Prescription pain medicine may cause constipation  Ask your healthcare provider how to prevent or treat constipation  · Medicines  to balance your electrolytes may be needed  · A procedure or surgery  to remove the kidney stones may be needed if they do not pass on their own  Your treatment will depend on the size and location of your kidney stones  What can I do to manage kidney stones? · Drink more liquids  Your healthcare provider may tell you to drink at least 8 to 12 (eight-ounce) cups of liquids each day  This helps flush out the kidney stones when you urinate  Water is the best liquid to drink  · Strain your urine every time you go to the bathroom    Urinate through

## 2021-09-28 NOTE — ASSESSMENT & PLAN NOTE
A CT scan in April reveals only a 6 mm nonobstructing right renal stone  Options were discussed  Patient is in favor of continued observation  24 hour urines do reveal an improvement in her citrate level with Urocit-K  Her volumes are still low  She will continue to take the Urocit-K and she will  try to increase her fluids  We will plan to check a KUB in 1 year  ER precautions were discussed  She will return in 1 year for follow-up

## 2021-09-28 NOTE — PROGRESS NOTES
Assessment/Plan:    Nephrolithiasis  A CT scan in April reveals only a 6 mm nonobstructing right renal stone  Options were discussed  Patient is in favor of continued observation  24 hour urines do reveal an improvement in her citrate level with Urocit-K  Her volumes are still low  She will continue to take the Urocit-K and she will  try to increase her fluids  We will plan to check a KUB in 1 year  ER precautions were discussed  She will return in 1 year for follow-up  Diagnoses and all orders for this visit:    Nephrolithiasis  -     XR abdomen 1 view kub; Future          Subjective:      Patient ID: Micaela Connolly is a 64 y o  female  Chief complaint:  Kidney stones    HPI:  64year-old female followed for the above complaints  She reports she has done well  She has not passed any stones since her last visit  She has no flank pain  She is voiding well  There is no gross hematuria, dysuria or symptoms of infection  She is satisfied with her voiding pattern  She is trying to increase her fluid intake  She has been taking Urocit-K sporadically  The following portions of the patient's history were reviewed and updated as appropriate: allergies, current medications, past family history, past medical history, past social history, past surgical history and problem list     Review of Systems   Constitutional: Negative for activity change, appetite change, fatigue and fever  HENT: Negative  Eyes: Negative  Respiratory: Negative  Cardiovascular: Negative  Gastrointestinal: Negative for blood in stool, constipation, diarrhea and vomiting  Endocrine: Negative  Genitourinary:        See HPI   Musculoskeletal: Positive for arthralgias and back pain  Skin: Negative  Allergic/Immunologic: Negative  Neurological: Negative  Hematological: Negative  Psychiatric/Behavioral: Negative            Objective:      /72   Ht 4' 5" (1 346 m)   Wt 70 3 kg (155 lb) BMI 38 80 kg/m²          Physical Exam  Constitutional:       General: She is not in acute distress  Appearance: Normal appearance  She is well-developed  She is obese  She is not ill-appearing, toxic-appearing or diaphoretic  HENT:      Head: Normocephalic and atraumatic  Eyes:      General: No scleral icterus  Conjunctiva/sclera: Conjunctivae normal    Cardiovascular:      Rate and Rhythm: Normal rate  Pulmonary:      Effort: Pulmonary effort is normal    Abdominal:      General: There is no distension  Palpations: Abdomen is soft  There is no mass  Tenderness: There is no abdominal tenderness  There is no right CVA tenderness, left CVA tenderness, guarding or rebound  Hernia: No hernia is present  Musculoskeletal:         General: Normal range of motion  Cervical back: Neck supple  Skin:     General: Skin is warm and dry  Neurological:      General: No focal deficit present  Mental Status: She is alert and oriented to person, place, and time  Psychiatric:         Mood and Affect: Mood normal          Behavior: Behavior normal          Thought Content:  Thought content normal          Judgment: Judgment normal

## 2021-12-29 ENCOUNTER — OFFICE VISIT (OUTPATIENT)
Dept: DENTISTRY | Facility: CLINIC | Age: 76
End: 2021-12-29

## 2021-12-29 VITALS — HEART RATE: 76 BPM | TEMPERATURE: 97.6 F | DIASTOLIC BLOOD PRESSURE: 74 MMHG | SYSTOLIC BLOOD PRESSURE: 128 MMHG

## 2021-12-29 DIAGNOSIS — K05.6 PERIODONTAL DISEASE: Primary | ICD-10-CM

## 2021-12-29 PROCEDURE — D4342 PERIODONTAL SCALING AND ROOT PLANING - 1 TO 3 TEETH PER QUADRANT: HCPCS | Performed by: DENTAL HYGIENIST

## 2021-12-29 PROCEDURE — D9215 LOCAL ANESTHESIA IN CONJUNCTION WITH OPERATIVE OR SURGICAL PROCEDURES: HCPCS | Performed by: DENTIST

## 2022-01-10 ENCOUNTER — OFFICE VISIT (OUTPATIENT)
Dept: DENTISTRY | Facility: CLINIC | Age: 77
End: 2022-01-10

## 2022-01-10 VITALS — TEMPERATURE: 97.1 F | DIASTOLIC BLOOD PRESSURE: 61 MMHG | HEART RATE: 91 BPM | SYSTOLIC BLOOD PRESSURE: 114 MMHG

## 2022-01-10 DIAGNOSIS — K05.6 PERIODONTAL DISEASE: Primary | ICD-10-CM

## 2022-01-10 PROCEDURE — D9215 LOCAL ANESTHESIA IN CONJUNCTION WITH OPERATIVE OR SURGICAL PROCEDURES: HCPCS | Performed by: DENTAL HYGIENIST

## 2022-01-10 PROCEDURE — D4342 PERIODONTAL SCALING AND ROOT PLANING - 1 TO 3 TEETH PER QUADRANT: HCPCS | Performed by: DENTAL HYGIENIST

## 2022-01-10 NOTE — PROGRESS NOTES
ASA:  SC/RP:   UL/LL   Quad scaling and root planning  Applied Topical Anesthetic,   Administered  _1__ carpules,  Long Needle, Lidocaine HCL 2 % and Epi 1 7 mL 1:100,000,   ___2__ carpule , Short Needle,  Septocaine (articaine HCI ) and Epi 4 % and  1:100,000 1 7 mL, Infiltration, IANB   Type of Treatment:  Used Ultrasonic and Hand Scaling, Flossed, Polished, Subgingival Irrigation - post tx - Chlorhexidine  Reviewed OHI  - Brush 2X/day and Floss 1X/day  Oral Hygiene:  Poor   Plaque: Moderate  / Heavy  Calculus: Moderate /  Heavy  Bleeding:   Moderate /  Heavy    Stain:  None  / Light  /  Moderate  /  Heavy    Treatment Plan:  no changes to tx plan       Dr  Exam:  Resident Dr Loi Forrest  gave anesthesia today                 Referral:  No referral given  NV1:  Rest 19 - 60 min  NV2:  post SRP eval - 60 min  NV3:  3 month perio maintenance - 60 min

## 2022-01-27 ENCOUNTER — TELEPHONE (OUTPATIENT)
Dept: FAMILY MEDICINE CLINIC | Facility: CLINIC | Age: 77
End: 2022-01-27

## 2022-01-27 DIAGNOSIS — R93.89 ENDOMETRIAL STRIPE INCREASED: Primary | ICD-10-CM

## 2022-01-27 NOTE — TELEPHONE ENCOUNTER
Pt notified   Pt states due to us moving "far away" has made it hard for her to drive to us and f/u  pt states she transferred to [facility] in [place], first appointment is 2/9/22   Wanted me to let you know she is very grateful for all that you have done throughout the years and she is very sorry

## 2022-01-27 NOTE — TELEPHONE ENCOUNTER
Received letter from  Radiology about Ct scan she had in ER last spring, apparently uterine lining seemed a little thick to xray doc and a pelvic us was recommended, I am placing order for this to be on safe side

## 2022-02-02 ENCOUNTER — TELEPHONE (OUTPATIENT)
Dept: FAMILY MEDICINE CLINIC | Facility: CLINIC | Age: 77
End: 2022-02-02

## 2022-02-02 NOTE — TELEPHONE ENCOUNTER
Spoke to [place] and she said she was switching to [facility] cause its right across the street for her  She wanted to let Dr Sisi Cintron know she was very happy with the years of great service she provider her but location is the main reason for switching

## 2022-02-09 ENCOUNTER — OFFICE VISIT (OUTPATIENT)
Dept: FAMILY MEDICINE CLINIC | Facility: CLINIC | Age: 77
End: 2022-02-09

## 2022-02-09 VITALS
WEIGHT: 153 LBS | SYSTOLIC BLOOD PRESSURE: 110 MMHG | HEART RATE: 79 BPM | RESPIRATION RATE: 16 BRPM | DIASTOLIC BLOOD PRESSURE: 80 MMHG | TEMPERATURE: 98 F | OXYGEN SATURATION: 97 % | BODY MASS INDEX: 35.41 KG/M2 | HEIGHT: 55 IN

## 2022-02-09 DIAGNOSIS — E66.09 CLASS 2 OBESITY DUE TO EXCESS CALORIES WITHOUT SERIOUS COMORBIDITY WITH BODY MASS INDEX (BMI) OF 38.0 TO 38.9 IN ADULT: ICD-10-CM

## 2022-02-09 DIAGNOSIS — M46.1 SACROILIITIS (HCC): ICD-10-CM

## 2022-02-09 DIAGNOSIS — Z23 ENCOUNTER FOR IMMUNIZATION: ICD-10-CM

## 2022-02-09 DIAGNOSIS — M85.852 OSTEOPENIA OF LEFT HIP: ICD-10-CM

## 2022-02-09 DIAGNOSIS — R42 VERTIGO: ICD-10-CM

## 2022-02-09 DIAGNOSIS — N20.0 NEPHROLITHIASIS: ICD-10-CM

## 2022-02-09 DIAGNOSIS — R93.89 THICKENED ENDOMETRIUM: Primary | ICD-10-CM

## 2022-02-09 DIAGNOSIS — H90.A31 MIXED CONDUCTIVE AND SENSORINEURAL HEARING LOSS OF RIGHT EAR WITH RESTRICTED HEARING OF LEFT EAR: ICD-10-CM

## 2022-02-09 PROBLEM — Z00.00 MEDICARE ANNUAL WELLNESS VISIT, SUBSEQUENT: Status: RESOLVED | Noted: 2020-07-13 | Resolved: 2022-02-09

## 2022-02-09 PROBLEM — R11.0 NAUSEA: Status: RESOLVED | Noted: 2019-03-07 | Resolved: 2022-02-09

## 2022-02-09 PROBLEM — R10.2 PELVIC PAIN: Status: RESOLVED | Noted: 2020-07-13 | Resolved: 2022-02-09

## 2022-02-09 PROBLEM — R10.9 FLANK PAIN: Status: RESOLVED | Noted: 2019-03-07 | Resolved: 2022-02-09

## 2022-02-09 PROCEDURE — 3725F SCREEN DEPRESSION PERFORMED: CPT | Performed by: FAMILY MEDICINE

## 2022-02-09 PROCEDURE — 1160F RVW MEDS BY RX/DR IN RCRD: CPT | Performed by: FAMILY MEDICINE

## 2022-02-09 PROCEDURE — 1101F PT FALLS ASSESS-DOCD LE1/YR: CPT | Performed by: FAMILY MEDICINE

## 2022-02-09 PROCEDURE — 90662 IIV NO PRSV INCREASED AG IM: CPT | Performed by: FAMILY MEDICINE

## 2022-02-09 PROCEDURE — 1036F TOBACCO NON-USER: CPT | Performed by: FAMILY MEDICINE

## 2022-02-09 PROCEDURE — G0008 ADMIN INFLUENZA VIRUS VAC: HCPCS | Performed by: FAMILY MEDICINE

## 2022-02-09 PROCEDURE — 3288F FALL RISK ASSESSMENT DOCD: CPT | Performed by: FAMILY MEDICINE

## 2022-02-09 PROCEDURE — 99204 OFFICE O/P NEW MOD 45 MIN: CPT | Performed by: FAMILY MEDICINE

## 2022-02-09 RX ORDER — IBUPROFEN 800 MG/1
800 TABLET ORAL EVERY 12 HOURS PRN
Qty: 30 TABLET | Refills: 0 | Status: SHIPPED | OUTPATIENT
Start: 2022-02-09

## 2022-02-09 RX ORDER — MECLIZINE HCL 12.5 MG/1
12.5 TABLET ORAL EVERY 12 HOURS PRN
Qty: 15 TABLET | Refills: 0 | Status: SHIPPED | OUTPATIENT
Start: 2022-02-09

## 2022-02-09 RX ORDER — B-COMPLEX WITH VITAMIN C
1 TABLET ORAL 2 TIMES DAILY WITH MEALS
Qty: 60 TABLET | Refills: 3 | Status: SHIPPED | OUTPATIENT
Start: 2022-02-09 | End: 2022-05-13

## 2022-02-09 NOTE — PROGRESS NOTES
Assessment/Plan: Thickened endometrium  CTA in April 2021 was done in ER when she presented for chest pain and it revealed mildly thickened endometrium measuring about 7 mm  Recommended US pelvis which has been ordered    Nephrolithiasis  Saw urologist in September 2021  CT scan in April 2021 revealed 6 mm nonobstructing right renal stone  Patient wanted continued observation   She was to continue to take the Urocit-K and try to increase her fluids but no longer feels Urocit-K is needed so stopped taking it  Repeat KUB in one year with urology    Mixed conductive and sensorineural hearing loss of right ear with restricted hearing of left ear  Wears hearing aid in both ears  Follow-up ENT in June 2022    Osteopenia of left hip  Strongly encouraged daily use of calcium+vitamin d    Sacroiliitis (HCC) - Bilateral  Refilled ibuprofen which paitent only 1-2 times per day for 1-2 days per month    Vertigo  Used to take meclizine when she needed it for vertigo  No episodes for over one year  Requested script because the meds she has at home is old  Class 2 obesity due to excess calories without serious comorbidity with body mass index (BMI) of 38 0 to 38 9 in adult  BMI Counseling: Body mass index is 38 3 kg/m²  The BMI is above normal  Nutrition recommendations include decreasing overall calorie intake, moderation in carbohydrate intake, reducing intake of saturated fat and trans fat and reducing intake of cholesterol  Exercise recommendations include exercising 3-5 times per week  Diagnoses and all orders for this visit:    Thickened endometrium  -     US pelvis complete non OB; Future    Sacroiliitis (HCC)  -     ibuprofen (MOTRIN) 800 mg tablet; Take 1 tablet (800 mg total) by mouth every 12 (twelve) hours as needed for mild pain    Osteopenia of left hip  -     calcium carbonate-vitamin D (OSCAL-D) 500 mg-200 units per tablet;  Take 1 tablet by mouth 2 (two) times a day with meals    Vertigo  - meclizine (ANTIVERT) 12 5 MG tablet; Take 1 tablet (12 5 mg total) by mouth every 12 (twelve) hours as needed for dizziness    Encounter for immunization  -     influenza vaccine, high-dose, PF 0 7 mL (FLUZONE HIGH-DOSE)    Class 2 obesity due to excess calories without serious comorbidity with body mass index (BMI) of 38 0 to 38 9 in adult  -     Lipid panel; Future  -     Comprehensive metabolic panel; Future  -     TSH, 3rd generation with Free T4 reflex; Future  -     CBC and differential; Future  -     Hepatitis C antibody; Future    Nephrolithiasis    Mixed conductive and sensorineural hearing loss of right ear with restricted hearing of left ear          Subjective:      Patient ID: Sixto Marinelli is a 64 y o  female  Saw urologist in September 2021  A CT scan in April 2021 revealed only a 6 mm nonobstructing right renal stone  Patient wanted continued observation  Advised to continue to take the Urocit-K and try to increase her fluids  Repeat KUB in one year  Went to ER in April 2021 for chest pain  CTA was done and it revealed mildly thickened endometrium measuring about 7 mm  Recommended US pelvis  Menopause at age 52  When wipes on occasion such as every 3-4 months for the past 2 years, noticed light brown discharge  Sees ENT for hearing loss  Wears hearing aid in both ears  Next appointment in June 2022  Last time saw Pain Management in June 2019  Had received injections in her back  Has not had major pain since then  To follow-up as needed  Patient only takes ibuprofen once or twice per day for one to two days every month  Requesting refill on ibuprofen 800 mg  Dexa in June 2020 showed osteopenia  Has been taking calcium and vitamin d  Used to take meclizine when she needed it for vertigo  No episodes for over one year  Requested script because the meds she has at home is old        The following portions of the patient's history were reviewed and updated as appropriate: allergies, current medications, past family history, past medical history, past social history, past surgical history and problem list     Review of Systems   Constitutional: Negative for chills, fatigue, fever and unexpected weight change  HENT: Negative for congestion, ear pain, rhinorrhea and sore throat  Eyes: Negative for pain and visual disturbance  Respiratory: Negative for cough, chest tightness and shortness of breath  Cardiovascular: Negative for chest pain, palpitations and leg swelling  Gastrointestinal: Negative for abdominal pain, constipation, diarrhea, nausea and vomiting  Genitourinary: Positive for vaginal discharge (+light brown, noticed every 3-4 months)  Negative for difficulty urinating, dysuria and urgency  Musculoskeletal: Negative for arthralgias and back pain  Skin: Negative for rash  Neurological: Negative for dizziness, numbness and headaches  Psychiatric/Behavioral: Negative for behavioral problems and suicidal ideas  The patient is not nervous/anxious  Objective:      /80 (BP Location: Left arm, Patient Position: Sitting, Cuff Size: Large)   Pulse 79   Temp 98 °F (36 7 °C) (Temporal)   Resp 16   Ht 4' 5" (1 346 m)   Wt 69 4 kg (153 lb)   SpO2 97%   BMI 38 30 kg/m²          Physical Exam  Constitutional:       Appearance: She is well-developed  HENT:      Head: Normocephalic and atraumatic  Right Ear: External ear normal       Left Ear: External ear normal       Nose: Nose normal    Eyes:      Conjunctiva/sclera: Conjunctivae normal       Pupils: Pupils are equal, round, and reactive to light  Cardiovascular:      Rate and Rhythm: Normal rate and regular rhythm  Heart sounds: Normal heart sounds  Pulmonary:      Effort: Pulmonary effort is normal       Breath sounds: Normal breath sounds  Abdominal:      General: Bowel sounds are normal       Palpations: Abdomen is soft     Musculoskeletal:         General: Normal range of motion  Cervical back: Normal range of motion and neck supple  Skin:     General: Skin is warm  Neurological:      Mental Status: She is alert and oriented to person, place, and time     Psychiatric:         Behavior: Behavior normal

## 2022-02-09 NOTE — PATIENT INSTRUCTIONS
For US pelvis: To schedule this appointment, please contact Central Scheduling at [phone]     13 years and Up - 1)  Full bladder required   2)  Please drink 24-32 oz of water 1 hour prior to appointment time  3)  No voiding 1 hour prior to appointment time  "Patient must drink 24 oz of water 60 minutes beforeyour scheduled appointment time  This test requires you to have a FULL bladder  Please do not urinate 1 hour before your appointment time  Patient is not to urinate until their Ultrasound is completed  Bladder filling is akey part of this study and needs to be full for accurate imaging during this exam      Please bring your insurance cards, a form of photo ID and a list of your medications with you   Arrive 15 minutesprior to your appointment time in order to register "

## 2022-02-10 ENCOUNTER — OFFICE VISIT (OUTPATIENT)
Dept: DENTISTRY | Facility: CLINIC | Age: 77
End: 2022-02-10

## 2022-02-10 VITALS — TEMPERATURE: 97.4 F

## 2022-02-10 DIAGNOSIS — Z01.20 ENCOUNTER FOR DENTAL EXAMINATION: Primary | ICD-10-CM

## 2022-02-10 PROCEDURE — D0120 PERIODIC ORAL EVALUATION - ESTABLISHED PATIENT: HCPCS | Performed by: DENTAL HYGIENIST

## 2022-02-10 NOTE — PROGRESS NOTES
Periodic  Exam     Exams:  Periodic exam and perio charted  Xrays:     none  Type of Treatment:     Reviewed OHI  Brush:  2X/day and Floss 1X/day  EO/OCS Exams:  No significant findings  IO: No significant findings  Oral Hygiene:   Fair    Plaque:  Light  / Moderate   Calculus:  Light  / Moderate   Bleeding:  Light   Gingiva:  Pink  / Firm  W/ localized inflammation  Stain:  None   Perio Charting:  Periocharting was completed and evaluated  1-6 mm w/ light BUP  Pt builds up calculus quickly  Residual localized deep pocketing remains in molars  Recommend pt stay on a three month perio maintenance and will re-evaluate probe readings every 3-6 months    Perio Findings:  Chronic Localized Mild to Moderate periodontitis  Caries Findings:  19 - OL - fractured off lingual - will attempt to do a filling but informed pt if the tooth breaks again it will need a crown ; 28 - MO  Caries Risk Assessment:   Moderate caries risk    Treatment Plan:  Updated    Dr  Exam:  Dr Amy Salcedo  Referral:  No referral given   Nv1:  Rest 19 - 60 min  Nv2:  Rest 28 - 60 min  NV3:  3 month perio main  - 60 min - schedule 3 months from 1/10/22

## 2022-02-20 PROBLEM — R42 VERTIGO: Status: ACTIVE | Noted: 2022-02-20

## 2022-02-20 PROBLEM — E66.09 CLASS 2 OBESITY DUE TO EXCESS CALORIES WITHOUT SERIOUS COMORBIDITY WITH BODY MASS INDEX (BMI) OF 38.0 TO 38.9 IN ADULT: Status: ACTIVE | Noted: 2022-02-20

## 2022-02-20 PROBLEM — H66.011 NON-RECURRENT ACUTE SUPPURATIVE OTITIS MEDIA OF RIGHT EAR WITH SPONTANEOUS RUPTURE OF TYMPANIC MEMBRANE: Status: RESOLVED | Noted: 2020-10-28 | Resolved: 2022-02-20

## 2022-02-20 PROBLEM — H90.A31 MIXED CONDUCTIVE AND SENSORINEURAL HEARING LOSS OF RIGHT EAR WITH RESTRICTED HEARING OF LEFT EAR: Status: ACTIVE | Noted: 2022-02-20

## 2022-02-20 PROBLEM — R93.89 THICKENED ENDOMETRIUM: Status: ACTIVE | Noted: 2022-02-20

## 2022-02-20 PROBLEM — E66.812 CLASS 2 OBESITY DUE TO EXCESS CALORIES WITHOUT SERIOUS COMORBIDITY WITH BODY MASS INDEX (BMI) OF 38.0 TO 38.9 IN ADULT: Status: ACTIVE | Noted: 2022-02-20

## 2022-02-20 NOTE — ASSESSMENT & PLAN NOTE
Used to take meclizine when she needed it for vertigo  No episodes for over one year  Requested script because the meds she has at home is old

## 2022-02-20 NOTE — ASSESSMENT & PLAN NOTE
BMI Counseling: Body mass index is 38 3 kg/m²  The BMI is above normal  Nutrition recommendations include decreasing overall calorie intake, moderation in carbohydrate intake, reducing intake of saturated fat and trans fat and reducing intake of cholesterol  Exercise recommendations include exercising 3-5 times per week

## 2022-02-20 NOTE — ASSESSMENT & PLAN NOTE
Saw urologist in September 2021  CT scan in April 2021 revealed 6 mm nonobstructing right renal stone     Patient wanted continued observation   She was to continue to take the Urocit-K and try to increase her fluids but no longer feels Urocit-K is needed so stopped taking it  Repeat KUB in one year with urology

## 2022-02-20 NOTE — ASSESSMENT & PLAN NOTE
CTA in April 2021 was done in ER when she presented for chest pain and it revealed mildly thickened endometrium measuring about 7 mm   Recommended US pelvis which has been ordered

## 2022-02-23 ENCOUNTER — APPOINTMENT (OUTPATIENT)
Dept: LAB | Facility: HOSPITAL | Age: 77
End: 2022-02-23
Attending: FAMILY MEDICINE
Payer: COMMERCIAL

## 2022-02-23 ENCOUNTER — HOSPITAL ENCOUNTER (OUTPATIENT)
Dept: ULTRASOUND IMAGING | Facility: HOSPITAL | Age: 77
Discharge: HOME | End: 2022-02-23
Attending: FAMILY MEDICINE
Payer: COMMERCIAL

## 2022-02-23 DIAGNOSIS — R93.89 THICKENED ENDOMETRIUM: ICD-10-CM

## 2022-02-23 DIAGNOSIS — E66.09 CLASS 2 OBESITY DUE TO EXCESS CALORIES WITHOUT SERIOUS COMORBIDITY WITH BODY MASS INDEX (BMI) OF 38.0 TO 38.9 IN ADULT: ICD-10-CM

## 2022-02-23 LAB
ALBUMIN SERPL BCP-MCNC: 3.8 G/DL (ref 3–5.2)
ALP SERPL-CCNC: 136 U/L (ref 43–122)
ALT SERPL W P-5'-P-CCNC: 22 U/L
ANION GAP SERPL CALCULATED.3IONS-SCNC: 6 MMOL/L (ref 5–14)
AST SERPL W P-5'-P-CCNC: 36 U/L (ref 14–36)
BASOPHILS # BLD AUTO: 0 THOUSANDS/ΜL (ref 0–0.1)
BASOPHILS NFR BLD AUTO: 1 % (ref 0–1)
BILIRUB SERPL-MCNC: 1.06 MG/DL
BUN SERPL-MCNC: 16 MG/DL (ref 5–25)
CALCIUM SERPL-MCNC: 8.5 MG/DL (ref 8.4–10.2)
CHLORIDE SERPL-SCNC: 102 MMOL/L (ref 97–108)
CHOLEST SERPL-MCNC: 175 MG/DL
CO2 SERPL-SCNC: 28 MMOL/L (ref 22–30)
CREAT SERPL-MCNC: 0.76 MG/DL (ref 0.6–1.2)
EOSINOPHIL # BLD AUTO: 0.3 THOUSAND/ΜL (ref 0–0.4)
EOSINOPHIL NFR BLD AUTO: 5 % (ref 0–6)
ERYTHROCYTE [DISTWIDTH] IN BLOOD BY AUTOMATED COUNT: 14.5 %
GFR SERPL CREATININE-BSD FRML MDRD: 75 ML/MIN/1.73SQ M
GLUCOSE P FAST SERPL-MCNC: 90 MG/DL (ref 70–99)
HCT VFR BLD AUTO: 38.5 % (ref 36–46)
HCV AB SER QL: NORMAL
HDLC SERPL-MCNC: 45 MG/DL
HGB BLD-MCNC: 12.9 G/DL (ref 12–16)
LDLC SERPL CALC-MCNC: 113 MG/DL
LYMPHOCYTES # BLD AUTO: 1.5 THOUSANDS/ΜL (ref 0.5–4)
LYMPHOCYTES NFR BLD AUTO: 23 % (ref 25–45)
MCH RBC QN AUTO: 29.3 PG (ref 26–34)
MCHC RBC AUTO-ENTMCNC: 33.5 G/DL (ref 31–36)
MCV RBC AUTO: 88 FL (ref 80–100)
MONOCYTES # BLD AUTO: 0.6 THOUSAND/ΜL (ref 0.2–0.9)
MONOCYTES NFR BLD AUTO: 9 % (ref 1–10)
NEUTROPHILS # BLD AUTO: 4.2 THOUSANDS/ΜL (ref 1.8–7.8)
NEUTS SEG NFR BLD AUTO: 63 % (ref 45–65)
NONHDLC SERPL-MCNC: 130 MG/DL
PLATELET # BLD AUTO: 164 THOUSANDS/UL (ref 150–450)
PMV BLD AUTO: 10.2 FL (ref 8.9–12.7)
POTASSIUM SERPL-SCNC: 3.4 MMOL/L (ref 3.6–5)
PROT SERPL-MCNC: 6.9 G/DL (ref 5.9–8.4)
RBC # BLD AUTO: 4.4 MILLION/UL (ref 4–5.2)
SODIUM SERPL-SCNC: 136 MMOL/L (ref 137–147)
TRIGL SERPL-MCNC: 87 MG/DL
TSH SERPL DL<=0.05 MIU/L-ACNC: 3.41 UIU/ML (ref 0.47–4.68)
WBC # BLD AUTO: 6.7 THOUSAND/UL (ref 4.5–11)

## 2022-02-23 PROCEDURE — 80061 LIPID PANEL: CPT

## 2022-02-23 PROCEDURE — 85025 COMPLETE CBC W/AUTO DIFF WBC: CPT

## 2022-02-23 PROCEDURE — 76856 US EXAM PELVIC COMPLETE: CPT

## 2022-02-23 PROCEDURE — 86803 HEPATITIS C AB TEST: CPT

## 2022-02-23 PROCEDURE — 36415 COLL VENOUS BLD VENIPUNCTURE: CPT

## 2022-02-23 PROCEDURE — 76830 TRANSVAGINAL US NON-OB: CPT

## 2022-02-23 PROCEDURE — 84443 ASSAY THYROID STIM HORMONE: CPT

## 2022-02-23 PROCEDURE — 80053 COMPREHEN METABOLIC PANEL: CPT

## 2022-02-23 NOTE — LETTER
[address] [place] [address]  [address] [facility] [address]      March 4, 2022    MRN: [id number]     Phone: [phone]     Dear Ms Tony Maldonado,    You recently had a(n) Ultrasound performed on 2/23/2022 at  [address] [place] [address] that was requested by Gray Bazan DO  The study was reviewed by a radiologist, which is a physician who specializes in medical imaging  The radiologist issued a report describing his or her findings  In that report there was a finding that the radiologist felt warranted further discussion with your health care provider and that discussion would be beneficial to you  The results were sent to Gray Bazan DO on 02/26/2022  7:46 PM  We recommend that you contact Gray Bazan DO at [phone] or set up an appointment to discuss the results of the imaging test  If you have already heard from Gray Bazan DO regarding the results of your study, you can disregard this letter  This letter is not meant to alarm you, but intended to encourage you to follow-up on your results with the provider that sent you for the imaging study  In addition, we have enclosed answers to frequently asked questions by other patients who have also received a letter to review results with their health care provider (see page two)  Thank you for choosing [address] [place] [address] for your medical imaging needs  FREQUENTLY ASKED QUESTIONS    1  Why am I receiving this letter? [facility] requires us to notify patients who have findings on imaging exams that may require more testing or follow-up with a health professional within the next 3 months  2  How serious is the finding on the imaging test?  This letter is sent to all patients who may need follow-up or more testing within the next 3 months  Receiving this letter does not necessarily mean you have a life-threatening imaging finding or disease  Recommendations in the radiologists imaging report are general in nature and it is up to your healthcare provider to say whether those recommendations make sense for your situation  You are strongly encouraged to talk to your health care provider about the results and ask whether additional steps need to be taken  3  Where can I get a copy of the final report for my recent radiology exam?  To get a full copy of the report you can access your records online at [email or web address] or please contact [address] [place] [address] Department at [phone] Monday through Friday between 8 am and 6 pm          4  What do I need to do now? Please contact your health care provider who requested the imaging study to discuss what further actions (if any) are needed  You may have already heard from (your ordering provider) in regard to this test in which case you can disregard this letter  NOTICE IN ACCORDANCE WITH THE [facility] PATIENT TEST RESULT INFORMATION ACT OF 2018    You are receiving this notice as a result of a determination by your diagnostic imaging service that further discussions of your test results are warranted and would be beneficial to you  The complete results of your test or tests have been or will be sent to the health care practitioner that ordered the test or tests  It is recommended that you contact your health care practitioner to discuss your results as soon as possible

## 2022-02-28 ENCOUNTER — TELEPHONE (OUTPATIENT)
Dept: FAMILY MEDICINE CLINIC | Facility: CLINIC | Age: 77
End: 2022-02-28

## 2022-02-28 PROBLEM — N84.0 ENDOMETRIAL POLYP: Status: ACTIVE | Noted: 2022-02-28

## 2022-02-28 NOTE — RESULT ENCOUNTER NOTE
Please let patient know US pelvis showed possible endometrial polyp, measuring approximately 0 7 x 0 4 x 1 3 cm, surrounded by a small amount of fluid within the endometrial cavity  Recommend sending her to GYN  Referral was placed

## 2022-03-02 ENCOUNTER — OFFICE VISIT (OUTPATIENT)
Dept: OBGYN CLINIC | Facility: CLINIC | Age: 77
End: 2022-03-02

## 2022-03-02 VITALS
WEIGHT: 153 LBS | DIASTOLIC BLOOD PRESSURE: 64 MMHG | BODY MASS INDEX: 38.3 KG/M2 | HEART RATE: 66 BPM | SYSTOLIC BLOOD PRESSURE: 132 MMHG

## 2022-03-02 DIAGNOSIS — R93.89 THICKENED ENDOMETRIUM: Primary | ICD-10-CM

## 2022-03-02 DIAGNOSIS — N84.0 ENDOMETRIAL POLYP: ICD-10-CM

## 2022-03-02 PROCEDURE — 58100 BIOPSY OF UTERUS LINING: CPT | Performed by: OBSTETRICS & GYNECOLOGY

## 2022-03-02 PROCEDURE — 88305 TISSUE EXAM BY PATHOLOGIST: CPT | Performed by: PATHOLOGY

## 2022-03-02 PROCEDURE — 99213 OFFICE O/P EST LOW 20 MIN: CPT | Performed by: OBSTETRICS & GYNECOLOGY

## 2022-03-02 RX ORDER — CALCIUM CARBONATE/VITAMIN D3 500MG-5MCG
1 TABLET ORAL 2 TIMES DAILY WITH MEALS
COMMUNITY
Start: 2022-02-09

## 2022-03-02 NOTE — PROGRESS NOTES
Assessment/Plan:     No problem-specific Assessment & Plan notes found for this encounter  Diagnoses and all orders for this visit:    Thickened endometrium  -     Tissue Exam    Endometrial polyp  -     Ambulatory Referral to Gynecology  -     Tissue Exam    Other orders  -     OYSCO 500 + D 500-200 MG-UNIT per tablet; Take 1 tablet by mouth 2 (two) times a day with meals    EMB done today  RTO for results review  Subjective:      Patient ID: Roger Garcia is a 64 y o  female presents as a consult from [address] [place] [address] secondary to abnormal US finding  She denies PMB  HPI UTERUS:  The uterus is anteverted in position, measuring 7 4 x 3 5 x 5 0 cm  There is a 1 2 x 1 4 x 1 0 cm subserosal anterior fibroid  The cervix appears within normal limits      ENDOMETRIUM:    There is fluid present within the endometrium  The anterior endometrial thickness is 1 4 mm, posteriorly 1 7 mm  There is a soft tissue filling defect within the fundal portion of the endometrial canal measuring 0 7 x 0 4 x 1 3 cm      OVARIES/ADNEXA:  Right ovary:  3 4 x 2 2 x 1 9 cm  7 6 mL  No suspicious right ovarian abnormality  Doppler flow within normal limits      Left ovary:  2 1 x 2 4 x 1 9 cm  5 1 mL  No suspicious left ovarian abnormality  Doppler flow within normal limits      No suspicious adnexal mass or loculated collections  There is no free fluid      IMPRESSION:     1  Findings are most suggestive of an endometrial polyp, measuring approximately 0 7 x 0 4 x 1 3 cm, surrounded by a small amount of fluid within the endometrial cavity  Recommend gynecological consultation- tissue sampling may be indicated in this   postmenopausal patient   2   There is no evidence of adnexal mass or cyst   3  There is a 1 4 cm fibroid     The following portions of the patient's history were reviewed and updated as appropriate: allergies, current medications, past family history, past medical history, past social history, past surgical history and problem list     Review of Systems      Objective:      /64   Pulse 66   Wt 69 4 kg (153 lb)   BMI 38 30 kg/m²          Physical Exam  Vitals and nursing note reviewed  Exam conducted with a chaperone present  Pulmonary:      Effort: Pulmonary effort is normal    Genitourinary:     Labia:         Right: No rash, tenderness, lesion or injury  Left: No rash, tenderness, lesion or injury  Vagina: Normal       Cervix: Normal       Uterus: Normal     Neurological:      General: No focal deficit present  Mental Status: She is oriented to person, place, and time     Psychiatric:         Mood and Affect: Mood normal          Behavior: Behavior normal

## 2022-03-02 NOTE — PROGRESS NOTES
Endometrial biopsy    Date/Time: 3/2/2022 1:12 PM  Performed by: Khari Dey MD  Authorized by: Khari Dey MD   Universal Protocol:  Consent: Verbal consent obtained  Written consent obtained  Risks and benefits: risks, benefits and alternatives were discussed  Consent given by: patient  Time out: Immediately prior to procedure a "time out" was called to verify the correct patient, procedure, equipment, support staff and site/side marked as required  Patient understanding: patient states understanding of the procedure being performed  Patient consent: the patient's understanding of the procedure matches consent given  Patient identity confirmed: verbally with patient      Indication:     Indications:  Other disorder of menstruation and other abnormal bleeding from female genital tract    Procedure:     Procedure: endometrial biopsy with Pipelle      A bivalve speculum was placed in the vagina: yes      Cervix cleaned and prepped: yes      The cervix was dilated: no      Uterus sounded: yes      Uterus sound depth (cm):  7    Specimen collected: specimen collected and sent to pathology      Patient tolerated procedure well with no complications: yes    Findings:     Uterus size:  Non-gravid    Cervix: normal

## 2022-03-02 NOTE — PATIENT INSTRUCTIONS
Endometrial Biopsy   WHAT YOU NEED TO KNOW:   Endometrial biopsy is a procedure to remove a tissue sample from the lining of your uterus  This procedure is done through your vagina  DISCHARGE INSTRUCTIONS:   Call your doctor or surgeon if:   · You have severe pain that does not go away after you take pain medicine  · You have a fever  · You have pain or cramping that lasts longer than a few days  · You have white or yellow vaginal discharge  · You have more vaginal bleeding than you were told to expect  · You have questions or concerns about your condition or care  Medicines:   · NSAIDs , such as ibuprofen, help decrease swelling, pain, and fever  NSAIDs can cause stomach bleeding or kidney problems in certain people  If you take blood thinner medicine, always ask your healthcare provider if NSAIDs are safe for you  Always read the medicine label and follow directions  · Take your medicine as directed  Contact your healthcare provider if you think your medicine is not helping or if you have side effects  Tell him or her if you are allergic to any medicine  Keep a list of the medicines, vitamins, and herbs you take  Include the amounts, and when and why you take them  Bring the list or the pill bottles to follow-up visits  Carry your medicine list with you in case of an emergency  Do not have sex, douche, or use a tampon  for at least 10 to 14 days  Follow up with your doctor or surgeon as directed:  Write down your questions so you remember to ask them during your visits  © Copyright [employer] 2022 Information is for End User's use only and may not be sold, redistributed or otherwise used for commercial purposes  All illustrations and images included in CareNotes® are the copyrighted property of [employer]  or [employer]  The above information is an  only  It is not intended as medical advice for individual conditions or treatments   Talk to your doctor, nurse or pharmacist before following any medical regimen to see if it is safe and effective for you

## 2022-03-15 NOTE — PROGRESS NOTES
Assessment/Plan:     No problem-specific Assessment & Plan notes found for this encounter  Diagnoses and all orders for this visit:    Encounter to discuss test results    Thickened endometrium    Expectant management  Call if any bleeding  RTO prn or annually  Subjective:      Patient ID: Yasemin Kan is a 64 y o  female who presents for EMB results  She had some bleeding after the procedure but offers no complaints today  Final Diagnosis   A  Uterus, Endometrium, Biopsy:  - Predominantly mucus and blood, with rare strips of atrophic endometrium  HPI    The following portions of the patient's history were reviewed and updated as appropriate: allergies, current medications, past family history, past medical history, past social history, past surgical history and problem list     Review of Systems      Objective:      /71   Wt 69 4 kg (153 lb)   BMI 38 30 kg/m²          Physical Exam  Vitals and nursing note reviewed  Constitutional:       Appearance: Normal appearance  Pulmonary:      Effort: Pulmonary effort is normal    Neurological:      General: No focal deficit present  Mental Status: She is alert and oriented to person, place, and time     Psychiatric:         Mood and Affect: Mood normal          Behavior: Behavior normal

## 2022-03-16 ENCOUNTER — OFFICE VISIT (OUTPATIENT)
Dept: OBGYN CLINIC | Facility: CLINIC | Age: 77
End: 2022-03-16

## 2022-03-16 VITALS — DIASTOLIC BLOOD PRESSURE: 71 MMHG | WEIGHT: 153 LBS | SYSTOLIC BLOOD PRESSURE: 106 MMHG | BODY MASS INDEX: 38.3 KG/M2

## 2022-03-16 DIAGNOSIS — R93.89 THICKENED ENDOMETRIUM: ICD-10-CM

## 2022-03-16 DIAGNOSIS — Z71.2 ENCOUNTER TO DISCUSS TEST RESULTS: Primary | ICD-10-CM

## 2022-03-16 PROCEDURE — 1160F RVW MEDS BY RX/DR IN RCRD: CPT | Performed by: OBSTETRICS & GYNECOLOGY

## 2022-03-16 PROCEDURE — 1036F TOBACCO NON-USER: CPT | Performed by: OBSTETRICS & GYNECOLOGY

## 2022-03-16 PROCEDURE — 99213 OFFICE O/P EST LOW 20 MIN: CPT | Performed by: OBSTETRICS & GYNECOLOGY

## 2022-04-13 ENCOUNTER — OFFICE VISIT (OUTPATIENT)
Dept: DENTISTRY | Facility: CLINIC | Age: 77
End: 2022-04-13

## 2022-04-13 VITALS — HEART RATE: 89 BPM | TEMPERATURE: 97.2 F | SYSTOLIC BLOOD PRESSURE: 145 MMHG | DIASTOLIC BLOOD PRESSURE: 85 MMHG

## 2022-04-13 DIAGNOSIS — Z01.20 ENCOUNTER FOR DENTAL EXAMINATION: Primary | ICD-10-CM

## 2022-04-13 PROCEDURE — D4910 PERIODONTAL MAINTENANCE: HCPCS

## 2022-04-13 NOTE — PROGRESS NOTES
Periodontal Maintenance     REVIEWED MED HX: meds, allergies, health changes reviewed in EPIC  CHIEF CONCERN:  none   PAIN SCALE:  0  ASA CLASS:  II  PLAQUE:  mild accumulation   CALCULUS:  light calculus /mod calculusBLEEDING:   none light moderate marginal    STAIN :   none   ORAL HYGIENE:  Good    PERIO: stable     Hand scaled, polished and flossed  Oral Hygiene Instruction:  recommended brushing 2 x daily for 2 minutes MIN, recommended flossing daily, reviewed dietary precautions  Visual and Tactile Intraoral/ Extraoral evaluation: Oral and Oropharyngeal cancer evaluation  No findings     No exam [facility]   Reviewed with patient clinical and radiographic findings and patient verbalized understanding  All questions and concerns addressed  REFERRALS: no referrals needed    CARIES FINDINGS:  #19 OL & #28 MO   Patient would like just a restoration on #19 no crown  I told patient to remind the Dr  At restorative appt         Next Visit: Restorative #19 OL   NV: 2 Restorative 28 MO   NV:3 3MRC Perio maintenance     Next Recall: 3 month recall     Last BWX:   Last Panorex/ FMX :

## 2022-05-13 DIAGNOSIS — M85.852 OSTEOPENIA OF LEFT HIP: ICD-10-CM

## 2022-05-13 RX ORDER — CALCIUM CARBONATE/VITAMIN D3 500MG-5MCG
TABLET ORAL
Qty: 180 TABLET | Refills: 1 | Status: SHIPPED | OUTPATIENT
Start: 2022-05-13

## 2022-05-20 ENCOUNTER — OFFICE VISIT (OUTPATIENT)
Dept: DENTISTRY | Facility: CLINIC | Age: 77
End: 2022-05-20

## 2022-05-20 VITALS — TEMPERATURE: 97.7 F | DIASTOLIC BLOOD PRESSURE: 76 MMHG | HEART RATE: 83 BPM | SYSTOLIC BLOOD PRESSURE: 114 MMHG

## 2022-05-20 DIAGNOSIS — K02.9 DENTAL CARIES: Primary | ICD-10-CM

## 2022-05-20 PROCEDURE — D1354 INTERIM CARIES ARRESTING MEDICAMENT APPLICATION - PER TOOTH: HCPCS | Performed by: DENTIST

## 2022-05-24 ENCOUNTER — OFFICE VISIT (OUTPATIENT)
Dept: DENTISTRY | Facility: CLINIC | Age: 77
End: 2022-05-24

## 2022-05-24 VITALS — SYSTOLIC BLOOD PRESSURE: 130 MMHG | DIASTOLIC BLOOD PRESSURE: 83 MMHG | HEART RATE: 73 BPM | TEMPERATURE: 97.2 F

## 2022-05-24 DIAGNOSIS — K02.62 DENTIN CARIES: Primary | ICD-10-CM

## 2022-05-24 PROCEDURE — D0140 LIMITED ORAL EVALUATION - PROBLEM FOCUSED: HCPCS | Performed by: DENTIST

## 2022-05-24 NOTE — PROGRESS NOTES
Limited Exam    Epi Adan presents for limited exam with cc:"The filling from Friday keeps scratching my tongue"  PMH reviewed, no changes  ASA 2  Pain score-0    Pt's last dental visit was Friday, 5/20/22  For interim caries arresting restoration with [facility]  Patient said that she is having pain     O: Tooth #19 has defective interim restoration  Mesio-lingual portion of restoration missing  P: Continue to monitor #19 for symptoms  If tooth is asymptomatic restored with composite restoration  Pt does not want to go through with a RCT or crown  No anesthesia used today  [place] used to restored tooth #19 with [facility]  Tooth condensed with finger and cotton swab  Refined with football taylor bur and and white stone  Verified and checked occlusion  Refined with finishing burs, polished with enhance point  Verified occlusion and contacts  Pt left satisfied      DONE TODAY  #19 interim restoration with [facility]    Attending: Dr Surjit Chou

## 2022-05-24 NOTE — PROGRESS NOTES
Composite Filling    Lm Bills presents for composite filling #19  PMH reviewed, no changes  Discussed with patient need for RCT if pulp exposure occurs or in future if pulp is inflamed  Pt understands and consents  Pt said that she does not want to go through with long  RCT  She would prefer ext  PA of #19 shows broken tooth approximating the pulpal chamber  Lingual wall broken off  Cloverleaf needed  Pt was informed that she will need a crown  It is very likely that the filling with break because there is a significant amount of tooth structure gone  No anesthesia used  Restored with Limelight and light cured  [facility] and condensed with cotton swab  Hand carved with composite tool  Refined with finishing burs, polished with enhance point  Verified occlusion and contacts  Pt left satisfied      NV: Build up tooth #19

## 2022-05-26 ENCOUNTER — OFFICE VISIT (OUTPATIENT)
Dept: FAMILY MEDICINE CLINIC | Facility: CLINIC | Age: 77
End: 2022-05-26

## 2022-05-26 VITALS
SYSTOLIC BLOOD PRESSURE: 132 MMHG | BODY MASS INDEX: 38.3 KG/M2 | DIASTOLIC BLOOD PRESSURE: 70 MMHG | TEMPERATURE: 97.6 F | OXYGEN SATURATION: 94 % | HEART RATE: 99 BPM | WEIGHT: 153 LBS | RESPIRATION RATE: 18 BRPM

## 2022-05-26 DIAGNOSIS — M46.1 SACROILIITIS (HCC): Primary | ICD-10-CM

## 2022-05-26 PROCEDURE — 99213 OFFICE O/P EST LOW 20 MIN: CPT | Performed by: FAMILY MEDICINE

## 2022-05-26 RX ORDER — PREDNISONE 10 MG/1
TABLET ORAL
Qty: 30 TABLET | Refills: 0 | Status: SHIPPED | OUTPATIENT
Start: 2022-05-26 | End: 2022-06-07

## 2022-05-26 NOTE — PROGRESS NOTES
Assessment/Plan:    [facility] - B[facility]  -Last  steroid injection by pain management was  a few years ago  -She had no benefit from physical therapy  -No pain improvement from lidocaine patch, NSAID, and tylenol  -Requesting prednisone for pain relief ( avoid NSAID while on prednisone)        Return in about 3 months (around 8/26/2022) for Next scheduled follow up medicare annual wellness  There are no Patient Instructions on file for this visit  Diagnoses and all orders for this visit:    S[facility] ([facility] ) - Bilateral  -     predniSONE 10 mg tablet; Take 4 tablets (40 mg total) by mouth daily for 3 days, THEN 3 tablets (30 mg total) daily for 3 days, THEN 2 tablets (20 mg total) daily for 3 days, THEN 1 tablet (10 mg total) daily for 3 days  Subjective:     Maureen Laguna is a 64 y o  female who  has a past medical history of Arthritis, Chronic pain disorder, GERD (gastroesophageal reflux disease), Kidney stone, Kidney stone, Nephrolithiasis, Osteoarthritis, Osteoporosis, and Sinus arrhythmia  who presented to the office today for refill on steroid for her sacroiliitis  She has chronic low back pain  She states that steroid is the only thing that helps  She states that she will be pain free for a whole year after a short course of steroid         HPI      The following portions of the patient's history were reviewed and updated as appropriate: allergies, current medications, past family history, past medical history, past social history, past surgical history and problem list     Current Outpatient Medications on File Prior to Visit   Medication Sig Dispense Refill    ibuprofen (MOTRIN) 800 mg tablet Take 1 tablet (800 mg total) by mouth every 12 (twelve) hours as needed for mild pain 30 tablet 0    meclizine (ANTIVERT) 12 5 MG tablet Take 1 tablet (12 5 mg total) by mouth every 12 (twelve) hours as needed for dizziness 15 tablet 0    OYSCO 500 + D 500-200 MG-UNIT per tablet Take 1 tablet by mouth 2 (two) times a day with meals      OYSCO 500 + D 500-200 MG-UNIT per tablet TAKE 1 TABLET BY MOUTH TWICE A DAY WITH MEALS 180 tablet 1     No current facility-administered medications on file prior to visit  Review of Systems   Constitutional: Negative for chills, fatigue and fever  HENT: Negative for congestion  Eyes: Negative for visual disturbance  Respiratory: Negative for shortness of breath, wheezing and stridor  Gastrointestinal: Negative for diarrhea, nausea and vomiting  Musculoskeletal: Positive for back pain  Negative for gait problem, joint swelling and myalgias  Skin: Negative for rash  Neurological: Negative for dizziness, seizures, syncope, numbness and headaches  Objective:    /70 (BP Location: Right arm, Patient Position: Sitting, Cuff Size: Adult)   Pulse 99   Temp 97 6 °F (36 4 °C) (Temporal)   Resp 18   Wt 69 4 kg (153 lb)   SpO2 94%   BMI 38 30 kg/m²     Physical Exam  Constitutional:       Appearance: She is well-developed  HENT:      Head: Normocephalic  Right Ear: External ear normal       Left Ear: External ear normal       Nose: Nose normal    Eyes:      Pupils: Pupils are equal, round, and reactive to light  Neck:      Thyroid: No thyromegaly  Vascular: No JVD  Trachea: No tracheal deviation  Cardiovascular:      Rate and Rhythm: Normal rate and regular rhythm  Heart sounds: Normal heart sounds  No murmur heard  No friction rub  No gallop  Pulmonary:      Effort: Pulmonary effort is normal  No respiratory distress  Breath sounds: Normal breath sounds  No stridor  No wheezing  Abdominal:      General: Bowel sounds are normal  There is no distension  Palpations: Abdomen is soft  There is no mass  Tenderness: There is no abdominal tenderness  There is no guarding  Musculoskeletal:         General: Normal range of motion  Cervical back: Normal range of motion        Comments: Sacroiliac tenderness bilateraly   Skin:     General: Skin is warm  Capillary Refill: Capillary refill takes less than 2 seconds  Findings: No rash  Neurological:      Mental Status: She is alert and oriented to person, place, and time  Cranial Nerves: No cranial nerve deficit  Motor: No abnormal muscle tone        Coordination: Coordination normal       Deep Tendon Reflexes: Reflexes normal    Psychiatric:         Behavior: Behavior normal          Naun Rendon MD  05/26/22  6:57 PM

## 2022-05-26 NOTE — ASSESSMENT & PLAN NOTE
-Last  steroid injection by pain management was  a few years ago  -She had no benefit from physical therapy  -No pain improvement from lidocaine patch, NSAID, and tylenol  -Requesting prednisone for pain relief ( avoid NSAID while on prednisone)

## 2022-06-29 ENCOUNTER — HOSPITAL ENCOUNTER (OUTPATIENT)
Dept: MAMMOGRAPHY | Facility: CLINIC | Age: 77
Discharge: HOME | End: 2022-06-29
Payer: COMMERCIAL

## 2022-06-29 DIAGNOSIS — Z12.39 SCREENING FOR BREAST CANCER: ICD-10-CM

## 2022-06-29 DIAGNOSIS — Z12.31 ENCOUNTER FOR SCREENING MAMMOGRAM FOR MALIGNANT NEOPLASM OF BREAST: ICD-10-CM

## 2022-06-29 PROCEDURE — 77067 SCR MAMMO BI INCL CAD: CPT

## 2022-06-29 PROCEDURE — 77063 BREAST TOMOSYNTHESIS BI: CPT

## 2022-08-29 ENCOUNTER — RA CDI HCC (OUTPATIENT)
Dept: OTHER | Facility: HOSPITAL | Age: 77
End: 2022-08-29

## 2022-08-29 NOTE — PROGRESS NOTES
[facility]  coding opportunities       Chart reviewed, no opportunity found:   [address]        Patients Insurance     Medicare Insurance: [facility]

## 2022-09-07 ENCOUNTER — OFFICE VISIT (OUTPATIENT)
Dept: FAMILY MEDICINE CLINIC | Facility: CLINIC | Age: 77
End: 2022-09-07

## 2022-09-07 VITALS
SYSTOLIC BLOOD PRESSURE: 124 MMHG | HEART RATE: 81 BPM | DIASTOLIC BLOOD PRESSURE: 82 MMHG | OXYGEN SATURATION: 99 % | BODY MASS INDEX: 34.53 KG/M2 | TEMPERATURE: 96.6 F | HEIGHT: 55 IN | RESPIRATION RATE: 18 BRPM | WEIGHT: 149.2 LBS

## 2022-09-07 DIAGNOSIS — K21.9 GASTROESOPHAGEAL REFLUX DISEASE WITHOUT ESOPHAGITIS: ICD-10-CM

## 2022-09-07 DIAGNOSIS — M79.672 CHRONIC FOOT PAIN, LEFT: ICD-10-CM

## 2022-09-07 DIAGNOSIS — E78.2 HYPERLIPIDEMIA, MIXED: ICD-10-CM

## 2022-09-07 DIAGNOSIS — N20.0 NEPHROLITHIASIS: ICD-10-CM

## 2022-09-07 DIAGNOSIS — Z00.00 MEDICARE ANNUAL WELLNESS VISIT, SUBSEQUENT: Primary | ICD-10-CM

## 2022-09-07 DIAGNOSIS — G89.29 CHRONIC FOOT PAIN, LEFT: ICD-10-CM

## 2022-09-07 PROCEDURE — 3288F FALL RISK ASSESSMENT DOCD: CPT | Performed by: FAMILY MEDICINE

## 2022-09-07 PROCEDURE — 1170F FXNL STATUS ASSESSED: CPT | Performed by: FAMILY MEDICINE

## 2022-09-07 PROCEDURE — G0439 PPPS, SUBSEQ VISIT: HCPCS | Performed by: FAMILY MEDICINE

## 2022-09-07 PROCEDURE — 1125F AMNT PAIN NOTED PAIN PRSNT: CPT | Performed by: FAMILY MEDICINE

## 2022-09-07 PROCEDURE — 3725F SCREEN DEPRESSION PERFORMED: CPT | Performed by: FAMILY MEDICINE

## 2022-09-07 PROCEDURE — 1160F RVW MEDS BY RX/DR IN RCRD: CPT | Performed by: FAMILY MEDICINE

## 2022-09-07 PROCEDURE — 99214 OFFICE O/P EST MOD 30 MIN: CPT | Performed by: FAMILY MEDICINE

## 2022-09-07 PROCEDURE — 1123F ACP DISCUSS/DSCN MKR DOCD: CPT | Performed by: FAMILY MEDICINE

## 2022-09-07 RX ORDER — SENNOSIDES 8.6 MG
650 CAPSULE ORAL EVERY 12 HOURS PRN
Qty: 30 TABLET | Refills: 0 | Status: SHIPPED | OUTPATIENT
Start: 2022-09-07

## 2022-09-07 RX ORDER — OMEPRAZOLE 20 MG/1
20 CAPSULE, DELAYED RELEASE ORAL DAILY
Qty: 30 CAPSULE | Refills: 3 | Status: SHIPPED | OUTPATIENT
Start: 2022-09-07 | End: 2022-10-11

## 2022-09-07 NOTE — PROGRESS NOTES
Assessment and Plan:     Problem List Items Addressed This Visit        Digestive    Gastroesophageal reflux disease without esophagitis     Will start the patient on a trial of PPI  Advised the patient decreasing acidic foods such as tomato sauce and spicy food and caffeinated products  Avoid NSAIDs, alcohol and tobacco smoking  Advised the patient to eat at least 3-4 hours prior to sleeping and to elevate head of bed  Do not lay down right after eating or drinking    Send to GI - patient requesting EGD since she had history of H  pylori many years ago (per patient)               Relevant Medications    omeprazole (PriLOSEC) 20 mg delayed release capsule    Other Relevant Orders    Ambulatory Referral to Gastroenterology       Genitourinary    Nephrolithiasis     Saw urologist in September 2021 - to return in one year - has to schedule follow-up appointment  Scheduled to repeat KUB on 9/12/22    CT scan in April 2021 revealed 6 mm nonobstructing right renal stone     Patient wanted continued observation   She was to continue to take the Urocit-K and try to increase her fluids but no longer feels Urocit-K is needed so stopped taking it  Repeat KUB in one year with urology            Other    Hyperlipidemia, mixed     ASCVD risk 22 3%  Patient refused to be placed on statin - explained the risks of not having cholesterol levels under control including heart attack, stroke and even death  Strongly encouraged low cholesterol diet         Chronic foot pain, left     S/p left foot surgery - patient told me she has piece of plastic near achilles tendon  Send for xray foot   Refer to podiatry         Relevant Medications    acetaminophen (TYLENOL) 650 mg CR tablet    Other Relevant Orders    XR foot 3+ vw left    Ambulatory Referral to Podiatry      Other Visit Diagnoses     Medicare annual wellness visit, subsequent    -  Primary          Depression Screening and Follow-up Plan: Patient was screened for depression during today's encounter  They screened negative with a PHQ-2 score of 1  Preventive health issues were discussed with patient, and age appropriate screening tests were ordered as noted in patient's After Visit Summary  Personalized health advice and appropriate referrals for health education or preventive services given if needed, as noted in patient's After Visit Summary  History of Present Illness:     Patient presents for a Medicare Wellness Visit    For the past three weeks, complains of left foot pain  Had surgery on left foot many years ago  She told me she has a piece of plastic near the achilles tendon  Now it feels swollen  Took three tablets of naprosyn 500 mg two nights ago  Yesterday morning took 2 tablets in the morning  Did not take any today  Complains of reflux and heartburn  She told me she had H  Pylori many years ago  Two of her brothers  of pancreatic cancer  Would like to have EGD done  Caffeine: 4 oz of black coffee in the morning, no soda, no chocolate, no tea or iced tea  Tobacco: none  ETOH: none  No hot, spicy or greasy foods       Patient Care Team:  [facility] (Family Medicine)  DO Donn Escobar MD Giles Fail, MD as Endoscopist     Review of Systems:     Review of Systems   Constitutional: Negative for chills, fatigue, fever and unexpected weight change  HENT: Negative for congestion, ear pain, rhinorrhea and sore throat  Eyes: Negative for pain and visual disturbance  Respiratory: Negative for cough, chest tightness and shortness of breath  Cardiovascular: Negative for chest pain, palpitations and leg swelling  Gastrointestinal: Negative for abdominal pain, constipation, diarrhea, nausea and vomiting  Genitourinary: Negative for difficulty urinating, dysuria and urgency  Musculoskeletal: Negative for arthralgias and back pain  +left foot pain   Skin: Negative for rash  Neurological: Negative for dizziness, numbness and headaches  Psychiatric/Behavioral: Negative for behavioral problems and suicidal ideas  The patient is not nervous/anxious  Problem List:     Patient Active Problem List   Diagnosis    Sacroiliitis ([address] ) - Bilateral    Lumbar spondylosis    Degenerative disc disease, thoracic    Nephrolithiasis    Chronic rhinitis    Umbilical hernia without obstruction and without gangrene    Osteopenia of left hip    Hydronephrosis with renal and ureteral calculus obstruction    Thickened endometrium    Mixed conductive and sensorineural hearing loss of right ear with restricted hearing of left ear    Vertigo    Class 2 obesity due to excess calories without serious comorbidity with body mass index (BMI) of 38 0 to 38 9 in adult    Endometrial polyp      Past Medical and Surgical History:     Past Medical History:   Diagnosis Date    Arthritis     Chronic pain disorder     sciatic    GERD (gastroesophageal reflux disease)     Kidney stone     Kidney stone     Nephrolithiasis     Osteoarthritis     Osteoporosis     Sinus arrhythmia      Past Surgical History:   Procedure Laterality Date    CHOLECYSTECTOMY      FL RETROGRADE PYELOGRAM  3/7/2019    KIDNEY STONE SURGERY      KNEE ARTHROSCOPY Left     AK COLONOSCOPY FLX DX W/COLLJ SPEC WHEN PFRMD N/A 11/2/2017    Procedure: COLONOSCOPY with polypectomy x2;  Surgeon: Katty Hernandez MD;  Location: [facility];   Service: Gastroenterology    AK CYSTOURETHROSCOPY,URETER CATHETER Left 3/7/2019    Procedure: CYSTOSCOPY, URETEROSCOPY WITH LASER, BASKET STONE EXTRACTION, RETROGRADE PYELOGRAM WITH INSERTION STENT URETERAL;  Surgeon: Yvonne Pepper MD;  Location: [facility];  Service: Urology    TUBAL LIGATION        Family History:     Family History   Problem Relation Age of Onset    No Known Problems Mother     Mental illness Father     Prostate cancer Father     Colon cancer Sister     No Known Problems Sister     No Known Problems Sister     No Known Problems Sister     Ovarian cancer Daughter     Mental illness Daughter     No Known Problems Maternal Grandmother     No Known Problems Maternal Grandfather     No Known Problems Paternal Grandmother     No Known Problems Paternal Grandfather       Social History:     Social History     Socioeconomic History    Marital status: /Civil Union     Spouse name: None    Number of children: 2    Years of education: None    Highest education level: None   Occupational History    Occupation: Retired   Tobacco Use    Smoking status: Never Smoker    Smokeless tobacco: Never Used   Vaping Use    Vaping Use: Never used   Substance and Sexual Activity    Alcohol use: Not Currently    Drug use: No    Sexual activity: None   Other Topics Concern    None   Social History Narrative    Daily caffeine consumption- 1-2 cups of coffee    Does not have living will    Denied:  History of exercises regularly    Denied:  History of domestic violence    No advance directives    Denied:  History of Power of  in existence    Flu shot: no     Social Determinants of Health     Financial Resource Strain: Low Risk     Difficulty of Paying Living Expenses: Not hard at all   Food Insecurity: No Food Insecurity    Worried About Running Out of Food in the Last Year: Never true    Abigail of Food in the Last Year: Never true   Transportation Needs: No Transportation Needs    Lack of Transportation (Medical): No    Lack of Transportation (Non-Medical): No   Physical Activity: Not on file   Stress: No Stress Concern Present    Feeling of Stress :  Only a little   Social Connections: Not on file   Intimate Partner Violence: Not At Risk    Fear of Current or Ex-Partner: No    Emotionally Abused: No    Physically Abused: No    Sexually Abused: No   Housing Stability: Low Risk     Unable to Pay for Housing in the Last Year: No    Number of Places Lived in the Last Year: 1    Unstable Housing in the Last Year: No      Medications and Allergies:     Current Outpatient Medications   Medication Sig Dispense Refill    ibuprofen (MOTRIN) 800 mg tablet Take 1 tablet (800 mg total) by mouth every 12 (twelve) hours as needed for mild pain 30 tablet 0    meclizine (ANTIVERT) 12 5 MG tablet Take 1 tablet (12 5 mg total) by mouth every 12 (twelve) hours as needed for dizziness 15 tablet 0    OYSCO 500 + D 500-200 MG-UNIT per tablet Take 1 tablet by mouth 2 (two) times a day with meals      OYSCO 500 + D 500-200 MG-UNIT per tablet TAKE 1 TABLET BY MOUTH TWICE A DAY WITH MEALS 180 tablet 1     No current facility-administered medications for this visit  Allergies   Allergen Reactions    Diclofenac Sodium      Other reaction(s): GI Upset    Meperidine     Tramadol      Other reaction(s): GI Upset      Immunizations:     Immunization History   Administered Date(s) Administered    COVID-19 MODERNA VACC 0 5 ML IM 02/11/2021, 03/10/2021, 11/11/2021    H1N1, All Formulations 01/15/2010    INFLUENZA 10/01/2009    Influenza Split High Dose Preservative Free IM 10/16/2014, 11/20/2015, 10/22/2019    Influenza, high dose seasonal 0 7 mL 10/16/2020, 02/09/2022    Pneumococcal Conjugate 13-Valent 11/20/2015    Pneumococcal Polysaccharide PPV23 09/30/2011      Health Maintenance:         Topic Date Due    Breast Cancer Screening: Mammogram  06/29/2023    Hepatitis C Screening  Completed         Topic Date Due    COVID-19 Vaccine (4 - Booster for Moderna series) 03/11/2022    Influenza Vaccine (1) 09/01/2022      Medicare Screening Tests and Risk Assessments:     Isa Paul is here for her Subsequent Wellness visit  Last Medicare Wellness visit information reviewed, patient interviewed and updates made to the record today  Health Risk Assessment:   Patient rates overall health as good  Patient feels that their physical health rating is slightly worse   Patient is satisfied with their life  Eyesight was rated as same  Hearing was rated as same  Patient feels that their emotional and mental health rating is slightly worse  Patients states they are always angry  Patient states they are never, rarely unusually tired/fatigued  Pain experienced in the last 7 days has been a lot  Patient's pain rating has been 7/10  Patient states that she has experienced no weight loss or gain in last 6 months  Depression Screening:   PHQ-2 Score: 1      Fall Risk Screening: In the past year, patient has experienced: no history of falling in past year      Urinary Incontinence Screening:   Patient has not leaked urine accidently in the last six months  Home Safety:  Patient has trouble with stairs inside or outside of their home  Patient has working smoke alarms and has working carbon monoxide detector  Home safety hazards include: none  Nutrition:   Current diet is Regular  Medications:   Patient is currently taking over-the-counter supplements  OTC medications include: see medication list  Patient is able to manage medications  Activities of Daily Living (ADLs)/Instrumental Activities of Daily Living (IADLs):   Walk and transfer into and out of bed and chair?: Yes  Dress and groom yourself?: Yes    Bathe or shower yourself?: Yes    Feed yourself?  Yes  Do your laundry/housekeeping?: Yes  Manage your money, pay your bills and track your expenses?: Yes  Make your own meals?: Yes    Do your own shopping?: Yes    Previous Hospitalizations:   Any hospitalizations or ED visits within the last 12 months?: No      Advance Care Planning:     Advanced directive counseling given: Yes    Five wishes given: Yes      Cognitive Screening:   Provider or family/friend/caregiver concerned regarding cognition?: No    PREVENTIVE SCREENINGS      Cardiovascular Screening:    General: Screening Current      Diabetes Screening:     General: Screening Current      Colorectal Cancer Screening: General: Risks and Benefits Discussed    Due for: Colonoscopy - Low Risk      Breast Cancer Screening:     General: Screening Current      Cervical Cancer Screening:    General: Screening Not Indicated      Abdominal Aortic Aneurysm (AAA) Screening:        General: Screening Not Indicated      Lung Cancer Screening:     General: Screening Not Indicated      Hepatitis C Screening:    General: Screening Current    Screening, Brief Intervention, and Referral to Treatment (SBIRT)    Screening  Typical number of drinks in a day: 0  Typical number of drinks in a week: 0  Interpretation: Low risk drinking behavior  Single Item Drug Screening:  How often have you used an illegal drug (including marijuana) or a prescription medication for non-medical reasons in the past year? never    Single Item Drug Screen Score: 0  Interpretation: Negative screen for possible drug use disorder    No exam data present     Physical Exam:     /82 (BP Location: Left arm, Patient Position: Sitting, Cuff Size: Standard)   Pulse 81   Temp (!) 96 6 °F (35 9 °C) (Temporal)   Resp 18   Ht 4' 4" (1 321 m)   Wt 67 7 kg (149 lb 3 2 oz)   LMP  (LMP Unknown)   SpO2 99%   BMI 38 79 kg/m²     Physical Exam  Constitutional:       Appearance: Normal appearance  She is normal weight  HENT:      Head: Normocephalic and atraumatic  Right Ear: Tympanic membrane, ear canal and external ear normal       Left Ear: Tympanic membrane, ear canal and external ear normal       Nose: Nose normal       Mouth/Throat:      Mouth: Mucous membranes are moist       Pharynx: Oropharynx is clear  Eyes:      Extraocular Movements: Extraocular movements intact  Conjunctiva/sclera: Conjunctivae normal       Pupils: Pupils are equal, round, and reactive to light  Cardiovascular:      Rate and Rhythm: Normal rate and regular rhythm  Pulses: Normal pulses  Dorsalis pedis pulses are 2+ on the right side and 2+ on the left side  Posterior tibial pulses are 2+ on the right side and 2+ on the left side  Heart sounds: Normal heart sounds  Pulmonary:      Breath sounds: Normal breath sounds  Abdominal:      General: Bowel sounds are normal       Palpations: Abdomen is soft  Musculoskeletal:      Cervical back: Normal range of motion and neck supple  Right foot: Normal range of motion  No deformity  Left foot: Decreased range of motion  Feet:    Feet:      Right foot:      Skin integrity: Skin integrity normal       Left foot:      Skin integrity: Skin integrity normal    Skin:     General: Skin is warm  Neurological:      General: No focal deficit present  Mental Status: She is alert and oriented to person, place, and time  Mental status is at baseline  Psychiatric:         Mood and Affect: Mood normal          Behavior: Behavior normal          Thought Content:  Thought content normal          Judgment: Judgment normal           Rehab DO Katiuska

## 2022-09-07 NOTE — PATIENT INSTRUCTIONS
Medicare Preventive Visit Patient Instructions  Thank you for completing your Welcome to Medicare Visit or Medicare Annual Wellness Visit today  Your next wellness visit will be due in one year (9/8/2023)  The screening/preventive services that you may require over the next 5-10 years are detailed below  Some tests may not apply to you based off risk factors and/or age  Screening tests ordered at today's visit but not completed yet may show as past due  Also, please note that scanned in results may not display below  Preventive Screenings:  Service Recommendations Previous Testing/Comments   Colorectal Cancer Screening  * Colonoscopy    * Fecal Occult Blood Test (FOBT)/Fecal Immunochemical Test (FIT)  * Fecal DNA/Cologuard Test  * Flexible Sigmoidoscopy Age: 39-70 years old   Colonoscopy: every 10 years (may be performed more frequently if at higher risk)  OR  FOBT/FIT: every 1 year  OR  Cologuard: every 3 years  OR  Sigmoidoscopy: every 5 years  Screening may be recommended earlier than age 39 if at higher risk for colorectal cancer  Also, an individualized decision between you and your healthcare provider will decide whether screening between the ages of 74-80 would be appropriate  Colonoscopy: 11/02/2017  FOBT/FIT: Not on file  Cologuard: Not on file  Sigmoidoscopy: Not on file          Breast Cancer Screening Age: 36 years old  Frequency: every 1-2 years  Not required if history of left and right mastectomy Mammogram: 06/29/2022    Screening Current   Cervical Cancer Screening Between the ages of 21-29, pap smear recommended once every 3 years  Between the ages of 33-67, can perform pap smear with HPV co-testing every 5 years     Recommendations may differ for women with a history of total hysterectomy, cervical cancer, or abnormal pap smears in past  Pap Smear: Not on file    Screening Not Indicated   Hepatitis C Screening Once for adults born between 1945 and 1965  More frequently in patients at high risk for Hepatitis C Hep C Antibody: 02/23/2022    Screening Current   Diabetes Screening 1-2 times per year if you're at risk for diabetes or have pre-diabetes Fasting glucose: 90 mg/dL (2/23/2022)  A1C: No results in last 5 years (No results in last 5 years)  Screening Current   Cholesterol Screening Once every 5 years if you don't have a lipid disorder  May order more often based on risk factors  Lipid panel: 02/23/2022    Screening Current     Other Preventive Screenings Covered by Medicare:  1  Abdominal Aortic Aneurysm (AAA) Screening: covered once if your at risk  You're considered to be at risk if you have a family history of AAA  2  Lung Cancer Screening: covers low dose CT scan once per year if you meet all of the following conditions: (1) Age 50-69; (2) No signs or symptoms of lung cancer; (3) Current smoker or have quit smoking within the last 15 years; (4) You have a tobacco smoking history of at least 20 pack years (packs per day multiplied by number of years you smoked); (5) You get a written order from a healthcare provider  3  Glaucoma Screening: covered annually if you're considered high risk: (1) You have diabetes OR (2) Family history of glaucoma OR (3)  aged 48 and older OR (3)  American aged 72 and older  3  Osteoporosis Screening: covered every 2 years if you meet one of the following conditions: (1) You're estrogen deficient and at risk for osteoporosis based off medical history and other findings; (2) Have a vertebral abnormality; (3) On glucocorticoid therapy for more than 3 months; (4) Have primary hyperparathyroidism; (5) On osteoporosis medications and need to assess response to drug therapy  · Last bone density test (DXA Scan): 07/22/2020   5  HIV Screening: covered annually if you're between the age of 15-65  Also covered annually if you are younger than 13 and older than 72 with risk factors for HIV infection   For pregnant patients, it is covered up to 3 times per pregnancy  Immunizations:  Immunization Recommendations   Influenza Vaccine Annual influenza vaccination during flu season is recommended for all persons aged >= 6 months who do not have contraindications   Pneumococcal Vaccine   * Pneumococcal conjugate vaccine = PCV13 (Prevnar 13), PCV15 (Vaxneuvance), PCV20 (Prevnar 20)  * Pneumococcal polysaccharide vaccine = PPSV23 (Pneumovax) Adults 25-60 years old: 1-3 doses may be recommended based on certain risk factors  Adults 72 years old: 1-2 doses may be recommended based off what pneumonia vaccine you previously received   Hepatitis B Vaccine 3 dose series if at intermediate or high risk (ex: diabetes, end stage renal disease, liver disease)   Tetanus (Td) Vaccine - COST NOT COVERED BY MEDICARE PART B Following completion of primary series, a booster dose should be given every 10 years to maintain immunity against tetanus  Td may also be given as tetanus wound prophylaxis  Tdap Vaccine - COST NOT COVERED BY MEDICARE PART B Recommended at least once for all adults  For pregnant patients, recommended with each pregnancy  Shingles Vaccine (Shingrix) - COST NOT COVERED BY MEDICARE PART B  2 shot series recommended in those aged 48 and above     Health Maintenance Due:      Topic Date Due    Breast Cancer Screening: Mammogram  06/29/2023    Hepatitis C Screening  Completed     Immunizations Due:      Topic Date Due    COVID-19 Vaccine (4 - Booster for Moderna series) 03/11/2022    Influenza Vaccine (1) 09/01/2022     Advance Directives   What are advance directives? Advance directives are legal documents that state your wishes and plans for medical care  These plans are made ahead of time in case you lose your ability to make decisions for yourself  Advance directives can apply to any medical decision, such as the treatments you want, and if you want to donate organs  What are the types of advance directives?   There are many types of advance directives, and each state has rules about how to use them  You may choose a combination of any of the following:  · Living will: This is a written record of the treatment you want  You can also choose which treatments you do not want, which to limit, and which to stop at a certain time  This includes surgery, medicine, IV fluid, and tube feedings  · Durable power of  for healthcare [facility]): This is a written record that states who you want to make healthcare choices for you when you are unable to make them for yourself  This person, called a proxy, is usually a family member or a friend  You may choose more than 1 proxy  · Do not resuscitate (DNR) order:  A DNR order is used in case your heart stops beating or you stop breathing  It is a request not to have certain forms of treatment, such as CPR  A DNR order may be included in other types of advance directives  · Medical directive: This covers the care that you want if you are in a coma, near death, or unable to make decisions for yourself  You can list the treatments you want for each condition  Treatment may include pain medicine, surgery, blood transfusions, dialysis, IV or tube feedings, and a ventilator (breathing machine)  · Values history: This document has questions about your views, beliefs, and how you feel and think about life  This information can help others choose the care that you would choose  Why are advance directives important? An advance directive helps you control your care  Although spoken wishes may be used, it is better to have your wishes written down  Spoken wishes can be misunderstood, or not followed  Treatments may be given even if you do not want them  An advance directive may make it easier for your family to make difficult choices about your care     Weight Management   Why it is important to manage your weight:  Being overweight increases your risk of health conditions such as heart disease, high blood pressure, type 2 diabetes, and certain types of cancer  It can also increase your risk for osteoarthritis, sleep apnea, and other respiratory problems  Aim for a slow, steady weight loss  Even a small amount of weight loss can lower your risk of health problems  How to lose weight safely:  A safe and healthy way to lose weight is to eat fewer calories and get regular exercise  You can lose up about 1 pound a week by decreasing the number of calories you eat by 500 calories each day  Healthy meal plan for weight management:  A healthy meal plan includes a variety of foods, contains fewer calories, and helps you stay healthy  A healthy meal plan includes the following:  · Eat whole-grain foods more often  A healthy meal plan should contain fiber  Fiber is the part of grains, fruits, and vegetables that is not broken down by your body  Whole-grain foods are healthy and provide extra fiber in your diet  Some examples of whole-grain foods are whole-wheat breads and pastas, oatmeal, brown rice, and bulgur  · Eat a variety of vegetables every day  Include dark, leafy greens such as spinach, kale, delmer greens, and mustard greens  Eat yellow and orange vegetables such as carrots, sweet potatoes, and winter squash  · Eat a variety of fruits every day  Choose fresh or canned fruit (canned in its own juice or light syrup) instead of juice  Fruit juice has very little or no fiber  · Eat low-fat dairy foods  Drink fat-free (skim) milk or 1% milk  Eat fat-free yogurt and low-fat cottage cheese  Try low-fat cheeses such as mozzarella and other reduced-fat cheeses  · Choose meat and other protein foods that are low in fat  Choose beans or other legumes such as split peas or lentils  Choose fish, skinless poultry (chicken or turkey), or lean cuts of red meat (beef or pork)  Before you cook meat or poultry, cut off any visible fat  · Use less fat and oil  Try baking foods instead of frying them   Add less fat, such as margarine, sour cream, regular salad dressing and mayonnaise to foods  Eat fewer high-fat foods  Some examples of high-fat foods include french fries, doughnuts, ice cream, and cakes  · Eat fewer sweets  Limit foods and drinks that are high in sugar  This includes candy, cookies, regular soda, and sweetened drinks  Exercise:  Exercise at least 30 minutes per day on most days of the week  Some examples of exercise include walking, biking, dancing, and swimming  You can also fit in more physical activity by taking the stairs instead of the elevator or parking farther away from stores  Ask your healthcare provider about the best exercise plan for you  © Copyright [employer] 2018 Information is for End User's use only and may not be sold, redistributed or otherwise used for commercial purposes   All illustrations and images included in CareNotes® are the copyrighted property of A D A M , Inc  or [address] [place] [address]

## 2022-09-12 ENCOUNTER — HOSPITAL ENCOUNTER (OUTPATIENT)
Dept: RADIOLOGY | Facility: HOSPITAL | Age: 77
Discharge: HOME | End: 2022-09-12
Attending: UROLOGY
Payer: COMMERCIAL

## 2022-09-12 DIAGNOSIS — M79.672 CHRONIC FOOT PAIN, LEFT: ICD-10-CM

## 2022-09-12 DIAGNOSIS — N20.0 NEPHROLITHIASIS: ICD-10-CM

## 2022-09-12 DIAGNOSIS — G89.29 CHRONIC FOOT PAIN, LEFT: ICD-10-CM

## 2022-09-12 PROCEDURE — 73630 X-RAY EXAM OF FOOT: CPT

## 2022-09-12 PROCEDURE — 74018 RADEX ABDOMEN 1 VIEW: CPT

## 2022-09-13 PROBLEM — E78.2 HYPERLIPIDEMIA, MIXED: Status: ACTIVE | Noted: 2022-09-13

## 2022-09-13 PROBLEM — M79.672 CHRONIC FOOT PAIN, LEFT: Status: ACTIVE | Noted: 2022-09-13

## 2022-09-13 PROBLEM — K21.9 GASTROESOPHAGEAL REFLUX DISEASE WITHOUT ESOPHAGITIS: Status: ACTIVE | Noted: 2022-09-13

## 2022-09-13 PROBLEM — G89.29 CHRONIC FOOT PAIN, LEFT: Status: ACTIVE | Noted: 2022-09-13

## 2022-09-13 NOTE — ASSESSMENT & PLAN NOTE
Saw urologist in September 2021 - to return in one year - has to schedule follow-up appointment  Scheduled to repeat KUB on 9/12/22    CT scan in April 2021 revealed 6 mm nonobstructing right renal stone     Patient wanted continued observation   She was to continue to take the Urocit-K and try to increase her fluids but no longer feels Urocit-K is needed so stopped taking it  Repeat KUB in one year with urology

## 2022-09-13 NOTE — ASSESSMENT & PLAN NOTE
S/p left foot surgery - patient told me she has piece of plastic near achilles tendon  Send for xray foot   Refer to podiatry

## 2022-09-13 NOTE — ASSESSMENT & PLAN NOTE
ASCVD risk 22 3%  Patient refused to be placed on statin - explained the risks of not having cholesterol levels under control including heart attack, stroke and even death  Strongly encouraged low cholesterol diet

## 2022-09-13 NOTE — ASSESSMENT & PLAN NOTE
Will start the patient on a trial of PPI  Advised the patient decreasing acidic foods such as tomato sauce and spicy food and caffeinated products  Avoid NSAIDs, alcohol and tobacco smoking  Advised the patient to eat at least 3-4 hours prior to sleeping and to elevate head of bed     Do not lay down right after eating or drinking    Send to GI - patient requesting EGD since she had history of H  pylori many years ago (per patient)

## 2022-09-16 ENCOUNTER — OFFICE VISIT (OUTPATIENT)
Dept: GASTROENTEROLOGY | Facility: MEDICAL CENTER | Age: 77
End: 2022-09-16
Payer: COMMERCIAL

## 2022-09-16 VITALS
SYSTOLIC BLOOD PRESSURE: 127 MMHG | TEMPERATURE: 97.8 F | WEIGHT: 148 LBS | HEART RATE: 56 BPM | BODY MASS INDEX: 38.48 KG/M2 | DIASTOLIC BLOOD PRESSURE: 78 MMHG

## 2022-09-16 DIAGNOSIS — Z12.11 COLON CANCER SCREENING: ICD-10-CM

## 2022-09-16 DIAGNOSIS — Z80.0 FAMILY HISTORY OF PANCREATIC CANCER: ICD-10-CM

## 2022-09-16 DIAGNOSIS — R14.0 BLOATING: ICD-10-CM

## 2022-09-16 DIAGNOSIS — K21.9 GASTROESOPHAGEAL REFLUX DISEASE WITHOUT ESOPHAGITIS: ICD-10-CM

## 2022-09-16 DIAGNOSIS — K59.00 CONSTIPATION, UNSPECIFIED CONSTIPATION TYPE: ICD-10-CM

## 2022-09-16 DIAGNOSIS — R68.81 EARLY SATIETY: Primary | ICD-10-CM

## 2022-09-16 PROCEDURE — 99204 OFFICE O/P NEW MOD 45 MIN: CPT | Performed by: STUDENT IN AN ORGANIZED HEALTH CARE EDUCATION/TRAINING PROGRAM

## 2022-09-16 PROCEDURE — 1160F RVW MEDS BY RX/DR IN RCRD: CPT | Performed by: STUDENT IN AN ORGANIZED HEALTH CARE EDUCATION/TRAINING PROGRAM

## 2022-09-16 RX ORDER — POLYETHYLENE GLYCOL 3350 17 G/17G
17 POWDER, FOR SOLUTION ORAL DAILY
Qty: 578 G | Refills: 5 | Status: SHIPPED | OUTPATIENT
Start: 2022-09-16

## 2022-09-16 NOTE — PROGRESS NOTES
[facility] - Outpatient Consultation  Yasemin Kan 76 y o  female MRN: [id number]  Encounter: [id number]          ASSESSMENT AND PLAN:    77F with GERD, hearling loss, BMI 45 here for concerns about family history of pancreatic cancer  Given 2 first degree relatives with pancreatic cancer and her symptoms of bloating and early satiety, reasonable to pursue CT pancreas protocol  1  Early satiety  2  Bloating  3  Family history of pancreatic cancer  - CT abdomen pelvis w contrast; Future  - Basic metabolic panel; Future    4  Gastroesophageal reflux disease without esophagitis  Mild symptoms related to dietary indiscretion  No indication for EGD at this time  - Ambulatory Referral to Gastroenterology    5  Constipation, unspecified constipation type  - polyethylene glycol (GLYCOLAX) 17 GM/SCOOP powder; Take 17 g by mouth daily  Dispense: 578 g; Refill: 5    6  Colon cancer screening  No adenomas on  colonoscopy  Sister with late onset colon cancer so teresa not escalate screening  Consider repeat colonoscopy  if overall health good or sooner if symptoms develop      ______________________________________________________________________    HPI:    Wants to know that she is cancer free  2 half brother just  rapidly of pancreatic cancer and wants to be checked    Gets rare heartburn  Usually related to eating spicy or tomato based things  Intermittent bloating  Occasional early satiety  No weight loss  Has some mild long standing constipation with taking calcium supplement  No blood in stool  Last EGD >10 years ago  Was diagnosed with H pylori and treated with confirmation of erradication  2 half brothers with pancreas cancer at 67yo and other brother was younger  Sister with colon cancer at 72 or older    Colonoscopy 2017 severe diverticulosis  2 polyps removed that were not adenomas   Repeat 10 years    REVIEW OF SYSTEMS:    CONSTITUTIONAL: Denies any fever, chills, rigors, and weight loss  HEENT: No earache or tinnitus  Denies hearing loss or visual disturbances  CARDIOVASCULAR: No chest pain or palpitations  RESPIRATORY: Denies any cough, hemoptysis, shortness of breath or dyspnea on exertion  GASTROINTESTINAL: As noted in the History of Present Illness  GENITOURINARY: No problems with urination  Denies any hematuria or dysuria  NEUROLOGIC: No dizziness or vertigo, denies headaches  MUSCULOSKELETAL: Denies any muscle or joint pain  SKIN: Denies skin rashes or itching  ENDOCRINE: Denies excessive thirst  Denies intolerance to heat or cold  PSYCHOSOCIAL: Denies depression or anxiety  Denies any recent memory loss  Historical Information   Past Medical History:   Diagnosis Date    Arthritis     Chronic pain disorder     sciatic    GERD (gastroesophageal reflux disease)     Hydronephrosis with renal and ureteral calculus obstruction 12/23/2015    Kidney stone     Kidney stone     Nephrolithiasis     Osteoarthritis     Osteoporosis     Sinus arrhythmia      Past Surgical History:   Procedure Laterality Date    CHOLECYSTECTOMY      FL RETROGRADE PYELOGRAM  3/7/2019    KIDNEY STONE SURGERY      KNEE ARTHROSCOPY Left     PA COLONOSCOPY FLX DX W/COLLJ SPEC WHEN PFRMD N/A 11/2/2017    Procedure: COLONOSCOPY with polypectomy x2;  Surgeon: Loi Granger MD;  Location: [facility];   Service: Gastroenterology    PA CYSTOURETHROSCOPY,URETER CATHETER Left 3/7/2019    Procedure: CYSTOSCOPY, URETEROSCOPY WITH LASER, BASKET STONE EXTRACTION, RETROGRADE PYELOGRAM WITH INSERTION STENT URETERAL;  Surgeon: Eric Srinivasan MD;  Location: [facility];  Service: Urology    TUBAL LIGATION       Social History   Social History     Substance and Sexual Activity   Alcohol Use Not Currently     Social History     Substance and Sexual Activity   Drug Use No     Social History     Tobacco Use   Smoking Status Never Smoker   Smokeless Tobacco Never Used     Family History   Problem Relation Age of Onset    No Known Problems Mother     Mental illness Father     Prostate cancer Father     Colon cancer Sister     No Known Problems Sister     No Known Problems Sister     No Known Problems Sister     Ovarian cancer Daughter     Mental illness Daughter     No Known Problems Maternal Grandmother     No Known Problems Maternal Grandfather     No Known Problems Paternal Grandmother     No Known Problems Paternal Grandfather        Meds/Allergies       Current Outpatient Medications:     acetaminophen (TYLENOL) 650 mg CR tablet    meclizine (ANTIVERT) 12 5 MG tablet    Multiple Vitamins-Minerals (CENTRUM ADULTS PO)    omeprazole (PriLOSEC) 20 mg delayed release capsule    OYSCO 500 + D 500-200 MG-UNIT per tablet    Allergies   Allergen Reactions    Diclofenac Sodium      Other reaction(s): GI Upset    Meperidine     Tramadol      Other reaction(s): GI Upset           Objective   /78 (BP Location: Right arm)   Pulse 56   Temp 97 8 °F (36 6 °C)   Wt 67 1 kg (148 lb)   LMP  (LMP Unknown)   BMI 38 48 kg/m²     PHYSICAL EXAM:      General Appearance:   Alert, cooperative, no distress   HEENT:   Normocephalic, atraumatic, anicteric  Neck:  Supple, symmetrical, trachea midline   Lungs:   Clear to auscultation bilaterally; no rales, rhonchi or wheezing; respirations unlabored    Heart[de-identified]   Regular rate and rhythm; no murmur, rub, or gallop  Abdomen:   Soft, non-tender, non-distended; normal bowel sounds; no masses, no organomegaly    Genitalia:   Deferred    Rectal:   Deferred    Extremities:  No cyanosis, clubbing or edema    Pulses:  2+ and symmetric    Skin:  No jaundice, rashes, or lesions    Lymph nodes:  No palpable cervical lymphadenopathy        Lab Results:   No visits with results within 1 Day(s) from this visit     Latest known visit with results is:   Office Visit on 03/02/2022   Component Date Value    Case Report 03/02/2022 Value:Surgical Pathology Report                         Case: [id number]                                   Authorizing Provider:  Aline Burkitt, MD     Collected:           03/02/2022 1321              Ordering Location:     [place] [address]      Received:            03/02/2022 [address]                                                          Pathologist:           Joe Justin DO                                                     Specimen:    Endometrium                                                                                Final Diagnosis 03/02/2022                      Value: This result contains rich text formatting which cannot be displayed here   Additional Information 03/02/2022                      Value: This result contains rich text formatting which cannot be displayed here  Gera Cancino 03/02/2022                      Value: This result contains rich text formatting which cannot be displayed here  Radiology Results:   No results found

## 2022-09-23 ENCOUNTER — APPOINTMENT (OUTPATIENT)
Dept: LAB | Facility: HOSPITAL | Age: 77
End: 2022-09-23
Attending: STUDENT IN AN ORGANIZED HEALTH CARE EDUCATION/TRAINING PROGRAM
Payer: COMMERCIAL

## 2022-09-23 DIAGNOSIS — Z80.0 FAMILY HISTORY OF PANCREATIC CANCER: ICD-10-CM

## 2022-09-23 DIAGNOSIS — R68.81 EARLY SATIETY: ICD-10-CM

## 2022-09-23 DIAGNOSIS — R14.0 BLOATING: ICD-10-CM

## 2022-09-23 LAB
ANION GAP SERPL CALCULATED.3IONS-SCNC: 7 MMOL/L (ref 5–14)
BUN SERPL-MCNC: 21 MG/DL (ref 5–25)
CALCIUM SERPL-MCNC: 8.8 MG/DL (ref 8.4–10.2)
CHLORIDE SERPL-SCNC: 103 MMOL/L (ref 96–108)
CO2 SERPL-SCNC: 28 MMOL/L (ref 21–32)
CREAT SERPL-MCNC: 0.74 MG/DL (ref 0.6–1.2)
GFR SERPL CREATININE-BSD FRML MDRD: 77 ML/MIN/1.73SQ M
GLUCOSE P FAST SERPL-MCNC: 90 MG/DL (ref 70–99)
POTASSIUM SERPL-SCNC: 3.5 MMOL/L (ref 3.5–5.3)
SODIUM SERPL-SCNC: 138 MMOL/L (ref 135–147)

## 2022-09-23 PROCEDURE — 80048 BASIC METABOLIC PNL TOTAL CA: CPT

## 2022-09-23 PROCEDURE — 36415 COLL VENOUS BLD VENIPUNCTURE: CPT

## 2022-09-30 ENCOUNTER — HOSPITAL ENCOUNTER (OUTPATIENT)
Dept: CT IMAGING | Facility: HOSPITAL | Age: 77
End: 2022-09-30
Attending: STUDENT IN AN ORGANIZED HEALTH CARE EDUCATION/TRAINING PROGRAM
Payer: COMMERCIAL

## 2022-09-30 DIAGNOSIS — R14.0 BLOATING: ICD-10-CM

## 2022-09-30 DIAGNOSIS — R68.81 EARLY SATIETY: ICD-10-CM

## 2022-09-30 DIAGNOSIS — Z80.0 FAMILY HISTORY OF PANCREATIC CANCER: ICD-10-CM

## 2022-09-30 PROCEDURE — 74177 CT ABD & PELVIS W/CONTRAST: CPT

## 2022-09-30 PROCEDURE — G1004 CDSM NDSC: HCPCS

## 2022-09-30 RX ADMIN — IOHEXOL 85 ML: 350 INJECTION, SOLUTION INTRAVENOUS at 11:08

## 2022-10-06 ENCOUNTER — TELEPHONE (OUTPATIENT)
Dept: FAMILY MEDICINE CLINIC | Facility: CLINIC | Age: 77
End: 2022-10-06

## 2022-10-06 NOTE — TELEPHONE ENCOUNTER
PCP SIGNATURE NEEDED FOR [facility] Prednisone 10mg FORM RECEIVED VIA FAX AND PLACED IN PCP FOLDER TO BE DELIVERED AT ASSIGNED TIMES.

## 2022-10-14 NOTE — TELEPHONE ENCOUNTER
Received form for refill of prednisone 10 mg which was prescribed to her in May 2022. There is no indication for medication at this time. If patient feels she needs prednisone, she must come in for further evaluation. Thanks

## 2022-11-01 ENCOUNTER — TELEPHONE (OUTPATIENT)
Dept: FAMILY MEDICINE CLINIC | Facility: CLINIC | Age: 77
End: 2022-11-01

## 2022-11-01 NOTE — TELEPHONE ENCOUNTER
11/01/22    PCP [address] [place] [address]  FORM RECEIVED VIA FAX AND PLACED IN PCP FOLDER TO BE DELIVERED AT ASSIGNED TIMES        Med: OMEPRAZOLE 20MG

## 2022-11-04 ENCOUNTER — IMMUNIZATIONS (OUTPATIENT)
Dept: FAMILY MEDICINE CLINIC | Facility: CLINIC | Age: 77
End: 2022-11-04

## 2022-11-04 DIAGNOSIS — Z23 ENCOUNTER FOR IMMUNIZATION: Primary | ICD-10-CM

## 2022-11-09 ENCOUNTER — OFFICE VISIT (OUTPATIENT)
Dept: FAMILY MEDICINE CLINIC | Facility: CLINIC | Age: 77
End: 2022-11-09

## 2022-11-09 VITALS
HEART RATE: 75 BPM | DIASTOLIC BLOOD PRESSURE: 82 MMHG | RESPIRATION RATE: 18 BRPM | BODY MASS INDEX: 34.88 KG/M2 | OXYGEN SATURATION: 95 % | WEIGHT: 150.7 LBS | HEIGHT: 55 IN | TEMPERATURE: 97.8 F | SYSTOLIC BLOOD PRESSURE: 160 MMHG

## 2022-11-09 DIAGNOSIS — M54.2 CERVICAL PAIN: Primary | ICD-10-CM

## 2022-11-09 RX ORDER — LIDOCAINE 50 MG/G
1 PATCH TOPICAL DAILY
Qty: 5 PATCH | Refills: 0 | Status: SHIPPED | OUTPATIENT
Start: 2022-11-09 | End: 2022-11-18 | Stop reason: SDUPTHER

## 2022-11-09 NOTE — PATIENT INSTRUCTIONS
Dolor en el nedra   CUIDADO AMBULATORIO:   Dolor en el nedra podría ser repentino y aumentar rápidamente  O, en vez de esto, es posible que usted sienta que el dolor aumenta con el [place]  El dolor de nedra podría desaparecer en unos días o semanas, o podría continuar por meses  El dolor podría ir y venir, o podría empeorar con ciertos movimientos  El dolor podría sentirse solamente en singh nedra, o podría pasarse a sendy brazos, espalda u hombros  También podría sentir dolor que comienza en otra área de singh cuerpo y se pasa a singh nedra  Algunos tipos de dolor de nedra son permanentes  Busque atención médica de inmediato si:  Usted tiene brielle lesión que le provoca dolor en el nedra y dolor punzante debajo de sendy brazos o piernas  Singh dolor de nedra se agrava repentinamente  Usted tiene dolor de nedra junto con entumecimiento, hormigueo o debilidad en sendy brazos o piernas  Usted tiene rigidez [employer], dolor de [place]  Comuníquese con singh médico si:  Usted tiene nuevos síntomas o sendy síntomas empeoran  Sendy síntomas continúan aún después del [place]  Usted tiene preguntas o inquietudes acerca de singh condición o cuidado  El tratamiento podría incluir cualquiera de lo siguiente, dependiendo de lo que esté provocando singh dolor:  Los medicamentos podrían ser recetados o recomendados para el dolor por singh médico  Es posible que usted necesite medicamento para tratar el dolor de nervios o para detener los espasmos musculares  También podrían darle medicamentos para reducir la inflamación  Singh médico podría inyectar medicamento a un nervio para bloquear el dolor  [place] de venta sin receta o el acetaminofén podrían recomendarse para tratar el dolor o la inflamación leve  La tracción se [place] para aliviar la presión de los nervios  Le estirarán la shae cuidadosamente alejándola de singh nedra  [place] estira los músculos y los ligamentos y le da más espacio a singh columna   Singh médico le indicará qué tipo de tracción ayudará a singh dolor de nedra  No  use dispositivos de tracción en singh casa a menos que se lo indique singh médico     La cirugía podría ser necesaria si el dolor es severo o si otros tratamientos no funcionan  La cirugía no ayudará para todos los tipos de [id number]  Usted podría necesitar brielle cirugía para estabilizar un hueso fracturado o para remover un tumor  La cirugía también podría usarse para ampliar la columna vertebral estrecha o para remover un disco [employer] del nedra  Controle o evite el dolor de nedra:  Repose el nedra bao se lo indiquen  No realice movimientos repentinos, bao voltear singh shae rápidamente  Singh médico podría recomendarle que use un collarín cervical por un periodo corto de [place]  El collarín evitará que usted [place] singh [place]  [place] ayudará a darle tiempo a singh nedra para que sane si es que brielle lesión está provocando singh dolor  Pregunte a singh médico cuándo puede volver a practicar deportes o realizar otras actividades cotidianas  Aplique calor según indicaciones  El calor ayuda a aliviar el dolor y la inflamación  Use brielle envoltura caliente o empape brielle toalla pequeña en agua tibia  Escurra el exceso de [place]  Aplique la venda caliente o la toalla por 20 minutos cada hora o bao se le indique  Aplique hielo según las indicaciones  El hielo ayuda a aliviar el dolor y la inflamación, y a evitar daño al tejido  Use un paquete con hielo o ponga hielo en brielle bolsa  Cubra el paquete con hielo o la espalda con brielle toalla antes de aplicarlo en singh nedra  Aplique el paquete de hielo o la bolsa por 15 minutos cada hora o bao se lo indiquen  Singh médico puede indicarle con qué frecuencia aplicar el hielo  Heather ejercicios para el nedra bao se lo indiquen  Los ejercicios para el nedra ayudan a [employer] y a aumentar el rango de [place]  Singh médico le indicará cuáles ejercicios son adecuados para usted   Podría darle instrucciones o podría recomendarle que acuda con un fisioterapeuta  Singh médico o terapeuta pueden asegurarse que usted esté haciendo estos ejercicios correctamente  Mantenga brielle buena postura  Trate de mantener singh shae y hombros levantados cuando se siente  Si usted trabaja en frente de brielle computadora, asegúrese de que el monitor esté al nivel adecuado  Usted no debería tener que mirar hacia abajo para scotty la pantalla  Tampoco debe tener que inclinarse hacia adelante para poder leer lo que está en la pantalla  Asegúrese de que singh teclado, ratón y otros artículos de computadora estén colocados en donde usted no tenga que extender karely hombros para alcanzarlos  Levántese a menudo si usted trabaja frente a brielle computadora o permanece sentado nicholas largos períodos  Estírese o camine para [employer] de singh nedra relajados  Acuda a karely consultas de control con singh médico según le indicaron: Singh médico podría referirlo a un especialista si singh dolor no mejora con el tratamiento  Anote karely preguntas para que se acuerde de hacerlas nicholas karely visitas  © Copyright [employer] 2022 Information is for End User's use only and may not be sold, redistributed or otherwise used for commercial purposes  All illustrations and images included in CareNotes® are the copyrighted property of A D A Lootsie  or [address] [place] [address] es sólo para uso en educación  Singh intención no es darle un consejo médico sobre enfermedades o tratamientos  Colsulte con singh Joane Gal farmacéutico antes de seguir cualquier régimen médico para saber si es seguro y efectivo para usted  Ejercicios para el nedra   CUIDADO AMBULATORIO:   Los ejercicios para el nedra ayudan a reducir el dolor de nedra y, al [employer], lo fortalecen y mejoran singh movimiento  Los ejercicios para el nedra también ayudan a prevenir problemas de nedra a Alessia Marley    Lo que necesita saber acerca de los ejercicios para el nedra:  Clint Ghosh a diario o con la frecuencia indicada por morin médico     Muévase lentamente, con cuidado y suavemente  Evite movimientos rápidos o bruscos  Póngase de pie y siéntese de la forma que morin médico le ha [place]  La buena postura puede llegar a reducir morin dolor de nedra  Revise morin postura con frecuencia, aún cuando no esté haciendo karely ejercicios de nedra  Cómo realizar ejercicios para el nedra de Am[place] anaya:  Posición de ejercicio: Puede sentarse o estar parado mientras realiza los ejercicios para el nedra  Vivienne de frente  Los hombros deben estar rectos y [place], con [place] [facility]  Inclinación de Michel Acosta atrás: Incline morin shae suavemente tratando que morin mentón toque morin pecho  Morin médico puede incluso pedirle que empuje la parte de atrás de morin nedra para ayudar a inclinar morin shae  Levante morin barbilla hasta la posición inicial  Incline morin shae hacia atrás lo más que pueda de [place] que quede mirando hacia el ayaka raso  Es posible que morin médico le pida que levante el mentón para así ayudar a inclinar morin Jeny Perez atrás  Regrese morin shae a la posición inicial          Inclinaciones de shae, de lado a lado: Incline morin shae de manera que morin oreja quede cerca de morin hombro  Luego incline morin shae hacia morin otro hombro  Giros de shae: Gire morin shae bao si fuera a mirar sobre el hombro  Incline morin mentón hacia abajo y trate de que toque morin hombro  No levante morin hombro hasta que toque morin mentón  Vivienne de frente otra vez  Heather lo mismo del otro lado  Giros de shae: Despacio, lleve la [address]  A continuación, gire la shae hacia la derecha  La oreja debe quedar ubicada por encima del hombro  Mantenga esta posición por 5 segundos  Gire la shae otra vez hacia el pecho y, White, hacia la izquierda a la misma posición  Sostenga está posición por 5 segundos   Con cuidado, gire la shae hacia atrás en círculo en el sentido de las manecillas del [place] [address] y repita 3 veces  A continuación, mueve la shae en la dirección contraria (en el sentido contrario de las manecillas del reloj) en círculo 3 veces  No encoja los hombros hacia arriba mientras realiza kassidy ejercicio  Comuníquese con singh médico si:  Singh dolor no mejora o Litzy Lorenzo  Usted tiene preguntas o inquietudes acerca de singh afección, cuidado o programa de ejercicios  © Copyright [employer] [employer] 2022 Information is for End User's use only and may not be sold, redistributed or otherwise used for commercial purposes  All illustrations and images included in CareNotes® are the copyrighted property of A D A M , Inc  or [address] [place] [address] es sólo para uso en educación  Singh intención no es darle un consejo médico sobre enfermedades o tratamientos  Colsulte con singh Kermitt Console farmacéutico antes de seguir cualquier régimen médico para saber si es seguro y efectivo para usted

## 2022-11-09 NOTE — PROGRESS NOTES
Name: Don Balderas      :       MRN: [id number]  Encounter Provider: Jeny Boothe MD  Encounter Date: 2022   Encounter department: [facility]     1  Cervical pain  Assessment & Plan:  Has had a few months of cervical pain  No trauma  PLAN:   - Consider pain management and exercise, if patient doesn't improve consider more formal PT and potentially gabapentin as pain is described as burning and shooting pain    Orders:  -     Ambulatory Referral to Physical Therapy; Future  -     lidocaine (Lidoderm) 5 %; Apply 1 patch topically daily Remove & Discard patch within 12 hours or as directed by MD España Carolina is a 76year old female with lumbar spondylosis and GERD presenting today for cervical pain that has been ongoing for the last few months  Back Pain  This is a chronic problem  The current episode started more than 1 month ago  The problem occurs constantly  The problem has been gradually worsening since onset  Pain location: cervical  The quality of the pain is described as burning and aching  The pain is at a severity of 8/10  The pain is severe  Stiffness is present at night  Pertinent negatives include no abdominal pain, chest pain, dysuria or fever  She has tried analgesics, ice and NSAIDs for the symptoms  Review of Systems   Constitutional: Negative for chills and fever  HENT: Negative for ear pain and sore throat  Eyes: Negative for pain and visual disturbance  Respiratory: Negative for cough and shortness of breath  Cardiovascular: Negative for chest pain and palpitations  Gastrointestinal: Negative for abdominal pain and vomiting  Genitourinary: Negative for dysuria and hematuria  Musculoskeletal: Negative for arthralgias and back pain  Skin: Negative for color change and rash  Neurological: Negative for seizures and syncope     All other systems reviewed and are negative  Past Medical History:   Diagnosis Date   • Arthritis    • Chronic pain disorder     sciatic   • GERD (gastroesophageal reflux disease)    • Hydronephrosis with renal and ureteral calculus obstruction 12/23/2015   • Kidney stone    • Kidney stone    • Nephrolithiasis    • Osteoarthritis    • Osteoporosis    • Sinus arrhythmia      Past Surgical History:   Procedure Laterality Date   • CHOLECYSTECTOMY     • FL RETROGRADE PYELOGRAM  3/7/2019   • KIDNEY STONE SURGERY     • KNEE ARTHROSCOPY Left    • ND COLONOSCOPY FLX DX W/COLLJ SPEC WHEN PFRMD N/A 11/2/2017    Procedure: COLONOSCOPY with polypectomy x2;  Surgeon: Ya Muir MD;  Location: [facility];   Service: Gastroenterology   • ND CYSTOURETHROSCOPY,URETER CATHETER Left 3/7/2019    Procedure: CYSTOSCOPY, URETEROSCOPY WITH LASER, BASKET STONE EXTRACTION, RETROGRADE PYELOGRAM WITH INSERTION STENT URETERAL;  Surgeon: Shavon Macias MD;  Location: [facility];  Service: Urology   • TUBAL LIGATION       Family History   Problem Relation Age of Onset   • No Known Problems Mother    • Mental illness Father    • Prostate cancer Father    • Colon cancer Sister    • No Known Problems Sister    • No Known Problems Sister    • No Known Problems Sister    • Ovarian cancer Daughter    • Mental illness Daughter    • No Known Problems Maternal Grandmother    • No Known Problems Maternal Grandfather    • No Known Problems Paternal Grandmother    • No Known Problems Paternal Grandfather      Social History     Socioeconomic History   • Marital status: /Civil Union     Spouse name: None   • Number of children: 2   • Years of education: None   • Highest education level: None   Occupational History   • Occupation: Retired   Tobacco Use   • Smoking status: Never Smoker   • Smokeless tobacco: Never Used   Vaping Use   • Vaping Use: Never used   Substance and Sexual Activity   • Alcohol use: Not Currently   • Drug use: No   • Sexual activity: None   Other Topics Concern   • None   Social History Narrative    Daily caffeine consumption- 1-2 cups of coffee    Does not have living will    Denied:  History of exercises regularly    Denied:  History of domestic violence    No advance directives    Denied:  History of Power of  in existence    Flu shot: no     Social Determinants of Health     Financial Resource Strain: Low Risk    • Difficulty of Paying Living Expenses: Not hard at all   Food Insecurity: No Food Insecurity   • Worried About [employer] [employer] in the Last Year: Never true   • Ran Out of Food in the Last Year: Never true   Transportation Needs: No Transportation Needs   • Lack of Transportation (Medical): No   • Lack of Transportation (Non-Medical): No   Physical Activity: Not on file   Stress: No Stress Concern Present   • Feeling of Stress :  Only a little   Social Connections: Not on file   Intimate Partner Violence: Not At Risk   • Fear of Current or Ex-Partner: No   • Emotionally Abused: No   • Physically Abused: No   • Sexually Abused: No   Housing Stability: Low Risk    • Unable to Pay for Housing in the Last Year: No   • Number of Places Lived in the Last Year: 1   • Unstable Housing in the Last Year: No     Current Outpatient Medications on File Prior to Visit   Medication Sig   • acetaminophen (TYLENOL) 650 mg CR tablet Take 1 tablet (650 mg total) by mouth every 12 (twelve) hours as needed for mild pain   • meclizine (ANTIVERT) 12 5 MG tablet Take 1 tablet (12 5 mg total) by mouth every 12 (twelve) hours as needed for dizziness   • Multiple Vitamins-Minerals (CENTRUM ADULTS PO) Take by mouth   • omeprazole (PriLOSEC) 20 mg delayed release capsule TAKE 1 CAPSULE BY MOUTH EVERY DAY   • OYSCO 500 + D 500-200 MG-UNIT per tablet Take 1 tablet by mouth 2 (two) times a day with meals   • polyethylene glycol (GLYCOLAX) 17 GM/SCOOP powder Take 17 g by mouth daily     Allergies   Allergen Reactions   • Diclofenac Sodium      Other reaction(s): GI Upset • Meperidine    • Tramadol      Other reaction(s): GI Upset     Immunization History   Administered Date(s) Administered   • COVID-19 MODERNA VACC 0 5 ML IM 02/11/2021, 03/10/2021, 11/11/2021   • H1N1, All Formulations 01/15/2010   • INFLUENZA 10/01/2009   • Influenza Split High Dose Preservative Free IM 10/16/2014, 11/20/2015, 10/22/2019   • Influenza, high dose seasonal 0 7 mL 10/16/2020, 02/09/2022, 11/04/2022   • Pneumococcal Conjugate 13-Valent 11/20/2015   • Pneumococcal Polysaccharide PPV23 09/30/2011       Objective     /82 (BP Location: Left arm, Patient Position: Standing, Cuff Size: Standard)   Pulse 75   Temp 97 8 °F (36 6 °C) (Temporal)   Resp 18   Ht 4' 4" (1 321 m)   Wt 68 4 kg (150 lb 11 2 oz)   LMP  (LMP Unknown)   SpO2 95%   BMI 39 18 kg/m²     Physical Exam  Constitutional:       Appearance: Normal appearance  HENT:      Head: Normocephalic and atraumatic  Eyes:      Extraocular Movements: Extraocular movements intact  Pupils: Pupils are equal, round, and reactive to light  Cardiovascular:      Rate and Rhythm: Normal rate and regular rhythm  Pulses: Normal pulses  Heart sounds: Normal heart sounds  Pulmonary:      Effort: Pulmonary effort is normal  No respiratory distress  Breath sounds: Normal breath sounds  No stridor  No wheezing, rhonchi or rales  Musculoskeletal:      Cervical back: Spasms and tenderness present  No swelling, erythema, lacerations, rigidity, torticollis or bony tenderness  Pain with movement present  Normal range of motion  Thoracic back: Normal       Lumbar back: Normal    Skin:     General: Skin is warm  Capillary Refill: Capillary refill takes less than 2 seconds  Neurological:      General: No focal deficit present  Mental Status: She is alert and oriented to person, place, and time     Psychiatric:         Mood and Affect: Mood normal          Behavior: Behavior normal        Ting Wang MD

## 2022-11-10 PROBLEM — M54.2 CERVICAL PAIN: Status: ACTIVE | Noted: 2022-11-10

## 2022-11-10 NOTE — ASSESSMENT & PLAN NOTE
Has had a few months of cervical pain  No trauma       PLAN:   - Consider pain management and exercise, if patient doesn't improve consider more formal PT and potentially gabapentin as pain is described as burning and shooting pain

## 2022-11-18 ENCOUNTER — TELEPHONE (OUTPATIENT)
Dept: FAMILY MEDICINE CLINIC | Facility: CLINIC | Age: 77
End: 2022-11-18

## 2022-11-18 DIAGNOSIS — M54.2 CERVICAL PAIN: ICD-10-CM

## 2022-11-18 RX ORDER — LIDOCAINE 50 MG/G
1 PATCH TOPICAL DAILY
Qty: 5 PATCH | Refills: 0 | Status: SHIPPED | OUTPATIENT
Start: 2022-11-18

## 2022-11-18 NOTE — TELEPHONE ENCOUNTER
11/18/22    Pt called office today and requested for the following me script to be sent to [facility]  lidocaine (Lidoderm) 5 %    Pt stated that at the [facility] they are charging her $40 00 for it  Asked Pt if what the pharmacy is requesting a Prior Auth, and then Pt Stated “yes, but to be sent to the [facility], if Imer Enciso will pay for it”    Pt did not provide an address to [facility], but did provided a contact # [phone]      Any questions, please contact Pt

## 2022-11-21 ENCOUNTER — EVALUATION (OUTPATIENT)
Dept: PHYSICAL THERAPY | Facility: CLINIC | Age: 77
End: 2022-11-21

## 2022-11-21 DIAGNOSIS — M54.12 RIGHT CERVICAL RADICULOPATHY: Primary | ICD-10-CM

## 2022-11-21 DIAGNOSIS — M54.2 CERVICAL PAIN: ICD-10-CM

## 2022-11-21 NOTE — PROGRESS NOTES
PT Evaluation     Today's date: 2022  Patient name: Harriet Sheffield  : [id number]  MRN: [id number]  Referring provider: Yisel Smith  Dx:   Encounter Diagnosis     ICD-10-CM    1  Right cervical radiculopathy  M54 12       2  Cervical pain  M54 2 Ambulatory Referral to Physical Therapy          Start Time: 1510  Stop Time: 1600  Total time in clinic (min): 50 minutes    Assessment  Assessment details: Pt is a 76 yof presenting to therapy with chronic R sided neck pain, signs and symptoms consistent with R cervical/upper thoracic radiculopathy  Pt displays hypomobility of cervical spine bilaterally, R>L  Pt is most limited with cervical flexion, L rotation, and L sidebending  Pt also has slight weakness of RUE, though LUE is more limited due to previous work related injury  Pt has difficulty reaching, lifting, and driving due to pain  Pt will benefit from continued therapy once a week for 6-8 weeks to improve cervical mobility, strength, and pain  Impairments: abnormal or restricted ROM, activity intolerance, impaired physical strength, lacks appropriate home exercise program and pain with function    Symptom irritability: moderateUnderstanding of Dx/Px/POC: good   Prognosis: good    Goals  Short term goals (3-4 weeks)  1  Pt will display independence with understanding and performance of HEP to allow for carryover of plan of care at home  2  Pt will improve FOTO score from initial evaluation to show improvement in pain and function  3  Pt will increase cervical mobility to moderate limitation in all planes to improve driving  4  Pt will increase R ER strength to 4+/5 to improve shoulder and scapular stability  Long term goals (6-8 weeks)  1  Pt will score equal or better than projected score on FOTO to show improvement in pain and function  2  Pt will increase cervical mobility to minimal limitation in all planes to improve driving     3  Pt will report <5/10 maximal pain to improve quality of life  Plan  Patient would benefit from: skilled physical therapy  Planned modality interventions: TENS, thermotherapy: hydrocollator packs, traction, ultrasound, cryotherapy and low level laser therapy  Planned therapy interventions: body mechanics training, therapeutic training, therapeutic exercise, therapeutic activities, stretching, strengthening, neuromuscular re-education, patient education, home exercise program, functional ROM exercises, flexibility, manual therapy, King taping, joint mobilization and balance  Frequency: 1x week  Duration in weeks: 8  Treatment plan discussed with: patient        Subjective Evaluation    History of Present Illness  Mechanism of injury: Pt presents to therapy with R sided neck pain  In August, pt was in [facility] and started feeling burning, numbness, and itching on R side of neck, upper back, and R arm with no trauma  Pt has difficulty with lifting and reaching and has pain with specific neck motions    Pain  Current pain ratin  At best pain ratin  At worst pain ratin  Quality: burning (numbness, itching)  Relieving factors: medications (lidocaine roll on )  Exacerbated by: bringing chin to chest   Progression: worsening    Hand dominance: right    Patient Goals  Patient goals for therapy: decreased pain and increased strength (mopping and broom without pain)          Objective     General Comments:      Cervical/Thoracic Comments  Cervical ROM  Flex: maximally limited with pain  Ext: minimally limited  R SB: Minimal limitation with pain L UT  L SB: Moderate limitation with pain R T2  R rot: Minimally limited with no pain  L rot: moderately limited with pain R T2    UE ROM  Shoulder flex: WNL bilat  Shoulder Abd: 90 degrees L with pain, ~120 R with tightness  Functional ER: C7 L, T4 R  Functional IR: T12 R, L sacrum L    UE strength  Shoulder flex: 4/5 bilat  Shoulder abd: 3/5 with pain L, 4/5 R  ER: 4/5 R, 3/5 L  IR: 5/5 bilat  Elbow flex: 5/5 bilat  Elbow ext: 5/5 R, 4/5 L  Gripping: symmetrical bilat    Special tests  -compression (-)  -distraction (+)  -spurlings: (+) R, (-) L    Palpation  Lateral glides: decreased mobility bilaterally        Flowsheet Rows    Flowsheet Row Most Recent Value   PT/OT G-Codes    Current Score 32   Projected Score 50             Precautions: GERD, chronic rhinitis, hearing loss bilaterally, sacroiliitis, degenerative disc disease, osteopenia L hip, nephrolithiasis, endometrial polyp, umbilical hernia, thickened endometrium, vertigo, class 2 obesity, hyperlipidemia, chronic L foot pain, cervical pain, arthritis, chronic pain disorder, kidney stone, osteoporosis, sinus arrhythmia, hydronephrosis, L knee arthroscopy       Manuals 11/21            Cervical lateral glides nv                                                   Neuro Re-Ed 11/21            Scapular retraction 1x10; HEP            Chin tucks 1x15; HEP            Rows/ext nv            Resisted ER/IR nv            Middle trap pull aparts nv                                      Ther Ex 11/21            SNAGS 1x10 ea; HEP            Pt edu NS            T/s ext nv            Wall slides Fwd, lateral nv                                                                Ther Activity 11/21                                      Gait Training 11/21                                      Modalities 11/21

## 2022-11-25 ENCOUNTER — TELEPHONE (OUTPATIENT)
Dept: FAMILY MEDICINE CLINIC | Facility: CLINIC | Age: 77
End: 2022-11-25

## 2022-11-25 ENCOUNTER — HOSPITAL ENCOUNTER (EMERGENCY)
Facility: HOSPITAL | Age: 77
Discharge: HOME | End: 2022-11-25
Attending: EMERGENCY MEDICINE

## 2022-11-25 ENCOUNTER — APPOINTMENT (EMERGENCY)
Dept: RADIOLOGY | Facility: HOSPITAL | Age: 77
End: 2022-11-25

## 2022-11-25 VITALS
BODY MASS INDEX: 41.6 KG/M2 | DIASTOLIC BLOOD PRESSURE: 79 MMHG | HEART RATE: 73 BPM | WEIGHT: 160 LBS | TEMPERATURE: 98.1 F | RESPIRATION RATE: 18 BRPM | OXYGEN SATURATION: 100 % | SYSTOLIC BLOOD PRESSURE: 176 MMHG

## 2022-11-25 DIAGNOSIS — M25.571 ACUTE RIGHT ANKLE PAIN: Primary | ICD-10-CM

## 2022-11-25 RX ORDER — ACETAMINOPHEN AND CODEINE PHOSPHATE 300; 30 MG/1; MG/1
1-2 TABLET ORAL EVERY 6 HOURS PRN
Qty: 15 TABLET | Refills: 0 | Status: SHIPPED | OUTPATIENT
Start: 2022-11-25 | End: 2022-12-05

## 2022-11-25 NOTE — TELEPHONE ENCOUNTER
PCP SIGNATURE NEEDED FOR [facility] FOR[facility] RECEIVED VIA FAX AND PLACED IN PCP FOLDER TO BE DELIVERED AT ASSIGNED TIMES    [phone]

## 2022-11-25 NOTE — ED PROVIDER NOTES
History  Chief Complaint   Patient presents with   • Foot Pain     Pt has had 15 days of right foot pain and swelling  Pt denies trauma  Pt to see podiatry on 12/9/22  Patient is a 64-year-old female who presents with a 2 week history of constant right medial ankle pain  States initially had pain in left ankle  Had an appointment with podiatry but was cancelled due to the podiatrist having Maryanne  Left ankle pain resolved, then 3 days later started with right ankle pain  Radiates to knee with ambulation  No numnbess/tingling  Denies any diabetes  Denies any trauma  Is scheduled now to see podiatry on 12/9/22  Prior to Admission Medications   Prescriptions Last Dose Informant Patient Reported? Taking?    Multiple Vitamins-Minerals (CENTRUM ADULTS PO)   Yes No   Sig: Take by mouth   OYSCO 500 + D 500-200 MG-UNIT per tablet   Yes No   Sig: Take 1 tablet by mouth 2 (two) times a day with meals   acetaminophen (TYLENOL) 650 mg CR tablet   No No   Sig: Take 1 tablet (650 mg total) by mouth every 12 (twelve) hours as needed for mild pain   lidocaine (Lidoderm) 5 %   No No   Sig: Apply 1 patch topically daily Remove & Discard patch within 12 hours or as directed by MD   meclizine (ANTIVERT) 12 5 MG tablet   No No   Sig: Take 1 tablet (12 5 mg total) by mouth every 12 (twelve) hours as needed for dizziness   omeprazole (PriLOSEC) 20 mg delayed release capsule   No No   Sig: TAKE 1 CAPSULE BY MOUTH EVERY DAY   polyethylene glycol (GLYCOLAX) 17 GM/SCOOP powder   No No   Sig: Take 17 g by mouth daily      Facility-Administered Medications: None       Past Medical History:   Diagnosis Date   • Arthritis    • Chronic pain disorder     sciatic   • GERD (gastroesophageal reflux disease)    • Hydronephrosis with renal and ureteral calculus obstruction 12/23/2015   • Kidney stone    • Kidney stone    • Nephrolithiasis    • Osteoarthritis    • Osteoporosis    • Sinus arrhythmia        Past Surgical History: Procedure Laterality Date   • CHOLECYSTECTOMY     • FL RETROGRADE PYELOGRAM  3/7/2019   • KIDNEY STONE SURGERY     • KNEE ARTHROSCOPY Left    • AR COLONOSCOPY FLX DX W/COLLJ SPEC WHEN PFRMD N/A 11/2/2017    Procedure: COLONOSCOPY with polypectomy x2;  Surgeon: Karon Perez MD;  Location: [facility]; Service: Gastroenterology   • AR CYSTOURETHROSCOPY,URETER CATHETER Left 3/7/2019    Procedure: CYSTOSCOPY, URETEROSCOPY WITH LASER, BASKET STONE EXTRACTION, RETROGRADE PYELOGRAM WITH INSERTION STENT URETERAL;  Surgeon: [place] Tan MD;  Location: [facility];  Service: Urology   • TUBAL LIGATION         Family History   Problem Relation Age of Onset   • No Known Problems Mother    • Mental illness Father    • Prostate cancer Father    • Colon cancer Sister    • No Known Problems Sister    • No Known Problems Sister    • No Known Problems Sister    • Ovarian cancer Daughter    • Mental illness Daughter    • No Known Problems Maternal Grandmother    • No Known Problems Maternal Grandfather    • No Known Problems Paternal Grandmother    • No Known Problems Paternal Grandfather      I have reviewed and agree with the history as documented  E-Cigarette/Vaping   • E-Cigarette Use Never User      E-Cigarette/Vaping Substances   • Nicotine No    • THC No    • CBD No    • Flavoring No    • Other No    • Unknown No      Social History     Tobacco Use   • Smoking status: Never   • Smokeless tobacco: Never   Vaping Use   • Vaping Use: Never used   Substance Use Topics   • Alcohol use: Not Currently   • Drug use: No       Review of Systems   Constitutional: Negative  HENT: Negative  Eyes: Negative  Respiratory: Negative  Cardiovascular: Negative  Gastrointestinal: Negative  Endocrine: Negative  Genitourinary: Negative  Musculoskeletal: Positive for arthralgias and joint swelling  Skin: Negative  Allergic/Immunologic: Negative  Neurological: Negative  Hematological: Negative  Psychiatric/Behavioral: Negative  All other systems reviewed and are negative  Physical Exam  Physical Exam  Vitals and nursing note reviewed  Constitutional:       Appearance: Normal appearance  She is obese  HENT:      Head: Normocephalic and atraumatic  Cardiovascular:      Rate and Rhythm: Normal rate and regular rhythm  Pulses: Normal pulses  Heart sounds: Normal heart sounds  Pulmonary:      Effort: Pulmonary effort is normal       Breath sounds: Normal breath sounds  Musculoskeletal:         General: Swelling and tenderness present  Cervical back: Normal range of motion and neck supple  Comments: +swelling and ttp at right medial mal   No foot bony ttp  No knee ttp  Limited dorsi/plantar flexion secondary to pain  Skin:     General: Skin is warm  Capillary Refill: Capillary refill takes less than 2 seconds  Comments: Dry ashen skin over right foot  No lesion/skin breakdown  Neurological:      General: No focal deficit present  Mental Status: She is alert and oriented to person, place, and time  Sensory: No sensory deficit  Vital Signs  ED Triage Vitals [11/25/22 1227]   Temperature Pulse Respirations Blood Pressure SpO2   98 1 °F (36 7 °C) 73 18 (!) 176/79 100 %      Temp Source Heart Rate Source Patient Position - Orthostatic VS BP Location FiO2 (%)   Tympanic Monitor Sitting Left arm --      Pain Score       --           Vitals:    11/25/22 1227   BP: (!) 176/79   Pulse: 73   Patient Position - Orthostatic VS: Sitting         Visual Acuity      ED Medications  Medications - No data to display    Diagnostic Studies  Results Reviewed     None                 XR ankle 3+ views RIGHT   ED Interpretation by Max Cheung MD (11/25 9730)   +sts, no fx                    Procedures  Procedures         ED Course                                             MDM  Number of Diagnoses or Management Options     Amount and/or Complexity of Data Reviewed  Tests in the radiology section of CPT®: reviewed and ordered  Review and summarize past medical records: yes  Independent visualization of images, tracings, or specimens: yes        Disposition  Final diagnoses:   Acute right ankle pain     Time reflects when diagnosis was documented in both MDM as applicable and the Disposition within this note     Time User Action Codes Description Comment    11/25/2022 12:56 PM Darryl Aldana Add [M25 571] Acute right ankle pain       ED Disposition     ED Disposition   Discharge    Condition   Stable    Date/Time   Fri Nov 25, 2022 12:56 PM    Comment   Aurelia Hillman discharge to home/self care  Follow-up Information     Follow up With Specialties Details Why Contact Info    [address] [place] [address]  [place] [facility] [address]           Please keep your appointment as schedule with Podiatry next month  Patient's Medications   Discharge Prescriptions    ACETAMINOPHEN-CODEINE (TYLENOL #3) 300-30 MG PER TABLET    Take 1-2 tablets by mouth every 6 (six) hours as needed for moderate pain for up to 10 days       Start Date: 11/25/2022End Date: 12/5/2022       Order Dose: 1-2 tablets       Quantity: 15 tablet    Refills: 0       No discharge procedures on file      PDMP Review     None          ED Provider  Electronically Signed by           Bright Brewer MD  11/25/22 5863

## 2022-12-05 ENCOUNTER — TELEPHONE (OUTPATIENT)
Dept: GASTROENTEROLOGY | Facility: AMBULARY SURGERY CENTER | Age: 77
End: 2022-12-05

## 2022-12-05 NOTE — TELEPHONE ENCOUNTER
Patients GI provider:  Dr Ce Barboza     Number to return call: [phone]    Reason for call: Pt calling requesting for results from xray       Scheduled procedure/appointment date if applicable: Apt/procedure 12-6-22

## 2022-12-09 ENCOUNTER — TELEPHONE (OUTPATIENT)
Dept: FAMILY MEDICINE CLINIC | Facility: CLINIC | Age: 77
End: 2022-12-09

## 2022-12-09 NOTE — TELEPHONE ENCOUNTER
PATIENT CONTACTED OFFICE REQUESTING REFILL ON A MEDICATION SHE STATES SHE WOULD LIKE IT SENT TO [facility]    PLEASE ADVISE       predniSONE 10 mg tablet

## 2022-12-15 ENCOUNTER — TELEPHONE (OUTPATIENT)
Dept: FAMILY MEDICINE CLINIC | Facility: CLINIC | Age: 77
End: 2022-12-15

## 2022-12-20 ENCOUNTER — OFFICE VISIT (OUTPATIENT)
Dept: DENTISTRY | Facility: CLINIC | Age: 77
End: 2022-12-20

## 2022-12-20 VITALS — SYSTOLIC BLOOD PRESSURE: 122 MMHG | TEMPERATURE: 97.3 F | HEART RATE: 66 BPM | DIASTOLIC BLOOD PRESSURE: 63 MMHG

## 2022-12-20 DIAGNOSIS — K03.6 DENTAL CALCULUS: ICD-10-CM

## 2022-12-20 DIAGNOSIS — Z01.20 ENCOUNTER FOR DENTAL EXAMINATION: Primary | ICD-10-CM

## 2022-12-20 DIAGNOSIS — K03.6 ACCRETIONS ON TEETH: ICD-10-CM

## 2022-12-20 NOTE — PROGRESS NOTES
PERIODONTAL MAINTENANCE     Reviewed medical history   ASA  2    Patient states she does not want radiographs today   States she no longer wants to have 3-4 mos perio maint  appts and only wants 6 mos " cleanings"  Patient also advised she has caries noted # 28 but did not want to proceed   She is aware # 19 should have a crown but refuses  " IT BROKE OUT ANYWAY"  Patient states due to her financial and her physical issues and her age, she is not interested in proceeding with major work  She was advised to get updated radiographs but will consent next visit   PATIENT IS AWARE THIS IS AGAINST OUR RECOMMENDATIONS  She states she has no trouble with her teeth and can eat fine  Likes to chew on bones !!! Ultrasonic and hand scaling used   Salivary calc    DO NOT RECLINE TOO FAR--VERTIGO    Periodontal therapy includes removal of bacterial plaque and calculus from supragingival and subgingival regions  Site specific scaling and root planning where indicated, and polishing the teeth       Periodontal therapy recommendation to continue 3 months intervals-- PATIENT ELECTS 6 MOS  Exam LIZZ benitez  BWX, EXAM PERIO MAINT/ 6 MOS

## 2022-12-21 ENCOUNTER — OFFICE VISIT (OUTPATIENT)
Dept: FAMILY MEDICINE CLINIC | Facility: CLINIC | Age: 77
End: 2022-12-21

## 2022-12-21 VITALS
HEIGHT: 55 IN | WEIGHT: 146.1 LBS | HEART RATE: 73 BPM | OXYGEN SATURATION: 97 % | DIASTOLIC BLOOD PRESSURE: 80 MMHG | RESPIRATION RATE: 18 BRPM | BODY MASS INDEX: 33.81 KG/M2 | SYSTOLIC BLOOD PRESSURE: 144 MMHG | TEMPERATURE: 97.9 F

## 2022-12-21 DIAGNOSIS — Z80.0 FAMILY HISTORY OF COLON CANCER: ICD-10-CM

## 2022-12-21 DIAGNOSIS — G89.29 CHRONIC BILATERAL THORACIC BACK PAIN: Primary | ICD-10-CM

## 2022-12-21 DIAGNOSIS — M79.672 CHRONIC FOOT PAIN, LEFT: ICD-10-CM

## 2022-12-21 DIAGNOSIS — K21.9 GASTROESOPHAGEAL REFLUX DISEASE WITHOUT ESOPHAGITIS: ICD-10-CM

## 2022-12-21 DIAGNOSIS — G89.29 CHRONIC FOOT PAIN, LEFT: ICD-10-CM

## 2022-12-21 DIAGNOSIS — M54.6 CHRONIC BILATERAL THORACIC BACK PAIN: Primary | ICD-10-CM

## 2022-12-21 DIAGNOSIS — E78.2 HYPERLIPIDEMIA, MIXED: ICD-10-CM

## 2022-12-21 RX ORDER — METHYLPREDNISOLONE 4 MG/1
TABLET ORAL
Qty: 21 EACH | Refills: 0 | Status: SHIPPED | OUTPATIENT
Start: 2022-12-21

## 2022-12-21 NOTE — PROGRESS NOTES
Name: Yasemin Kan      :       MRN: [id number]  Encounter Provider: Lana Martin DO  Encounter Date: 2022   Encounter department: [facility] [place]     1  Chronic bilateral thoracic back pain  Assessment & Plan:  Place on steroid taper - this is the only treatment that has worked for her in the past and the pain relief lasts about one year each time  Send for xray thoracic spine    Orders:  -     XR spine thoracic 3 vw; Future; Expected date: 2022  -     methylPREDNISolone 4 MG tablet therapy pack; Use as directed on package    2  Family history of colon cancer  Assessment & Plan:  No adenomas on colonoscopy done in   Saw GI in 2022 - Consider repeat colonoscopy in  if overall health good or sooner if symptoms develop      3  Hyperlipidemia, mixed  Assessment & Plan:  ASCVD risk 28 7% - raised from 22 3%  Patient refused to be placed on statin - explained the risks of not having cholesterol levels under control including heart attack, stroke and even death  Strongly encouraged low cholesterol diet      4  Gastroesophageal reflux disease without esophagitis  Assessment & Plan:  Saw GI in 2022 - to continue PPI, no need for EGD at this time           Subjective     Complains of upper thoracic pain  Intermittent in nature  Has not been able to move much  Not able to complete chores around the house due to the pain  Has already gone to PT  In the past the only thing that has helped was a short course of steroids  Already tried tylenol arthritis with mild relief  Patient asking for steroids to get some relief  Right foot swollen a few weeks ago  Going to see podiatrist in 2023  Review of Systems   Constitutional: Negative for chills, fatigue, fever and unexpected weight change  HENT: Negative for congestion, ear pain, rhinorrhea and sore throat  Eyes: Negative for pain and visual disturbance  Respiratory: Negative for cough, chest tightness and shortness of breath  Cardiovascular: Positive for leg swelling  Negative for chest pain and palpitations  Gastrointestinal: Negative for abdominal pain, constipation, diarrhea, nausea and vomiting  Genitourinary: Negative for difficulty urinating, dysuria and urgency  Musculoskeletal: Positive for back pain  Negative for arthralgias  Skin: Negative for rash  Neurological: Negative for dizziness, numbness and headaches  Psychiatric/Behavioral: Negative for behavioral problems and suicidal ideas  The patient is not nervous/anxious  Past Medical History:   Diagnosis Date   • Arthritis    • Chronic pain disorder     sciatic   • GERD (gastroesophageal reflux disease)    • Hydronephrosis with renal and ureteral calculus obstruction 12/23/2015   • Kidney stone    • Kidney stone    • Nephrolithiasis    • Osteoarthritis    • Osteoporosis    • Sinus arrhythmia      Past Surgical History:   Procedure Laterality Date   • CHOLECYSTECTOMY     • FL RETROGRADE PYELOGRAM  3/7/2019   • KIDNEY STONE SURGERY     • KNEE ARTHROSCOPY Left    • NE COLONOSCOPY FLX DX W/COLLJ SPEC WHEN PFRMD N/A 11/2/2017    Procedure: COLONOSCOPY with polypectomy x2;  Surgeon: Meenu Sexton MD;  Location: [facility];   Service: Gastroenterology   • NE CYSTOURETHROSCOPY,URETER CATHETER Left 3/7/2019    Procedure: CYSTOSCOPY, URETEROSCOPY WITH LASER, BASKET STONE EXTRACTION, RETROGRADE PYELOGRAM WITH INSERTION STENT URETERAL;  Surgeon: Thania Boyle MD;  [facility];  Service: Urology   • TUBAL LIGATION       Family History   Problem Relation Age of Onset   • No Known Problems Mother    • Mental illness Father    • Prostate cancer Father    • Colon cancer Sister    • No Known Problems Sister    • No Known Problems Sister    • No Known Problems Sister    • Ovarian cancer Daughter    • Mental illness Daughter    • No Known Problems Maternal Grandmother    • No Known Problems Maternal Grandfather    • No Known Problems Paternal Grandmother    • No Known Problems Paternal Grandfather      Social History     Socioeconomic History   • Marital status: /Civil Union     Spouse name: None   • Number of children: 2   • Years of education: None   • Highest education level: None   Occupational History   • Occupation: Retired   Tobacco Use   • Smoking status: Never   • Smokeless tobacco: Never   Vaping Use   • Vaping Use: Never used   Substance and Sexual Activity   • Alcohol use: Not Currently   • Drug use: No   • Sexual activity: None   Other Topics Concern   • None   Social History Narrative    Daily caffeine consumption- 1-2 cups of coffee    Does not have living will    Denied:  History of exercises regularly    Denied:  History of domestic violence    No advance directives    Denied:  History of Power of  in existence    Flu shot: no     Social Determinants of Health     Financial Resource Strain: Low Risk    • Difficulty of Paying Living Expenses: Not hard at all   Food Insecurity: No Food Insecurity   • Worried About Running Out of Food in the Last Year: Never true   • Ran Out of Food in the Last Year: Never true   Transportation Needs: No Transportation Needs   • Lack of Transportation (Medical): No   • Lack of Transportation (Non-Medical): No   Physical Activity: Not on file   Stress: No Stress Concern Present   • Feeling of Stress :  Only a little   Social Connections: Not on file   Intimate Partner Violence: Not At Risk   • Fear of Current or Ex-Partner: No   • Emotionally Abused: No   • Physically Abused: No   • Sexually Abused: No   Housing Stability: Low Risk    • Unable to Pay for Housing in the Last Year: No   • Number of Places Lived in the Last Year: 1   • Unstable Housing in the Last Year: No     Current Outpatient Medications on File Prior to Visit   Medication Sig   • acetaminophen (TYLENOL) 650 mg CR tablet Take 1 tablet (650 mg total) by mouth every 12 (twelve) hours as needed for mild pain   • lidocaine (Lidoderm) 5 % Apply 1 patch topically daily Remove & Discard patch within 12 hours or as directed by MD   • meclizine (ANTIVERT) 12 5 MG tablet Take 1 tablet (12 5 mg total) by mouth every 12 (twelve) hours as needed for dizziness   • Multiple Vitamins-Minerals (CENTRUM ADULTS PO) Take by mouth   • omeprazole (PriLOSEC) 20 mg delayed release capsule TAKE 1 CAPSULE BY MOUTH EVERY DAY   • OYSCO 500 + D 500-200 MG-UNIT per tablet Take 1 tablet by mouth 2 (two) times a day with meals   • polyethylene glycol (GLYCOLAX) 17 GM/SCOOP powder Take 17 g by mouth daily (Patient not taking: Reported on 12/20/2022)     Allergies   Allergen Reactions   • Diclofenac Sodium      Other reaction(s): GI Upset   • Meperidine    • Tramadol      Other reaction(s): GI Upset     Immunization History   Administered Date(s) Administered   • COVID-19 MODERNA VACC 0 5 ML IM 02/11/2021, 03/10/2021, 11/11/2021   • H1N1, All Formulations 01/15/2010   • INFLUENZA 10/01/2009   • Influenza Split High Dose Preservative Free IM 10/16/2014, 11/20/2015, 10/22/2019   • Influenza, high dose seasonal 0 7 mL 10/16/2020, 02/09/2022, 11/04/2022   • Pneumococcal Conjugate 13-Valent 11/20/2015   • Pneumococcal Polysaccharide PPV23 09/30/2011       Objective     /80 (BP Location: Left arm, Patient Position: Standing, Cuff Size: Standard)   Pulse 73   Temp 97 9 °F (36 6 °C) (Temporal)   Resp 18   Ht 4' 4" (1 321 m)   Wt 66 3 kg (146 lb 1 6 oz)   LMP  (LMP Unknown)   SpO2 97%   BMI 37 99 kg/m²     Physical Exam  Constitutional:       Appearance: She is well-developed  HENT:      Head: Normocephalic and atraumatic  Right Ear: External ear normal       Left Ear: External ear normal       Nose: Nose normal    Eyes:      Conjunctiva/sclera: Conjunctivae normal       Pupils: Pupils are equal, round, and reactive to light  Cardiovascular:      Rate and Rhythm: Normal rate and regular rhythm        Heart sounds: Normal heart sounds  Pulmonary:      Effort: Pulmonary effort is normal       Breath sounds: Normal breath sounds  Abdominal:      General: Bowel sounds are normal       Palpations: Abdomen is soft  Musculoskeletal:      Cervical back: Normal, normal range of motion and neck supple  Thoracic back: Spasms and tenderness present  Decreased range of motion  Lumbar back: Normal       Right foot: Swelling present  Left foot: Normal    Skin:     General: Skin is warm  Neurological:      Mental Status: She is alert and oriented to person, place, and time     Psychiatric:         Behavior: Behavior normal        Rehab DO [place]

## 2022-12-23 RX ORDER — SENNOSIDES 8.6 MG
650 CAPSULE ORAL EVERY 12 HOURS PRN
Qty: 30 TABLET | Refills: 0 | Status: SHIPPED | OUTPATIENT
Start: 2022-12-23

## 2023-01-03 PROBLEM — Z80.0 FAMILY HISTORY OF COLON CANCER: Status: ACTIVE | Noted: 2023-01-03

## 2023-01-03 PROBLEM — G89.29 CHRONIC BILATERAL THORACIC BACK PAIN: Status: ACTIVE | Noted: 2023-01-03

## 2023-01-03 PROBLEM — M54.6 CHRONIC BILATERAL THORACIC BACK PAIN: Status: ACTIVE | Noted: 2023-01-03

## 2023-01-03 NOTE — ASSESSMENT & PLAN NOTE
Place on steroid taper - this is the only treatment that has worked for her in the past and the pain relief lasts about one year each time  Send for xray thoracic spine

## 2023-01-03 NOTE — ASSESSMENT & PLAN NOTE
ASCVD risk 28 7% - raised from 22 3%  Patient refused to be placed on statin - explained the risks of not having cholesterol levels under control including heart attack, stroke and even death  Strongly encouraged low cholesterol diet

## 2023-01-03 NOTE — ASSESSMENT & PLAN NOTE
No adenomas on colonoscopy done in 2017  Saw GI in September 2022 - Consider repeat colonoscopy in 2027 if overall health good or sooner if symptoms develop

## 2023-01-09 ENCOUNTER — OFFICE VISIT (OUTPATIENT)
Dept: PODIATRY | Facility: CLINIC | Age: 78
End: 2023-01-09

## 2023-01-09 VITALS
HEIGHT: 55 IN | WEIGHT: 146 LBS | DIASTOLIC BLOOD PRESSURE: 80 MMHG | SYSTOLIC BLOOD PRESSURE: 143 MMHG | HEART RATE: 85 BPM | BODY MASS INDEX: 33.79 KG/M2

## 2023-01-09 DIAGNOSIS — M77.31 HEEL SPUR, RIGHT: ICD-10-CM

## 2023-01-09 DIAGNOSIS — M19.071 ARTHRITIS OF RIGHT ANKLE: Primary | ICD-10-CM

## 2023-01-09 RX ORDER — TRIAMCINOLONE ACETONIDE 40 MG/ML
40 INJECTION, SUSPENSION INTRA-ARTICULAR; INTRAMUSCULAR ONCE
Status: COMPLETED | OUTPATIENT
Start: 2023-01-09 | End: 2023-01-09

## 2023-01-09 RX ORDER — LIDOCAINE HYDROCHLORIDE 10 MG/ML
1 INJECTION, SOLUTION INFILTRATION; PERINEURAL ONCE
Status: COMPLETED | OUTPATIENT
Start: 2023-01-09 | End: 2023-01-09

## 2023-01-09 RX ADMIN — LIDOCAINE HYDROCHLORIDE 1 ML: 10 INJECTION, SOLUTION INFILTRATION; PERINEURAL at 10:51

## 2023-01-09 RX ADMIN — TRIAMCINOLONE ACETONIDE 40 MG: 40 INJECTION, SUSPENSION INTRA-ARTICULAR; INTRAMUSCULAR at 10:52

## 2023-01-09 NOTE — PROGRESS NOTES
Assessment/Plan:         Diagnoses and all orders for this visit:    Arthritis of right ankle  -     Ambulatory Referral to Podiatry  -     triamcinolone acetonide (KENALOG-40) 40 mg/mL injection 40 mg  -     lidocaine (XYLOCAINE) 1 % injection 1 mL    Heel spur, right          Regarding ankle arthritis, recommended PT but she declined  I did provide home therapy exercises  PErform twice per day  Ankle joint injection given as well  RTC 6 weeks    Regarding posterior heel pain, achilles enthesopathy  Reviewed XR  She had this previously on the left and underwent surgery  She is hoping to avoid surgery on the right  At this point there is no need to suspect she needs surgery  Again offered formal therapy which she declined  Home therapy exercises and heel cups discussed  Stressed compliance  Medium joint arthrocentesis  Universal Protocol:  Consent: Verbal consent obtained  Risks and benefits: risks, benefits and alternatives were discussed  Consent given by: patient  Time out: Immediately prior to procedure a "time out" was called to verify the correct patient, procedure, equipment, support staff and site/side marked as required  Timeout called at: 1/9/2023 10:00 AM   Patient understanding: patient states understanding of the procedure being performed  Patient identity confirmed: verbally with patient    Supporting Documentation  Indications: pain and joint swelling   Procedure Details  Location: ankle -   Needle size: 25 G  Ultrasound guidance: no  Approach: anteromedial    Patient tolerance: patient tolerated the procedure well with no immediate complications    7 8EM kenalog 40 and 2cc 1% lidocaine plain  Subjective:      Patient ID: Betty Connelly is a 76 y o  female  Patient presents with right foot pain  She gets swelling and a lump on the side of her ankle  IT used to be on the outside, now the lump moved to the inside  In December her ankle hurt so bad she went tot he ED   She got an XR  IT is slowly feeling better but it still bothers her enough that she has to use a cane  PAin is all around the ankle on the right  The following portions of the patient's history were reviewed and updated as appropriate: allergies, current medications, past family history, past medical history, past social history, past surgical history and problem list     Review of Systems    Constitutional: Negative  HENT: Negative for sinus pressure and sinus pain  Respiratory: Negative for cough and shortness of breath  Cardiovascular: Negative for chest pain and leg swelling  Gastrointestinal: Negative for diarrhea, nausea and vomiting  Musculoskeletal: ankle pain  Skin: Negative for rash or wound  Neurological: Negative for weakness, numbness and headaches  Psychiatric/Behavioral: The patient is not nervous/anxious  Objective:      /80   Pulse 85   Ht 4' 4" (1 321 m)   Wt 66 2 kg (146 lb)   LMP  (LMP Unknown)   BMI 37 96 kg/m²          Physical Exam  Vitals reviewed  Constitutional:       Appearance: She is not ill-appearing or diaphoretic  Cardiovascular:      Rate and Rhythm: Normal rate  Pulses: Normal pulses  Pulmonary:      Effort: Pulmonary effort is normal  No respiratory distress  Musculoskeletal:         General: Tenderness (   Vague ankle joint tenderness without any evidence of instability) and deformity ( Posterior heel spurring and bursitis to the right) present  Skin:     Findings: No erythema  Neurological:      Mental Status: She is alert and oriented to person, place, and time  XRay 3 views of the left foot personally read by Dr Oliver Kelley in office today and discussed with patient:  1  [place] in calcaneus, likely from previous achilles detachment reattachment  No lucency  2  Posterior and plantar spurring calcaneus    3 views right ankle  1  Posterior and anterior ankle spurring  2  Posterior calcaneal spurring mild  3  Degenerative changes

## 2023-01-09 NOTE — PATIENT INSTRUCTIONS
Ankle Exercises   AMBULATORY CARE:   What you need to know about ankle exercises: Ankle exercises help strengthen your ankle and improve its function after injury  These are beginning exercises  Ask your healthcare provider if you need to see a physical therapist for more advanced exercises  Do these exercises 3 to 5 days a week , or as directed by your healthcare provider  Ask if you should perform the exercises on each ankle  Do the exercises in the order that your healthcare provider recommends  This will help prevent swelling, chronic pain, and reinjury  Start with range of motion exercises  Then progress to strengthening exercises, and finally to balancing exercises  Warm up before you do ankle exercises  Walk or ride a stationary bike for 5 to 10 minutes to prepare your ankle for movement  Stop if you feel pain  It is normal to feel some discomfort at first  Regular exercise will help decrease your discomfort over time  How to perform range of motion exercises safely:  Begin with range of motion exercises to improve flexibility  Ask your healthcare provider when you can progress to strengthening exercises  Ankle alphabet:  Sit on a chair so that your feet do not touch the floor  Use your big toe to write each letter of the alphabet  Use only your foot and ankle, and keep your movements small  Do 2 sets  Calf stretches:      Sitting calf stretches with a towel:  Sit on the floor with both legs out straight in front of you  Loop a towel around the ball of your injured foot  Grasp the ends of the towel and pull it toward you  Keep your leg and back straight  Do not lean forward as you pull the towel  Hold for 30 seconds  Then relax for 30 seconds  Do 2 sets of 10  Standing calf stretches:  Stand facing a wall with the foot that is not injured forward and your knee slightly bent  Keep the leg with the injured foot straight and behind you with your toes pointed in slightly   With both heels flat on the floor, press your hips forward  Do not arch your back  Hold for 30 seconds, and then relax for 30 seconds  Do 2 sets of 10  Repeat with your leg bent  Do 2 sets of 10  How to perform strengthening exercises safely:  After you can perform range of motion exercises without pain, you may begin strengthening exercises  Ask your healthcare provider when you can progress to balancing exercises  Ankle movement in 4 directions:  Sit on the floor with your legs straight in front of you  Keep your heels on the floor for support  Dorsiflexion:  Begin with your toes pointing straight up  Pull your toes toward your body  Slowly return to the starting position  Do 3 sets of 5  Plantar flexion:  Begin with your toes pointing straight up  Push your toes away from your body  Slowly return to the starting position  Do 3 sets of 5  Inversion:  Begin with your toes pointing straight up  Push your toes inward, toward each other  Slowly return to the starting position  Do 3 sets of 5  Eversion:  Begin with your toes pointing straight up  Push your toes outward, away from each other  Slowly return to the starting position  Do 3 sets of 5  Toe curls with a towel:  Sit on a chair so that both of your feet are flat on the floor  Place a small towel on the floor in front of your injured foot  Grab the center of the towel with your toes and curl the towel toward you  Relax and repeat  Do 1 set of 5  Ralph pick-ups:  Sit on a chair so that both of your feet are flat on the floor  Place 20 marbles on the floor in front of your injured foot  Use your toes to  one marble at a time and place it into a bowl  Repeat until you have picked up all the marbles  Do 1 set  Heel raises:      Single leg heel raises:  Stand with your weight evenly on both feet  Hold on to a chair or a wall for balance   Lift the foot that is not injured off the floor so all your weight is placed on your injured foot  Raise the heel of your injured foot as high as you can  Slowly lower your heel to the floor  Do 1 set of 10  Double leg heel raises:  Stand with your weight evenly on both feet  Hold on to a chair or a wall for balance  Raise both of your heels as high as you can  Slowly lower your heels to the floor  Do 1 set of 10  Heel and toe walks:      Heel walks:  Begin in a standing position  Lift your toes off the floor and walk on your heels  Keep your toes lifted as high as possible  Do 2 sets of 10  Toe walks:  Begin in a standing position  Lift your heels off the floor and walk on the balls and toes of your feet  Keep your heels lifted as high as possible  Do 2 sets of 10  How to perform a balance exercise safely:  After you can perform strengthening exercises without pain, you may do this beginning balancing exercise  Ask your healthcare provider for more advanced balance exercises  Single leg stance:  Stand with your weight evenly on both feet, or hold on to a chair or a wall  Do not lean to the side  Lift the foot that is not injured off the floor so all your weight is placed on your injured foot  Balance on your injured foot  Ask your healthcare provider how long to hold this position  Contact your healthcare provider if:   Your pain becomes worse  You have new pain  You have questions or concerns about your condition, care, or exercise program     © Copyright [employer] 2022 Information is for End User's use only and may not be sold, redistributed or otherwise used for commercial purposes  All illustrations and images included in CareNotes® are the copyrighted property of A D A M [employer]  or [facility] Jarad Villanueva   The above information is an  only  It is not intended as medical advice for individual conditions or treatments   Talk to your doctor, nurse or pharmacist before following any medical regimen to see if it is safe and effective for you

## 2023-01-31 ENCOUNTER — TELEPHONE (OUTPATIENT)
Dept: OTHER | Facility: OTHER | Age: 78
End: 2023-01-31

## 2023-01-31 NOTE — TELEPHONE ENCOUNTER
Patient is calling regarding cancelling an appointment      Date/Time: 2-2-2023 @ 14:00 pm    Patient was rescheduled: YES [] NO [x]    Patient requesting call back to reschedule: YES [] NO [x]    Patient is feeling better

## 2023-03-17 ENCOUNTER — HOSPITAL ENCOUNTER (OUTPATIENT)
Dept: RADIOLOGY | Facility: HOSPITAL | Age: 78
End: 2023-03-17

## 2023-03-17 DIAGNOSIS — M54.6 CHRONIC BILATERAL THORACIC BACK PAIN: ICD-10-CM

## 2023-03-17 DIAGNOSIS — G89.29 CHRONIC BILATERAL THORACIC BACK PAIN: ICD-10-CM

## 2023-03-21 ENCOUNTER — OFFICE VISIT (OUTPATIENT)
Dept: PODIATRY | Facility: CLINIC | Age: 78
End: 2023-03-21

## 2023-03-21 VITALS
DIASTOLIC BLOOD PRESSURE: 72 MMHG | WEIGHT: 146 LBS | HEART RATE: 77 BPM | HEIGHT: 55 IN | SYSTOLIC BLOOD PRESSURE: 138 MMHG | BODY MASS INDEX: 33.79 KG/M2

## 2023-03-21 DIAGNOSIS — M19.071 ARTHRITIS OF RIGHT ANKLE: Primary | ICD-10-CM

## 2023-03-21 DIAGNOSIS — M77.31 HEEL SPUR, RIGHT: ICD-10-CM

## 2023-03-21 NOTE — PROGRESS NOTES
Assessment/Plan:      Diagnoses and all orders for this visit:    Arthritis of right ankle    Heel spur, right      Pain resolved  Continue therapy, slowly increase activity    Subjective:     Patient ID: Fadia Biswas is a 76 y o  female  Patient had an ankle injection for arthritis and has been stretching her heel pain  She has no pain today      Review of Systems      Objective:     Physical Exam  Vitals reviewed  Cardiovascular:      Pulses: Normal pulses  Musculoskeletal:         General: No swelling, tenderness, deformity or signs of injury  Neurological:      Mental Status: She is alert

## 2023-04-12 PROBLEM — R93.89 THICKENED ENDOMETRIUM: Status: RESOLVED | Noted: 2022-02-20 | Resolved: 2023-04-12

## 2023-06-22 ENCOUNTER — OFFICE VISIT (OUTPATIENT)
Dept: DENTISTRY | Facility: CLINIC | Age: 78
End: 2023-06-22

## 2023-06-22 VITALS — HEART RATE: 81 BPM | DIASTOLIC BLOOD PRESSURE: 75 MMHG | SYSTOLIC BLOOD PRESSURE: 132 MMHG | TEMPERATURE: 97.2 F

## 2023-06-22 DIAGNOSIS — Z01.20 ENCOUNTER FOR DENTAL EXAMINATION: Primary | ICD-10-CM

## 2023-06-22 PROCEDURE — D4910 PERIODONTAL MAINTENANCE: HCPCS | Performed by: DENTAL HYGIENIST

## 2023-06-22 PROCEDURE — D0272 BITEWINGS - 2 RADIOGRAPHIC IMAGES: HCPCS | Performed by: DENTAL HYGIENIST

## 2023-06-22 PROCEDURE — D0120 PERIODIC ORAL EVALUATION - ESTABLISHED PATIENT: HCPCS

## 2023-06-22 NOTE — DENTAL PROCEDURE DETAILS
Kelsey Book presents for a Periodic exam  Verbal consent for treatment given in addition to the forms  Reviewed health history - Patient is ASA II  Consents signed: Yes     Perio: Generalized, Slight bleeding, and Recession  Pain Scale: 0  Caries Assessment: Medium  Radiographs: Bitewings x2  EO/IO/OCS:  WNL     Oral Hygiene instruction reviewed and given  OHI:  Fair/Poor  ---Lt to mod calc and plaque  ---[place]av[place], handscaled, polish, floss     Treatment Plan:  1  Periodontal therapy: 3 month periodontal maintenance  2  Caries control:   ---28 - DO, 29 - DO   ---19 - broken lingual - tooth was repaired before and it broke  ---Pt does not want to have any restorative work done - her teeth do not hurt at this time and she wants to keep it that way  3   Case Difficulty Type 1    Prognosis is Good    Referrals needed: No  Exam:  Dr Omega Dowd:  Periodontal main - 60 min - 3 months from 6/22/23  w/ Norman Altman

## 2023-07-18 ENCOUNTER — APPOINTMENT (EMERGENCY)
Dept: RADIOLOGY | Facility: HOSPITAL | Age: 78
End: 2023-07-18
Payer: COMMERCIAL

## 2023-07-18 ENCOUNTER — HOSPITAL ENCOUNTER (EMERGENCY)
Facility: HOSPITAL | Age: 78
Discharge: HOME | End: 2023-07-18
Attending: EMERGENCY MEDICINE
Payer: COMMERCIAL

## 2023-07-18 VITALS
SYSTOLIC BLOOD PRESSURE: 166 MMHG | TEMPERATURE: 96.7 F | WEIGHT: 147.2 LBS | RESPIRATION RATE: 16 BRPM | HEART RATE: 75 BPM | BODY MASS INDEX: 38.27 KG/M2 | OXYGEN SATURATION: 98 % | DIASTOLIC BLOOD PRESSURE: 92 MMHG

## 2023-07-18 DIAGNOSIS — M19.90 ARTHRITIS: ICD-10-CM

## 2023-07-18 DIAGNOSIS — M79.601 RIGHT ARM PAIN: Primary | ICD-10-CM

## 2023-07-18 PROCEDURE — 99284 EMERGENCY DEPT VISIT MOD MDM: CPT

## 2023-07-18 PROCEDURE — 99283 EMERGENCY DEPT VISIT LOW MDM: CPT

## 2023-07-18 PROCEDURE — 73080 X-RAY EXAM OF ELBOW: CPT

## 2023-07-18 PROCEDURE — 73110 X-RAY EXAM OF WRIST: CPT

## 2023-07-18 RX ORDER — METHYLPREDNISOLONE 4 MG/1
TABLET ORAL
Qty: 21 TABLET | Refills: 0 | Status: SHIPPED | OUTPATIENT
Start: 2023-07-18 | End: 2023-07-18 | Stop reason: SDUPTHER

## 2023-07-18 RX ORDER — METHYLPREDNISOLONE 4 MG/1
TABLET ORAL
Qty: 21 TABLET | Refills: 0 | Status: SHIPPED | OUTPATIENT
Start: 2023-07-18

## 2023-07-18 NOTE — ED NOTES
Pt states that the pain meds that she is taking "hurt my stomach and make me dizzy"     You Ybarra RN  07/18/23 1613

## 2023-07-18 NOTE — ED PROVIDER NOTES
History  Chief Complaint   Patient presents with   • Arm Pain     R arm pain "from the Elbow down" hx arthritis     64-year-old female presenting today with pain from the right elbow down to her fingertips onset 2 days ago. Patient states that she been taking medications without relief. States she does have a history of arthritis. Denies any nausea, vomiting, falls, chest pain, shortness of breath, fever, chills. States she does not believe she did anything to it but does state that she is the "doer" in her house and she does everything. Prior to Admission Medications   Prescriptions Last Dose Informant Patient Reported? Taking?    Multiple Vitamins-Minerals (CENTRUM ADULTS PO)   Yes No   Sig: Take by mouth   OYSCO 500 + D 500-200 MG-UNIT per tablet   Yes No   Sig: Take 1 tablet by mouth 2 (two) times a day with meals   acetaminophen (TYLENOL) 650 mg CR tablet   No No   Sig: TAKE 1 TABLET (650 MG TOTAL) BY MOUTH EVERY 12 (TWELVE) HOURS AS NEEDED FOR MILD PAIN   lidocaine (Lidoderm) 5 %   No No   Sig: Apply 1 patch topically daily Remove & Discard patch within 12 hours or as directed by MD   meclizine (ANTIVERT) 12.5 MG tablet   No No   Sig: Take 1 tablet (12.5 mg total) by mouth every 12 (twelve) hours as needed for dizziness   Patient not taking: Reported on 4/12/2023   methylPREDNISolone 4 MG tablet therapy pack   No No   Sig: Use as directed on package   Patient not taking: Reported on 4/12/2023   omeprazole (PriLOSEC) 20 mg delayed release capsule   No No   Sig: TAKE 1 CAPSULE BY MOUTH EVERY DAY   polyethylene glycol (GLYCOLAX) 17 GM/SCOOP powder   No No   Sig: Take 17 g by mouth daily      Facility-Administered Medications: None       Past Medical History:   Diagnosis Date   • Arthritis    • Chronic pain disorder     sciatic   • GERD (gastroesophageal reflux disease)    • Hydronephrosis with renal and ureteral calculus obstruction 12/23/2015   • Kidney stone    • Kidney stone    • Nephrolithiasis • Osteoarthritis    • Osteoporosis    • Sinus arrhythmia        Past Surgical History:   Procedure Laterality Date   • CHOLECYSTECTOMY     • FL RETROGRADE PYELOGRAM  3/7/2019   • KIDNEY STONE SURGERY     • KNEE ARTHROSCOPY Left    • AR COLONOSCOPY FLX DX W/COLLJ SPEC WHEN PFRMD N/A 11/2/2017    Procedure: COLONOSCOPY with polypectomy x2;  Surgeon: Adam Oviedo MD;  Location: [facility]; Service: Gastroenterology   • AR CYSTO BLADDER W/URETERAL CATHETERIZATION Left 3/7/2019    Procedure: CYSTOSCOPY, URETEROSCOPY WITH LASER, BASKET STONE EXTRACTION, RETROGRADE PYELOGRAM WITH INSERTION STENT URETERAL;  Surgeon: Fabian Clay MD;  Location: [facility];  Service: Urology   • TUBAL LIGATION         Family History   Problem Relation Age of Onset   • No Known Problems Mother    • Mental illness Father    • Prostate cancer Father    • Colon cancer Sister    • No Known Problems Sister    • No Known Problems Sister    • No Known Problems Sister    • Ovarian cancer Daughter    • Mental illness Daughter    • No Known Problems Maternal Grandmother    • No Known Problems Maternal Grandfather    • No Known Problems Paternal Grandmother    • No Known Problems Paternal Grandfather      I have reviewed and agree with the history as documented.     E-Cigarette/Vaping   • E-Cigarette Use Never User      E-Cigarette/Vaping Substances   • Nicotine No    • THC No    • CBD No    • Flavoring No    • Other No    • Unknown No      Social History     Tobacco Use   • Smoking status: Never   • Smokeless tobacco: Never   Vaping Use   • Vaping Use: Never used   Substance Use Topics   • Alcohol use: Not Currently   • Drug use: No       Review of Systems    Physical Exam  Physical Exam    Vital Signs  ED Triage Vitals [07/18/23 0944]   Temperature Pulse Respirations Blood Pressure SpO2   (!) 96.7 °F (35.9 °C) 75 16 166/92 98 %      Temp Source Heart Rate Source Patient Position - Orthostatic VS BP Location FiO2 (%)   Tympanic Monitor Lying Left arm --      Pain Score       --           Vitals:    07/18/23 0944   BP: 166/92   Pulse: 75   Patient Position - Orthostatic VS: Lying         [facility]      ED Medications  Medications - No data to display    Diagnostic Studies  Results Reviewed     None                 XR elbow 3+ vw RIGHT    (Results Pending)   XR wrist 3+ views RIGHT    (Results Pending)              Procedures  Procedures         ED Course                                             MDM    Disposition  Final diagnoses:   None     ED Disposition     None      Follow-up Information    None         Patient's Medications   Discharge Prescriptions    No medications on file       No discharge procedures on file.     PDMP Review     None          ED Provider  Electronically Signed by [employer]      ED Medications  Medications - No data to display    Diagnostic Studies  Results Reviewed     None                 XR elbow 3+ vw RIGHT   Final Result by Sandra Becker MD (07/19 7369)      No acute osseous abnormality. Workstation performed: [id number]         XR wrist 3+ views RIGHT   Final Result by Sandra Becker MD (07/19 4185)      No acute osseous abnormality. Workstation performed: [id number]                    Procedures  Procedures         ED Course                                             Medical Decision Making  64-year-old female presenting today with concerns of right arm, from elbow to wrist pain. 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  Denies any injuries or falls to the area. Denies any other injuries. Denies any other concerning symptoms like dizziness, chest pain, shortness of breath. X-ray to rule out fracture. X-ray read by me, no acute fracture or dislocation. Likely tendinitis/arthritis. Will treat with steroids outpatient. Let patient follow-up with family doctor for further evaluation. Patient at time of discharge was well-appearing and in no acute distress, questions answered. Patient's vitals, lab/imaging results, diagnosis, and treatment plan were discussed with the patient. All new/changed medications were discussed with patient, specifically, route of administration, how often and when to take, and where they can be picked up. Strict return precautions as well as close follow up with PCP was discussed with the patient and the patient was agreeable to my recommendations. Patient verbally acknowledged understanding of the above communications. All labs reviewed and utilized in the medical decision making process (if labs were ordered).  Portions of the record may have been created with voice recognition software.  Occasional wrong word or "sound a like" substitutions may have occurred due to the inherent limitations of voice recognition software.  Read the chart carefully and recognize, using context, where substitutions have occurred. Amount and/or Complexity of Data Reviewed  Radiology: ordered. Risk  Prescription drug management. Disposition  Final diagnoses:   Right arm pain   Arthritis     Time reflects when diagnosis was documented in both MDM as applicable and the Disposition within this note     Time User Action Codes Description Comment    7/18/2023 10:34 AM Gertrude Marroquin Add [V46.780] Right arm pain     7/18/2023 10:36 AM Gertrude Marroquin Add [M19.90] Arthritis       ED Disposition     ED Disposition   Discharge    Condition   Stable    Date/Time   Tue Jul 18, 2023 10:34 AM    Comment   Miguel Rutledge discharge to home/self care. Follow-up Information     Follow up With Specialties Details Why Contact Info Additional [address] [facility] Emergency Department Emergency Medicine Go to  If symptoms worsen [address] [phone] [address] [facility] Emergency Department    [facility] [place] [place] [facility] Schedule an appointment as soon as possible for a visit  As needed [address]  [address] [place] [address]  [phone]             Discharge Medication List as of 7/18/2023 10:41 AM      CONTINUE these medications which have CHANGED    Details   !! methylPREDNISolone 4 MG tablet therapy pack Use as directed on package, Normal       !! - Potential duplicate medications found. Please discuss with provider.       CONTINUE these medications which have NOT CHANGED    Details   acetaminophen (TYLENOL) 650 mg CR tablet TAKE 1 TABLET (650 MG TOTAL) BY MOUTH EVERY 12 (TWELVE) HOURS AS NEEDED FOR MILD PAIN, Starting Fri 12/23/2022, Normal      lidocaine (Lidoderm) 5 % Apply 1 patch topically daily Remove & Discard patch within 12 hours or as directed by MD, Starting Fri 11/18/2022, Normal      meclizine (ANTIVERT) 12.5 MG tablet Take 1 tablet (12.5 mg total) by mouth every 12 (twelve) hours as needed for dizziness, Starting Wed 2/9/2022, Normal      !! methylPREDNISolone 4 MG tablet therapy pack Use as directed on package, Normal      Multiple Vitamins-Minerals (CENTRUM ADULTS PO) Take by mouth, Historical Med      omeprazole (PriLOSEC) 20 mg delayed release capsule TAKE 1 CAPSULE BY MOUTH EVERY DAY, Normal      OYSCO 500 + D 500-200 MG-UNIT per tablet Take 1 tablet by mouth 2 (two) times a day with meals, Starting Wed 2/9/2022, Historical Med      polyethylene glycol (GLYCOLAX) 17 GM/SCOOP powder Take 17 g by mouth daily, Starting Fri 9/16/2022, Normal       !! - Potential duplicate medications found. Please discuss with provider. No discharge procedures on file.     PDMP Review     None          ED Provider  Electronically Signed by           Peter Alvarez PA-C  07/25/23 5186

## 2023-07-19 DIAGNOSIS — Z12.31 SCREENING MAMMOGRAM FOR HIGH-RISK PATIENT: Primary | ICD-10-CM

## 2023-10-09 ENCOUNTER — OFFICE VISIT (OUTPATIENT)
Dept: FAMILY MEDICINE CLINIC | Facility: CLINIC | Age: 78
End: 2023-10-09

## 2023-10-09 VITALS
DIASTOLIC BLOOD PRESSURE: 80 MMHG | BODY MASS INDEX: 34.94 KG/M2 | HEART RATE: 86 BPM | TEMPERATURE: 98.7 F | SYSTOLIC BLOOD PRESSURE: 116 MMHG | HEIGHT: 55 IN | WEIGHT: 151 LBS | RESPIRATION RATE: 16 BRPM | OXYGEN SATURATION: 97 %

## 2023-10-09 DIAGNOSIS — E66.09 CLASS 2 OBESITY DUE TO EXCESS CALORIES WITHOUT SERIOUS COMORBIDITY WITH BODY MASS INDEX (BMI) OF 38.0 TO 38.9 IN ADULT: ICD-10-CM

## 2023-10-09 DIAGNOSIS — M54.6 CHRONIC BILATERAL THORACIC BACK PAIN: Primary | ICD-10-CM

## 2023-10-09 DIAGNOSIS — G89.29 CHRONIC BILATERAL THORACIC BACK PAIN: Primary | ICD-10-CM

## 2023-10-09 DIAGNOSIS — K21.9 GASTROESOPHAGEAL REFLUX DISEASE WITHOUT ESOPHAGITIS: ICD-10-CM

## 2023-10-09 DIAGNOSIS — Z23 ENCOUNTER FOR IMMUNIZATION: ICD-10-CM

## 2023-10-09 DIAGNOSIS — R42 VERTIGO: ICD-10-CM

## 2023-10-09 PROCEDURE — 99214 OFFICE O/P EST MOD 30 MIN: CPT

## 2023-10-09 PROCEDURE — G0008 ADMIN INFLUENZA VIRUS VAC: HCPCS

## 2023-10-09 PROCEDURE — 90662 IIV NO PRSV INCREASED AG IM: CPT

## 2023-10-09 RX ORDER — METHYLPREDNISOLONE 4 MG/1
TABLET ORAL
Qty: 21 EACH | Refills: 0 | Status: SHIPPED | OUTPATIENT
Start: 2023-10-09

## 2023-10-09 RX ORDER — OMEPRAZOLE 20 MG/1
20 CAPSULE, DELAYED RELEASE ORAL DAILY
Qty: 90 CAPSULE | Refills: 1 | Status: SHIPPED | OUTPATIENT
Start: 2023-10-09

## 2023-10-09 RX ORDER — MECLIZINE HCL 12.5 MG/1
12.5 TABLET ORAL EVERY 12 HOURS PRN
Qty: 10 TABLET | Refills: 0 | Status: SHIPPED | OUTPATIENT
Start: 2023-10-09

## 2023-10-09 NOTE — ASSESSMENT & PLAN NOTE
Body mass index is 39.26 kg/m². The BMI is above normal. Nutrition recommendations include reducing portion sizes, decreasing overall calorie intake, 3-5 servings of fruits/vegetables daily, reducing fast food intake, consuming healthier snacks, decreasing soda and/or juice intake, moderation in carbohydrate intake, increasing intake of lean protein, reducing intake of saturated fat and trans fat and reducing intake of cholesterol. Exercise recommendations include moderate aerobic physical activity for 150 minutes/week.

## 2023-10-09 NOTE — PROGRESS NOTES
Name: Samia Xiong      :       MRN: [id number]  Encounter Provider: BINA Barker  Encounter Date: 10/9/2023   Encounter department: [facility] [facility],[address]     1. Chronic bilateral thoracic back pain  Assessment & Plan:  -continue tylenol and  Capsaicin cream PRN  -continue heating pad on affected areas and home exercises  -declines PT   -refer to pain management    Orders:  -     Ambulatory referral to [facility]; Future  -     methylPREDNISolone 4 MG tablet therapy pack; Use as directed on package    2. Encounter for immunization  -     influenza vaccine, high-dose, PF 0.7 mL (FLUZONE HIGH-DOSE)    3. Class 2 obesity due to excess calories without serious comorbidity with body mass index (BMI) of 38.0 to 38.9 in adult  Assessment & Plan: Body mass index is 39.26 kg/m². The BMI is above normal. Nutrition recommendations include reducing portion sizes, decreasing overall calorie intake, 3-5 servings of fruits/vegetables daily, reducing fast food intake, consuming healthier snacks, decreasing soda and/or juice intake, moderation in carbohydrate intake, increasing intake of lean protein, reducing intake of saturated fat and trans fat and reducing intake of cholesterol. Exercise recommendations include moderate aerobic physical activity for 150 minutes/week. 4. Gastroesophageal reflux disease without esophagitis  Assessment & Plan:  -stable  -continue prilosec 20 mg daily    Orders:  -     omeprazole (PriLOSEC) 20 mg delayed release capsule; Take 1 capsule (20 mg total) by mouth daily    5. Vertigo  -     meclizine (ANTIVERT) 12.5 MG tablet;  Take 1 tablet (12.5 mg total) by mouth every 12 (twelve) hours as needed for dizziness         Subjective        Samia Xiong is a 78 y.o. female  has a past medical history of Arthritis, Chronic pain disorder, GERD (gastroesophageal reflux disease), Hydronephrosis with renal and ureteral calculus obstruction, Kidney stone, Kidney stone, Nephrolithiasis, Osteoarthritis, Osteoporosis, and Sinus arrhythmia. has a past surgical history that includes Tubal ligation; Knee arthroscopy (Left); Cholecystectomy; pr colonoscopy flx dx w/collj spec when pfrmd (N/A, 11/2/2017); pr cysto bladder w/ureteral catheterization (Left, 3/7/2019); FL retrograde pyelogram (3/7/2019); and Kidney stone surgery.     Priscilla Coyle is a 78year old female who is here for acute on chronic bilateral thoracic pain. The current episode started 1 month ago. Her pain has been gradually worsening since onset. The quality of the pain is described as burning and aching. The pain is at a severity of 8/10. Stiffness is present at night and in the morning. Patient was seen in the office earlier this year for the same problem and was ordered steroids taper which was effective. Patient was seen by pain management couple years ago and was receiving steroid injections which was effective. Pertinent negatives include no abdominal pain, chest pain, dysuria or fever. Patient reports taking tylenol, NSAIDS, lidocaine patch, lidocaine cream, and voltaren cream which is ineffective. Patient reports using heating pad and capsaicin cream which is mildly effective. Review of Systems   Constitutional: Negative. Negative for chills and fever. HENT: Negative. Negative for ear pain and sore throat. Eyes: Negative. Negative for pain and visual disturbance. Respiratory: Negative. Negative for cough and shortness of breath. Cardiovascular: Negative. Negative for chest pain and palpitations. Gastrointestinal: Negative. Negative for abdominal pain and vomiting. Endocrine: Negative. Genitourinary: Negative. Negative for dysuria and hematuria. Musculoskeletal: Positive for arthralgias, back pain and myalgias. Skin: Negative. Negative for color change and rash. Allergic/Immunologic: Negative.     Neurological: Negative. Negative for seizures and syncope. Hematological: Negative. Psychiatric/Behavioral: Negative. All other systems reviewed and are negative. Current Outpatient Medications on File Prior to Visit   Medication Sig   • acetaminophen (TYLENOL) 650 mg CR tablet TAKE 1 TABLET (650 MG TOTAL) BY MOUTH EVERY 12 (TWELVE) HOURS AS NEEDED FOR MILD PAIN   • lidocaine (Lidoderm) 5 % Apply 1 patch topically daily Remove & Discard patch within 12 hours or as directed by MD   • Multiple Vitamins-Minerals (CENTRUM ADULTS PO) Take by mouth   • OYSCO 500 + D 500-200 MG-UNIT per tablet Take 1 tablet by mouth 2 (two) times a day with meals   • [DISCONTINUED] meclizine (ANTIVERT) 12.5 MG tablet Take 1 tablet (12.5 mg total) by mouth every 12 (twelve) hours as needed for dizziness (Patient not taking: Reported on 4/12/2023)   • [DISCONTINUED] methylPREDNISolone 4 MG tablet therapy pack Use as directed on package (Patient not taking: Reported on 4/12/2023)   • [DISCONTINUED] methylPREDNISolone 4 MG tablet therapy pack Use as directed on package   • [DISCONTINUED] omeprazole (PriLOSEC) 20 mg delayed release capsule TAKE 1 CAPSULE BY MOUTH EVERY DAY   • [DISCONTINUED] polyethylene glycol (GLYCOLAX) 17 GM/SCOOP powder Take 17 g by mouth daily       Objective     /80 (BP Location: Right arm, Patient Position: Sitting, Cuff Size: Large)   Pulse 86   Temp 98.7 °F (37.1 °C) (Temporal)   Resp 16   Ht 4' 4" (1.321 m)   Wt 68.5 kg (151 lb)   LMP  (LMP Unknown)   SpO2 97%   BMI 39.26 kg/m²     Physical Exam  Constitutional:       Appearance: Normal appearance. She is obese. HENT:      Head: Normocephalic. Right Ear: Tympanic membrane, ear canal and external ear normal.      Left Ear: Tympanic membrane, ear canal and external ear normal.      Nose: Nose normal.      Mouth/Throat:      Mouth: Mucous membranes are moist.   Eyes:      General:         Right eye: No discharge. Left eye: No discharge. Conjunctiva/sclera: Conjunctivae normal.      Pupils: Pupils are equal, round, and reactive to light. Cardiovascular:      Rate and Rhythm: Normal rate and regular rhythm. Pulses: Normal pulses. Heart sounds: Normal heart sounds. Pulmonary:      Effort: Pulmonary effort is normal.      Breath sounds: Normal breath sounds. Abdominal:      General: Abdomen is flat. Bowel sounds are normal.      Palpations: Abdomen is soft. Musculoskeletal:         General: Normal range of motion. Cervical back: Normal range of motion. Thoracic back: Tenderness present. Back:       Right lower leg: No edema. Left lower leg: No edema. Comments: (+) tenderness to palpation on bilateral upper thoracic region   Skin:     General: Skin is warm and dry. Capillary Refill: Capillary refill takes less than 2 seconds. Neurological:      General: No focal deficit present. Mental Status: She is alert and oriented to person, place, and time. Mental status is at baseline. Psychiatric:         Mood and Affect: Mood normal.         Behavior: Behavior normal.         Thought Content:  Thought content normal.         Judgment: Judgment normal.       [address]

## 2023-10-09 NOTE — ASSESSMENT & PLAN NOTE
-continue tylenol and  Capsaicin cream PRN  -continue heating pad on affected areas and home exercises  -declines PT   -refer to pain management no edema, no murmurs, regular rate and rhythm

## 2023-10-11 ENCOUNTER — HOSPITAL ENCOUNTER (OUTPATIENT)
Dept: MAMMOGRAPHY | Facility: CLINIC | Age: 78
Discharge: HOME | End: 2023-10-11
Payer: COMMERCIAL

## 2023-10-11 VITALS — HEIGHT: 55 IN | WEIGHT: 151 LBS | BODY MASS INDEX: 34.94 KG/M2

## 2023-10-11 DIAGNOSIS — Z12.31 SCREENING MAMMOGRAM FOR HIGH-RISK PATIENT: ICD-10-CM

## 2023-10-11 PROCEDURE — 77067 SCR MAMMO BI INCL CAD: CPT

## 2023-10-11 PROCEDURE — 77063 BREAST TOMOSYNTHESIS BI: CPT

## 2023-10-31 ENCOUNTER — TELEPHONE (OUTPATIENT)
Dept: FAMILY MEDICINE CLINIC | Facility: CLINIC | Age: 78
End: 2023-10-31

## 2023-10-31 NOTE — TELEPHONE ENCOUNTER
10/31/23    Pt came into the office today requesting an MRI Per Pain Management.     Pt states that Pain Management wants her to have an MRI from her Waist Up to her neck and head (PT STATED)     Pt needs Referral before her 11/27/23 [facility]       Any questions, please contact Pt

## 2023-11-06 NOTE — TELEPHONE ENCOUNTER
11/06/23    first attempt to contact patient. no answer    Left message       If pt contact office, please relate PCP message.

## 2023-11-08 ENCOUNTER — TELEPHONE (OUTPATIENT)
Age: 78
End: 2023-11-08

## 2023-11-08 NOTE — TELEPHONE ENCOUNTER
Caller: James Jacob pt    Doctor: Shauna Andrews    Reason for call: pt called stating that she would like to get an MRI. She was told by her PCP that she should reach out to our office for Dr. Shauna Andrews to order. There is a telephone encounter dated 10/31/23 regarding this request and statement.     Call back#: [phone]

## 2023-11-08 NOTE — TELEPHONE ENCOUNTER
Attempted to reach pt and got message "the person you're trying to reach is busy on another call. Try again later."  Tried 3 times. Will try again later.

## 2023-11-09 NOTE — TELEPHONE ENCOUNTER
S/w pt and advised of same. Pt verbalized understanding and states she did PT a year ago. Pt is aware that insurance normally requires 6 weeks of PT within the past 3-6 months in order approve an MRI. Pt plans to reach out to her PCP regarding PT.

## 2023-11-20 NOTE — TELEPHONE ENCOUNTER
Detail Level: Detailed Patient was seen in the emergency department this weekend for foot pain, states it is her arthritis, she can barely walk, very painful  She needs a shot or something for the pain  She was seeing a podiatrist in the past, but he has moved and it is now too far for her to go, but she can't remember his name  Asking if you can refer her somewhere or help her in any way    Thanks

## 2023-11-27 ENCOUNTER — OFFICE VISIT (OUTPATIENT)
Dept: PAIN MEDICINE | Facility: MEDICAL CENTER | Age: 78
End: 2023-11-27
Payer: COMMERCIAL

## 2023-11-27 VITALS
HEART RATE: 59 BPM | WEIGHT: 150 LBS | SYSTOLIC BLOOD PRESSURE: 132 MMHG | OXYGEN SATURATION: 99 % | HEIGHT: 55 IN | BODY MASS INDEX: 34.71 KG/M2 | DIASTOLIC BLOOD PRESSURE: 82 MMHG

## 2023-11-27 DIAGNOSIS — M79.18 MYOFASCIAL PAIN SYNDROME: Primary | ICD-10-CM

## 2023-11-27 DIAGNOSIS — M79.18 MYOFASCIAL PAIN SYNDROME: ICD-10-CM

## 2023-11-27 PROCEDURE — 20552 NJX 1/MLT TRIGGER POINT 1/2: CPT | Performed by: PHYSICAL MEDICINE & REHABILITATION

## 2023-11-27 PROCEDURE — 99204 OFFICE O/P NEW MOD 45 MIN: CPT | Performed by: PHYSICAL MEDICINE & REHABILITATION

## 2023-11-27 RX ORDER — LIDOCAINE HYDROCHLORIDE 10 MG/ML
2 INJECTION, SOLUTION EPIDURAL; INFILTRATION; INTRACAUDAL; PERINEURAL ONCE
Status: COMPLETED | OUTPATIENT
Start: 2023-11-27 | End: 2023-11-27

## 2023-11-27 RX ORDER — TIZANIDINE 4 MG/1
4 TABLET ORAL
Qty: 30 TABLET | Refills: 0 | Status: SHIPPED | OUTPATIENT
Start: 2023-11-27 | End: 2023-12-27

## 2023-11-27 RX ORDER — TIZANIDINE HYDROCHLORIDE 4 MG/1
4 CAPSULE, GELATIN COATED ORAL
Qty: 30 CAPSULE | Refills: 0 | Status: SHIPPED | OUTPATIENT
Start: 2023-11-27 | End: 2023-11-28

## 2023-11-27 RX ADMIN — LIDOCAINE HYDROCHLORIDE 2 ML: 10 INJECTION, SOLUTION EPIDURAL; INFILTRATION; INTRACAUDAL; PERINEURAL at 13:33

## 2023-11-27 NOTE — PROGRESS NOTES
Right levator scapula TRIGGER POINT INJECTION(S)  After discussing the risks of the procedure including, but not limited to: unchanged or increased pain, bleeding, infection, allergic reaction, soft-tissue injury, nerve damage, pneumothorax, informed consent was obtained. The targeted areas were identified by the presence of discrete trigger points with localized tenderness, hypertonicity and taut bands. The skin overlying the target sites was then cleansed with alcohol. Next, a 27 gauge 1.25" needle was inserted into the taut band of muscle. At each site, aspiration was attempted and was negative for return of blood or other fluid. Then, 1 site(s) injected with 1 cc of 1% lidocaine. Additionally, dry-needling in a fanlike distribution was performed at each site. The needle was removed intact from the patient. The patient tolerated the procedure well. No complications were noted and hemostasis was achieved. The patient was instructed to contact us with any problems including any signs/symptoms of infection with persistent bleeding or drainage.

## 2023-11-27 NOTE — PROGRESS NOTES
Assessment  1. Myofascial pain syndrome        Plan  Initiate tizanidine 4 mg nightly  Patient is interested in trigger point injection which we will provide today. I would like her to call us in a week to see how she is doing with the change. Follow-up in 4 weeks    My impressions and treatment recommendations were discussed in detail with the patient who verbalized understanding and had no further questions. Discharge instructions were provided. I personally saw and examined the patient and I agree with the above discussed plan of care. No orders of the defined types were placed in this encounter. New Medications Ordered This Visit   Medications    ibuprofen (MOTRIN) 100 mg/5 mL suspension     Sig: Take by mouth every 6 (six) hours as needed for mild pain    lidocaine (PF) (XYLOCAINE-MPF) 1 % injection 2 mL    TiZANidine (ZANAFLEX) 4 MG capsule     Sig: Take 1 capsule (4 mg total) by mouth daily at bedtime as needed for muscle spasms     Dispense:  30 capsule     Refill:  0       History of Present Illness    Amberly Mata is a 78 y.o. female seen in consultation today regarding pain in the myofascial structures of the cervical paraspinal and upper thoracic region. This is mainly over the levator scapula muscle. Patient's been experiencing symptoms for few months without any specific inciting event or trauma. She did have some x-rays obtained demonstrating some thoracic spine arthritis as well. She has noted some pain in the wrists as well but is not describing radicular type pain. Aggravating and alleviating factors are unknown but she has significant tenderness to palpation over the musculature. Previously had methocarbamol available but did not find this beneficial.    I have personally reviewed and/or updated the patient's past medical history, past surgical history, family history, social history, current medications, allergies, and vital signs today.      Review of Systems Constitutional:  Positive for chills. Negative for fever and unexpected weight change. HENT:  Negative for trouble swallowing. Eyes:  Negative for visual disturbance. Respiratory:  Negative for shortness of breath and wheezing. Cardiovascular:  Positive for leg swelling. Negative for chest pain and palpitations. Gastrointestinal:  Negative for constipation, diarrhea, nausea and vomiting. Endocrine: Negative for cold intolerance, heat intolerance and polydipsia. Genitourinary:  Negative for difficulty urinating and frequency. Musculoskeletal:  Positive for back pain, joint swelling and myalgias. Negative for arthralgias and gait problem. Skin:  Negative for rash. Neurological:  Negative for dizziness, seizures, syncope, weakness and headaches. Hematological:  Does not bruise/bleed easily. Psychiatric/Behavioral:  Negative for dysphoric mood. All other systems reviewed and are negative.       Patient Active Problem List   Diagnosis    Sacroiliitis ([address]) - Bilateral    Lumbar spondylosis    Degenerative disc disease, thoracic    Nephrolithiasis    Chronic rhinitis    Umbilical hernia without obstruction and without gangrene    Osteopenia of left hip    Mixed conductive and sensorineural hearing loss of right ear with restricted hearing of left ear    Vertigo    Class 2 obesity due to excess calories without serious comorbidity with body mass index (BMI) of 38.0 to 38.9 in adult    Endometrial polyp    Hyperlipidemia, mixed    Chronic foot pain, left    Gastroesophageal reflux disease without esophagitis    Cervical pain    Family history of colon cancer    Chronic bilateral thoracic back pain       Past Medical History:   Diagnosis Date    Arthritis     Chronic pain disorder     sciatic    GERD (gastroesophageal reflux disease)     Hydronephrosis with renal and ureteral calculus obstruction 12/23/2015    Kidney stone     Kidney stone     Nephrolithiasis     Osteoarthritis     Osteoporosis Sinus arrhythmia        Past Surgical History:   Procedure Laterality Date    CHOLECYSTECTOMY      FL RETROGRADE PYELOGRAM  3/7/2019    KIDNEY STONE SURGERY      KNEE ARTHROSCOPY Left     NV COLONOSCOPY FLX DX W/COLLJ SPEC WHEN PFRMD N/A 11/2/2017    Procedure: COLONOSCOPY with polypectomy x2;  Surgeon: Mar Jean MD;  Location: [facility];   Service: Gastroenterology    NV CYSTO BLADDER W/URETERAL CATHETERIZATION Left 3/7/2019    Procedure: CYSTOSCOPY, URETEROSCOPY WITH LASER, BASKET STONE EXTRACTION, RETROGRADE PYELOGRAM WITH INSERTION STENT URETERAL;  Surgeon: Etta Henry MD;  Location: [facility];  Service: Urology    TUBAL LIGATION         Family History   Problem Relation Age of Onset    No Known Problems Mother     Mental illness Father     Prostate cancer Father     Colon cancer Sister     No Known Problems Sister     No Known Problems Sister     No Known Problems Sister     Ovarian cancer Daughter     Mental illness Daughter     No Known Problems Maternal Grandmother     No Known Problems Maternal Grandfather     No Known Problems Paternal Grandmother     No Known Problems Paternal Grandfather        Social History     Occupational History    Occupation: Retired   Tobacco Use    Smoking status: Never    Smokeless tobacco: Never   Vaping Use    Vaping Use: Never used   Substance and Sexual Activity    Alcohol use: Not Currently    Drug use: No    Sexual activity: Not Currently       Current Outpatient Medications on File Prior to Visit   Medication Sig    ibuprofen (MOTRIN) 100 mg/5 mL suspension Take by mouth every 6 (six) hours as needed for mild pain    lidocaine (Lidoderm) 5 % Apply 1 patch topically daily Remove & Discard patch within 12 hours or as directed by MD    meclizine (ANTIVERT) 12.5 MG tablet Take 1 tablet (12.5 mg total) by mouth every 12 (twelve) hours as needed for dizziness    Multiple Vitamins-Minerals (CENTRUM ADULTS PO) Take by mouth    OYSCO 500 + D 500-200 MG-UNIT per tablet Take 1 tablet by mouth 2 (two) times a day with meals    acetaminophen (TYLENOL) 650 mg CR tablet TAKE 1 TABLET (650 MG TOTAL) BY MOUTH EVERY 12 (TWELVE) HOURS AS NEEDED FOR MILD PAIN (Patient not taking: Reported on 11/27/2023)    methylPREDNISolone 4 MG tablet therapy pack Use as directed on package (Patient not taking: Reported on 11/27/2023)    omeprazole (PriLOSEC) 20 mg delayed release capsule Take 1 capsule (20 mg total) by mouth daily     No current facility-administered medications on file prior to visit. Allergies   Allergen Reactions    Diclofenac Sodium      Other reaction(s): GI Upset    Meperidine     Tramadol      Other reaction(s): GI Upset       Physical Exam    /82   Pulse 59   Ht 4' 4" (1.321 m)   Wt 68 kg (150 lb)   LMP  (LMP Unknown)   SpO2 99%   BMI 39.00 kg/m²     Constitutional: normal, well developed, well nourished, alert, in no distress and non-toxic and no overt pain behavior.   Eyes: anicteric  HEENT: grossly intact  Neck: supple, symmetric, trachea midline and no masses   Pulmonary:even and unlabored  Cardiovascular:No edema or pitting edema present  Skin:Normal without rashes or lesions and well hydrated  Psychiatric:Mood and affect appropriate  Neurologic:Cranial Nerves II-XII grossly intact  Musculoskeletal:normal, except for significant tenderness to palpation over the right levator scapula muscle reproducing her pain complaint, she has more widespread tenderness as well    Imaging

## 2023-11-28 RX ORDER — TIZANIDINE HYDROCHLORIDE 4 MG/1
4 CAPSULE, GELATIN COATED ORAL
Qty: 30 CAPSULE | Refills: 0 | Status: SHIPPED | OUTPATIENT
Start: 2023-11-28 | End: 2023-12-28

## 2023-12-04 ENCOUNTER — TELEPHONE (OUTPATIENT)
Age: 78
End: 2023-12-04

## 2023-12-04 DIAGNOSIS — G89.29 CHRONIC BILATERAL THORACIC BACK PAIN: ICD-10-CM

## 2023-12-04 DIAGNOSIS — M54.6 CHRONIC BILATERAL THORACIC BACK PAIN: ICD-10-CM

## 2023-12-04 RX ORDER — METHYLPREDNISOLONE 4 MG/1
TABLET ORAL
Qty: 21 EACH | Refills: 0 | Status: SHIPPED | OUTPATIENT
Start: 2023-12-04

## 2023-12-04 NOTE — TELEPHONE ENCOUNTER
RN s/w pt regarding previus. Pt did not require an interpretor. Per pt the TPI that was done on 11/27 did not help the pt's pain and now she has the pain plus she feels the "poked areas."    Per pt her shoulder pain radiates to her hands. Pt has not dirven since 11/27 due to being woried that she can't due to shoulder to hand pain. Pt tried the tizanidine 4mg x 2 nights and had sleep for 3-4 hours and then woke with numb hands, then tried 2mg instead of 4 and same results. Per pt,"if Dr Magdalena Bhat would like me to have a nice [place] would he consider ordering a prednisone dose chelsie?"  Per pt these help her for 2-3 months and she will be following up with [facility] after holidays. --please advise thank you--  Prednisone dose chelsie?  Stop the tizanidine

## 2023-12-04 NOTE — TELEPHONE ENCOUNTER
Pt aware of same and is appreciative of same. Aware no NSAIDS while taking the prednisone, NSAIDS listed for pt.

## 2023-12-04 NOTE — TELEPHONE ENCOUNTER
Caller: Hussain Ayers, pt    Doctor: Daisy Knight    Reason for call: pt is calling with a follow up on the medication that she was prescribed. Pt stated she has not had any improvement with the medications. After taking muscle relaxer's pt's hands are going numb.   She is asking if the dr can prescribe a prednisone pack    Call back#: [phone]

## 2023-12-21 DIAGNOSIS — M79.18 MYOFASCIAL PAIN SYNDROME: ICD-10-CM

## 2023-12-21 RX ORDER — TIZANIDINE 4 MG/1
4 TABLET ORAL
Qty: 90 TABLET | Refills: 1 | Status: SHIPPED | OUTPATIENT
Start: 2023-12-21

## 2024-01-02 ENCOUNTER — APPOINTMENT (OUTPATIENT)
Dept: RADIOLOGY | Facility: MEDICAL CENTER | Age: 79
End: 2024-01-02
Payer: COMMERCIAL

## 2024-01-02 ENCOUNTER — OFFICE VISIT (OUTPATIENT)
Dept: PAIN MEDICINE | Facility: MEDICAL CENTER | Age: 79
End: 2024-01-02
Payer: COMMERCIAL

## 2024-01-02 VITALS
HEART RATE: 89 BPM | OXYGEN SATURATION: 99 % | SYSTOLIC BLOOD PRESSURE: 144 MMHG | HEIGHT: 55 IN | DIASTOLIC BLOOD PRESSURE: 68 MMHG | WEIGHT: 150 LBS | BODY MASS INDEX: 34.71 KG/M2

## 2024-01-02 DIAGNOSIS — M79.18 MYOFASCIAL PAIN SYNDROME: ICD-10-CM

## 2024-01-02 DIAGNOSIS — M47.812 CERVICAL SPONDYLOSIS: Primary | ICD-10-CM

## 2024-01-02 DIAGNOSIS — G89.29 CHRONIC NECK PAIN: ICD-10-CM

## 2024-01-02 DIAGNOSIS — M54.2 CHRONIC NECK PAIN: ICD-10-CM

## 2024-01-02 PROCEDURE — 72050 X-RAY EXAM NECK SPINE 4/5VWS: CPT

## 2024-01-02 PROCEDURE — 99214 OFFICE O/P EST MOD 30 MIN: CPT

## 2024-01-02 RX ORDER — CYCLOBENZAPRINE HCL 10 MG
10 TABLET ORAL 2 TIMES DAILY PRN
Qty: 60 TABLET | Refills: 0 | Status: SHIPPED | OUTPATIENT
Start: 2024-01-02

## 2024-01-02 NOTE — PROGRESS NOTES
Assessment:  1. Cervical spondylosis    2. Chronic neck pain    3. Myofascial pain syndrome        Plan:  Update x-ray of the cervical spine. I advised the patient that I will reach out to her when I receive the results of this study.  Discontinue tizanidine 4 mg and initiate cyclobenzaprine 10 mg twice daily as needed for muscle spasms.  The patient has been experiencing moderate to severe axial cervical spine pain that is causing functional deficit. The pain has been present for at least 3 months and is not improving with conservative care. Currently the patient is not experiencing any radicular features.  Non-facet pathology has been ruled out on clinical evaluation.  The patient's pain appears facet mediated on examination today. To help with the neck and shoulder pain, occurring from facet arthropathy, we discussed medial branch block/radiofrequency ablation. This is a 2 step process, where the patient would first undergo a diagnostic test called a medial branch block injection. If successful, the  medial branch block injection  would provide 50% or more pain relief for a few hours  after the procedure. The next step would be a radiofrequency ablation. Radiofrequency ablation decreases pain by creating a nerve lesion to interrupt the pain signals, and weaken the pain perceived by the brain. This procedure typically provides 1-2 years of pain relief. The risks, including bleeding, infection, and tissue reaction were reviewed with the patient, and was scheduled for right C3-C5 medial branch block injection.  Follow-up after radiofrequency ablation or sooner if needed.    My impressions and treatment recommendations were discussed in detail with the patient who verbalized understanding and had no further questions.  Discharge instructions were provided. I personally saw and examined the patient and I agree with the above discussed plan of care.    Orders Placed This Encounter   Procedures    XR spine cervical  complete 4 or 5 vw non injury     Standing Status:   Future     Standing Expiration Date:   1/2/2028     Scheduling Instructions:      Bring along any outside films relating to this procedure.           New Medications Ordered This Visit   Medications    cyclobenzaprine (FLEXERIL) 10 mg tablet     Sig: Take 1 tablet (10 mg total) by mouth 2 (two) times a day as needed for muscle spasms     Dispense:  60 tablet     Refill:  0       History of Present Illness:  Shai Osborne is a 78 y.o. female with a history of chronic bilateral thoracic back pain, lumbar spondylosis, and sacroiliitis who presents for a follow up office visit in regards to ongoing right-sided neck pain. The patient reports that her pain is intermittent and is currently rating it a 6/10 on the numeric pain rating scale. Her pain is axial and does not radiate into the upper extremities, however she does have some pain referral into the right shoulder consistent with cervical facet arthropathy. She is also describing a bothersome popping/grinding/clicking sensation that she feels only when she moves her neck.    The patient has tried and failed trigger point injections into the cervical paraspinal musculature, as well as multiple muscle relaxers. Patient also states that she has tried physical therapy for her neck as well as her low back, which only increased her pain.    Current pain medications includes:  Tizanidine 4 mg 1 tablet once daily as needed.  The patient reports that this regimen is providing 10% pain relief.  The patient is reporting daytime drowsiness and numbness and tingling in the fingers from this pain medication regimen.  The patient reports that she is no longer using the tizanidine due to bothersome side effects.    I have personally reviewed and/or updated the patient's past medical history, past surgical history, family history, social history, current medications, allergies, and vital signs today.     Review of Systems    Respiratory:  Negative for shortness of breath.    Cardiovascular:  Negative for chest pain.   Gastrointestinal:  Negative for constipation, diarrhea, nausea and vomiting.   Musculoskeletal:  Positive for back pain and myalgias. Negative for arthralgias, gait problem and joint swelling.   Skin:  Negative for rash.   Neurological:  Positive for weakness. Negative for dizziness and seizures.   All other systems reviewed and are negative.      Patient Active Problem List   Diagnosis    Sacroiliitis (HCC) - Bilateral    Lumbar spondylosis    Degenerative disc disease, thoracic    Nephrolithiasis    Chronic rhinitis    Umbilical hernia without obstruction and without gangrene    Osteopenia of left hip    Mixed conductive and sensorineural hearing loss of right ear with restricted hearing of left ear    Vertigo    Class 2 obesity due to excess calories without serious comorbidity with body mass index (BMI) of 38.0 to 38.9 in adult    Endometrial polyp    Hyperlipidemia, mixed    Chronic foot pain, left    Gastroesophageal reflux disease without esophagitis    Cervical pain    Family history of colon cancer    Chronic bilateral thoracic back pain       Past Medical History:   Diagnosis Date    Arthritis     Chronic pain disorder     sciatic    GERD (gastroesophageal reflux disease)     Hydronephrosis with renal and ureteral calculus obstruction 12/23/2015    Kidney stone     Kidney stone     Nephrolithiasis     Osteoarthritis     Osteoporosis     Sinus arrhythmia        Past Surgical History:   Procedure Laterality Date    CHOLECYSTECTOMY      FL RETROGRADE PYELOGRAM  3/7/2019    KIDNEY STONE SURGERY      KNEE ARTHROSCOPY Left     MS COLONOSCOPY FLX DX W/COLLJ SPEC WHEN PFRMD N/A 11/2/2017    Procedure: COLONOSCOPY with polypectomy x2;  Surgeon: Wild Stanley MD;  Location: [facility];  Service: Gastroenterology    MS CYSTO BLADDER W/URETERAL CATHETERIZATION Left 3/7/2019    Procedure: CYSTOSCOPY, URETEROSCOPY WITH LASER,  BASKET STONE EXTRACTION, RETROGRADE PYELOGRAM WITH INSERTION STENT URETERAL;  Surgeon: Pedro Mcarthur MD;  Location: [facility];  Service: Urology    TUBAL LIGATION         Family History   Problem Relation Age of Onset    No Known Problems Mother     Mental illness Father     Prostate cancer Father     Colon cancer Sister     No Known Problems Sister     No Known Problems Sister     No Known Problems Sister     Ovarian cancer Daughter     Mental illness Daughter     No Known Problems Maternal Grandmother     No Known Problems Maternal Grandfather     No Known Problems Paternal Grandmother     No Known Problems Paternal Grandfather        Social History     Occupational History    Occupation: Retired   Tobacco Use    Smoking status: Never    Smokeless tobacco: Never   Vaping Use    Vaping status: Never Used   Substance and Sexual Activity    Alcohol use: Not Currently    Drug use: No    Sexual activity: Not Currently       Current Outpatient Medications on File Prior to Visit   Medication Sig    ibuprofen (MOTRIN) 100 mg/5 mL suspension Take by mouth every 6 (six) hours as needed for mild pain    lidocaine (Lidoderm) 5 % Apply 1 patch topically daily Remove & Discard patch within 12 hours or as directed by MD    meclizine (ANTIVERT) 12.5 MG tablet Take 1 tablet (12.5 mg total) by mouth every 12 (twelve) hours as needed for dizziness    Multiple Vitamins-Minerals (CENTRUM ADULTS PO) Take by mouth    omeprazole (PriLOSEC) 20 mg delayed release capsule Take 1 capsule (20 mg total) by mouth daily    OYSCO 500 + D 500-200 MG-UNIT per tablet Take 1 tablet by mouth 2 (two) times a day with meals    [DISCONTINUED] tiZANidine (ZANAFLEX) 4 mg tablet TAKE 1 TABLET BY MOUTH DAILY AT BEDTIME    acetaminophen (TYLENOL) 650 mg CR tablet TAKE 1 TABLET (650 MG TOTAL) BY MOUTH EVERY 12 (TWELVE) HOURS AS NEEDED FOR MILD PAIN (Patient not taking: Reported on 11/27/2023)    methylPREDNISolone 4 MG tablet therapy pack Use as directed  "on package (Patient not taking: Reported on 1/2/2024)    [DISCONTINUED] TiZANidine (ZANAFLEX) 4 MG capsule TAKE 1 CAPSULE (4 MG TOTAL) BY MOUTH DAILY AT BEDTIME AS NEEDED FOR MUSCLE SPASMS     No current facility-administered medications on file prior to visit.       Allergies   Allergen Reactions    Diclofenac Sodium      Other reaction(s): GI Upset    Meperidine     Tramadol      Other reaction(s): GI Upset       Physical Exam:    /68   Pulse 89   Ht 4' 4\" (1.321 m)   Wt 68 kg (150 lb)   LMP  (LMP Unknown)   SpO2 99%   BMI 39.00 kg/m²     Constitutional:normal, well developed, well nourished, alert, in no distress and non-toxic and no overt pain behavior.  Eyes:anicteric  HEENT:grossly intact  Neck:supple, symmetric, trachea midline and no masses   Pulmonary:even and unlabored  Cardiovascular:No edema or pitting edema present  Skin:Normal without rashes or lesions and well hydrated  Psychiatric:Mood and affect appropriate  Neurologic:Cranial Nerves II-XII grossly intact.  Negative Radha sign bilaterally.  Negative Spurling test.  Musculoskeletal:normal gait. Strength 5/5 in all muscle groups in the upper extremities bilaterally. Cervical spine range of motion is limited and painful in all planes.  Severe pain with flexion of the cervical spine.  Moderate tenderness to palpation over the right cervical paraspinal musculature.     "

## 2024-01-10 ENCOUNTER — TELEPHONE (OUTPATIENT)
Dept: PAIN MEDICINE | Facility: MEDICAL CENTER | Age: 79
End: 2024-01-10

## 2024-01-10 NOTE — TELEPHONE ENCOUNTER
S/w pt and advised of same. Pt verbalized understanding.  was not needed. Please assist pt with scheduling MBB. Thank you

## 2024-01-10 NOTE — TELEPHONE ENCOUNTER
----- Message from Carrol Espino PA-C sent at 1/10/2024  7:28 AM EST -----  Please let the patient know that her cervical spine x-ray reveals mild to moderate degenerative changes with no acute abnormalities. We will plan to proceed with MBB as scheduled.

## 2024-01-10 NOTE — TELEPHONE ENCOUNTER
Procedure Scheduled 1/26/24.    Reviewed instructions: , NPO 1 hour prior, loose-fitting/comfortable clothes, if ill/fever/infx/abx to call and reschedule.  Also pain level at leat 5/10 and refrain from PRN, as-needed pain meds 6h prior.  Patient stated verbal understanding.

## 2024-01-24 ENCOUNTER — OFFICE VISIT (OUTPATIENT)
Dept: FAMILY MEDICINE CLINIC | Facility: CLINIC | Age: 79
End: 2024-01-24

## 2024-01-24 VITALS
HEART RATE: 77 BPM | DIASTOLIC BLOOD PRESSURE: 76 MMHG | SYSTOLIC BLOOD PRESSURE: 128 MMHG | OXYGEN SATURATION: 97 % | TEMPERATURE: 97.8 F | RESPIRATION RATE: 18 BRPM | WEIGHT: 147 LBS | BODY MASS INDEX: 34.02 KG/M2 | HEIGHT: 55 IN

## 2024-01-24 DIAGNOSIS — E55.9 VITAMIN D DEFICIENCY: ICD-10-CM

## 2024-01-24 DIAGNOSIS — M46.1 SACROILIITIS (HCC): ICD-10-CM

## 2024-01-24 DIAGNOSIS — Z00.00 ENCOUNTER FOR SUBSEQUENT ANNUAL WELLNESS VISIT (AWV) IN MEDICARE PATIENT: Primary | ICD-10-CM

## 2024-01-24 DIAGNOSIS — H04.123 DRY EYE SYNDROME OF BOTH EYES: ICD-10-CM

## 2024-01-24 DIAGNOSIS — N20.0 NEPHROLITHIASIS: ICD-10-CM

## 2024-01-24 DIAGNOSIS — E66.09 CLASS 2 OBESITY DUE TO EXCESS CALORIES WITHOUT SERIOUS COMORBIDITY WITH BODY MASS INDEX (BMI) OF 38.0 TO 38.9 IN ADULT: ICD-10-CM

## 2024-01-24 DIAGNOSIS — R41.3 MEMORY LOSS: ICD-10-CM

## 2024-01-24 DIAGNOSIS — M47.812 CERVICAL SPONDYLOSIS: ICD-10-CM

## 2024-01-24 DIAGNOSIS — K63.5 HYPERPLASTIC POLYP OF TRANSVERSE COLON: ICD-10-CM

## 2024-01-24 PROBLEM — M54.2 CERVICAL PAIN: Status: RESOLVED | Noted: 2022-11-10 | Resolved: 2024-01-24

## 2024-01-24 PROCEDURE — G0439 PPPS, SUBSEQ VISIT: HCPCS | Performed by: FAMILY MEDICINE

## 2024-01-24 PROCEDURE — 99214 OFFICE O/P EST MOD 30 MIN: CPT | Performed by: FAMILY MEDICINE

## 2024-01-24 RX ORDER — UBIDECARENONE 75 MG
CAPSULE ORAL DAILY
COMMUNITY

## 2024-01-24 RX ORDER — MULTIVIT WITH IRON,MINERALS
LIQUID (ML) ORAL DAILY
COMMUNITY
End: 2024-01-24

## 2024-01-24 RX ORDER — MELATONIN
1000 DAILY
COMMUNITY

## 2024-01-24 NOTE — ASSESSMENT & PLAN NOTE
Last time saw urologist was September 2021 - was supposed to have gone back in September 2022  Did have KUB in September 2022 - 4 mm right renal calculus and probable 2 mm left renal calculus  Patient does not feel the need to return to urology since has no symptoms at that moment - she knows to call and schedule appointment with them if becomes symptomatic

## 2024-01-24 NOTE — PATIENT INSTRUCTIONS
Recommend getting the shingles vaccine    Medicare Preventive Visit Patient Instructions  Thank you for completing your Welcome to Medicare Visit or Medicare Annual Wellness Visit today. Your next wellness visit will be due in one year (1/24/2025).  The screening/preventive services that you may require over the next 5-10 years are detailed below. Some tests may not apply to you based off risk factors and/or age. Screening tests ordered at today's visit but not completed yet may show as past due. Also, please note that scanned in results may not display below.  Preventive Screenings:  Service Recommendations Previous Testing/Comments   Colorectal Cancer Screening  * Colonoscopy    * Fecal Occult Blood Test (FOBT)/Fecal Immunochemical Test (FIT)  * Fecal DNA/Cologuard Test  * Flexible Sigmoidoscopy Age: 45-75 years old   Colonoscopy: every 10 years (may be performed more frequently if at higher risk)  OR  FOBT/FIT: every 1 year  OR  Cologuard: every 3 years  OR  Sigmoidoscopy: every 5 years  Screening may be recommended earlier than age 45 if at higher risk for colorectal cancer. Also, an individualized decision between you and your healthcare provider will decide whether screening between the ages of 76-85 would be appropriate. Colonoscopy: 11/02/2017  FOBT/FIT: Not on file  Cologuard: Not on file  Sigmoidoscopy: Not on file          Breast Cancer Screening Age: 40+ years old  Frequency: every 1-2 years  Not required if history of left and right mastectomy Mammogram: 10/11/2023    Screening Current   Cervical Cancer Screening Between the ages of 21-29, pap smear recommended once every 3 years.   Between the ages of 30-65, can perform pap smear with HPV co-testing every 5 years.   Recommendations may differ for women with a history of total hysterectomy, cervical cancer, or abnormal pap smears in past. Pap Smear: 04/12/2023    Screening Not Indicated   Hepatitis C Screening Once for adults born between 1945 and  1965  More frequently in patients at high risk for Hepatitis C Hep C Antibody: 02/23/2022    Screening Current   Diabetes Screening 1-2 times per year if you're at risk for diabetes or have pre-diabetes Fasting glucose: 90 mg/dL (9/23/2022)  A1C: No results in last 5 years (No results in last 5 years)      Cholesterol Screening Once every 5 years if you don't have a lipid disorder. May order more often based on risk factors. Lipid panel: 02/23/2022    Screening Not Indicated  History Lipid Disorder     Other Preventive Screenings Covered by Medicare:  Abdominal Aortic Aneurysm (AAA) Screening: covered once if your at risk. You're considered to be at risk if you have a family history of AAA.  Lung Cancer Screening: covers low dose CT scan once per year if you meet all of the following conditions: (1) Age 55-77; (2) No signs or symptoms of lung cancer; (3) Current smoker or have quit smoking within the last 15 years; (4) You have a tobacco smoking history of at least 20 pack years (packs per day multiplied by number of years you smoked); (5) You get a written order from a healthcare provider.  Glaucoma Screening: covered annually if you're considered high risk: (1) You have diabetes OR (2) Family history of glaucoma OR (3)  aged 50 and older OR (4)  American aged 65 and older  Osteoporosis Screening: covered every 2 years if you meet one of the following conditions: (1) You're estrogen deficient and at risk for osteoporosis based off medical history and other findings; (2) Have a vertebral abnormality; (3) On glucocorticoid therapy for more than 3 months; (4) Have primary hyperparathyroidism; (5) On osteoporosis medications and need to assess response to drug therapy.   Last bone density test (DXA Scan): 07/22/2020.  HIV Screening: covered annually if you're between the age of 15-65. Also covered annually if you are younger than 15 and older than 65 with risk factors for HIV infection. For  pregnant patients, it is covered up to 3 times per pregnancy.    Immunizations:  Immunization Recommendations   Influenza Vaccine Annual influenza vaccination during flu season is recommended for all persons aged >= 6 months who do not have contraindications   Pneumococcal Vaccine   * Pneumococcal conjugate vaccine = PCV13 (Prevnar 13), PCV15 (Vaxneuvance), PCV20 (Prevnar 20)  * Pneumococcal polysaccharide vaccine = PPSV23 (Pneumovax) Adults 19-65 yo with certain risk factors or if 65+ yo  If never received any pneumonia vaccine: recommend Prevnar 20 (PCV20)  Give PCV20 if previously received 1 dose of PCV13 or PPSV23   Hepatitis B Vaccine 3 dose series if at intermediate or high risk (ex: diabetes, end stage renal disease, liver disease)   Respiratory syncytial virus (RSV) Vaccine - COVERED BY MEDICARE PART D  * RSVPreF3 (Arexvy) CDC recommends that adults 60 years of age and older may receive a single dose of RSV vaccine using shared clinical decision-making (SCDM)   Tetanus (Td) Vaccine - COST NOT COVERED BY MEDICARE PART B Following completion of primary series, a booster dose should be given every 10 years to maintain immunity against tetanus. Td may also be given as tetanus wound prophylaxis.   Tdap Vaccine - COST NOT COVERED BY MEDICARE PART B Recommended at least once for all adults. For pregnant patients, recommended with each pregnancy.   Shingles Vaccine (Shingrix) - COST NOT COVERED BY MEDICARE PART B  2 shot series recommended in those 19 years and older who have or will have weakened immune systems or those 50 years and older     Health Maintenance Due:      Topic Date Due    Breast Cancer Screening: Mammogram  10/11/2024    Hepatitis C Screening  Completed     Immunizations Due:      Topic Date Due    COVID-19 Vaccine (4 - 2023-24 season) 09/01/2023     Advance Directives   What are advance directives?  Advance directives are legal documents that state your wishes and plans for medical care. These  plans are made ahead of time in case you lose your ability to make decisions for yourself. Advance directives can apply to any medical decision, such as the treatments you want, and if you want to donate organs.   What are the types of advance directives?  There are many types of advance directives, and each state has rules about how to use them. You may choose a combination of any of the following:  Living will:  This is a written record of the treatment you want. You can also choose which treatments you do not want, which to limit, and which to stop at a certain time. This includes surgery, medicine, IV fluid, and tube feedings.   Durable power of  for healthcare (DPAHC):  This is a written record that states who you want to make healthcare choices for you when you are unable to make them for yourself. This person, called a proxy, is usually a family member or a friend. You may choose more than 1 proxy.  Do not resuscitate (DNR) order:  A DNR order is used in case your heart stops beating or you stop breathing. It is a request not to have certain forms of treatment, such as CPR. A DNR order may be included in other types of advance directives.  Medical directive:  This covers the care that you want if you are in a coma, near death, or unable to make decisions for yourself. You can list the treatments you want for each condition. Treatment may include pain medicine, surgery, blood transfusions, dialysis, IV or tube feedings, and a ventilator (breathing machine).  Values history:  This document has questions about your views, beliefs, and how you feel and think about life. This information can help others choose the care that you would choose.  Why are advance directives important?  An advance directive helps you control your care. Although spoken wishes may be used, it is better to have your wishes written down. Spoken wishes can be misunderstood, or not followed. Treatments may be given even if you do not  want them. An advance directive may make it easier for your family to make difficult choices about your care.   Weight Management   Why it is important to manage your weight:  Being overweight increases your risk of health conditions such as heart disease, high blood pressure, type 2 diabetes, and certain types of cancer. It can also increase your risk for osteoarthritis, sleep apnea, and other respiratory problems. Aim for a slow, steady weight loss. Even a small amount of weight loss can lower your risk of health problems.  How to lose weight safely:  A safe and healthy way to lose weight is to eat fewer calories and get regular exercise. You can lose up about 1 pound a week by decreasing the number of calories you eat by 500 calories each day.   Healthy meal plan for weight management:  A healthy meal plan includes a variety of foods, contains fewer calories, and helps you stay healthy. A healthy meal plan includes the following:  Eat whole-grain foods more often.  A healthy meal plan should contain fiber. Fiber is the part of grains, fruits, and vegetables that is not broken down by your body. Whole-grain foods are healthy and provide extra fiber in your diet. Some examples of whole-grain foods are whole-wheat breads and pastas, oatmeal, brown rice, and bulgur.  Eat a variety of vegetables every day.  Include dark, leafy greens such as spinach, kale, delmer greens, and mustard greens. Eat yellow and orange vegetables such as carrots, sweet potatoes, and winter squash.   Eat a variety of fruits every day.  Choose fresh or canned fruit (canned in its own juice or light syrup) instead of juice. Fruit juice has very little or no fiber.  Eat low-fat dairy foods.  Drink fat-free (skim) milk or 1% milk. Eat fat-free yogurt and low-fat cottage cheese. Try low-fat cheeses such as mozzarella and other reduced-fat cheeses.  Choose meat and other protein foods that are low in fat.  Choose beans or other legumes such as  split peas or lentils. Choose fish, skinless poultry (chicken or turkey), or lean cuts of red meat (beef or pork). Before you cook meat or poultry, cut off any visible fat.   Use less fat and oil.  Try baking foods instead of frying them. Add less fat, such as margarine, sour cream, regular salad dressing and mayonnaise to foods. Eat fewer high-fat foods. Some examples of high-fat foods include french fries, doughnuts, ice cream, and cakes.  Eat fewer sweets.  Limit foods and drinks that are high in sugar. This includes candy, cookies, regular soda, and sweetened drinks.  Exercise:  Exercise at least 30 minutes per day on most days of the week. Some examples of exercise include walking, biking, dancing, and swimming. You can also fit in more physical activity by taking the stairs instead of the elevator or parking farther away from stores. Ask your healthcare provider about the best exercise plan for you.      © Copyright [employer] 2018 Information is for End User's use only and may not be sold, redistributed or otherwise used for commercial purposes. All illustrations and images included in CareNotes® are the copyrighted property of [employer] or [employer]

## 2024-01-24 NOTE — PROGRESS NOTES
Assessment and Plan:     Problem List Items Addressed This Visit          Digestive    Hyperplastic polyp of transverse colon     Last colonoscopy in November 2017   Due for colonoscopy in November 2027            Musculoskeletal and Integument    Sacroiliitis (HCC) - Bilateral     Already received steroid injections in the past  Follow-up pain management if needed         Cervical spondylosis     Saw Pain Management on 1/2/24  Scheduled for right C3-C5 medial branch block injection on 1/26/24            Genitourinary    Nephrolithiasis     Last time saw urologist was September 2021 - was supposed to have gone back in September 2022  Did have KUB in September 2022 - 4 mm right renal calculus and probable 2 mm left renal calculus  Patient does not feel the need to return to urology since has no symptoms at that moment - she knows to call and schedule appointment with them if becomes symptomatic            Other    Class 2 obesity due to excess calories without serious comorbidity with body mass index (BMI) of 38.0 to 38.9 in adult     BMI Counseling: Body mass index is 38.22 kg/m². The BMI is above normal. Nutrition recommendations include reducing portion sizes, consuming healthier snacks, moderation in carbohydrate intake, and reducing intake of cholesterol. Exercise recommendations include exercising 3-5 times per week.           Relevant Orders    Lipid panel    Comprehensive metabolic panel    Dry eye syndrome of both eyes     Continue artifical tears recommended by ophthalmologist - last seen 12/29/23  Needs to go get her new prescription glasses   Return in one year         Memory loss     Send to neurology  Send for baseline blood work         Relevant Orders    TSH + Free T4    Vitamin B12    RPR (DX) W/REFL TITER AND CONFIRM TESTING (REFL)    Ambulatory Referral to Neurology     Other Visit Diagnoses       Encounter for subsequent annual wellness visit (AWV) in Medicare patient    -  Primary    Vitamin D  deficiency        Relevant Orders    Vitamin D 25 hydroxy          BMI Counseling: Body mass index is 38.22 kg/m². The BMI is above normal. Nutrition recommendations include decreasing portion sizes, decreasing fast food intake, consuming healthier snacks and moderation in carbohydrate intake. Exercise recommendations include exercising 3-5 times per week. No pharmacotherapy was ordered. Rationale for BMI follow-up plan is due to patient being overweight or obese.     Depression Screening and Follow-up Plan: Patient was screened for depression during today's encounter. They screened negative with a PHQ-2 score of 0.      Preventive health issues were discussed with patient, and age appropriate screening tests were ordered as noted in patient's After Visit Summary.  Personalized health advice and appropriate referrals for health education or preventive services given if needed, as noted in patient's After Visit Summary.     History of Present Illness:     Patient presents for a Medicare Wellness Visit    Saw Pain Management on 1/2/24 for her neck pain. Had been placed on tizanidine but caused numbness so placed her on flexeril. Patient mentioned flexeril gave her no relief so stopped taking it. Scheduled for right C3-C5 medial branch block injection on 1/26/24  Already received steroid injections in her lumbar spine.    Saw ophthalmologist on 12/29/23. Given new prescription glasses which she has to go .  Had been advised to use artificial tears for dry eye syndrome. Had been educated regarding the symptoms of posterior vitreous detachment of right eye and to call ophthalmology office immediately. She is to go back in one year.      Starting to forget things. For instance, she gives medications to her 57 year old daughter but at times forgets that she gave it to her. Getting scared for her daughter for whom she cares.  Has to double check if closed the faucet because she forgets.   For example, grabs the knife  instead of the spoon.  It takes 5-10 seconds for her to remember to get back on track.   Keeps her mind busy. Does word finder, reads books. Sleeps about 7 hours at night. Wears a hearing aid in left ear.   No family history of memory loss or dementia.            Patient Care Team:  Rehab DO Katiuska as PCP - General (Family Medicine)  DO Romain Lorenz MD Berhanu Geme, MD as Endoscopist     Review of Systems:     Review of Systems   Constitutional:  Negative for chills, fatigue, fever and unexpected weight change.   HENT:  Negative for congestion, ear pain, rhinorrhea and sore throat.    Eyes:  Positive for visual disturbance. Negative for pain.   Respiratory:  Negative for cough, chest tightness and shortness of breath.    Cardiovascular:  Negative for chest pain, palpitations and leg swelling.   Gastrointestinal:  Negative for abdominal pain, constipation, diarrhea, nausea and vomiting.   Genitourinary:  Negative for difficulty urinating, dysuria and urgency.   Musculoskeletal:  Positive for neck pain. Negative for arthralgias and back pain.   Skin:  Negative for rash.   Neurological:  Negative for dizziness, numbness and headaches.        +memory loss   Psychiatric/Behavioral:  Negative for behavioral problems and suicidal ideas. The patient is not nervous/anxious.         Problem List:     Patient Active Problem List   Diagnosis    Sacroiliitis (HCC) - Bilateral    Lumbar spondylosis    Degenerative disc disease, thoracic    Nephrolithiasis    Chronic rhinitis    Umbilical hernia without obstruction and without gangrene    Osteopenia of left hip    Mixed conductive and sensorineural hearing loss of right ear with restricted hearing of left ear    Vertigo    Class 2 obesity due to excess calories without serious comorbidity with body mass index (BMI) of 38.0 to 38.9 in adult    Endometrial polyp    Hyperlipidemia, mixed    Chronic foot pain, left    Gastroesophageal reflux disease without  esophagitis    Cervical pain    Family history of colon cancer    Chronic bilateral thoracic back pain      Past Medical and Surgical History:     Past Medical History:   Diagnosis Date    Arthritis     Chronic pain disorder     sciatic    GERD (gastroesophageal reflux disease)     Hydronephrosis with renal and ureteral calculus obstruction 12/23/2015    Kidney stone     Kidney stone     Nephrolithiasis     Osteoarthritis     Osteoporosis     Sinus arrhythmia      Past Surgical History:   Procedure Laterality Date    CHOLECYSTECTOMY      FL RETROGRADE PYELOGRAM  3/7/2019    KIDNEY STONE SURGERY      KNEE ARTHROSCOPY Left     CT COLONOSCOPY FLX DX W/COLLJ SPEC WHEN PFRMD N/A 11/2/2017    Procedure: COLONOSCOPY with polypectomy x2;  Surgeon: Wild Stanley MD;  Location: [facility];  Service: Gastroenterology    CT CYSTO BLADDER W/URETERAL CATHETERIZATION Left 3/7/2019    Procedure: CYSTOSCOPY, URETEROSCOPY WITH LASER, BASKET STONE EXTRACTION, RETROGRADE PYELOGRAM WITH INSERTION STENT URETERAL;  Surgeon: Pedro Mcarthur MD;  Location: [facility];  Service: Urology    TUBAL LIGATION        Family History:     Family History   Problem Relation Age of Onset    No Known Problems Mother     Mental illness Father     Prostate cancer Father     Colon cancer Sister     No Known Problems Sister     No Known Problems Sister     No Known Problems Sister     Ovarian cancer Daughter     Mental illness Daughter     No Known Problems Maternal Grandmother     No Known Problems Maternal Grandfather     No Known Problems Paternal Grandmother     No Known Problems Paternal Grandfather       Social History:     Social History     Socioeconomic History    Marital status: /Civil Union     Spouse name: None    Number of children: 2    Years of education: None    Highest education level: None   Occupational History    Occupation: Retired   Tobacco Use    Smoking status: Never     Passive exposure: Never    Smokeless tobacco: Never    Vaping Use    Vaping status: Never Used   Substance and Sexual Activity    Alcohol use: Not Currently    Drug use: No    Sexual activity: Not Currently   Other Topics Concern    None   Social History Narrative    Daily caffeine consumption- 1-2 cups of coffee    Does not have living will    Denied:  History of exercises regularly    Denied:  History of domestic violence    No advance directives    Denied:  History of Power of  in existence    Flu shot: no     Social Determinants of Health     Financial Resource Strain: Low Risk  (1/24/2024)    Overall Financial Resource Strain (CARDIA)     Difficulty of Paying Living Expenses: Not very hard   Food Insecurity: No Food Insecurity (1/24/2024)    Hunger Vital Sign     Worried About Running Out of Food in the Last Year: Never true     Ran Out of Food in the Last Year: Never true   Transportation Needs: No Transportation Needs (1/24/2024)    PRAPARE - Transportation     Lack of Transportation (Medical): No     Lack of Transportation (Non-Medical): No   Physical Activity: Not on file   Stress: No Stress Concern Present (3/2/2022)    [employer] - Occupational Stress Questionnaire     Feeling of Stress : Only a little   Social Connections: Not on file   Intimate Partner Violence: Not At Risk (3/2/2022)    Humiliation, Afraid, Rape, and Kick questionnaire     Fear of Current or Ex-Partner: No     Emotionally Abused: No     Physically Abused: No     Sexually Abused: No   Housing Stability: Low Risk  (3/2/2022)    Housing Stability Vital Sign     Unable to Pay for Housing in the Last Year: No     Number of Places Lived in the Last Year: 1     Unstable Housing in the Last Year: No      Medications and Allergies:     Current Outpatient Medications   Medication Sig Dispense Refill    acetaminophen (TYLENOL) 650 mg CR tablet TAKE 1 TABLET (650 MG TOTAL) BY MOUTH EVERY 12 (TWELVE) HOURS AS NEEDED FOR MILD PAIN (Patient not taking: Reported on  11/27/2023) 30 tablet 0    cyclobenzaprine (FLEXERIL) 10 mg tablet Take 1 tablet (10 mg total) by mouth 2 (two) times a day as needed for muscle spasms 60 tablet 0    ibuprofen (MOTRIN) 100 mg/5 mL suspension Take by mouth every 6 (six) hours as needed for mild pain      lidocaine (Lidoderm) 5 % Apply 1 patch topically daily Remove & Discard patch within 12 hours or as directed by MD 5 patch 0    meclizine (ANTIVERT) 12.5 MG tablet Take 1 tablet (12.5 mg total) by mouth every 12 (twelve) hours as needed for dizziness 10 tablet 0    methylPREDNISolone 4 MG tablet therapy pack Use as directed on package (Patient not taking: Reported on 1/2/2024) 21 each 0    Multiple Vitamins-Minerals (CENTRUM ADULTS PO) Take by mouth      omeprazole (PriLOSEC) 20 mg delayed release capsule Take 1 capsule (20 mg total) by mouth daily 90 capsule 1    OYSCO 500 + D 500-200 MG-UNIT per tablet Take 1 tablet by mouth 2 (two) times a day with meals       No current facility-administered medications for this visit.     Allergies   Allergen Reactions    Diclofenac Sodium      Other reaction(s): GI Upset    Meperidine     Tramadol      Other reaction(s): GI Upset      Immunizations:     Immunization History   Administered Date(s) Administered    COVID-19 MODERNA VACC 0.5 ML IM 02/11/2021, 03/10/2021, 11/11/2021    H1N1, All Formulations 01/15/2010    INFLUENZA 10/01/2009, 11/04/2022    Influenza Split High Dose Preservative Free IM 10/16/2014, 11/20/2015, 10/22/2019    Influenza, high dose seasonal 0.7 mL 10/16/2020, 02/09/2022, 11/04/2022, 10/09/2023    Pneumococcal Conjugate 13-Valent 11/20/2015    Pneumococcal Polysaccharide PPV23 09/30/2011      Health Maintenance:         Topic Date Due    Breast Cancer Screening: Mammogram  10/11/2024    Hepatitis C Screening  Completed         Topic Date Due    COVID-19 Vaccine (4 - 2023-24 season) 09/01/2023      Medicare Screening Tests and Risk Assessments:     Shai is here for her Subsequent  Wellness visit.     Health Risk Assessment:   Patient rates overall health as fair. Patient feels that their physical health rating is same. Patient is satisfied with their life. Eyesight was rated as same. Hearing was rated as same. Patient feels that their emotional and mental health rating is same. Patients states they are always angry. Patient states they are sometimes unusually tired/fatigued. Pain experienced in the last 7 days has been a lot. Patient's pain rating has been 6/10. Patient states that she has experienced no weight loss or gain in last 6 months.     Depression Screening:   PHQ-2 Score: 0      Fall Risk Screening:   In the past year, patient has experienced: no history of falling in past year      Urinary Incontinence Screening:   Patient has not leaked urine accidently in the last six months.     Home Safety:  Patient has trouble with stairs inside or outside of their home. Patient has working smoke alarms and has working carbon monoxide detector. Home safety hazards include: none.     Nutrition:   Current diet is Regular and Limited junk food.     Medications:   Patient is currently taking over-the-counter supplements. OTC medications include: see medication list. Patient is able to manage medications.     Activities of Daily Living (ADLs)/Instrumental Activities of Daily Living (IADLs):   Walk and transfer into and out of bed and chair?: Yes  Dress and groom yourself?: Yes    Bathe or shower yourself?: Yes    Feed yourself? Yes  Do your laundry/housekeeping?: Yes  Manage your money, pay your bills and track your expenses?: Yes  Make your own meals?: Yes    Do your own shopping?: Yes    Previous Hospitalizations:   Any hospitalizations or ED visits within the last 12 months?: No      Advance Care Planning:   Living will: No    Durable POA for healthcare: No    Advanced directive: No    Advanced directive counseling given: Yes    Patient declined ACP directive: Yes      PREVENTIVE SCREENINGS       "Cardiovascular Screening:    General: Screening Not Indicated and History Lipid Disorder      Breast Cancer Screening:     General: Screening Current      Cervical Cancer Screening:    General: Screening Not Indicated      Lung Cancer Screening:     General: Screening Not Indicated      Hepatitis C Screening:    General: Screening Current    Screening, Brief Intervention, and Referral to Treatment (SBIRT)    Screening  Typical number of drinks in a day: 0  Typical number of drinks in a week: 0  Interpretation: Low risk drinking behavior.    Single Item Drug Screening:  How often have you used an illegal drug (including marijuana) or a prescription medication for non-medical reasons in the past year? never    Single Item Drug Screen Score: 0  Interpretation: Negative screen for possible drug use disorder    No results found.     Physical Exam:     /76 (BP Location: Left arm, Patient Position: Sitting, Cuff Size: Standard)   Pulse 77   Temp 97.8 °F (36.6 °C) (Temporal)   Resp 18   Ht 4' 4\" (1.321 m)   Wt 66.7 kg (147 lb)   LMP  (LMP Unknown)   SpO2 97%   BMI 38.22 kg/m²     Physical Exam  Constitutional:       Appearance: Normal appearance. She is normal weight.   HENT:      Head: Normocephalic and atraumatic.      Right Ear: Tympanic membrane, ear canal and external ear normal.      Left Ear: Tympanic membrane, ear canal and external ear normal.      Nose: Nose normal.      Mouth/Throat:      Mouth: Mucous membranes are moist.      Pharynx: Oropharynx is clear.   Eyes:      Extraocular Movements: Extraocular movements intact.      Conjunctiva/sclera: Conjunctivae normal.      Pupils: Pupils are equal, round, and reactive to light.   Cardiovascular:      Rate and Rhythm: Normal rate and regular rhythm.      Pulses: Normal pulses.      Heart sounds: Normal heart sounds.   Pulmonary:      Breath sounds: Normal breath sounds.   Abdominal:      General: Bowel sounds are normal.      Palpations: Abdomen is " soft.   Musculoskeletal:      Cervical back: Neck supple. Spasms and tenderness present. Decreased range of motion.   Skin:     General: Skin is warm.   Neurological:      General: No focal deficit present.      Mental Status: She is alert and oriented to person, place, and time. Mental status is at baseline.      Cranial Nerves: Cranial nerves 2-12 are intact.      Sensory: Sensation is intact.      Motor: Motor function is intact.   Psychiatric:         Attention and Perception: Attention and perception normal.         Mood and Affect: Mood and affect normal.         Speech: Speech normal.         Behavior: Behavior normal.         Thought Content: Thought content normal.         Cognition and Memory: Cognition is impaired. Memory is impaired.         Judgment: Judgment normal.          Rehab Tabfidelai, DO

## 2024-01-24 NOTE — ASSESSMENT & PLAN NOTE
Continue artifical tears recommended by ophthalmologist - last seen 12/29/23  Needs to go get her new prescription glasses   Return in one year

## 2024-01-24 NOTE — ASSESSMENT & PLAN NOTE
BMI Counseling: Body mass index is 38.22 kg/m². The BMI is above normal. Nutrition recommendations include reducing portion sizes, consuming healthier snacks, moderation in carbohydrate intake, and reducing intake of cholesterol. Exercise recommendations include exercising 3-5 times per week.

## 2024-01-26 ENCOUNTER — HOSPITAL ENCOUNTER (OUTPATIENT)
Dept: RADIOLOGY | Facility: MEDICAL CENTER | Age: 79
End: 2024-01-26
Payer: COMMERCIAL

## 2024-01-26 VITALS
RESPIRATION RATE: 20 BRPM | HEART RATE: 75 BPM | SYSTOLIC BLOOD PRESSURE: 139 MMHG | DIASTOLIC BLOOD PRESSURE: 76 MMHG | OXYGEN SATURATION: 97 % | TEMPERATURE: 97.3 F

## 2024-01-26 DIAGNOSIS — M47.812 CERVICAL SPONDYLOSIS: ICD-10-CM

## 2024-01-26 PROCEDURE — 64490 INJ PARAVERT F JNT C/T 1 LEV: CPT | Performed by: PHYSICAL MEDICINE & REHABILITATION

## 2024-01-26 PROCEDURE — 64491 INJ PARAVERT F JNT C/T 2 LEV: CPT | Performed by: PHYSICAL MEDICINE & REHABILITATION

## 2024-01-26 RX ADMIN — Medication 1.5 ML: at 13:30

## 2024-01-26 NOTE — H&P
History of Present Illness: The patient is a 78 y.o. female who presents with complaints of right neck pain    Past Medical History:   Diagnosis Date    Arthritis     Chronic pain disorder     sciatic    GERD (gastroesophageal reflux disease)     Hydronephrosis with renal and ureteral calculus obstruction 12/23/2015    Kidney stone     Kidney stone     Nephrolithiasis     Osteoarthritis     Osteoporosis     Sinus arrhythmia        Past Surgical History:   Procedure Laterality Date    CHOLECYSTECTOMY      FL RETROGRADE PYELOGRAM  3/7/2019    KIDNEY STONE SURGERY      KNEE ARTHROSCOPY Left     MI COLONOSCOPY FLX DX W/COLLJ SPEC WHEN PFRMD N/A 11/2/2017    Procedure: COLONOSCOPY with polypectomy x2;  Surgeon: Wild Stanley MD;  Location: [facility];  Service: Gastroenterology    MI CYSTO BLADDER W/URETERAL CATHETERIZATION Left 3/7/2019    Procedure: CYSTOSCOPY, URETEROSCOPY WITH LASER, BASKET STONE EXTRACTION, RETROGRADE PYELOGRAM WITH INSERTION STENT URETERAL;  Surgeon: Pedro Mcarthur MD;  Location: [facility];  Service: Urology    TUBAL LIGATION           Current Outpatient Medications:     Acetaminophen (ARTHRITIS PAIN PO), Take by mouth, Disp: , Rfl:     cholecalciferol (VITAMIN D3) 1,000 units tablet, Take 1,000 Units by mouth daily, Disp: , Rfl:     cyanocobalamin (VITAMIN B-12) 100 mcg tablet, Take by mouth daily, Disp: , Rfl:     lidocaine (Lidoderm) 5 %, Apply 1 patch topically daily Remove & Discard patch within 12 hours or as directed by MD, Disp: 5 patch, Rfl: 0    meclizine (ANTIVERT) 12.5 MG tablet, Take 1 tablet (12.5 mg total) by mouth every 12 (twelve) hours as needed for dizziness, Disp: 10 tablet, Rfl: 0    Multiple Vitamins-Minerals (CENTRUM ADULTS PO), Take by mouth, Disp: , Rfl:     omeprazole (PriLOSEC) 20 mg delayed release capsule, Take 1 capsule (20 mg total) by mouth daily, Disp: 90 capsule, Rfl: 1  No current facility-administered medications for this encounter.    Allergies   Allergen  Pt's room secured per protocol. Pt sitting in bed, calm and cooperative. Pt wearing blue scrubs and yellow socks. Pt being directly monitored by belgica Abreu at this time.      Reactions    Diclofenac Sodium      Other reaction(s): GI Upset    Meperidine     Tramadol      Other reaction(s): GI Upset       Physical Exam:   Vitals:    01/26/24 1317   BP: 141/81   Pulse: 71   Resp: 18   Temp: (!) 97.3 °F (36.3 °C)   SpO2: 97%     General: Awake, Alert, Oriented x 3, Mood and affect appropriate  Respiratory: Respirations even and unlabored  Cardiovascular: Peripheral pulses intact; no edema  Musculoskeletal Exam: right neck pain    ASA Score: 3    Patient/Chart Verification  Patient ID Verified: Verbal  ID Band Applied: No  Consents Confirmed: Procedural  H&P( within 30 days) Verified: To be obtained in the Pre-Procedure area  Interval H&P(within 24 hr) Complete (required for Outpatients and Surgery Admit only): To be obtained in the Pre-Procedure area  Allergies Reviewed: Yes  Anticoag/NSAID held?: NA  Currently on antibiotics?: No  Pregnancy denied?: NA    Assessment:   1. Cervical spondylosis        Plan: (R) C4-C6 MBB#1

## 2024-01-26 NOTE — DISCHARGE INSTRUCTIONS
ACTIVITY  Please do activities that will bring on the normal pain that we are rating. For example, if vacuuming or walking increases the pain, do that. This will give the most accurate response to the diary.  You may shower, but no tub baths today, or applied heat.    CARE OF THE INJECTION SITE  This area may be numb for several hours after the injection.  Notify [facility] if you have any of the following:  redness, drainage, swelling, or fever above 100°F.    SPECIAL INSTRUCTIONS  Please return the MBB diary to our office by mail, fax, or drop it off.    MEDICATIONS  Please do not take any break through or short acting pain medications for 8 hours after the block.  Continue to take all routine medications.  Our office may have instructed you to hold some medications.    As no general anesthesia was used in today's procedure, you should not experience any side effects related to anesthesia.     If you have a problem specifically related to your procedure, please call our office at [phone].    Problems not related to your procedure should be directed to your primary care physician. INSTRUCCIONES PARA EL RADHA DEL BLOQUEO DE LA [facility] MEDIAL    ACTIVIDAD   Realice las actividades que normalmente provocarían el dolor que estamos midiendo. Por ejemplo, si cuando aspira o camina siente más dolor, nupur eso. [place] le brindará respuestas más precisas para el diario.   Puede ducharse, aura no tome shaq en tinas ni se aplique calor por hoy.    CUIDADO DEL ÁREA DE APLICACIÓN DE LA INYECCIÓN   Esta área puede estar entumecida nicholas varias horas después de la inyección.   Informe a [facility] si se presenta alguno de estos síntomas: enrojecimiento, secreción, inflamación o fiebre superior a 100 °F.    INSTRUCCIONES ESPECIALES  Envíe de regreso el diario de bloqueo de la darek medial por correo, fax o pase a dejarlo.    MEDICAMENTOS   No tome analgésicos de acción a corto plazo o intermitente  nicholas las 8 horas posteriores  al bloqueo.   Continúe tomando todos [place] medicamentos de rutina.

## 2024-02-01 ENCOUNTER — APPOINTMENT (OUTPATIENT)
Dept: LAB | Facility: CLINIC | Age: 79
End: 2024-02-01
Payer: COMMERCIAL

## 2024-02-01 DIAGNOSIS — R41.3 MEMORY LOSS: ICD-10-CM

## 2024-02-01 DIAGNOSIS — E66.09 CLASS 2 OBESITY DUE TO EXCESS CALORIES WITHOUT SERIOUS COMORBIDITY WITH BODY MASS INDEX (BMI) OF 38.0 TO 38.9 IN ADULT: ICD-10-CM

## 2024-02-01 DIAGNOSIS — E55.9 VITAMIN D DEFICIENCY: ICD-10-CM

## 2024-02-01 LAB
25(OH)D3 SERPL-MCNC: 53.1 NG/ML (ref 30–100)
ALBUMIN SERPL BCP-MCNC: 3.9 G/DL (ref 3.5–5)
ALP SERPL-CCNC: 126 U/L (ref 34–104)
ALT SERPL W P-5'-P-CCNC: 12 U/L (ref 7–52)
ANION GAP SERPL CALCULATED.3IONS-SCNC: 8 MMOL/L
AST SERPL W P-5'-P-CCNC: 22 U/L (ref 13–39)
BILIRUB SERPL-MCNC: 0.8 MG/DL (ref 0.2–1)
BUN SERPL-MCNC: 13 MG/DL (ref 5–25)
CALCIUM SERPL-MCNC: 9.1 MG/DL (ref 8.4–10.2)
CHLORIDE SERPL-SCNC: 105 MMOL/L (ref 96–108)
CHOLEST SERPL-MCNC: 166 MG/DL
CO2 SERPL-SCNC: 28 MMOL/L (ref 21–32)
CREAT SERPL-MCNC: 0.7 MG/DL (ref 0.6–1.3)
GFR SERPL CREATININE-BSD FRML MDRD: 82 ML/MIN/1.73SQ M
GLUCOSE P FAST SERPL-MCNC: 87 MG/DL (ref 65–99)
HDLC SERPL-MCNC: 57 MG/DL
LDLC SERPL CALC-MCNC: 89 MG/DL (ref 0–100)
NONHDLC SERPL-MCNC: 109 MG/DL
POTASSIUM SERPL-SCNC: 3.9 MMOL/L (ref 3.5–5.3)
PROT SERPL-MCNC: 6.7 G/DL (ref 6.4–8.4)
SODIUM SERPL-SCNC: 141 MMOL/L (ref 135–147)
T4 FREE SERPL-MCNC: 0.93 NG/DL (ref 0.61–1.12)
TRIGL SERPL-MCNC: 101 MG/DL
TSH SERPL DL<=0.05 MIU/L-ACNC: 5.24 UIU/ML (ref 0.45–4.5)
VIT B12 SERPL-MCNC: 1957 PG/ML (ref 180–914)

## 2024-02-01 PROCEDURE — 86780 TREPONEMA PALLIDUM: CPT

## 2024-02-01 PROCEDURE — 36415 COLL VENOUS BLD VENIPUNCTURE: CPT

## 2024-02-01 PROCEDURE — 80061 LIPID PANEL: CPT

## 2024-02-01 PROCEDURE — 84443 ASSAY THYROID STIM HORMONE: CPT

## 2024-02-01 PROCEDURE — 82306 VITAMIN D 25 HYDROXY: CPT

## 2024-02-01 PROCEDURE — 84439 ASSAY OF FREE THYROXINE: CPT

## 2024-02-01 PROCEDURE — 82607 VITAMIN B-12: CPT

## 2024-02-01 PROCEDURE — 80053 COMPREHEN METABOLIC PANEL: CPT

## 2024-02-02 LAB — TREPONEMA PALLIDUM IGG+IGM AB [PRESENCE] IN SERUM OR PLASMA BY IMMUNOASSAY: NORMAL

## 2024-02-12 ENCOUNTER — OFFICE VISIT (OUTPATIENT)
Dept: PAIN MEDICINE | Facility: MEDICAL CENTER | Age: 79
End: 2024-02-12
Payer: COMMERCIAL

## 2024-02-12 VITALS
WEIGHT: 145 LBS | HEIGHT: 55 IN | BODY MASS INDEX: 33.56 KG/M2 | SYSTOLIC BLOOD PRESSURE: 131 MMHG | HEART RATE: 78 BPM | DIASTOLIC BLOOD PRESSURE: 70 MMHG

## 2024-02-12 DIAGNOSIS — M54.12 CERVICAL RADICULITIS: ICD-10-CM

## 2024-02-12 DIAGNOSIS — M47.812 CERVICAL SPONDYLOSIS: Primary | ICD-10-CM

## 2024-02-12 PROCEDURE — 99214 OFFICE O/P EST MOD 30 MIN: CPT

## 2024-02-12 NOTE — PROGRESS NOTES
Assessment:  1. Cervical spondylosis    2. Cervical radiculitis        Plan:  Spent time reviewing pain diary from right C4-C6 MBB #1. The patient would like to proceed with MBB #2.  I have recommended that the patient begin physical therapy as well to hopefully alleviate some of her neck and shoulder pain.  Continue acetaminophen as needed for pain relief.  The patient is aware that she should not exceed 3000 mg of this medication in a 24-hour period.  Follow-up pending response to MBB #2.    My impressions and treatment recommendations were discussed in detail with the patient who verbalized understanding and had no further questions.  Discharge instructions were provided. I personally saw and examined the patient and I agree with the above discussed plan of care.    Orders Placed This Encounter   Procedures    FL spine and pain procedure     Standing Status:   Future     Standing Expiration Date:   2/12/2028     Order Specific Question:   Reason for Exam:     Answer:   Right C4-C6 MBB #2     Order Specific Question:   Anticoagulant hold needed?     Answer:   No    Ambulatory referral to Physical Therapy     Standing Status:   Future     Standing Expiration Date:   2/12/2025     Referral Priority:   Routine     Referral Type:   Physical Therapy     Referral Reason:   Specialty Services Required     Requested Specialty:   Physical Therapy     Number of Visits Requested:   1     Expiration Date:   2/12/2025     History of Present Illness:  Shai Osborne is a 78 y.o. female with a history of chronic bilateral thoracic back pain, lumbar spondylosis, sacroiliitis, and cervical spondylosis who presents for a follow up office visit to review pain diary after undergoing right C4-C6 MBB #1 on 1/26/2024. Pain diary conflicts with patient's report today.  The patient states that her everyday dull, aching pain was reduced by 80 to 90% for several hours following the blocks. Patient reported pain with insertion of needles  during the MBB, and states that she was rating this pain a 4-5/10 on her pain diary postprocedure. Patient states that she has a lot of difficulty when asked to rate her pain with numbers. She does feel that her existing pain greatly improved, and was disappointed that medial branch block #2 was not offered to her. Patient states that the day of her procedure, she was able to fully use her right arm, which she had not been able to do before due to severe pain. Patient also states that she was able to complete tasks around the house such as sweeping/vacuuming that she was unable to do with her severe neck and shoulder pain.    The patient's current pain level is a 6/10 on the numeric pain rating scale. She states that her pain is intermittent, and worse in the evening/at night.  She describes the quality of this pain as burning, dull/aching, and pins-and-needles. She denies any radiation of this pain into the right upper extremity, and denies any upper extremity weakness associated with this pain complaint. She does report some limited range of motion of the right upper extremity related to pain.    The patient is not currently taking any prescription analgesic medications.  She does use Tylenol arthritis strength as needed when the pain is severe.    I have personally reviewed and/or updated the patient's past medical history, past surgical history, family history, social history, current medications, allergies, and vital signs today.     Review of Systems   Respiratory:  Negative for shortness of breath.    Cardiovascular:  Negative for chest pain.   Gastrointestinal:  Negative for constipation, diarrhea, nausea and vomiting.   Musculoskeletal:  Positive for myalgias, neck pain and neck stiffness. Negative for arthralgias, gait problem and joint swelling.   Skin:  Positive for rash.   Neurological:  Negative for dizziness, seizures and weakness.   All other systems reviewed and are negative.      Patient Active  Problem List   Diagnosis    Sacroiliitis (HCC) - Bilateral    Lumbar spondylosis    Degenerative disc disease, thoracic    Nephrolithiasis    Chronic rhinitis    Umbilical hernia without obstruction and without gangrene    Osteopenia of left hip    Mixed conductive and sensorineural hearing loss of right ear with restricted hearing of left ear    Vertigo    Class 2 obesity due to excess calories without serious comorbidity with body mass index (BMI) of 38.0 to 38.9 in adult    Endometrial polyp    Hyperlipidemia, mixed    Chronic foot pain, left    Gastroesophageal reflux disease without esophagitis    Family history of colon cancer    Chronic bilateral thoracic back pain    Cervical spondylosis    Hyperplastic polyp of transverse colon    Dry eye syndrome of both eyes    Memory loss       Past Medical History:   Diagnosis Date    Arthritis     Chronic pain disorder     sciatic    GERD (gastroesophageal reflux disease)     Hydronephrosis with renal and ureteral calculus obstruction 12/23/2015    Kidney stone     Kidney stone     Nephrolithiasis     Osteoarthritis     Osteoporosis     Sinus arrhythmia        Past Surgical History:   Procedure Laterality Date    CHOLECYSTECTOMY      FL RETROGRADE PYELOGRAM  3/7/2019    KIDNEY STONE SURGERY      KNEE ARTHROSCOPY Left     [place] COLONOSCOPY FLX DX W/COLLJ SPEC WHEN PFRMD N/A 11/2/2017    Procedure: COLONOSCOPY with polypectomy x2;  Surgeon: Wild Stanley MD;  Location: [facility];  Service: Gastroenterology    KS CYSTO BLADDER W/URETERAL CATHETERIZATION Left 3/7/2019    Procedure: CYSTOSCOPY, URETEROSCOPY WITH LASER, BASKET STONE EXTRACTION, RETROGRADE PYELOGRAM WITH INSERTION STENT URETERAL;  Surgeon: Pedro Mcarthur MD;  Location: [facility];  Service: Urology    TUBAL LIGATION         Family History   Problem Relation Age of Onset    No Known Problems Mother     Mental illness Father     Prostate cancer Father     Colon cancer Sister     No Known Problems Sister     No  "Known Problems Sister     No Known Problems Sister     Ovarian cancer Daughter     Mental illness Daughter     No Known Problems Maternal Grandmother     No Known Problems Maternal Grandfather     No Known Problems Paternal Grandmother     No Known Problems Paternal Grandfather        Social History     Occupational History    Occupation: Retired   Tobacco Use    Smoking status: Never     Passive exposure: Never    Smokeless tobacco: Never   Vaping Use    Vaping status: Never Used   Substance and Sexual Activity    Alcohol use: Not Currently    Drug use: No    Sexual activity: Not Currently       Current Outpatient Medications on File Prior to Visit   Medication Sig    Acetaminophen (ARTHRITIS PAIN PO) Take by mouth    cholecalciferol (VITAMIN D3) 1,000 units tablet Take 1,000 Units by mouth daily    cyanocobalamin (VITAMIN B-12) 100 mcg tablet Take by mouth daily    meclizine (ANTIVERT) 12.5 MG tablet Take 1 tablet (12.5 mg total) by mouth every 12 (twelve) hours as needed for dizziness    Multiple Vitamins-Minerals (CENTRUM ADULTS PO) Take by mouth    omeprazole (PriLOSEC) 20 mg delayed release capsule Take 1 capsule (20 mg total) by mouth daily    lidocaine (Lidoderm) 5 % Apply 1 patch topically daily Remove & Discard patch within 12 hours or as directed by MD (Patient not taking: Reported on 2/12/2024)     No current facility-administered medications on file prior to visit.       Allergies   Allergen Reactions    Diclofenac Sodium      Other reaction(s): GI Upset    Meperidine     Tramadol      Other reaction(s): GI Upset       Physical Exam:    /70   Pulse 78   Ht 4' 4\" (1.321 m)   Wt 65.8 kg (145 lb)   LMP  (LMP Unknown)   BMI 37.70 kg/m²     Constitutional:normal, well developed, well nourished, alert, in no distress and non-toxic and no overt pain behavior.  Eyes:anicteric  HEENT:grossly intact  Neck:supple, symmetric, trachea midline and no masses   Pulmonary:even and unlabored  Cardiovascular:No " edema or pitting edema present  Skin:Normal without rashes or lesions and well hydrated  Psychiatric:Mood and affect appropriate  Neurologic:Cranial Nerves II-XII grossly intact. Negative Spurling's. Negative Radha sign bilaterally.  Musculoskeletal:normal gait. Cervical spine range of motion minimally limited all planes.  Pain is reproduced with cervical extension and rotation to the right.  Tender to palpation over the bilateral cervical paraspinal and trapezii musculature.

## 2024-02-14 ENCOUNTER — TELEPHONE (OUTPATIENT)
Dept: FAMILY MEDICINE CLINIC | Facility: CLINIC | Age: 79
End: 2024-02-14

## 2024-03-04 ENCOUNTER — TELEPHONE (OUTPATIENT)
Dept: NEUROLOGY | Facility: CLINIC | Age: 79
End: 2024-03-04

## 2024-03-08 ENCOUNTER — HOSPITAL ENCOUNTER (OUTPATIENT)
Dept: RADIOLOGY | Facility: MEDICAL CENTER | Age: 79
End: 2024-03-08
Payer: COMMERCIAL

## 2024-03-08 VITALS
SYSTOLIC BLOOD PRESSURE: 141 MMHG | RESPIRATION RATE: 18 BRPM | DIASTOLIC BLOOD PRESSURE: 75 MMHG | HEART RATE: 71 BPM | OXYGEN SATURATION: 100 % | TEMPERATURE: 97.3 F

## 2024-03-08 DIAGNOSIS — M47.812 CERVICAL SPONDYLOSIS: ICD-10-CM

## 2024-03-08 PROCEDURE — 64491 INJ PARAVERT F JNT C/T 2 LEV: CPT | Performed by: PHYSICAL MEDICINE & REHABILITATION

## 2024-03-08 PROCEDURE — 64490 INJ PARAVERT F JNT C/T 1 LEV: CPT | Performed by: PHYSICAL MEDICINE & REHABILITATION

## 2024-03-08 RX ORDER — BUPIVACAINE HYDROCHLORIDE 5 MG/ML
1.5 INJECTION, SOLUTION EPIDURAL; INTRACAUDAL ONCE
Status: COMPLETED | OUTPATIENT
Start: 2024-03-08 | End: 2024-03-08

## 2024-03-08 RX ADMIN — BUPIVACAINE HYDROCHLORIDE 1.5 ML: 5 INJECTION, SOLUTION EPIDURAL; INTRACAUDAL at 13:35

## 2024-03-08 NOTE — H&P
History of Present Illness: The patient is a 78 y.o. female who presents with complaints of right neck pain    Past Medical History:   Diagnosis Date    Arthritis     Chronic pain disorder     sciatic    GERD (gastroesophageal reflux disease)     Hydronephrosis with renal and ureteral calculus obstruction 12/23/2015    Kidney stone     Kidney stone     Nephrolithiasis     Osteoarthritis     Osteoporosis     Sinus arrhythmia        Past Surgical History:   Procedure Laterality Date    CHOLECYSTECTOMY      FL RETROGRADE PYELOGRAM  3/7/2019    KIDNEY STONE SURGERY      KNEE ARTHROSCOPY Left     AR COLONOSCOPY FLX DX W/COLLJ SPEC WHEN PFRMD N/A 11/2/2017    Procedure: COLONOSCOPY with polypectomy x2;  Surgeon: Wild Stanley MD;  Location: [facility];  Service: Gastroenterology    AR CYSTO BLADDER W/URETERAL CATHETERIZATION Left 3/7/2019    Procedure: CYSTOSCOPY, URETEROSCOPY WITH LASER, BASKET STONE EXTRACTION, RETROGRADE PYELOGRAM WITH INSERTION STENT URETERAL;  Surgeon: Pedro Mcarthur MD;  Location: [facility];  Service: Urology    TUBAL LIGATION           Current Outpatient Medications:     Acetaminophen (ARTHRITIS PAIN PO), Take by mouth, Disp: , Rfl:     cholecalciferol (VITAMIN D3) 1,000 units tablet, Take 1,000 Units by mouth daily, Disp: , Rfl:     cyanocobalamin (VITAMIN B-12) 100 mcg tablet, Take by mouth daily, Disp: , Rfl:     lidocaine (Lidoderm) 5 %, Apply 1 patch topically daily Remove & Discard patch within 12 hours or as directed by MD (Patient not taking: Reported on 2/12/2024), Disp: 5 patch, Rfl: 0    meclizine (ANTIVERT) 12.5 MG tablet, Take 1 tablet (12.5 mg total) by mouth every 12 (twelve) hours as needed for dizziness, Disp: 10 tablet, Rfl: 0    Multiple Vitamins-Minerals (CENTRUM ADULTS PO), Take by mouth, Disp: , Rfl:     omeprazole (PriLOSEC) 20 mg delayed release capsule, Take 1 capsule (20 mg total) by mouth daily, Disp: 90 capsule, Rfl: 1    Allergies   Allergen Reactions    Diclofenac  Sodium      Other reaction(s): GI Upset    Meperidine     Tramadol      Other reaction(s): GI Upset       Physical Exam:   Vitals:    03/08/24 1318   BP: 141/84   Pulse: 68   Resp: 18   Temp: (!) 97.3 °F (36.3 °C)   SpO2: 100%     General: Awake, Alert, Oriented x 3, Mood and affect appropriate  Respiratory: Respirations even and unlabored  Cardiovascular: Peripheral pulses intact; no edema  Musculoskeletal Exam: right neck pain    ASA Score: 3    Patient/Chart Verification  Patient ID Verified: Verbal  Consents Confirmed: Procedural, To be obtained in the Pre-Procedure area  H&P( within 30 days) Verified: To be obtained in the Pre-Procedure area  Allergies Reviewed: Yes  Anticoag/NSAID held?: NA  Currently on antibiotics?: No  Pregnancy denied?: NA    Assessment:   1. Cervical spondylosis        Plan: Right C4-C6 MBB #2

## 2024-03-08 NOTE — DISCHARGE INSTRUCTIONS

## 2024-03-11 ENCOUNTER — TELEPHONE (OUTPATIENT)
Dept: PAIN MEDICINE | Facility: MEDICAL CENTER | Age: 79
End: 2024-03-11

## 2024-03-11 NOTE — TELEPHONE ENCOUNTER
"----- Message from Meenakshi Jones RN sent at 3/11/2024  8:58 AM EDT -----  Regarding: FW: Pain diary  Contact: [phone]  Please schedule as per JE.  ----- Message -----  From: Delroy Villavicencio DO  Sent: 3/11/2024   8:49 AM EDT  To: Carrol Espino PA-C; #  Subject: FW: Pain diary                                   I don't think she will be able to tolerate the RFA, please schedule follow up with Shaka.    ----- Message -----  From: Meenakshi Jones RN  Sent: 3/11/2024   8:05 AM EDT  To: Delroy Villavicencio DO  Subject: FW: Pain diary                                     ----- Message -----  From: Shai Osborne \"Yaneth\"  Sent: 3/9/2024   5:19 PM EDT  To: [facility]  Subject: Pain diary                                       I was told to send this on the morris.        "

## 2024-03-11 NOTE — TELEPHONE ENCOUNTER
Left message (english) call back to schedule follow up to discuss next step regarding no relief from injection. Please schedule patient with Carrol Khoury)-PA   Thank you.

## 2024-03-14 ENCOUNTER — OFFICE VISIT (OUTPATIENT)
Dept: PAIN MEDICINE | Facility: MEDICAL CENTER | Age: 79
End: 2024-03-14
Payer: COMMERCIAL

## 2024-03-14 VITALS
HEIGHT: 55 IN | DIASTOLIC BLOOD PRESSURE: 77 MMHG | HEART RATE: 63 BPM | BODY MASS INDEX: 34.48 KG/M2 | WEIGHT: 149 LBS | SYSTOLIC BLOOD PRESSURE: 133 MMHG

## 2024-03-14 DIAGNOSIS — M79.18 MYOFASCIAL PAIN SYNDROME: ICD-10-CM

## 2024-03-14 DIAGNOSIS — M54.12 CERVICAL RADICULITIS: ICD-10-CM

## 2024-03-14 DIAGNOSIS — M47.812 CERVICAL SPONDYLOSIS: Primary | ICD-10-CM

## 2024-03-14 PROCEDURE — 99214 OFFICE O/P EST MOD 30 MIN: CPT

## 2024-03-14 PROCEDURE — G2211 COMPLEX E/M VISIT ADD ON: HCPCS

## 2024-03-14 NOTE — PROGRESS NOTES
Assessment:  1. Cervical spondylosis    2. Cervical radiculitis    3. Myofascial pain syndrome        Plan:  Initiate physical therapy.  Plan to obtain advanced imaging of the cervical spine if physical therapy fails to provide sufficient relief.  Follow-up in 8 weeks, or upon completion of physical therapy.    My impressions and treatment recommendations were discussed in detail with the patient who verbalized understanding and had no further questions.  Discharge instructions were provided. I personally saw and examined the patient and I agree with the above discussed plan of care.    Orders Placed This Encounter   Procedures    Ambulatory referral to Physical Therapy     Standing Status:   Future     Standing Expiration Date:   3/14/2025     Referral Priority:   Routine     Referral Type:   Physical Therapy     Referral Reason:   Specialty Services Required     Requested Specialty:   Physical Therapy     Number of Visits Requested:   1     Expiration Date:   3/14/2025     No orders of the defined types were placed in this encounter.    History of Present Illness:  Shai Osborne is a 78 y.o. female with a history of chronic bilateral thoracic back pain, lumbar spondylosis, sacroiliitis, and cervical spondylosis who presents for a follow up office visit after undergoing right C4-C6 medial branch block #1 on 3/8/2024. Patient report upon examination today again contraindicates results recorded in pain diary. Patient did not tolerate medial branch blocks well well and would likely not be able to tolerate the radiofrequency ablation.     The patient reports that her current pain is localized to the axial cervical spine and is primarily right-sided.  Most of her pain is localized to the right trapezius muscle.  The patient is also reporting pain radiating from the neck into the left shoulder and posterior arm at this time.  Patient reports that her pain is intermittent, and is currently rating it a 2/10 in  intensity.  She describes the quality of this pain as shooting and numbness.  She denies any upper extremity weakness associated with this pain complaint.    The patient is not currently taking any prescription analgesic medications. She does use Tylenol arthritis strength as needed when the pain is severe.     I have personally reviewed and/or updated the patient's past medical history, past surgical history, family history, social history, current medications, allergies, and vital signs today.     Review of Systems   Constitutional:  Negative for unexpected weight change.   HENT:  Negative for hearing loss and sore throat.    Eyes:  Negative for pain and visual disturbance.   Respiratory:  Negative for shortness of breath and wheezing.    Cardiovascular:  Negative for leg swelling.   Gastrointestinal:  Negative for abdominal pain, nausea and vomiting.   Endocrine: Negative for polyphagia and polyuria.   Genitourinary:  Negative for difficulty urinating and hematuria.   Musculoskeletal:  Positive for joint swelling (joint stiffness). Negative for myalgias and neck pain.   Skin:  Positive for rash.   Neurological:  Negative for weakness and numbness.   Hematological:  Does not bruise/bleed easily.   Psychiatric/Behavioral:  Negative for decreased concentration. The patient is not nervous/anxious.        Patient Active Problem List   Diagnosis    Sacroiliitis (HCC) - Bilateral    Lumbar spondylosis    Degenerative disc disease, thoracic    Nephrolithiasis    Chronic rhinitis    Umbilical hernia without obstruction and without gangrene    Osteopenia of left hip    Mixed conductive and sensorineural hearing loss of right ear with restricted hearing of left ear    Vertigo    Class 2 obesity due to excess calories without serious comorbidity with body mass index (BMI) of 38.0 to 38.9 in adult    Endometrial polyp    Hyperlipidemia, mixed    Chronic foot pain, left    Gastroesophageal reflux disease without esophagitis     Family history of colon cancer    Chronic bilateral thoracic back pain    Cervical spondylosis    Hyperplastic polyp of transverse colon    Dry eye syndrome of both eyes    Memory loss       Past Medical History:   Diagnosis Date    Arthritis     Chronic pain disorder     sciatic    GERD (gastroesophageal reflux disease)     Hydronephrosis with renal and ureteral calculus obstruction 12/23/2015    Kidney stone     Kidney stone     Nephrolithiasis     Osteoarthritis     Osteoporosis     Sinus arrhythmia        Past Surgical History:   Procedure Laterality Date    CHOLECYSTECTOMY      FL RETROGRADE PYELOGRAM  3/7/2019    KIDNEY STONE SURGERY      KNEE ARTHROSCOPY Left     [place] COLONOSCOPY FLX DX W/COLLJ SPEC WHEN PFRMD N/A 11/2/2017    Procedure: COLONOSCOPY with polypectomy x2;  Surgeon: Wild Stanley MD;  Location: [facility];  Service: Gastroenterology    [place] CYSTO BLADDER W/URETERAL CATHETERIZATION Left 3/7/2019    Procedure: CYSTOSCOPY, URETEROSCOPY WITH LASER, BASKET STONE EXTRACTION, RETROGRADE PYELOGRAM WITH INSERTION STENT URETERAL;  Surgeon: Pedro Mcarthur MD;  Location: [facility];  Service: Urology    TUBAL LIGATION         Family History   Problem Relation Age of Onset    No Known Problems Mother     Mental illness Father     Prostate cancer Father     Colon cancer Sister     No Known Problems Sister     No Known Problems Sister     No Known Problems Sister     Ovarian cancer Daughter     Mental illness Daughter     No Known Problems Maternal Grandmother     No Known Problems Maternal Grandfather     No Known Problems Paternal Grandmother     No Known Problems Paternal Grandfather        Social History     Occupational History    Occupation: Retired   Tobacco Use    Smoking status: Never     Passive exposure: Never    Smokeless tobacco: Never   Vaping Use    Vaping status: Never Used   Substance and Sexual Activity    Alcohol use: Not Currently    Drug use: No    Sexual activity: Not Currently       Current  "Outpatient Medications on File Prior to Visit   Medication Sig    cholecalciferol (VITAMIN D3) 1,000 units tablet Take 1,000 Units by mouth daily    cyanocobalamin (VITAMIN B-12) 100 mcg tablet Take by mouth daily    Multiple Vitamins-Minerals (CENTRUM ADULTS PO) Take by mouth    Acetaminophen (ARTHRITIS PAIN PO) Take by mouth (Patient not taking: Reported on 3/14/2024)    lidocaine (Lidoderm) 5 % Apply 1 patch topically daily Remove & Discard patch within 12 hours or as directed by MD (Patient not taking: Reported on 2/12/2024)    meclizine (ANTIVERT) 12.5 MG tablet Take 1 tablet (12.5 mg total) by mouth every 12 (twelve) hours as needed for dizziness (Patient not taking: Reported on 3/14/2024)    omeprazole (PriLOSEC) 20 mg delayed release capsule Take 1 capsule (20 mg total) by mouth daily (Patient not taking: Reported on 3/14/2024)     No current facility-administered medications on file prior to visit.       Allergies   Allergen Reactions    Diclofenac Sodium      Other reaction(s): GI Upset    Meperidine     Tramadol      Other reaction(s): GI Upset       Physical Exam:    /77   Pulse 63   Ht 4' 4\" (1.321 m)   Wt 67.6 kg (149 lb)   LMP  (LMP Unknown)   BMI 38.74 kg/m²     Constitutional:normal, well developed, well nourished, alert, in no distress and non-toxic and no overt pain behavior.  Eyes:anicteric  HEENT:grossly intact  Neck:symmetric, trachea midline and no masses   Pulmonary:even and unlabored  Cardiovascular:No edema or pitting edema present  Skin:Normal without rashes or lesions and well hydrated  Psychiatric:Mood and affect appropriate  Neurologic:Cranial Nerves II-XII grossly intact.  Negative Spurling's.  Negative Radha sign bilaterally.  Musculoskeletal:normal gait.  Cervical spine range of motion minimally limited in all planes.  Tender to palpation over the bilateral cervical paraspinal and trapezii musculature.  5/5 UE strength bilaterally.    "

## 2024-03-19 ENCOUNTER — TELEPHONE (OUTPATIENT)
Dept: NEUROLOGY | Facility: CLINIC | Age: 79
End: 2024-03-19

## 2024-03-19 ENCOUNTER — CONSULT (OUTPATIENT)
Dept: NEUROLOGY | Facility: CLINIC | Age: 79
End: 2024-03-19
Payer: COMMERCIAL

## 2024-03-19 VITALS
HEIGHT: 55 IN | SYSTOLIC BLOOD PRESSURE: 130 MMHG | TEMPERATURE: 97.6 F | RESPIRATION RATE: 18 BRPM | WEIGHT: 146 LBS | BODY MASS INDEX: 33.79 KG/M2 | HEART RATE: 78 BPM | OXYGEN SATURATION: 98 % | DIASTOLIC BLOOD PRESSURE: 70 MMHG

## 2024-03-19 DIAGNOSIS — R41.3 MEMORY LOSS: ICD-10-CM

## 2024-03-19 DIAGNOSIS — G31.84 MILD COGNITIVE IMPAIRMENT: Primary | ICD-10-CM

## 2024-03-19 DIAGNOSIS — R06.83 SNORING: ICD-10-CM

## 2024-03-19 DIAGNOSIS — H90.A31 MIXED CONDUCTIVE AND SENSORINEURAL HEARING LOSS OF RIGHT EAR WITH RESTRICTED HEARING OF LEFT EAR: ICD-10-CM

## 2024-03-19 PROCEDURE — G2212 PROLONG OUTPT/OFFICE VIS: HCPCS | Performed by: PSYCHIATRY & NEUROLOGY

## 2024-03-19 PROCEDURE — 99205 OFFICE O/P NEW HI 60 MIN: CPT | Performed by: PSYCHIATRY & NEUROLOGY

## 2024-03-19 RX ORDER — CHOLECALCIFEROL (VITAMIN D3) 125 MCG
500 CAPSULE ORAL DAILY
Qty: 90 TABLET | Refills: 3 | Status: SHIPPED | OUTPATIENT
Start: 2024-03-19

## 2024-03-19 NOTE — TELEPHONE ENCOUNTER
----- Message from Shayy Valentin MD sent at 3/19/2024 10:36 AM EDT -----  Hi team,     Yaneth is a new patient of mine who I am seeing for dementia. She states that she needs some help around the house. She lives with her  and daughter who has MR. I was hoping Yahnily could reach out and identify her needs and see what she qualifies for.     Thank you!   M

## 2024-03-19 NOTE — ASSESSMENT & PLAN NOTE
ellyn Osborne is a very pleasant 78 y.o. female with history of mixed conductive sensorineural hearing loss of the right ear with restricted hearing of the left ear, sacroiliitis, multi level degenerative disc disease, nephrolithiasis, osteopenia, vertigo, obesity, endometrial polyps, hyperlipidemia, GERD and dry eyes who presents for cognitive evaluation.     Patient reports that over the last year she started noticing a gradual decline in her cognition.  She was mostly reports lapses of memory that are brief causing confusion, disorientation and forgetfulness.  This has resulted in certain situations which could be noted as dangerous such as forgetting to lock the door, leaving the water running in the sink resulting in the kitchen flooding or briefly not knowing which direction she has to drive to in a familiar place.  For the most part patient has good insight into her condition.  She reports that at baseline she has an inpatient personality and can get easily stressed.  Patient also reports frequent nighttime awakenings due to pain from her arthritis.  She endorses that she snores and does not wake up feeling well rested.  Otherwise dementia review of systems is negative.  Patient is independent in all activities of daily living.  [place]strom today 22/30 with deficits mostly in visuospatial/executive function and delayed recall.    At this time, I believe that patient has a mild cognitive impairment versus pseudodementia secondary to her tendency to easily get stressed and potential underlying obstructive sleep apnea causing unrestful sleep.  I had a long conversation today with her about how when we tried to control everything we can get overwhelmed and this can impact our memory.  Untreated obstructive sleep apnea can also cause problems with memory and restless sleep cannot allow time for consolidation of memory.    Plan:  Will do an MRI neuro quant without contrast to evaluate for any reversible structural  causes of her deficits  I placed a referral to sleep medicine to evaluate for TRISTIAN   I placed a referral to audiology to have her hearing aids updated.  In the meantime I recommended she try hearing amplifiers  I reviewed patient's labs and her vitamin B12 levels were supratherapeutic.  I asked her to decrease this to 500 mcg.  I encourage patient to practice mindfulness and to be aware of potential dangerous situations.  For example if she is cooking she should put a timer in the microwave as a reminder that she should check on the food in case she were to history away from the kitchen.  I encouraged patient to try and avoid stress and not try to fix situations that are out of her control  I encourage patient to do things that challenge her mind such as crossword puzzles  I will see her again in 3 months

## 2024-03-19 NOTE — PATIENT INSTRUCTIONS
Hearing Amplifiers   El [facility] de vitamina B12 salio alto, Bajar suplemento B 12 a 500 mcg   MRI NeuroQuant   Referido a sleep medicine y audiologia

## 2024-03-20 NOTE — TELEPHONE ENCOUNTER
Attempt #2  MSW phoned home number at [phone] . Pt not available, please call back at a later time.

## 2024-03-21 NOTE — TELEPHONE ENCOUNTER
MSW phoned pt who informed that HIRO Moreau [phone]  was helping her apply for help at home (waiver services) in which she was denied for. Pt shared that she appealed with a  and was informed that she needs to apply for Options through [employer].   Pt reported that Reshma helped her apply for Options and a worker named Maco did the home visit last February. At this time she has not heard anything back.   MSW instructed pt to reach out to Reshma regarding referral for Options/help at home. Pt reported understanding.

## 2024-03-29 DIAGNOSIS — K21.9 GASTROESOPHAGEAL REFLUX DISEASE WITHOUT ESOPHAGITIS: ICD-10-CM

## 2024-03-29 RX ORDER — OMEPRAZOLE 20 MG/1
20 CAPSULE, DELAYED RELEASE ORAL DAILY
Qty: 90 CAPSULE | Refills: 1 | Status: SHIPPED | OUTPATIENT
Start: 2024-03-29

## 2024-04-09 ENCOUNTER — HOSPITAL ENCOUNTER (OUTPATIENT)
Facility: MEDICAL CENTER | Age: 79
Discharge: HOME | End: 2024-04-09
Payer: COMMERCIAL

## 2024-04-09 DIAGNOSIS — G31.84 MILD COGNITIVE IMPAIRMENT: ICD-10-CM

## 2024-04-09 DIAGNOSIS — R41.3 MEMORY LOSS: ICD-10-CM

## 2024-04-09 PROCEDURE — 70551 MRI BRAIN STEM W/O DYE: CPT

## 2024-04-12 ENCOUNTER — TELEPHONE (OUTPATIENT)
Age: 79
End: 2024-04-12

## 2024-04-12 NOTE — TELEPHONE ENCOUNTER
Left a detailed message on # listed below, advising the previous tasks.  C/B # provided for any questions.

## 2024-04-12 NOTE — TELEPHONE ENCOUNTER
Caller: [facility]    Doctor: Saud    Reason for call: rebecca called stating they are treating the pt for neck pain.  Every time the pt lays on her back and gets up she is having dizzy spells.  Rebecca is asking for a script for vertigo for the pt    Call back#: [phone] option 2    Fax # [phone]

## 2024-04-15 DIAGNOSIS — R93.89 ABNORMAL MRI: Primary | ICD-10-CM

## 2024-04-15 DIAGNOSIS — I65.21 STENOSIS OF RIGHT INTERNAL CAROTID ARTERY: ICD-10-CM

## 2024-04-16 ENCOUNTER — TELEPHONE (OUTPATIENT)
Dept: NEUROLOGY | Facility: CLINIC | Age: 79
End: 2024-04-16

## 2024-04-16 ENCOUNTER — HOSPITAL ENCOUNTER (OUTPATIENT)
Dept: CT IMAGING | Facility: HOSPITAL | Age: 79
Discharge: HOME | End: 2024-04-16
Attending: PSYCHIATRY & NEUROLOGY
Payer: COMMERCIAL

## 2024-04-16 DIAGNOSIS — I65.21 STENOSIS OF RIGHT INTERNAL CAROTID ARTERY: ICD-10-CM

## 2024-04-16 DIAGNOSIS — R93.89 ABNORMAL MRI: ICD-10-CM

## 2024-04-16 PROCEDURE — 70496 CT ANGIOGRAPHY HEAD: CPT

## 2024-04-16 PROCEDURE — 70498 CT ANGIOGRAPHY NECK: CPT

## 2024-04-16 RX ADMIN — IOHEXOL 80 ML: 350 INJECTION, SOLUTION INTRAVENOUS at 14:55

## 2024-04-16 NOTE — PROGRESS NOTES
Received urgent TT with abnormal MRI findings. Called patient. She denies any acute sx. Order placed for STAT CTA. Advised patient to call central scheduling and get imaging done no later than tomorrow. Gave number to central scheduling.

## 2024-04-17 ENCOUNTER — OFFICE VISIT (OUTPATIENT)
Dept: NEUROLOGY | Facility: CLINIC | Age: 79
End: 2024-04-17
Payer: COMMERCIAL

## 2024-04-17 VITALS
HEART RATE: 74 BPM | RESPIRATION RATE: 20 BRPM | SYSTOLIC BLOOD PRESSURE: 124 MMHG | OXYGEN SATURATION: 99 % | HEIGHT: 55 IN | WEIGHT: 148 LBS | TEMPERATURE: 98 F | DIASTOLIC BLOOD PRESSURE: 80 MMHG | BODY MASS INDEX: 34.25 KG/M2

## 2024-04-17 DIAGNOSIS — I49.9 IRREGULARLY IRREGULAR HEART RHYTHM: Primary | ICD-10-CM

## 2024-04-17 DIAGNOSIS — I65.29 INTERNAL CAROTID ARTERY STENOSIS: ICD-10-CM

## 2024-04-17 DIAGNOSIS — I48.91 A-FIB (HCC): ICD-10-CM

## 2024-04-17 DIAGNOSIS — G31.84 MILD COGNITIVE IMPAIRMENT: ICD-10-CM

## 2024-04-17 PROCEDURE — 99214 OFFICE O/P EST MOD 30 MIN: CPT | Performed by: PSYCHIATRY & NEUROLOGY

## 2024-04-17 PROCEDURE — G2211 COMPLEX E/M VISIT ADD ON: HCPCS | Performed by: PSYCHIATRY & NEUROLOGY

## 2024-04-17 RX ORDER — ASPIRIN 81 MG/1
81 TABLET, CHEWABLE ORAL DAILY
COMMUNITY

## 2024-04-17 NOTE — PROGRESS NOTES
Patient ID: Shai Osborne is a 78 y.o. female.    Assessment/Plan:    Internal carotid artery stenosis  MRI brain neuro quant was ordered to evaluate patient's cognition.Incidental note is made of age-indeterminate loss of the normal flow-void within the right internal carotid artery which may be on the basis of high-grade stenosis or occlusion. CT angiogram of the head and neck is recommended for further evaluation.    CTA head and neck with and without contrast (4/16/2024): Diffuse, high-grade right cervical and intracranial ICA stenosis with segmental areas of occlusion. Unknown chronicity. This may be due to dissection. No secondary evidence of significant atherosclerosis. Absent right anterior cerebral artery A1 segment which is commonly developmental. Both anterior cerebral arteries are otherwise patent. Middle cerebral artery supplied by the posterior circulation. Patent right ophthalmic artery origin. No acute intracranial pathology.    Patient reports getting transient episodes of dizziness.  These are made worse with aspirin use.  Recent LDL 89.  Patient not on statin medication.    Plan:  I advised patient to start aspirin 81 mg  Stat referral placed to neurosurgery to evaluate right ICA stenosis  Echocardiogram ordered as patient reports palpitations to rule out structural findings suggestive of A-fib  Referred to cardiology    Mild cognitive impairment  Yaneth Osborne is a very pleasant 78 y.o. 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  I last saw her on 3/19/2024.    Patient reports that over the year 2023 she started noticing a gradual decline in her cognition.  She was mostly reports lapses of memory that are brief causing  confusion, disorientation and forgetfulness.  This has resulted in certain situations which could be noted as dangerous such as forgetting to lock the door, leaving the water running in the sink resulting in the kitchen flooding or briefly not knowing which direction she has to drive to in a familiar place.  For the most part patient has good insight into her condition.  She reports that at baseline she has an impatient personality and can get easily stressed.  Patient also reports frequent nighttime awakenings due to pain from her arthritis.  She endorses that she snores and does not wake up feeling well rested.  Otherwise dementia review of systems is negative.  Patient is independent in all activities of daily living.  [place] on initial visit 22/30 with deficits mostly in visuospatial/executive function and delayed recall.  MRI neuro quant ordered which showed normal hippocampal volume.     At this time, I believe that patient has a mild cognitive impairment versus pseudodementia secondary to her tendency to easily get stressed and potential underlying obstructive sleep apnea causing unrestful sleep.      Plan:  Patient should avoid stress   Patient to follow-up with sleep medicine for evaluation of TRISTIAN as untreated TRISTIAN can also cause problems with memory and restless sleep cannot allow time for consolidation of memory.  Follow-up with audiology.    Continue B12 to 500 mcg   Follow-up in 3 months    Irregularly irregular heart rhythm  This was noted on cardiac auscultation.  Patient reports palpitations.    Plan:  Echocardiogram  Referral to cardiology for Zio patch monitoring       Diagnoses and all orders for this visit:    Irregularly irregular heart rhythm  -     Echo complete w/ contrast if indicated; Future  -     Ambulatory Referral to Cardiology; Future    A-fib (HCC)  -     Echo complete w/ contrast if indicated; Future  -     Ambulatory Referral to Cardiology; Future    Internal carotid artery stenosis  -      Ambulatory referral to Neurosurgery; Future    Mild cognitive impairment    Other orders  -     aspirin 81 mg chewable tablet; Chew 81 mg daily       Subjective:  Yaneth Osborne is a very pleasant 78 y.o. 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  I last saw her on 3/19/2024.      Initial visit (3/19/2024):    Patient reports that over the last year she started noticing a gradual decline in her cognition.  She was mostly reports lapses of memory that are brief causing confusion, disorientation and forgetfulness.  This has resulted in certain situations which could be noted as dangerous such as forgetting to lock the door, leaving the water running in the sink resulting in the kitchen flooding or briefly not knowing which direction she has to drive to in a familiar place.  For the most part patient has good insight into her condition.  She reports that at baseline she has an inpatient personality and can get easily stressed.  Patient also reports frequent nighttime awakenings due to pain from her arthritis.  She endorses that she snores and does not wake up feeling well rested.  Otherwise dementia review of systems is negative.  Patient is independent in all activities of daily living.  [place] today 22/30 with deficits mostly in visuospatial/executive function and delayed recall.     At this time, I believe that patient has a mild cognitive impairment versus pseudodementia secondary to her tendency to easily get stressed and potential underlying obstructive sleep apnea causing unrestful sleep.  I had a long conversation today with her about how when we tried to control everything we can get overwhelmed and this can impact our memory.  Untreated  obstructive sleep apnea can also cause problems with memory and restless sleep cannot allow time for consolidation of memory.    I encourage patient to avoid stress.  I placed orders for an MRI neuro quant, sleep medicine, audiology.  I advised her to decrease her vitamin B12 to 500 mcg as her levels were supratherapeutic      Workup:    MRI brain neuro quant without contrast (4/9/2024):  No mass effect, acute intracranial hemorrhage or evidence of recent infarction. Moderate chronic microvascular ischemic change.     NeuroQuant analysis was performed: Normal study; Does not support neurodegeneration.     Incidental note is made of age-indeterminate loss of the normal flow-void within the right internal carotid artery which may be on the basis of high-grade stenosis or occlusion. CT angiogram of the head and neck is recommended for further evaluation.      CTA head and neck with and without contrast (4/16/2024): Diffuse, high-grade right cervical and intracranial ICA stenosis with segmental areas of occlusion. Unknown chronicity. This may be due to dissection. No secondary evidence of significant atherosclerosis. Absent right anterior cerebral artery A1 segment which is commonly developmental. Both anterior cerebral arteries are otherwise patent. Middle cerebral artery supplied by the posterior circulation. Patent right ophthalmic artery origin. No acute intracranial pathology.     LDL (2/1/2024): 89    TSH: (2/1/24): 5.237, normal T4    Interval history:    Abnormalities above were found on imaging.    Yesterday I received a call about abnormalities of the follow-up CTA and I asked patient to come in for sooner follow-up appointment.  Gets palpitaitons   Reports transient episodes of dizziness         The following portions of the patient's history were reviewed and updated as appropriate: allergies, current medications, past family history, past medical history, past social history, past surgical history, and  "problem list and review of systems.         Objective:    Blood pressure 124/80, pulse 74, temperature 98 °F (36.7 °C), temperature source Temporal, resp. rate 20, height 4' 4\" (1.321 m), weight 67.1 kg (148 lb), SpO2 99%, not currently breastfeeding.    Physical Exam  Constitutional:       General: She is awake.   Neurological:      Mental Status: She is alert.      Deep Tendon Reflexes:      Reflex Scores:       Bicep reflexes are 2+ on the right side and 2+ on the left side.       Brachioradialis reflexes are 2+ on the right side and 2+ on the left side.       Patellar reflexes are 2+ on the right side and 2+ on the left side.  Psychiatric:         Speech: Speech normal.         Neurological Exam  Mental Status  Awake and alert. Oriented only to person, place, time and situation. Unable to copy figure. Clock drawing is abnormal. Speech is normal. Able to name objects, name parts of objects and repeat. Follows complex commands. Able to perform serial calculations. Able to spell words backwards. Fund of knowledge is appropriate for level of education.  No oriented to age .    Motor   Strength is 5/5 in all four extremities except as noted.  Pain limited 4/5 L HF.    Reflexes                                            Right                      Left  Brachioradialis                    2+                         2+  Biceps                                 2+                         2+  Patellar                                2+                         2+        ROS:    Review of Systems      Review of Systems   Constitutional:  Negative for appetite change, fatigue and fever.   HENT: Negative.  Negative for hearing loss, tinnitus, trouble swallowing and voice change.    Eyes: Negative.  Negative for photophobia, pain and visual disturbance.   Respiratory: Negative.  Negative for shortness of breath.    Cardiovascular: Negative.  Negative for palpitations.   Gastrointestinal: Negative.  Negative for nausea and " vomiting.   Endocrine: Negative.  Negative for cold intolerance.   Genitourinary: Negative.  Negative for dysuria, frequency and urgency.   Musculoskeletal:  Negative for back pain, gait problem, myalgias, neck pain and neck stiffness.   Skin: Negative.  Negative for rash.   Allergic/Immunologic: Negative.    Neurological: Negative.  Negative for dizziness, tremors, seizures, syncope, facial asymmetry, speech difficulty, weakness, light-headedness, numbness and headaches.   Hematological: Negative.  Does not bruise/bleed easily.   Psychiatric/Behavioral: Negative.  Negative for confusion, hallucinations and sleep disturbance.    All other systems reviewed and are negative.

## 2024-04-17 NOTE — PATIENT INSTRUCTIONS
Cardiology appointment: call [phone]   Echocardiogram [phone].   Take baby aspirin 81 mg

## 2024-04-17 NOTE — PROGRESS NOTES
Patient ID: Shai Osborne is a 78 y.o. female.    Assessment/Plan:    No problem-specific Assessment & Plan notes found for this encounter.       {Assess/PlanSmartLinks:15186}       Subjective:    Initial visit:    ***    Prior visits:    4/17/2024 : ***      Workup:    ***              Interval history:        ***        The following portions of the patient's history were reviewed and updated as appropriate: allergies, current medications, past family history, past medical history, past social history, past surgical history, and problem list and review of systems.         Objective:    Weight 67.1 kg (148 lb), not currently breastfeeding.    Physical Exam    Neurological Exam      ROS:    Review of Systems   Constitutional:  Negative for appetite change, fatigue and fever.   HENT: Negative.  Negative for hearing loss, tinnitus, trouble swallowing and voice change.    Eyes: Negative.  Negative for photophobia, pain and visual disturbance.   Respiratory: Negative.  Negative for shortness of breath.    Cardiovascular: Negative.  Negative for palpitations.   Gastrointestinal: Negative.  Negative for nausea and vomiting.   Endocrine: Negative.  Negative for cold intolerance.   Genitourinary: Negative.  Negative for dysuria, frequency and urgency.   Musculoskeletal:  Negative for back pain, gait problem, myalgias, neck pain and neck stiffness.   Skin: Negative.  Negative for rash.   Allergic/Immunologic: Negative.    Neurological: Negative.  Negative for dizziness, tremors, seizures, syncope, facial asymmetry, speech difficulty, weakness, light-headedness, numbness and headaches.   Hematological: Negative.  Does not bruise/bleed easily.   Psychiatric/Behavioral: Negative.  Negative for confusion, hallucinations and sleep disturbance.    All other systems reviewed and are negative.

## 2024-04-17 NOTE — TELEPHONE ENCOUNTER
----- Message from Shayy Valentin MD sent at 4/16/2024  4:41 PM EDT -----  Can you call and schedule this patient sooner? 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 Okay to add at 4:30 tomorrow.     TY~    ----- Message -----  From: Interface, Radiology Results In  Sent: 4/16/2024   3:43 PM EDT  To: Shayy Valentin MD      
Placed call to pt, spoke to pt family member, pt is asleep. I scheduled pt tomorrow at 10am suze Carlton. I will call pt tomorrow to confirm if she can come in.   
Spoke to pt this morning, she confirmed she will be coming today 10.  
Pt refused, doesn't get it

## 2024-04-24 ENCOUNTER — TELEPHONE (OUTPATIENT)
Dept: NEUROLOGY | Facility: CLINIC | Age: 79
End: 2024-04-24

## 2024-04-24 NOTE — TELEPHONE ENCOUNTER
Pt left a vm in Nepali.  [phone]    Called pt w/  [facility] # [id number]. Spoke to pt but she handed the phone to her granddaughter Shy. States that pt saw Dr Carlton a week ago. She recommended to take baby asa daily. Pt took it for 3 days but it was making her dizzy and lightheaded so she stopped taking it. Pt states that Dr Carlton did not really tell her why she needed to take asa.      [phone], ok to leave a detailed message

## 2024-05-09 ENCOUNTER — OFFICE VISIT (OUTPATIENT)
Dept: PAIN MEDICINE | Facility: MEDICAL CENTER | Age: 79
End: 2024-05-09
Payer: COMMERCIAL

## 2024-05-09 VITALS
HEIGHT: 55 IN | SYSTOLIC BLOOD PRESSURE: 123 MMHG | HEART RATE: 78 BPM | BODY MASS INDEX: 33.79 KG/M2 | DIASTOLIC BLOOD PRESSURE: 77 MMHG | WEIGHT: 146 LBS

## 2024-05-09 DIAGNOSIS — M47.812 CERVICAL SPONDYLOSIS: Primary | ICD-10-CM

## 2024-05-09 DIAGNOSIS — M79.18 CERVICAL MYOFASCIAL PAIN SYNDROME: ICD-10-CM

## 2024-05-09 PROCEDURE — G2211 COMPLEX E/M VISIT ADD ON: HCPCS

## 2024-05-09 PROCEDURE — 99213 OFFICE O/P EST LOW 20 MIN: CPT

## 2024-05-09 NOTE — PROGRESS NOTES
Assessment:  1. Cervical spondylosis    2. Cervical myofascial pain syndrome        Plan:  Patient notes significant improvement in pain since completion of physical therapy at [facility].  Encouraged compliance with home exercise program moving forward.  She is taking tylenol as needed for pain control.   She will follow up with us on an as needed basis at this point.     This patient is being managed for a complex and serious condition that requires ongoing, intensive medical management. The nature of this condition demands constant vigilance and a nuanced approach to treatment. The condition necessitates an in-depth and focused approach to management, including regular monitoring and potential coordination with other healthcare professionals.     Detailed Description of Visit Complexity: This visit involved an intricate evaluation and management of the patient's condition. The complexity of the visit was due to the need for a detailed assessment of the current state, consideration of potential complications, and a careful balancing of treatment options to manage the condition effectively.     Patient's Health Status and History: This patient has a significant pain history which requires regular and detailed management. The condition's impact on their life and health is substantial, necessitating a comprehensive and tailored approach to chronic ongoing care.     My impressions and treatment recommendations were discussed in detail with the patient who verbalized understanding and had no further questions.  Discharge instructions were provided. I personally saw and examined the patient and I agree with the above discussed plan of care.    History of Present Illness:  Shai Osborne is a 78 y.o. female with a history of chronic bilateral thoracic back pain, lumbar spondylosis, sacroiliitis, and cervical spondylosis who presents for a follow up office visit for reevaluation of her neck pain. She reports  significant improvement in her pain since completion of physical therapy.  Overall she is doing very well at this point and does not feel she requires any further intervention.  She has no complaints today.    I have personally reviewed and/or updated the patient's past medical history, past surgical history, family history, social history, current medications, allergies, and vital signs today.     Review of Systems   Constitutional:  Negative for fever.   HENT:  Negative for hearing loss.    Eyes:  Negative for visual disturbance.   Respiratory:  Negative for cough.    Cardiovascular:  Negative for leg swelling.   Gastrointestinal:  Negative for abdominal pain and nausea.   Endocrine: Negative for polydipsia.   Genitourinary:  Negative for difficulty urinating.   Musculoskeletal:  Negative for gait problem and myalgias.   Skin:  Negative for rash.   Neurological:  Negative for numbness.   Hematological:  Does not bruise/bleed easily.   Psychiatric/Behavioral:  Negative for dysphoric mood and sleep disturbance.        Patient Active Problem List   Diagnosis    Sacroiliitis (HCC) - Bilateral    Lumbar spondylosis    Degenerative disc disease, thoracic    Nephrolithiasis    Chronic rhinitis    Umbilical hernia without obstruction and without gangrene    Osteopenia of left hip    Mixed conductive and sensorineural hearing loss of right ear with restricted hearing of left ear    Vertigo    Class 2 obesity due to excess calories without serious comorbidity with body mass index (BMI) of 38.0 to 38.9 in adult    Endometrial polyp    Hyperlipidemia, mixed    Chronic foot pain, left    Gastroesophageal reflux disease without esophagitis    Family history of colon cancer    Chronic bilateral thoracic back pain    Cervical spondylosis    Hyperplastic polyp of transverse colon    Dry eye syndrome of both eyes    Memory loss    Mild cognitive impairment       Past Medical History:   Diagnosis Date    Arthritis     Chronic pain  disorder     sciatic    GERD (gastroesophageal reflux disease)     Hydronephrosis with renal and ureteral calculus obstruction 12/23/2015    Kidney stone     Kidney stone     Nephrolithiasis     Osteoarthritis     Osteoporosis     Sinus arrhythmia        Past Surgical History:   Procedure Laterality Date    CHOLECYSTECTOMY      FL RETROGRADE PYELOGRAM  3/7/2019    KIDNEY STONE SURGERY      KNEE ARTHROSCOPY Left     HI COLONOSCOPY FLX DX W/COLLJ SPEC WHEN PFRMD N/A 11/2/2017    Procedure: COLONOSCOPY with polypectomy x2;  Surgeon: Wild Stanley MD;  Location: [facility];  Service: Gastroenterology    HI CYSTO BLADDER W/URETERAL CATHETERIZATION Left 3/7/2019    Procedure: CYSTOSCOPY, URETEROSCOPY WITH LASER, BASKET STONE EXTRACTION, RETROGRADE PYELOGRAM WITH INSERTION STENT URETERAL;  Surgeon: Pedro Mcarthur MD;  Location: [facility];  Service: Urology    TUBAL LIGATION         Family History   Problem Relation Age of Onset    No Known Problems Mother     Mental illness Father     Prostate cancer Father     Colon cancer Sister     No Known Problems Sister     No Known Problems Sister     No Known Problems Sister     Ovarian cancer Daughter     Mental illness Daughter     No Known Problems Maternal Grandmother     No Known Problems Maternal Grandfather     No Known Problems Paternal Grandmother     No Known Problems Paternal Grandfather        Social History     Occupational History    Occupation: Retired   Tobacco Use    Smoking status: Never     Passive exposure: Never    Smokeless tobacco: Never   Vaping Use    Vaping status: Never Used   Substance and Sexual Activity    Alcohol use: Not Currently    Drug use: No    Sexual activity: Not Currently       Current Outpatient Medications on File Prior to Visit   Medication Sig    Acetaminophen (ARTHRITIS PAIN PO) Take by mouth    aspirin 81 mg chewable tablet Chew 81 mg daily    cholecalciferol (VITAMIN D3) 1,000 units tablet Take 1,000 Units by mouth daily     "cyanocobalamin (VITAMIN B-12) 500 mcg tablet Take 1 tablet (500 mcg total) by mouth daily    lidocaine (Lidoderm) 5 % Apply 1 patch topically daily Remove & Discard patch within 12 hours or as directed by MD    meclizine (ANTIVERT) 12.5 MG tablet Take 1 tablet (12.5 mg total) by mouth every 12 (twelve) hours as needed for dizziness    Multiple Vitamins-Minerals (CENTRUM ADULTS PO) Take by mouth    omeprazole (PriLOSEC) 20 mg delayed release capsule TAKE 1 CAPSULE BY MOUTH EVERY DAY     No current facility-administered medications on file prior to visit.       Allergies   Allergen Reactions    Diclofenac Sodium      Other reaction(s): GI Upset    Meperidine     Tramadol      Other reaction(s): GI Upset       Physical Exam:    /77   Pulse 78   Ht 4' 4\" (1.321 m)   Wt 66.2 kg (146 lb)   LMP  (LMP Unknown)   BMI 37.96 kg/m²     Constitutional:normal, well developed, well nourished, alert, in no distress and non-toxic and no overt pain behavior.  Eyes:anicteric  HEENT:grossly intact  Neck:supple, symmetric, trachea midline and no masses   Pulmonary:even and unlabored  Cardiovascular:No edema or pitting edema present  Skin:Normal without rashes or lesions and well hydrated  Psychiatric:Mood and affect appropriate  Neurologic:Cranial Nerves II-XII grossly intact  Musculoskeletal:normal, except for some tenderness to palpation over the bilateral cervical paraspinal and trapezius muscles right greater than left    "

## 2024-05-13 ENCOUNTER — HOSPITAL ENCOUNTER (OUTPATIENT)
Dept: NON INVASIVE DIAGNOSTICS | Facility: HOSPITAL | Age: 79
Discharge: HOME | End: 2024-05-13
Attending: PSYCHIATRY & NEUROLOGY
Payer: COMMERCIAL

## 2024-05-13 VITALS
HEIGHT: 55 IN | SYSTOLIC BLOOD PRESSURE: 123 MMHG | HEART RATE: 67 BPM | WEIGHT: 148 LBS | BODY MASS INDEX: 34.25 KG/M2 | DIASTOLIC BLOOD PRESSURE: 77 MMHG

## 2024-05-13 DIAGNOSIS — I49.9 IRREGULARLY IRREGULAR HEART RHYTHM: ICD-10-CM

## 2024-05-13 DIAGNOSIS — I48.91 A-FIB (HCC): ICD-10-CM

## 2024-05-13 LAB
AORTIC ROOT: 2.5 CM
APICAL FOUR CHAMBER EJECTION FRACTION: 66 %
BSA FOR ECHO PROCEDURE: 1.48 M2
E WAVE DECELERATION TIME: 220 MS
E/A RATIO: 0.7
FRACTIONAL SHORTENING: 34 (ref 28–44)
INTERVENTRICULAR SEPTUM IN DIASTOLE (PARASTERNAL SHORT AXIS VIEW): 0.8 CM
INTERVENTRICULAR SEPTUM: 0.8 CM (ref 0.6–1.1)
LAAS-AP2: 15.3 CM2
LAAS-AP4: 13.1 CM2
LEFT ATRIUM SIZE: 3.1 CM
LEFT ATRIUM VOLUME (MOD BIPLANE): 37 ML
LEFT ATRIUM VOLUME INDEX (MOD BIPLANE): 25.2 ML/M2
LEFT INTERNAL DIMENSION IN SYSTOLE: 2.3 CM (ref 2.1–4)
LEFT VENTRICULAR INTERNAL DIMENSION IN DIASTOLE: 3.5 CM (ref 3.5–6)
LEFT VENTRICULAR POSTERIOR WALL IN END DIASTOLE: 0.8 CM
LEFT VENTRICULAR STROKE VOLUME: 33 ML
LVSV (TEICH): 33 ML
MV E'TISSUE VEL-SEP: 8 CM/S
MV PEAK A VEL: 1.22 M/S
MV PEAK E VEL: 85 CM/S
MV STENOSIS PRESSURE HALF TIME: 64 MS
MV VALVE AREA P 1/2 METHOD: 3.44
PA SYSTOLIC PRESSURE: 30 MMHG
RIGHT ATRIUM AREA SYSTOLE A4C: 13 CM2
RIGHT VENTRICLE ID DIMENSION: 2.8 CM
SL CV LEFT ATRIUM LENGTH A2C: 5 CM
SL CV LV EF: 65
SL CV PED ECHO LEFT VENTRICLE DIASTOLIC VOLUME (MOD BIPLANE) 2D: 51 ML
SL CV PED ECHO LEFT VENTRICLE SYSTOLIC VOLUME (MOD BIPLANE) 2D: 18 ML
TR MAX PG: 26 MMHG
TR PEAK VELOCITY: 2.5 M/S
TRICUSPID ANNULAR PLANE SYSTOLIC EXCURSION: 2.2 CM
TRICUSPID VALVE PEAK REGURGITATION VELOCITY: 2.53 M/S

## 2024-05-13 PROCEDURE — 93306 TTE W/DOPPLER COMPLETE: CPT

## 2024-05-13 PROCEDURE — 93306 TTE W/DOPPLER COMPLETE: CPT | Performed by: INTERNAL MEDICINE

## 2024-05-18 ENCOUNTER — RA CDI HCC (OUTPATIENT)
Dept: OTHER | Facility: HOSPITAL | Age: 79
End: 2024-05-18

## 2024-05-19 PROBLEM — I65.29 INTERNAL CAROTID ARTERY STENOSIS: Status: ACTIVE | Noted: 2024-05-19

## 2024-05-19 PROBLEM — I49.9 IRREGULARLY IRREGULAR HEART RHYTHM: Status: ACTIVE | Noted: 2024-05-19

## 2024-05-19 NOTE — ASSESSMENT & PLAN NOTE
Yanteh Osborne is a very pleasant 78 y.o. 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  I last saw her on 3/19/2024.    Patient reports that over the year 2023 she started noticing a gradual decline in her cognition.  She was mostly reports lapses of memory that are brief causing confusion, disorientation and forgetfulness.  This has resulted in certain situations which could be noted as dangerous such as forgetting to lock the door, leaving the water running in the sink resulting in the kitchen flooding or briefly not knowing which direction she has to drive to in a familiar place.  For the most part patient has good insight into her condition.  She reports that at baseline she has an impatient personality and can get easily stressed.  Patient also reports frequent nighttime awakenings due to pain from her arthritis.  She endorses that she snores and does not wake up feeling well rested.  Otherwise dementia review of systems is negative.  Patient is independent in all activities of daily living.  Mize on initial visit 22/30 with deficits mostly in visuospatial/executive function and delayed recall.  MRI neuro quant ordered which showed normal hippocampal volume.     At this time, I believe that patient has a mild cognitive impairment versus pseudodementia secondary to her tendency to easily get stressed and potential underlying obstructive sleep apnea causing unrestful sleep.      Plan:  Patient should avoid stress   Patient to follow-up with sleep medicine for evaluation of TRISTIAN as untreated TRISTIAN can also cause problems with memory and restless sleep cannot allow time for consolidation of memory.  Follow-up with audiology.    Continue B12 to 500 mcg    Follow-up in 3 months

## 2024-05-19 NOTE — ASSESSMENT & PLAN NOTE
MRI brain neuro quant was ordered to evaluate patient's cognition.Incidental note is made of age-indeterminate loss of the normal flow-void within the right internal carotid artery which may be on the basis of high-grade stenosis or occlusion. CT angiogram of the head and neck is recommended for further evaluation.    CTA head and neck with and without contrast (4/16/2024): Diffuse, high-grade right cervical and intracranial ICA stenosis with segmental areas of occlusion. Unknown chronicity. This may be due to dissection. No secondary evidence of significant atherosclerosis. Absent right anterior cerebral artery A1 segment which is commonly developmental. Both anterior cerebral arteries are otherwise patent. Middle cerebral artery supplied by the posterior circulation. Patent right ophthalmic artery origin. No acute intracranial pathology.    Patient reports getting transient episodes of dizziness.  These are made worse with aspirin use.  Recent LDL 89.  Patient not on statin medication.    Plan:  I advised patient to start aspirin 81 mg  Stat referral placed to neurosurgery to evaluate right ICA stenosis  Echocardiogram ordered as patient reports palpitations to rule out structural findings suggestive of A-fib  Referred to cardiology

## 2024-05-19 NOTE — ASSESSMENT & PLAN NOTE
This was noted on cardiac auscultation.  Patient reports palpitations.    Plan:  Echocardiogram  Referral to cardiology for Zio patch monitoring

## 2024-05-29 ENCOUNTER — OFFICE VISIT (OUTPATIENT)
Dept: FAMILY MEDICINE CLINIC | Facility: CLINIC | Age: 79
End: 2024-05-29

## 2024-05-29 VITALS
HEIGHT: 55 IN | RESPIRATION RATE: 18 BRPM | HEART RATE: 74 BPM | TEMPERATURE: 97.6 F | OXYGEN SATURATION: 97 % | SYSTOLIC BLOOD PRESSURE: 131 MMHG | BODY MASS INDEX: 33.56 KG/M2 | DIASTOLIC BLOOD PRESSURE: 76 MMHG | WEIGHT: 145 LBS

## 2024-05-29 DIAGNOSIS — G31.84 MILD COGNITIVE IMPAIRMENT: Primary | ICD-10-CM

## 2024-05-29 DIAGNOSIS — I49.8 SINUS ARRHYTHMIA: ICD-10-CM

## 2024-05-29 DIAGNOSIS — I65.21 STENOSIS OF RIGHT INTERNAL CAROTID ARTERY: ICD-10-CM

## 2024-05-29 DIAGNOSIS — M47.812 CERVICAL SPONDYLOSIS: ICD-10-CM

## 2024-05-29 DIAGNOSIS — R79.89 ELEVATED TSH: ICD-10-CM

## 2024-05-29 PROBLEM — R41.3 MEMORY LOSS: Status: RESOLVED | Noted: 2024-01-24 | Resolved: 2024-05-29

## 2024-05-29 PROBLEM — M54.6 CHRONIC BILATERAL THORACIC BACK PAIN: Status: RESOLVED | Noted: 2023-01-03 | Resolved: 2024-05-29

## 2024-05-29 PROBLEM — G89.29 CHRONIC BILATERAL THORACIC BACK PAIN: Status: RESOLVED | Noted: 2023-01-03 | Resolved: 2024-05-29

## 2024-05-29 PROCEDURE — 99214 OFFICE O/P EST MOD 30 MIN: CPT | Performed by: FAMILY MEDICINE

## 2024-05-29 PROCEDURE — G2211 COMPLEX E/M VISIT ADD ON: HCPCS | Performed by: FAMILY MEDICINE

## 2024-05-29 NOTE — ASSESSMENT & PLAN NOTE
Patient mentioned she will most likely get EKG when sees cardiologist on 6/6/24 so declined for today

## 2024-05-29 NOTE — PROGRESS NOTES
Ambulatory Visit  Name: Shai Osborne      : 1945      MRN: [id number]  Encounter Provider: Jones Harp DO  Encounter Date: 2024   Encounter department: [facility]    Assessment & Plan   1. Mild cognitive impairment  Assessment & Plan:  Follow-up neurologist on 24  Continue ASA 81 mg daily and vitamin B12  Neurologist referred her to Sleep Medicine and still awaiting appointment  2. Stenosis of right internal carotid artery  Assessment & Plan:  Has appointment with neurosurgeon 24  Continue ASA 81 mg daily  Orders:  -     Lipid panel; Future  3. Sinus arrhythmia  Assessment & Plan:  Patient mentioned she will most likely get EKG when sees cardiologist on 24 so declined for today    4. Elevated TSH  Assessment & Plan:  TSH 5.237, T4 level 0.93 in 2024  Repeat TSH prior to next office visit in 3 months  Orders:  -     Comprehensive metabolic panel; Future  -     TSH + Free T4; Future  5. Cervical spondylosis  Assessment & Plan:  Saw Pain Management on 24 and felt better after completing PT at [facility]  Tylenol as needed  Pain Management as needed       History of Present Illness     Saw neurologist for memory loss in 2024.Sent for MRI brain and referred her to Sleep Medicine to rule out TRISTIAN.  Saw neurologist again in 2024. Was told to continue vitamin B12, follow-up with audiology. Was referred to Neurosurg due to right ICA stenosis and instructed to start ASA 81 mg daily. Was also referred to cardiology and sent for echo due to irregular heart rate.                Review of Systems   Constitutional:  Negative for chills, fatigue, fever and unexpected weight change.   HENT:  Negative for congestion, ear pain, rhinorrhea and sore throat.    Eyes:  Negative for pain and visual disturbance.   Respiratory:  Negative for cough, chest tightness and shortness of breath.    Cardiovascular:  Negative for chest pain, palpitations  "and leg swelling.   Gastrointestinal:  Negative for abdominal pain, constipation, diarrhea, nausea and vomiting.   Genitourinary:  Negative for difficulty urinating, dysuria and urgency.   Musculoskeletal:  Negative for arthralgias and back pain.   Skin:  Negative for rash.   Neurological:  Negative for dizziness, numbness and headaches.   Psychiatric/Behavioral:  Negative for behavioral problems and suicidal ideas. The patient is not nervous/anxious.        Objective     /76 (BP Location: Left arm, Patient Position: Sitting, Cuff Size: Standard)   Pulse 74   Temp 97.6 °F (36.4 °C) (Temporal)   Resp 18   Ht 4' 4\" (1.321 m)   Wt 65.8 kg (145 lb)   LMP  (LMP Unknown)   SpO2 97%   BMI 37.70 kg/m²     Physical Exam  Constitutional:       Appearance: She is well-developed.   HENT:      Head: Normocephalic and atraumatic.      Right Ear: External ear normal.      Left Ear: External ear normal.      Nose: Nose normal.   Eyes:      Conjunctiva/sclera: Conjunctivae normal.      Pupils: Pupils are equal, round, and reactive to light.   Cardiovascular:      Rate and Rhythm: Normal rate and regular rhythm.      Heart sounds: Normal heart sounds.   Pulmonary:      Effort: Pulmonary effort is normal.      Breath sounds: Normal breath sounds.   Abdominal:      General: Bowel sounds are normal.      Palpations: Abdomen is soft.   Musculoskeletal:         General: Normal range of motion.      Cervical back: Normal range of motion and neck supple.   Skin:     General: Skin is warm.   Neurological:      Mental Status: She is alert and oriented to person, place, and time.   Psychiatric:         Behavior: Behavior normal.           "

## 2024-05-29 NOTE — ASSESSMENT & PLAN NOTE
Saw Pain Management on 5/9/24 and felt better after completing PT at [facility]  Tylenol as needed  Pain Management as needed

## 2024-05-29 NOTE — ASSESSMENT & PLAN NOTE
Follow-up neurologist on 6/18/24  Continue ASA 81 mg daily and vitamin B12  Neurologist referred her to Sleep Medicine and still awaiting appointment

## 2024-06-06 ENCOUNTER — CONSULT (OUTPATIENT)
Dept: CARDIOLOGY CLINIC | Facility: CLINIC | Age: 79
End: 2024-06-06
Payer: COMMERCIAL

## 2024-06-06 VITALS
WEIGHT: 144.4 LBS | HEART RATE: 72 BPM | SYSTOLIC BLOOD PRESSURE: 126 MMHG | HEIGHT: 55 IN | BODY MASS INDEX: 33.42 KG/M2 | DIASTOLIC BLOOD PRESSURE: 84 MMHG

## 2024-06-06 DIAGNOSIS — I49.8 SINUS ARRHYTHMIA: ICD-10-CM

## 2024-06-06 DIAGNOSIS — I49.9 IRREGULARLY IRREGULAR HEART RHYTHM: Primary | ICD-10-CM

## 2024-06-06 DIAGNOSIS — I65.21 STENOSIS OF RIGHT INTERNAL CAROTID ARTERY: ICD-10-CM

## 2024-06-06 DIAGNOSIS — I48.91 A-FIB (HCC): ICD-10-CM

## 2024-06-06 PROCEDURE — 99204 OFFICE O/P NEW MOD 45 MIN: CPT | Performed by: INTERNAL MEDICINE

## 2024-06-06 PROCEDURE — 93000 ELECTROCARDIOGRAM COMPLETE: CPT | Performed by: INTERNAL MEDICINE

## 2024-06-06 NOTE — PATIENT INSTRUCTIONS
CARDIOLOGY ASSESSMENT & PLAN:   Diagnosis ICD-10-CM Associated Orders   1. Irregularly irregular heart rhythm  I49.9 Ambulatory Referral to Cardiology     AMB extended holter monitor      2. Sinus arrhythmia  I49.8 POCT ECG      3. A-fib (HCC)  I48.91 Ambulatory Referral to Cardiology     POCT ECG      4. Stenosis of right internal carotid artery  I65.21         1. Irregularly irregular heart rhythm  Assessment & Plan:  Ms. Shai Osborne is noted to have irregularly irregular rhythm.  She reports occasional mild palpitations.  She has had no presyncope/syncope.  Her ECG of the rhythm shows that it is in fact sinus rhythm with marked sinus arrhythmia.  She was noted to have this in 2021 also.  She denies any symptoms of sustained palpitations.  Her examination is significant for increased BMI.  Her blood pressure is reasonable.  I did not appreciate any carotid bruit on exam.  She does have chronic trace lower extremity edema.  She underwent recent echocardiogram which showed overall normal cardiac structure and function.  There was grade 1 diastolic dysfunction and mild tricuspid and pulmonic valve regurgitation.  Her laboratory evaluation is significant for stable electrolytes and normal renal function, borderline lipids and subclinical hypothyroidism.    Today we reviewed her records and workup done at this point.  I reassured her that the abnormality on her ECG is probably benign.  However, given her age and risk factors it will be reasonable to rule out atrial fibrillation rhythm.  -- I am requesting a 7-day extended Holter monitor to detect any atrial fibrillation episodes.  -- Meanwhile she is advised to continue current medications.  Regarding aspirin use we can discontinue this after review of the extended Holter monitor.  -- I am encouraging her to continue to do normal activities and eat healthy and try to lose weight through dietary changes and physical activity.  -- She will follow-up with  neurosurgeon as planned to further investigate her recent diagnosis of ICA stenosis.  -- She is advised to call us and let us know if she develops any sustained palpitations or experiences any passing out or near passing out episodes or any other concerning symptoms.    Orders:  -     Ambulatory Referral to Cardiology  -     AMB extended holter monitor; Future; Expected date: 06/06/2024  2. Sinus arrhythmia  -     POCT ECG  3. A-fib ([facility])  -     Ambulatory Referral to Cardiology  -     POCT ECG  4. Stenosis of right internal carotid artery

## 2024-06-06 NOTE — ASSESSMENT & PLAN NOTE
Ms. Shai Osborne is noted to have irregularly irregular rhythm.  She reports occasional mild palpitations.  She has had no presyncope/syncope.  Her ECG of the rhythm shows that it is in fact sinus rhythm with marked sinus arrhythmia.  She was noted to have this in 2021 also.  She denies any symptoms of sustained palpitations.  Her examination is significant for increased BMI.  Her blood pressure is reasonable.  I did not appreciate any carotid bruit on exam.  She does have chronic trace lower extremity edema.  She underwent recent echocardiogram which showed overall normal cardiac structure and function.  There was grade 1 diastolic dysfunction and mild tricuspid and pulmonic valve regurgitation.  Her laboratory evaluation is significant for stable electrolytes and normal renal function, borderline lipids and subclinical hypothyroidism.    Today we reviewed her records and workup done at this point.  I reassured her that the abnormality on her ECG is probably benign.  However, given her age and risk factors it will be reasonable to rule out atrial fibrillation rhythm.  -- I am requesting a 7-day extended Holter monitor to detect any atrial fibrillation episodes.  -- Meanwhile she is advised to continue current medications.  Regarding aspirin use we can discontinue this after review of the extended Holter monitor.  -- I am encouraging her to continue to do normal activities and eat healthy and try to lose weight through dietary changes and physical activity.  -- She will follow-up with neurosurgeon as planned to further investigate her recent diagnosis of ICA stenosis.  -- She is advised to call us and let us know if she develops any sustained palpitations or experiences any passing out or near passing out episodes or any other concerning symptoms.

## 2024-06-06 NOTE — PROGRESS NOTES
[facility]  [facility]  Primary Office: [address] [place] [id number]  Phone: [phone]; Fax: [phone]  *-*-*-*-*-*-*-*-*-*-*-*-*-*-*-*-*-*-*-*-*-*-*-*-*-*-*-*-*-*-*-*-*-*-*-*-*-*-*-*-*-*-*-*-*-*-*-*-*-*-*-*-*-*  ENCOUNTER DATE: 06/06/24 9:28 AM  PATIENT NAME: Shai Osborne   1945    [id number]  Age: 78 y.o.      Sex: female  ENCOUNTER PROVIDER:  Tiffany Jarrell MD, A, [facility]  PRIMARYCARE PHYSICIAN: Jones Harp DO  REFERRING PHYSICIAN: Shayy Valentin MD  [address] [facility] [place] [address] Shayy Brambila,*   *-*-*-*-*-*-*-*-*-*-*-*-*-*-*-*-*-*-*-*-*-*-*-*-*-*-*-*-*-*-*-*-*-*-*-*-*-*-*-*-*-*-*-*-*-*-*-*-*-*-*-*-*-*-  REASON FOR REFERRAL: Irregular heartbeat, concern for atrial fibrillation    *-*-*-*-*-*-*-*-*-*-*-*-*-*-*-*-*-*-*-*-*-*-*-*-*-*-*-*-*-*-*-*-*-*-*-*-*-*-*-*-*-*-*-*-*-*-*-*-*-*-*-*-*-*-  CARDIOLOGY ASSESSMENT & PLAN:   Diagnosis ICD-10-CM Associated Orders   1. Irregularly irregular heart rhythm  I49.9 Ambulatory Referral to Cardiology     AMB extended holter monitor      2. Sinus arrhythmia  I49.8 POCT ECG      3. A-fib (HCC)  I48.91 Ambulatory Referral to Cardiology     POCT ECG      4. Stenosis of right internal carotid artery  I65.21         1. Irregularly irregular heart rhythm  Assessment & Plan:  Ms. Shai Osborne is noted to have irregularly irregular rhythm.  She reports occasional mild palpitations.  She has had no presyncope/syncope.  Her ECG of the rhythm shows that it is in fact sinus rhythm with marked sinus arrhythmia.  She was noted to have this in 2021 also.  She denies any symptoms of sustained palpitations.  Her examination is significant for increased BMI.  Her blood pressure is reasonable.  I did not appreciate any carotid bruit on exam.  She does have chronic trace lower extremity edema.  She underwent recent echocardiogram which showed overall normal cardiac structure and function.  There was  grade 1 diastolic dysfunction and mild tricuspid and pulmonic valve regurgitation.  Her laboratory evaluation is significant for stable electrolytes and normal renal function, borderline lipids and subclinical hypothyroidism.    Today we reviewed her records and workup done at this point.  I reassured her that the abnormality on her ECG is probably benign.  However, given her age and risk factors it will be reasonable to rule out atrial fibrillation rhythm.  -- I am requesting a 7-day extended Holter monitor to detect any atrial fibrillation episodes.  -- Meanwhile she is advised to continue current medications.  Regarding aspirin use we can discontinue this after review of the extended Holter monitor.  -- I am encouraging her to continue to do normal activities and eat healthy and try to lose weight through dietary changes and physical activity.  -- She will follow-up with neurosurgeon as planned to further investigate her recent diagnosis of ICA stenosis.  -- She is advised to call us and let us know if she develops any sustained palpitations or experiences any passing out or near passing out episodes or any other concerning symptoms.    Orders:  -     Ambulatory Referral to Cardiology  -     AMB extended holter monitor; Future; Expected date: 06/06/2024  2. Sinus arrhythmia  -     POCT ECG  3. A-fib ([facility])  -     Ambulatory Referral to Cardiology  -     POCT ECG  4. Stenosis of right internal carotid artery     *-*-*-*-*-*-*-*-*-*-*-*-*-*-*-*-*-*-*-*-*-*-*-*-*-*-*-*-*-*-*-*-*-*-*-*-*-*-*-*-*-*-*-*-*-*-*-*-*-*-*-*-*-*-  CURRENT ECG:  Results for orders placed or performed in visit on 06/06/24   POCT ECG    Narrative    Sinus rhythm with marked sinus arrhythmia.  HR 72 bpm.  Borderline low QRS voltages, leftward axis, no significant chamber hypertrophy or enlargement.  No evidence of prior infarction.  No changes of ischemia.       ECG  4/13/2021:    *-*-*-*-*-*-*-*-*-*-*-*-*-*-*-*-*-*-*-*-*-*-*-*-*-*-*-*-*-*-*-*-*-*-*-*-*-*-*-*-*-*-*-*-*-*-*-*-*-*-*-*-*-*-  HISTORY OF PRESENT ILLNESS:  Patient is an 78-year-old female with medical history significant for:  Right internal carotid artery stenosis  Degenerative joint disease with cervical spondylosis  Mild cognitive impairment  Sleep disturbance  History of nephrolithiasis  Hearing loss  Vertigo  Obesity  Dyslipidemia  Irregular heartbeat with sinus arrhythmia  Subclinical hypothyroidism.    She is here for irregular heart beat which was noted initially in 2021.  She has no previous history of coronary artery disease or congestive heart failure or TIA/CVA or history of cancer.  See does not have hypertension or diabetes.  From a symptom perspective she reports that she is overall in good her biggest worry is her osteoarthritis which is getting worse.  She uses a cane when she walks outside but at home she ambulates independently.  She denies any unusual chest pain or shortness of breath with normal activity but she does get brief shortness of breath and palpitations if she exerts herself.  She lives at home with her  and her brother and her daughter who is mentally challenged.  She is actively involved and takes care of her daughter and has not experienced decline in her exercise tolerance.  She has recently been diagnosed with intracranial ICAs stenosis and is scheduled to follow-up with neurosurgery on 6/13/2024.  She reports that she gets vertigo from time to time but this is rarely maybe once a year.  She did have recent episode.  She mentions that her primary care physician started her on aspirin recently and she feels like this might have triggered her last vertigo episode.  She denies any visual problems or focal weaknesses or severe headaches.  She has had no seizures.  She denies any orthopnea PND or worsening pedal edema.    Functional capacity status: Moderate to good for her age    (Excellent- >10 METs; Good: (7-10 METs); Moderate (4-7 METs); Poor (<= 4 METs)    Any chronic stressors: None   (feeling of poor health, financial problems, and social isolation etc).    Tobacco or alcohol dependence: She has never been a smoker.  She does not drink alcohol.     Family history: She denies any family history of premature coronary artery disease or sudden cardiac death.    Current cardiac meds: Meclizine 12.5 mg every 12 as needed, aspirin 81 mg daily    Previous blood work:  Blood work 2/2024 sodium 141 potassium 3.9 chloride 105 bicarb 28 BUN 13 creatinine 0.70  Normal LFTs except for alk phos slightly increased at 126  Lipid profile total cholesterol 166 triglyceride 101 HDL 57 calculated LDL 89  Normal vitamin D and B12 levels  TSH 5.237 Free T4 0.93  No recent hemoglobin A1c noted.    Previous cardiac studies:  Echocardiogram 5/13/2024:  Normal left ventricular size wall thickness and systolic function.  EF 65%.  Grade 1 diastolic dysfunction.  Normal RV size and function.  Normal left and right atrial size.  Normal right leaflet aortic valve with no aortic valve stenosis or regurgitation.  Normal mitral valve without stenosis or regurgitation.  Mild tricuspid and mild pulmonic valve regurgitation.  No obvious pulmonary hypertension.  No pericardial effusion.  Normal diameter of aortic root and proximal aorta and inferior vena cava.    CTA neck and brain January 2024:   Diffuse, high-grade right cervical and intracranial ICA stenosis with segmental areas of occlusion. Unknown chronicity. This may be due to dissection. No secondary evidence of significant atherosclerosis.  Absent right anterior cerebral artery A1 segment which is commonly developmental. Both anterior cerebral arteries are otherwise patent. Middle cerebral artery supplied by the posterior circulation. Patent right ophthalmic artery origin.  No acute intracranial pathology.    Major cardiac risk factors: Increased BMI (Tobacco use,  Hypertension, Family history of CHD, Primary severe hypercholesterolemia, DM, multiple major and risk enhancing factors)     Any cardiac clinical risk enhancers from available data: -- (Family history of premature CAD, patient's history of chronic kidney disease, primary hypercholesterolemia, metabolic syndrome, abnormal ZOEY, inflammatory condition such as RA-HIV-psoriasis, RA-HIV-Psoriasis,, pregnancy related complications such as preeclampsia or premature delivery, early menopause, high risk ethnicity such as [place] , social deprivation, physical inactivity, nonalcoholic fatty liver disease psychosocial stress, major psychiatric illness, atrial fibrillation, left ventricular hypertrophy, obstructive sleep apnea, nonalcoholic fatty liver disease).    *-*-*-*-*-*-*-*-*-*-*-*-*-*-*-*-*-*-*-*-*-*-*-*-*-*-*-*-*-*-*-*-*-*-*-*-*-*-*-*-*-*-*-*-*-*-*-*-*-*-*-*-*-*  PAST MEDICAL HISTORY:  Past Medical History:   Diagnosis Date    Arthritis     Chronic pain disorder     sciatic    GERD (gastroesophageal reflux disease)     Hydronephrosis with renal and ureteral calculus obstruction 12/23/2015    Kidney stone     Kidney stone     Nephrolithiasis     Osteoarthritis     Osteoporosis     Sinus arrhythmia     PAST SURGICAL HISTORY:   Past Surgical History:   Procedure Laterality Date    CHOLECYSTECTOMY      FL RETROGRADE PYELOGRAM  3/7/2019    KIDNEY STONE SURGERY      KNEE ARTHROSCOPY Left     IA COLONOSCOPY FLX DX W/COLLJ SPEC WHEN PFRMD N/A 11/2/2017    Procedure: COLONOSCOPY with polypectomy x2;  Surgeon: Wild Stanley MD;  Location: [facility];  Service: Gastroenterology    IA CYSTO BLADDER W/URETERAL CATHETERIZATION Left 3/7/2019    Procedure: CYSTOSCOPY, URETEROSCOPY WITH LASER, BASKET STONE EXTRACTION, RETROGRADE PYELOGRAM WITH INSERTION STENT URETERAL;  Surgeon: Pedro Mcarthur MD;  Location: [facility];  Service: Urology    TUBAL LIGATION           FAMILY HISTORY:  Family History   Problem Relation Age of Onset     No Known Problems Mother     Mental illness Father     Prostate cancer Father     Colon cancer Sister     No Known Problems Sister     No Known Problems Sister     No Known Problems Sister     Ovarian cancer Daughter     Mental illness Daughter     No Known Problems Maternal Grandmother     No Known Problems Maternal Grandfather     No Known Problems Paternal Grandmother     No Known Problems Paternal Grandfather     SOCIAL HISTORY:  Social History     Tobacco Use   Smoking Status Never    Passive exposure: Never   Smokeless Tobacco Never      Social History     Substance and Sexual Activity   Alcohol Use Not Currently     Social History     Substance and Sexual Activity   Drug Use No    [unfilled]     *-*-*-*-*-*-*-*-*-*-*-*-*-*-*-*-*-*-*-*-*-*-*-*-*-*-*-*-*-*-*-*-*-*-*-*-*-*-*-*-*-*-*-*-*-*-*-*-*-*-*-*-*-*  ALLERGIES:  Allergies   Allergen Reactions    Diclofenac Sodium      Other reaction(s): GI Upset    Meperidine     Tramadol      Other reaction(s): GI Upset    CURRENT SCHEDULED MEDICATIONS:    Current Outpatient Medications:     Acetaminophen (ARTHRITIS PAIN PO), Take by mouth, Disp: , Rfl:     aspirin 81 mg chewable tablet, Chew 81 mg daily, Disp: , Rfl:     cholecalciferol (VITAMIN D3) 1,000 units tablet, Take 1,000 Units by mouth daily, Disp: , Rfl:     cyanocobalamin (VITAMIN B-12) 500 mcg tablet, Take 1 tablet (500 mcg total) by mouth daily, Disp: 90 tablet, Rfl: 3    lidocaine (Lidoderm) 5 %, Apply 1 patch topically daily Remove & Discard patch within 12 hours or as directed by MD, Disp: 5 patch, Rfl: 0    meclizine (ANTIVERT) 12.5 MG tablet, Take 1 tablet (12.5 mg total) by mouth every 12 (twelve) hours as needed for dizziness, Disp: 10 tablet, Rfl: 0    Multiple Vitamins-Minerals (CENTRUM ADULTS PO), Take by mouth, Disp: , Rfl:     omeprazole (PriLOSEC) 20 mg delayed release capsule, TAKE 1 CAPSULE BY MOUTH EVERY DAY, Disp: 90 capsule, Rfl: 1      *-*-*-*-*-*-*-*-*-*-*-*-*-*-*-*-*-*-*-*-*-*-*-*-*-*-*-*-*-*-*-*-*-*-*-*-*-*-*-*-*-*-*-*-*-*-*-*-*-*-*-*-*-*  REVIEW OF SYMPTOMS:    Positive for: As noted above in HPI  Negative for: All remaining as reviewed below and in HPI. SYSTEM SYMPTOMS REVIEWED:  General--weight change, fever, night sweats  Respiratoryl-- Wheezing, shortness of breath, cough, URI symptoms, sputum, blood  Cardiovascular--chest pain, syncope, dyspnea on exertion, edema, decline in exercise tolerance, claudication   Gastrointestinal--persistent vomiting, diarrhea, abdominal distention, blood in stool   Muscular or skeletal--joint pain or swelling   Neurologic--headaches, syncope, abnormal movement  Hematologic--history of easy bruising and bleeding   Endocrine--thyroid enlargement, heat or cold intolerance, polyuria   Psychiatric--anxiety, depression      *-*-*-*-*-*-*-*-*-*-*-*-*-*-*-*-*-*-*-*-*-*-*-*-*-*-*-*-*-*-*-*-*-*-*-*-*-*-*-*-*-*-*-*-*-*-*-*-*-*-*-*-*-*  CURRENT OUTPATIENT MEDICATIONS:     Current Outpatient Medications:     Acetaminophen (ARTHRITIS PAIN PO), Take by mouth, Disp: , Rfl:     aspirin 81 mg chewable tablet, Chew 81 mg daily, Disp: , Rfl:     cholecalciferol (VITAMIN D3) 1,000 units tablet, Take 1,000 Units by mouth daily, Disp: , Rfl:     cyanocobalamin (VITAMIN B-12) 500 mcg tablet, Take 1 tablet (500 mcg total) by mouth daily, Disp: 90 tablet, Rfl: 3    lidocaine (Lidoderm) 5 %, Apply 1 patch topically daily Remove & Discard patch within 12 hours or as directed by MD, Disp: 5 patch, Rfl: 0    meclizine (ANTIVERT) 12.5 MG tablet, Take 1 tablet (12.5 mg total) by mouth every 12 (twelve) hours as needed for dizziness, Disp: 10 tablet, Rfl: 0    Multiple Vitamins-Minerals (CENTRUM ADULTS PO), Take by mouth, Disp: , Rfl:     omeprazole (PriLOSEC) 20 mg delayed release capsule, TAKE 1 CAPSULE BY MOUTH EVERY DAY, Disp: 90 capsule, Rfl:  "1    *-*-*-*-*-*-*-*-*-*-*-*-*-*-*-*-*-*-*-*-*-*-*-*-*-*-*-*-*-*-*-*-*-*-*-*-*-*-*-*-*-*-*-*-*-*-*-*-*-*-*-*-*-*  VITAL SIGNS:  Vitals:    06/06/24 0835   BP: 126/84   BP Location: Left arm   Patient Position: Sitting   Cuff Size: Large   Pulse: 72   Weight: 65.5 kg (144 lb 6.4 oz)   Height: 4' 4\" (1.321 m)       BMI: Body mass index is 37.55 kg/m².    WEIGHTS:   Wt Readings from Last 25 Encounters:   06/06/24 65.5 kg (144 lb 6.4 oz)   05/29/24 65.8 kg (145 lb)   05/13/24 67.1 kg (148 lb)   05/09/24 66.2 kg (146 lb)   04/17/24 67.1 kg (148 lb)   03/19/24 66.2 kg (146 lb)   03/14/24 67.6 kg (149 lb)   02/12/24 65.8 kg (145 lb)   01/24/24 66.7 kg (147 lb)   01/02/24 68 kg (150 lb)   11/27/23 68 kg (150 lb)   10/11/23 68.5 kg (151 lb)   10/09/23 68.5 kg (151 lb)   07/18/23 66.8 kg (147 lb 3.2 oz)   04/12/23 67.1 kg (148 lb)   03/21/23 66.2 kg (146 lb)   01/09/23 66.2 kg (146 lb)   12/21/22 66.3 kg (146 lb 1.6 oz)   11/25/22 72.6 kg (160 lb)   11/09/22 68.4 kg (150 lb 11.2 oz)   09/16/22 67.1 kg (148 lb)   09/07/22 67.7 kg (149 lb 3.2 oz)   05/26/22 69.4 kg (153 lb)   03/16/22 69.4 kg (153 lb)   03/02/22 69.4 kg (153 lb)        *-*-*-*-*-*-*-*-*-*-*-*-*-*-*-*-*-*-*-*-*-*-*-*-*-*-*-*-*-*-*-*-*-*-*-*-*-*-*-*-*-*-*-*-*-*-*-*-*-*-*-*-*-*-  PHYSICAL EXAM:  General Appearance:    Alert, cooperative, no distress, appears stated age, short stature, obese   Head, Eyes, ENT:  Bilateral hearing aids, moist mucous mebranes.   Neck:   Supple, no carotid bruit. No JVD, no obvious lymphadenoapthy   Back:     Symmetric, no curvature.   Lungs:     Respirations unlabored. Clear to auscultation bilaterally,    Chest wall:    No tenderness or deformity   Heart:  Irregularly irregular rhythm no, normal intensity heart sounds, no murmur, rub  or gallop.   Abdomen:     Soft, non-tender.   Extremities:   Extremities warm, no cyanosis, chronic lymphedema noted in lower extremities   Skin:   No venostatic changes in lower extremities.   Normal " skin color, texture, and turgor. No rashes or lesions   Neuro: Pt is alert and oriented time 3 with no gross motor deficits.   Psychiatric/Behavioral: Mood is normal. Behavior is normal.   *-*-*-*-*-*-*-*-*-*-*-*-*-*-*-*-*-*-*-*-*-*-*-*-*-*-*-*-*-*-*-*-*-*-*-*-*-*-*-*-*-*-*-*-*-*-*-*-*-*-*-*-*-*-  LABORATORY DATA: I have personally reviewed the available laboratory data.        Potassium   Date Value Ref Range Status   02/01/2024 3.9 3.5 - 5.3 mmol/L Final   09/23/2022 3.5 3.5 - 5.3 mmol/L Final   02/23/2022 3.4 (L) 3.6 - 5.0 mmol/L Final     Chloride   Date Value Ref Range Status   02/01/2024 105 96 - 108 mmol/L Final   09/23/2022 103 96 - 108 mmol/L Final   02/23/2022 102 97 - 108 mmol/L Final     CO2   Date Value Ref Range Status   02/01/2024 28 21 - 32 mmol/L Final   09/23/2022 28 21 - 32 mmol/L Final   02/23/2022 28 22 - 30 mmol/L Final     BUN   Date Value Ref Range Status   02/01/2024 13 5 - 25 mg/dL Final   09/23/2022 21 5 - 25 mg/dL Final   02/23/2022 16 5 - 25 mg/dL Final     Creatinine   Date Value Ref Range Status   02/01/2024 0.70 0.60 - 1.30 mg/dL Final     Comment:     Standardized to IDMS reference method   09/23/2022 0.74 0.60 - 1.20 mg/dL Final     Comment:     Standardized to IDMS reference method   02/23/2022 0.76 0.60 - 1.20 mg/dL Final     Comment:     Standardized to IDMS reference method     eGFR   Date Value Ref Range Status   02/01/2024 82 ml/min/1.73sq m Final   09/23/2022 77 ml/min/1.73sq m Final   02/23/2022 75 ml/min/1.73sq m Final     Calcium   Date Value Ref Range Status   02/01/2024 9.1 8.4 - 10.2 mg/dL Final   09/23/2022 8.8 8.4 - 10.2 mg/dL Final   02/23/2022 8.5 8.4 - 10.2 mg/dL Final     AST   Date Value Ref Range Status   02/01/2024 22 13 - 39 U/L Final   02/23/2022 36 14 - 36 U/L Final     Comment:     Specimen collection should occur prior to Sulfasalazine administration due to the potential for falsely depressed results.    04/13/2021 27 14 - 36 U/L Final     Comment:      "Specimen collection should occur prior to Sulfasalazine administration due to the potential for falsely depressed results.      ALT   Date Value Ref Range Status   02/01/2024 12 7 - 52 U/L Final     Comment:     Specimen collection should occur prior to Sulfasalazine administration due to the potential for falsely depressed results.    02/23/2022 22 <35 U/L Final     Comment:     Specimen collection should occur prior to Sulfasalazine administration due to the potential for falsely depressed results.    04/13/2021 14 <35 U/L Final     Comment:     Specimen collection should occur prior to Sulfasalazine administration due to the potential for falsely depressed results.      Alkaline Phosphatase   Date Value Ref Range Status   02/01/2024 126 (H) 34 - 104 U/L Final   02/23/2022 136 (H) 43 - 122 U/L Final   04/13/2021 137 (H) 43 - 122 U/L Final     WBC   Date Value Ref Range Status   02/23/2022 6.70 4.50 - 11.00 Thousand/uL Final   04/13/2021 7.20 4.50 - 11.00 Thousand/uL Final   07/22/2020 7.00 4.50 - 11.00 Thousand/uL Final     Hemoglobin   Date Value Ref Range Status   02/23/2022 12.9 12.0 - 16.0 g/dL Final   04/13/2021 13.0 12.0 - 16.0 g/dL Final   07/22/2020 13.3 12.0 - 16.0 g/dL Final     Platelets   Date Value Ref Range Status   02/23/2022 164 150 - 450 Thousands/uL Final   04/13/2021 178 150 - 450 Thousands/uL Final   07/22/2020 187 150 - 450 Thousands/uL Final     No results found for: \"PT\", \"PTT\", \"INR\"  No results found for: \"CK\", \"CKMB\", \"DIGOXIN\"  No results found for: \"TSH\"  HDL, Direct   Date Value Ref Range Status   02/01/2024 57 >=50 mg/dL Final     Triglycerides   Date Value Ref Range Status   02/01/2024 101 See Comment mg/dL Final     Comment:     Triglyceride:     0-9Y            <75mg/dL     10Y-17Y         <90 mg/dL       >=18Y     Normal          <150 mg/dL     Borderline High 150-199 mg/dL     High            200-499 mg/dL        Very High       >499 mg/dL    Specimen collection should occur " "prior to Metamizole administration due to the potential for falsely depressed results.      No results found for: \"HGBA1C\"  Urine Culture   Date Value Ref Range Status   03/25/2019 No Growth <1000 cfu/mL  Final   03/06/2019 No Growth <1000 cfu/mL  Final   01/13/2016 <10,000 cfu/ml Mixed Contaminants X2  Final       *-*-*-*-*-*-*-*-*-*-*-*-*-*-*-*-*-*-*-*-*-*-*-*-*-*-*-*-*-*-*-*-*-*-*-*-*-*-*-*-*-*-*-*-*-*-*-*-*-*-*-*-*-*-  RADIOLOGY RESULTS:  No results found.    *-*-*-*-*-*-*-*-*-*-*-*-*-*-*-*-*-*-*-*-*-*-*-*-*-*-*-*-*-*-*-*-*-*-*-*-*-*-*-*-*-*-*-*-*-*-*-*-*-*-*-*-*-*-  LAST ECHOCARDIOGRAM AND OTHER CARDIOLOGY RESULTS:  No results found for this or any previous visit.    No results found for this or any previous visit.    No results found for this or any previous visit.    No results found for this or any previous visit.       *-*-*-*-*-*-*-*-*-*-*-*-*-*-*-*-*-*-*-*-*-*-*-*-*-*-*-*-*-*-*-*-*-*-*-*-*-*-*-*-*-*-*-*-*-*-*-*-*-*-*-*-*-*-  SIGNATURES:   @SIG@   Tiffany Jarrell MD     CC:   Jones Harp, Shayy Hayes,*     "

## 2024-06-12 NOTE — ASSESSMENT & PLAN NOTE
New evaluation of right ICA stenosis/occlusions  Noted on MRI neuroquant 4/2024 for memory issues and additional imaging ordered  Denies previous stroke.  No new neurologic complaints  Exam: Nonfocal    Imaging:  CTA head/neck 4/16/24: Diffuse, high-grade right cervical and intracranial ICA stenosis with segmental areas of occlusion. Unknown chronicity. This may be due to dissection. No secondary evidence of significant atherosclerosis. Absent right anterior cerebral artery A1 segment which is commonly developmental. Both anterior cerebral arteries are otherwise patent. Middle cerebral artery supplied by the posterior circulation. Patent right ophthalmic artery origin. No acute intracranial pathology    Plan:  Reviewed imaging with patient and Dr. Willams. She has diffuse high-grade right ICA stenoses and occlusions.  Reviewed that it's unclear how long this has been present, suspect this is chronic. There was no suggestion of new stroke on MRI neuroquant.  No neurosurgical intervention recommended given long segment stenoses/occlusions extending intracranially.  Recommend medical management.  She is already on 81 mg aspirin.  Reviewed red flag signs and symptoms.  Continue to follow with neurology.  Follow-up as needed.  Call any questions or concerns.

## 2024-06-13 ENCOUNTER — CONSULT (OUTPATIENT)
Dept: NEUROSURGERY | Facility: CLINIC | Age: 79
End: 2024-06-13
Payer: COMMERCIAL

## 2024-06-13 VITALS
OXYGEN SATURATION: 96 % | BODY MASS INDEX: 33.33 KG/M2 | SYSTOLIC BLOOD PRESSURE: 128 MMHG | HEIGHT: 55 IN | HEART RATE: 73 BPM | RESPIRATION RATE: 18 BRPM | WEIGHT: 144 LBS | TEMPERATURE: 98.2 F | DIASTOLIC BLOOD PRESSURE: 82 MMHG

## 2024-06-13 DIAGNOSIS — I65.29 INTERNAL CAROTID ARTERY STENOSIS: ICD-10-CM

## 2024-06-13 DIAGNOSIS — I65.21 STENOSIS OF RIGHT INTERNAL CAROTID ARTERY: Primary | ICD-10-CM

## 2024-06-13 PROCEDURE — 99202 OFFICE O/P NEW SF 15 MIN: CPT | Performed by: PHYSICIAN ASSISTANT

## 2024-06-13 NOTE — PROGRESS NOTES
Neurosurgery Office Note  Shai Osborne 78 y.o. female MRN: [id number]      Assessment & Plan     Internal carotid artery stenosis  New evaluation of right ICA stenosis/occlusions  Noted on MRI neuroquant 4/2024 for memory issues and additional imaging ordered  Denies previous stroke.  No new neurologic complaints  Exam: Nonfocal    Imaging:  CTA head/neck 4/16/24: Diffuse, high-grade right cervical and intracranial ICA stenosis with segmental areas of occlusion. Unknown chronicity. This may be due to dissection. No secondary evidence of significant atherosclerosis. Absent right anterior cerebral artery A1 segment which is commonly developmental. Both anterior cerebral arteries are otherwise patent. Middle cerebral artery supplied by the posterior circulation. Patent right ophthalmic artery origin. No acute intracranial pathology    Plan:  Reviewed imaging with patient and Dr. Willams. She has diffuse high-grade right ICA stenoses and occlusions.  Reviewed that it's unclear how long this has been present, suspect this is chronic. There was no suggestion of new stroke on MRI neuroquant.  No neurosurgical intervention recommended given long segment stenoses/occlusions extending intracranially.  Recommend medical management.  She is already on 81 mg aspirin.  Reviewed red flag signs and symptoms.  Continue to follow with neurology.  Follow-up as needed.  Call any questions or concerns.       Diagnoses and all orders for this visit:    Stenosis of right internal carotid artery    Internal carotid artery stenosis  -     Ambulatory referral to Neurosurgery          I have spent a total time of 25 minutes on 06/13/24 in caring for this patient including Diagnostic results, Risks and benefits of tx options, Instructions for management, Patient and family education, Impressions, Counseling / Coordination of care, Documenting in the medical record, Reviewing / ordering tests, medicine, procedures  , Obtaining or reviewing  history  , and Communicating with other healthcare professionals .      CHIEF COMPLAINT    Chief Complaint   Patient presents with    Consult     CONSULT  CAROTID ARTERY STENOSIS  CTA HEAD & NECK 4/16/24   MRI BRAIN NEUROQUANT 4/9/24           HISTORY    History of Present Illness     78 y.o. year old female     78 year old female seen for evaluation of right ICA stenosis and occlusions.  This was incidentally noted on MRI neuro quant ordered for memory issues by neurology April 2024 so she was sent for additional imaging.  Denies previous stroke.  Currently taking 81 mg aspirin. She has occasional episodes of dizziness with intermittent numbness and tingling in both arms and hands.        See Discussion    REVIEW OF SYSTEMS    Review of Systems   Constitutional: Negative.    HENT: Negative.  Negative for tinnitus.         Wearing hearing aids    Eyes: Negative.  Negative for pain and visual disturbance.   Respiratory: Negative.     Cardiovascular: Negative.    Gastrointestinal: Negative.    Endocrine: Negative.    Genitourinary: Negative.    Musculoskeletal: Negative.    Skin: Negative.    Allergic/Immunologic: Negative.    Neurological:  Positive for dizziness (very minor episodes) and numbness (n+t in b/l arms and hands). Negative for speech difficulty, weakness, light-headedness and headaches.   Hematological:  Bruises/bleeds easily (asa81).   Psychiatric/Behavioral: Negative.  Negative for sleep disturbance.        ROS obtained by MA. Reviewed. See HPI.     Meds/Allergies     Current Outpatient Medications   Medication Sig Dispense Refill    Acetaminophen (ARTHRITIS PAIN PO) Take by mouth      aspirin 81 mg chewable tablet Chew 81 mg daily      cholecalciferol (VITAMIN D3) 1,000 units tablet Take 1,000 Units by mouth daily      cyanocobalamin (VITAMIN B-12) 500 mcg tablet Take 1 tablet (500 mcg total) by mouth daily 90 tablet 3    meclizine (ANTIVERT) 12.5 MG tablet Take 1 tablet (12.5 mg total) by mouth  every 12 (twelve) hours as needed for dizziness 10 tablet 0    Multiple Vitamins-Minerals (CENTRUM ADULTS PO) Take by mouth      omeprazole (PriLOSEC) 20 mg delayed release capsule TAKE 1 CAPSULE BY MOUTH EVERY DAY 90 capsule 1    lidocaine (Lidoderm) 5 % Apply 1 patch topically daily Remove & Discard patch within 12 hours or as directed by MD (Patient not taking: Reported on 6/13/2024) 5 patch 0     No current facility-administered medications for this visit.       Allergies   Allergen Reactions    Diclofenac Sodium      Other reaction(s): GI Upset    Meperidine     Tramadol      Other reaction(s): GI Upset       PAST HISTORY    Past Medical History:   Diagnosis Date    Arthritis     Chronic pain disorder     sciatic    GERD (gastroesophageal reflux disease)     Hydronephrosis with renal and ureteral calculus obstruction 12/23/2015    Kidney stone     Kidney stone     Nephrolithiasis     Osteoarthritis     Osteoporosis     Sinus arrhythmia        Past Surgical History:   Procedure Laterality Date    CHOLECYSTECTOMY      FL RETROGRADE PYELOGRAM  3/7/2019    KIDNEY STONE SURGERY      KNEE ARTHROSCOPY Left     DE COLONOSCOPY FLX DX W/COLLJ SPEC WHEN PFRMD N/A 11/2/2017    Procedure: COLONOSCOPY with polypectomy x2;  Surgeon: Wild Stanley MD;  Location: [facility];  Service: Gastroenterology    DE CYSTO BLADDER W/URETERAL CATHETERIZATION Left 3/7/2019    Procedure: CYSTOSCOPY, URETEROSCOPY WITH LASER, BASKET STONE EXTRACTION, RETROGRADE PYELOGRAM WITH INSERTION STENT URETERAL;  Surgeon: Pedro Mcarthur MD;  Location: [facility];  Service: Urology    TUBAL LIGATION         Social History     Tobacco Use    Smoking status: Never     Passive exposure: Never    Smokeless tobacco: Never   Vaping Use    Vaping status: Never Used   Substance Use Topics    Alcohol use: Not Currently    Drug use: No       Family History   Problem Relation Age of Onset    No Known Problems Mother     Mental illness Father     Prostate cancer  "Father     Colon cancer Sister     No Known Problems Sister     No Known Problems Sister     No Known Problems Sister     Ovarian cancer Daughter     Mental illness Daughter     No Known Problems Maternal Grandmother     No Known Problems Maternal Grandfather     No Known Problems Paternal Grandmother     No Known Problems Paternal Grandfather          Above history personally reviewed.       EXAM    Vitals:Blood pressure 128/82, pulse 73, temperature 98.2 °F (36.8 °C), temperature source Temporal, resp. rate 18, height 4' 4\" (1.321 m), weight 65.3 kg (144 lb), SpO2 96%, not currently breastfeeding.,Body mass index is 37.44 kg/m².     Physical Exam  Vitals reviewed.   Constitutional:       General: She is awake.      Appearance: Normal appearance.   HENT:      Head: Normocephalic and atraumatic.   Eyes:      Extraocular Movements: EOM normal.      Conjunctiva/sclera: Conjunctivae normal.      Pupils: Pupils are equal, round, and reactive to light.   Cardiovascular:      Rate and Rhythm: Normal rate.   Pulmonary:      Effort: Pulmonary effort is normal.   Skin:     General: Skin is warm and dry.   Neurological:      Mental Status: She is alert and oriented to person, place, and time.      Motor: Motor strength is normal.     Coordination: Finger-Nose-Finger Test normal.      Deep Tendon Reflexes:      Reflex Scores:       Bicep reflexes are 2+ on the right side and 2+ on the left side.       Brachioradialis reflexes are 2+ on the right side and 2+ on the left side.       Patellar reflexes are 1+ on the right side and 1+ on the left side.  Psychiatric:         Attention and Perception: Attention and perception normal.         Mood and Affect: Mood and affect normal.         Speech: Speech normal.         Behavior: Behavior normal. Behavior is cooperative.         Thought Content: Thought content normal.         Cognition and Memory: Cognition and memory normal.         Judgment: Judgment normal.         Neurologic " Exam     Mental Status   Oriented to person, place, and time.   Oriented to year and month.   Follows 2 step commands.   Attention: normal. Concentration: normal.   Speech: speech is normal   Level of consciousness: alert  Knowledge: good. Able to perform simple calculations.   Able to name object. Normal comprehension.     Cranial Nerves     CN III, IV, VI   Pupils are equal, round, and reactive to light.  Extraocular motions are normal.   Right pupil: Shape: regular. Reactivity: brisk. Consensual response: intact.   Left pupil: Shape: regular. Reactivity: brisk. Consensual response: intact.   CN III: no CN III palsy  CN VI: no CN VI palsy  Nystagmus: none   Ophthalmoparesis: none  Upgaze: normal  Downgaze: normal  Conjugate gaze: present    CN V   Facial sensation intact.     CN VII   Facial expression full, symmetric.     CN VIII   Hearing: intact    CN XI   Right trapezius strength: normal  Left trapezius strength: normal    CN XII   CN XII normal.     Motor Exam   Muscle bulk: normal  Overall muscle tone: normal  Right arm pronator drift: absent  Left arm pronator drift: absent    Strength   Strength 5/5 throughout.     Sensory Exam   Light touch normal.     Gait, Coordination, and Reflexes     Coordination   Finger to nose coordination: normal    Tremor   Resting tremor: absent  Intention tremor: absent  Action tremor: absent    Reflexes   Right brachioradialis: 2+  Left brachioradialis: 2+  Right biceps: 2+  Left biceps: 2+  Right patellar: 1+  Left patellar: 1+  Right Olson: absent  Left Olson: absent  Antalgic gait         MEDICAL DECISION MAKING    Imaging Studies:     No results found.    I have personally reviewed pertinent reports.   and I have personally reviewed pertinent films in PACS

## 2024-06-18 ENCOUNTER — OFFICE VISIT (OUTPATIENT)
Dept: NEUROLOGY | Facility: CLINIC | Age: 79
End: 2024-06-18
Payer: COMMERCIAL

## 2024-06-18 VITALS
TEMPERATURE: 98.2 F | WEIGHT: 144.7 LBS | HEART RATE: 73 BPM | SYSTOLIC BLOOD PRESSURE: 126 MMHG | OXYGEN SATURATION: 96 % | BODY MASS INDEX: 33.48 KG/M2 | DIASTOLIC BLOOD PRESSURE: 76 MMHG | HEIGHT: 55 IN

## 2024-06-18 DIAGNOSIS — I65.21 STENOSIS OF RIGHT INTERNAL CAROTID ARTERY: ICD-10-CM

## 2024-06-18 DIAGNOSIS — I49.9 IRREGULARLY IRREGULAR HEART RHYTHM: Primary | ICD-10-CM

## 2024-06-18 PROCEDURE — G2211 COMPLEX E/M VISIT ADD ON: HCPCS | Performed by: PSYCHIATRY & NEUROLOGY

## 2024-06-18 PROCEDURE — 99214 OFFICE O/P EST MOD 30 MIN: CPT | Performed by: PSYCHIATRY & NEUROLOGY

## 2024-07-02 ENCOUNTER — CLINICAL SUPPORT (OUTPATIENT)
Dept: CARDIOLOGY CLINIC | Facility: CLINIC | Age: 79
End: 2024-07-02
Payer: COMMERCIAL

## 2024-07-02 DIAGNOSIS — I49.9 IRREGULARLY IRREGULAR HEART RHYTHM: ICD-10-CM

## 2024-07-02 PROCEDURE — 93244 EXT ECG>48HR<7D REV&INTERPJ: CPT | Performed by: INTERNAL MEDICINE

## 2024-07-05 NOTE — ASSESSMENT & PLAN NOTE
This was noted on cardiac auscultation.  Patient reports palpitations.  Echocardiogram with a normal left atrium and an ejection fraction of 65%.  Patient saw cardiology on 6/6/2024 and ordered a Zio patch x 7 days    Plan:  Follow-up to discuss Zio patch results  Today was the last day of monitoring and patient asked if I could assist her in taking the patch off  I helped the patient remove the patch without any complications.  Patient was going to return the patches same day

## 2024-07-05 NOTE — PROGRESS NOTES
Patient ID: Shai Osborne is a 78 y.o. female.    Assessment/Plan:    Internal carotid artery stenosis  MRI brain neuro quant was ordered to evaluate patient's cognition.Incidental note is made of age-indeterminate loss of the normal flow-void within the right internal carotid artery which may be on the basis of high-grade stenosis or occlusion. CT angiogram of the head and neck is recommended for further evaluation.    CTA head and neck with and without contrast (4/16/2024): Diffuse, high-grade right cervical and intracranial ICA stenosis with segmental areas of occlusion. Unknown chronicity. This may be due to dissection. No secondary evidence of significant atherosclerosis. Absent right anterior cerebral artery A1 segment which is commonly developmental. Both anterior cerebral arteries are otherwise patent. Middle cerebral artery supplied by the posterior circulation. Patent right ophthalmic artery origin. No acute intracranial pathology.    During last visit: Patient reported getting transient episodes of dizziness.  These are made worse with aspirin use.  Patient not on statin medication.    Today she reports that she still not on a statin medication because she gets tingling of bilateral upper extremities.Saw neurosurgery on 6/13/24: Reviewed that it's unclear how long this has been present, suspect this is chronic. There was no suggestion of new stroke on MRI neuroquant. No neurosurgical intervention recommended given long segment stenoses/occlusions extending intracranially.    Impression: At this time findings likely chronic and patient has developed collaterals.  Need to keep vascular risk factors under control.    Plan:  Ideally, patient should be taking aspirin 81 mg  If aspirin is not tolerable will need to consider another antiplatelet agent  No intervention per neurosurgery   Last LDL 89, recommend keeping it less than 70.  Patient should discuss starting atorvastatin with PCP  Ideal goal blood  pressure would be<130/80 however given that she has right ICA stenosis she is likely perfusion dependent, therefore goal should be where she remains asymptomatic but I would not recommend SBP higher than 150  Fasting CMP 87, with an HbA1c    Irregularly irregular heart rhythm  This was noted on cardiac auscultation.  Patient reports palpitations.  Echocardiogram with a normal left atrium and an ejection fraction of 65%.  Patient saw cardiology on 6/6/2024 and ordered a Zio patch x 7 days    Plan:  Follow-up to discuss Zio patch results  Today was the last day of monitoring and patient asked if I could assist her in taking the patch off  I helped the patient remove the patch without any complications.  Patient was going to return the patches same day       Diagnoses and all orders for this visit:    Irregularly irregular heart rhythm    Stenosis of right internal carotid artery       Subjective:    Quinton Osborne is a very pleasant 78 y.o. 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  I last saw her on 4/17/2024  Initial visit (3/19/2024):    Patient reports that over the last year she started noticing a gradual decline in her cognition.  She was mostly reports lapses of memory that are brief causing confusion, disorientation and forgetfulness.  This has resulted in certain situations which could be noted as dangerous such as forgetting to lock the door, leaving the water running in the sink resulting in the kitchen flooding or briefly not knowing which direction she has to drive to in a familiar place.  For the most part patient has good insight into her condition.  She reports that at baseline she has an inpatient personality and can get easily  stressed.  Patient also reports frequent nighttime awakenings due to pain from her arthritis.  She endorses that she snores and does not wake up feeling well rested.  Otherwise dementia review of systems is negative.  Patient is independent in all activities of daily living.  Campbell today 22/30 with deficits mostly in visuospatial/executive function and delayed recall.     At this time, I believe that patient has a mild cognitive impairment versus pseudodementia secondary to her tendency to easily get stressed and potential underlying obstructive sleep apnea causing unrestful sleep.  I had a long conversation today with her about how when we tried to control everything we can get overwhelmed and this can impact our memory.  Untreated obstructive sleep apnea can also cause problems with memory and restless sleep cannot allow time for consolidation of memory.    Orders placed included an MRI neuro quant without contrast, sleep medicine evaluation, referral to audiology.  I recommended she use hearing amplifiers.  I reviewed B12 levels which were supratherapeutic and advised her to decrease her supplementation to 500 mcg.  Encouraged patient to practice mindfulness and to be aware of potential danger situations.    Prior visits:    4/15/2024: Received urgent TT with abnormal MRI findings. Called patient. She denies any acute sx. Order placed for STAT CTA. Advised patient to call central scheduling and get imaging done no later than tomorrow. Gave number to central scheduling.    4/17/2024:   Internal carotid stenosis: I reviewed findings of internal carotid stenosis with patient.Patient reports getting transient episodes of dizziness.  These are made worse with aspirin use.  Recent LDL 89.  Patient not on statin medication.  Echocardiogram ordered.  Patient referred to cardiology.  I placed a stat referral to neurosurgery to evaluate ICA stenosis.    MCI: At this time, I believe that patient has a mild cognitive  impairment versus pseudodementia secondary to her tendency to easily get stressed and potential underlying obstructive sleep apnea causing unrestful sleep.       Irregularly irregular heart rhythm: I noted on cardiac auscultation.  Patient reports palpitations.  I ordered an echocardiogram and referred to cardiology for long-term cardiac monitoring    Workup:    MRI brain neuro quant without contrast (4/9/2024):No mass effect, acute intracranial hemorrhage or evidence of recent infarction. Moderate chronic microvascular ischemic change. NeuroQuant analysis was performed: Normal study; Does not support neurodegeneration.     Incidental note is made of age-indeterminate loss of the normal flow-void within the right internal carotid artery which may be on the basis of high-grade stenosis or occlusion. CT angiogram of the head and neck is recommended for further evaluation.        CTA head and neck with and without contrast (4/16/2024): Diffuse, high-grade right cervical and intracranial ICA stenosis with segmental areas of occlusion. Unknown chronicity. This may be due to dissection. No secondary evidence of significant atherosclerosis. Absent right anterior cerebral artery A1 segment which is commonly developmental. Both anterior cerebral arteries are otherwise patent. Middle cerebral artery supplied by the posterior circulation. Patent right ophthalmic artery origin. No acute intracranial pathology.     LDL (2/1/2024): 89     TSH: (2/1/24): 5.237, normal T4    Echocardiogram: Left atrium normal in size.  Ejection fraction of 65%.      Interval history:  Saw cardiology on 6/6/24: zio patch X 7 days  Saw neurosurgery on 6/13/24: Reviewed that it's unclear how long this has been present, suspect this is chronic. There was no suggestion of new stroke on MRI neuroquant. No neurosurgical intervention recommended given long segment stenoses/occlusions extending intracranially.  Not taking aspirin because she get tingling or  "bilateral upper         The following portions of the patient's history were reviewed and updated as appropriate: allergies, current medications, past family history, past medical history, past social history, past surgical history, and problem list and review of systems.         Objective:    Blood pressure 126/76, pulse 73, temperature 98.2 °F (36.8 °C), temperature source Temporal, height 4' 6\" (1.372 m), weight 65.6 kg (144 lb 11.2 oz), SpO2 96%, not currently breastfeeding.    Physical Exam    Neurological Exam      ROS:    Review of Systems    Review of Systems   Constitutional:  Negative for appetite change, fatigue and fever.   HENT: Negative.  Negative for hearing loss, tinnitus, trouble swallowing and voice change.    Eyes: Negative.  Negative for photophobia, pain and visual disturbance.   Respiratory: Negative.  Negative for shortness of breath.    Cardiovascular: Negative.  Negative for palpitations.   Gastrointestinal: Negative.  Negative for nausea and vomiting.   Endocrine: Negative.  Negative for cold intolerance.   Genitourinary: Negative.  Negative for dysuria, frequency and urgency.   Musculoskeletal:  Negative for back pain, gait problem, myalgias, neck pain and neck stiffness.   Skin: Negative.  Negative for rash.   Allergic/Immunologic: Negative.    Neurological: Negative.  Negative for dizziness, tremors, seizures, syncope, facial asymmetry, speech difficulty, weakness, light-headedness, numbness and headaches.   Hematological: Negative.  Does not bruise/bleed easily.   Psychiatric/Behavioral: Negative.  Negative for confusion, hallucinations and sleep disturbance.    All other systems reviewed and are negative.            "

## 2024-07-05 NOTE — ASSESSMENT & PLAN NOTE
MRI brain neuro quant was ordered to evaluate patient's cognition.Incidental note is made of age-indeterminate loss of the normal flow-void within the right internal carotid artery which may be on the basis of high-grade stenosis or occlusion. CT angiogram of the head and neck is recommended for further evaluation.    CTA head and neck with and without contrast (4/16/2024): Diffuse, high-grade right cervical and intracranial ICA stenosis with segmental areas of occlusion. Unknown chronicity. This may be due to dissection. No secondary evidence of significant atherosclerosis. Absent right anterior cerebral artery A1 segment which is commonly developmental. Both anterior cerebral arteries are otherwise patent. Middle cerebral artery supplied by the posterior circulation. Patent right ophthalmic artery origin. No acute intracranial pathology.    During last visit: Patient reported getting transient episodes of dizziness.  These are made worse with aspirin use.  Patient not on statin medication.    Today she reports that she still not on a statin medication because she gets tingling of bilateral upper extremities.Saw neurosurgery on 6/13/24: Reviewed that it's unclear how long this has been present, suspect this is chronic. There was no suggestion of new stroke on MRI neuroquant. No neurosurgical intervention recommended given long segment stenoses/occlusions extending intracranially.    Impression: At this time findings likely chronic and patient has developed collaterals.  Need to keep vascular risk factors under control.    Plan:  Ideally, patient should be taking aspirin 81 mg  If aspirin is not tolerable will need to consider another antiplatelet agent  No intervention per neurosurgery   Last LDL 89, recommend keeping it less than 70.  Patient should discuss starting atorvastatin with PCP  Ideal goal blood pressure would be<130/80 however given that she has right ICA stenosis she is likely perfusion dependent,  therefore goal should be where she remains asymptomatic but I would not recommend SBP higher than 150  Fasting CMP 87, with an HbA1c

## 2024-07-17 ENCOUNTER — OFFICE VISIT (OUTPATIENT)
Dept: NEUROLOGY | Facility: CLINIC | Age: 79
End: 2024-07-17
Payer: COMMERCIAL

## 2024-07-17 VITALS
DIASTOLIC BLOOD PRESSURE: 82 MMHG | HEART RATE: 60 BPM | OXYGEN SATURATION: 98 % | SYSTOLIC BLOOD PRESSURE: 136 MMHG | HEIGHT: 55 IN | WEIGHT: 146 LBS | BODY MASS INDEX: 33.79 KG/M2

## 2024-07-17 DIAGNOSIS — G31.84 MILD COGNITIVE IMPAIRMENT: ICD-10-CM

## 2024-07-17 DIAGNOSIS — I65.29 INTERNAL CAROTID ARTERY STENOSIS: Primary | ICD-10-CM

## 2024-07-17 DIAGNOSIS — I49.9 IRREGULARLY IRREGULAR HEART RHYTHM: ICD-10-CM

## 2024-07-17 PROCEDURE — 99214 OFFICE O/P EST MOD 30 MIN: CPT | Performed by: PSYCHIATRY & NEUROLOGY

## 2024-07-17 PROCEDURE — G2211 COMPLEX E/M VISIT ADD ON: HCPCS | Performed by: PSYCHIATRY & NEUROLOGY

## 2024-07-17 NOTE — PATIENT INSTRUCTIONS
Los cuatro acuerdos de Driss Lizamauel Javier  Amadou seguimiento a empezar atorvastatina 40 mg para el colesterol dameon  --------------------  Dieta    Barbara dieta baja en carbohidratos generalmente implica reducir la ingesta de alimentos ricos en carbohidratos, bao pan, pasta, arroz y bocadillos azucarados. En cambio, enfatiza los alimentos ricos en proteínas, grasas saludables y verduras sin almidón. Aquí hay un esquema básico de lo que podría incluir barbara dieta baja en carbohidratos:    1. Proteína: Incluya harrison bao aves, pescado, huevos, tofu y erazo magros de carne. Estos proporcionan aminoácidos esenciales para la reparación muscular y la función general del cuerpo. Prefiera las gissel santiago a las cook,ya que las gissel rojaspueden promover la inflamación     1. Grasas saludables: Incorpore harrison bao aguacates, nueces, semillas, aceite de eaton y pescados grasos (bao salmón y caballa). Estas grasas son cruciales para la jesus del cerebro y la regulación hormonal.    2. Verduras sin almidón: Consuma verduras de hoja lisa, brócoli, coliflor, calabacín, pimientos morrones y otras verduras bajas en carbohidratos. Son ricos en fibra, vitaminas y minerales, aura bajos en carbohidratos.    3. Frutas limitadas: Si niko las frutas son generalmente saludables, algunas tienen un alto contenido de carbohidratos. Opta porbayas (bao fresas, arándanos y frambuesas) con moderación, ya que son más bajas en azúcar en comparación con otras frutas.    4. Lácteos: Elija productos lácteos enteros bao queso, yogur y crema. Sin embargo, es posible que algunas personas deban limitar los productos lácteos si tienen intolerancia a la lactosa o sensibilidades a los lácteos.    5. Prakash secos y semillas: Son excelentes harrison de grasas saludables, proteínas y fibra. Solo tenga en cuenta el tamaño de las porciones, ya que pueden ser densas en calorías.    6. Cereales integrales (opcional): Si incluye cereales, opte por cereales  integrales bao la quinua, la cebada y la giovanni en pequeñas porciones. Sin embargo, algunas personas pueden preferir eliminar los granos por completo para un enfoque más estricto bajo en carbohidratos.    7. Evite los alimentos azucarados y los carbohidratos con almidón: [place] incluye bebidas azucaradas, dulces, pasteles, pan cates, pasta, arroz y aaron.

## 2024-07-17 NOTE — PROGRESS NOTES
Ambulatory Visit  Name: Shai sOborne      : 1945      MRN: [id number]  Encounter Provider: Shayy Carlton MD  Encounter Date: 2024   Encounter department: [facility]     Quinton Osborne is a very pleasant 78 y.o. 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  I last saw her on 2024.     Assessment & Plan   1. Internal carotid artery stenosis  Assessment & Plan:  MRI brain neuro quant was ordered to evaluate patient's cognition. Incidental note is made of age-indeterminate loss of the normal flow-void within the right ICA. CTA showed a diffuse, high-grade right cervical and intracranial ICA stenosis with segmental areas of occlusion. Unknown chronicity. Saw neurosurgery on 24 who agreed it is unclear how long this has been present, suspect this is chronic. There was no suggestion of new stroke on MRI. No neurosurgical intervention recommended given long segment stenoses/occlusions extending intracranially. In previous visits patient reported getting transient episodes of dizziness.  These were made worse with aspirin use.  Patient not on statin medication because she gets tingling of bilateral upper extremities.    Today patient reports that she has been tolerating aspirin without a problem.    Impression: At this time findings likely chronic and patient has developed collaterals.  Need to keep vascular risk factors under control.    Plan:  Continue aspirin 81 mg  No intervention per neurosurgery   Last LDL 89, recommend keeping it less than 70.  Patient was advised to follow-up with PCP about starting atorvastatin  Ideal goal blood pressure would be<130/80 however given that she has  right ICA stenosis she is likely perfusion dependent, therefore goal should be where she remains asymptomatic but I would not recommend SBP higher than 150  2. Mild cognitive impairment  Assessment & Plan:  Patient reports that over the year 2023 she started noticing a gradual decline in her cognition.  She mostly reports lapses of memory that are brief causing confusion, disorientation and forgetfulness.  This has resulted in certain situations which could be noted as dangerous such as forgetting to lock the door, leaving the water running in the sink resulting in the kitchen flooding or briefly not knowing which direction she has to drive to in a familiar place.  For the most part patient has good insight into her condition.  She reports that at baseline she has an impatient personality and can get easily stressed.  Patient also reports frequent nighttime awakenings due to pain from her arthritis.  She endorses that she snores and does not wake up feeling well rested.  Otherwise dementia review of systems is negative.  Patient is independent in all activities of daily living.  [place] on initial visit 22/30 with deficits mostly in visuospatial/executive function and delayed recall.  MRI neuro quant ordered which showed normal hippocampal volume.     At this time, I believe that patient has a mild cognitive impairment versus pseudodementia secondary to her tendency to easily get stressed and potential underlying obstructive sleep apnea causing unrestful sleep.      Plan:  Patient should avoid stress   Patient to follow-up with sleep medicine for evaluation of TRISTIAN as untreated TRISTIAN can also cause problems with memory and restless sleep cannot allow time for consolidation of memory.  3. Irregularly irregular heart rhythm  Assessment & Plan:  This was noted on cardiac auscultation.  Patient reports palpitations.  Echocardiogram with a normal left atrium and an ejection fraction of 65%.  Patient saw cardiology on 6/6/2024  and ordered a Zio patch x 7 days.     Ziopatch: There were no sustained arrhythmias and no symptoms were reported during the course of monitoring.     Plan:  Follow-up with PCP and cardiology      History of Present Illness     Subjective:     Quinton Osborne is a very pleasant 78 y.o. 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  I last saw her on 6/18/2024.       Initial visit (3/19/2024):     Patient reports that over the last year she started noticing a gradual decline in her cognition.  She was mostly reports lapses of memory that are brief causing confusion, disorientation and forgetfulness.  This has resulted in certain situations which could be noted as dangerous such as forgetting to lock the door, leaving the water running in the sink resulting in the kitchen flooding or briefly not knowing which direction she has to drive to in a familiar place.  For the most part patient has good insight into her condition.  She reports that at baseline she has an inpatient personality and can get easily stressed.  Patient also reports frequent nighttime awakenings due to pain from her arthritis.  She endorses that she snores and does not wake up feeling well rested.  Otherwise dementia review of systems is negative.  Patient is independent in all activities of daily living.  [place] today 22/30 with deficits mostly in visuospatial/executive function and delayed recall.     At this time, I believe that patient has a mild cognitive impairment versus pseudodementia secondary to her tendency to easily get stressed and potential underlying obstructive sleep apnea causing unrestful sleep.  I had a long conversation today with her about how when we tried to  control everything we can get overwhelmed and this can impact our memory.  Untreated obstructive sleep apnea can also cause problems with memory and restless sleep cannot allow time for consolidation of memory.     Orders placed included an MRI neuro quant without contrast, sleep medicine evaluation, referral to audiology.  I recommended she use hearing amplifiers.  I reviewed B12 levels which were supratherapeutic and advised her to decrease her supplementation to 500 mcg.  Encouraged patient to practice mindfulness and to be aware of potential danger situations.     Prior visits:     4/15/2024: Received urgent TT with abnormal MRI findings. Called patient. She denies any acute sx. Order placed for STAT CTA. Advised patient to call central scheduling and get imaging done no later than tomorrow. Gave number to central scheduling.     4/17/2024: Reported transient episodes of dizziness made worse with aspirin use.    Internal carotid stenosis: I reviewed findings of internal carotid stenosis with patient.Patient reports getting transient episodes of dizziness.  These are made worse with aspirin use.  Recent LDL 89.  Patient not on statin medication.  Echocardiogram ordered.  Patient referred to cardiology.  I placed a stat referral to neurosurgery to evaluate ICA stenosis.     MCI: At this time, I believe that patient has a mild cognitive impairment versus pseudodementia secondary to her tendency to easily get stressed and potential underlying obstructive sleep apnea causing unrestful sleep.        Irregularly irregular heart rhythm: I noted on cardiac auscultation.  Patient reports palpitations.  I ordered an echocardiogram and referred to cardiology for long-term cardiac monitoring    6/18/24: Today she reports that she still not on a statin medication because she gets tingling of bilateral upper extremities.Saw neurosurgery on 6/13/24: Reviewed that it's unclear how long this has been present, suspect this is  chronic. There was no suggestion of new stroke on MRI neuroquant. No neurosurgical intervention recommended given long segment stenoses/occlusions extending intracranially.      Workup:     MRI brain neuro quant without contrast (4/9/2024):No mass effect, acute intracranial hemorrhage or evidence of recent infarction. Moderate chronic microvascular ischemic change. NeuroQuant analysis was performed: Normal study; Does not support neurodegeneration.     Incidental note is made of age-indeterminate loss of the normal flow-void within the right internal carotid artery which may be on the basis of high-grade stenosis or occlusion. CT angiogram of the head and neck is recommended for further evaluation.        CTA head and neck with and without contrast (4/16/2024): Diffuse, high-grade right cervical and intracranial ICA stenosis with segmental areas of occlusion. Unknown chronicity. This may be due to dissection. No secondary evidence of significant atherosclerosis. Absent right anterior cerebral artery A1 segment which is commonly developmental. Both anterior cerebral arteries are otherwise patent. Middle cerebral artery supplied by the posterior circulation. Patent right ophthalmic artery origin. No acute intracranial pathology.     LDL (2/1/2024): 89     TSH: (2/1/24): 5.237, normal T4     Echocardiogram: Left atrium normal in size.  Ejection fraction of 65%.        Interval history:  Zio patch, final report not available but no afib in pre fernandez   She is doing word searches   Taking aspirin as bedtime, tolerating well   Used to leave water running, now focusing more     Review of Systems   Constitutional:  Negative for appetite change, fatigue and fever.   HENT: Negative.  Negative for hearing loss, tinnitus, trouble swallowing and voice change.    Eyes: Negative.  Negative for photophobia, pain and visual disturbance.   Respiratory: Negative.  Negative for shortness of breath.    Cardiovascular: Negative.  Negative  "for palpitations.   Gastrointestinal: Negative.  Negative for nausea and vomiting.   Endocrine: Negative.  Negative for cold intolerance.   Genitourinary: Negative.  Negative for dysuria, frequency and urgency.   Musculoskeletal:  Negative for back pain, gait problem, myalgias, neck pain and neck stiffness.   Skin: Negative.  Negative for rash.   Allergic/Immunologic: Negative.    Neurological: Negative.  Negative for dizziness, tremors, seizures, syncope, facial asymmetry, speech difficulty, weakness, light-headedness, numbness and headaches.   Hematological: Negative.  Does not bruise/bleed easily.   Psychiatric/Behavioral: Negative.  Negative for confusion, hallucinations and sleep disturbance.      Medical History Reviewed by provider this encounter:  Problems       Objective     /82 (BP Location: Left arm, Patient Position: Sitting, Cuff Size: Large)   Pulse 60   Ht 4' 6\" (1.372 m)   Wt 66.2 kg (146 lb)   LMP  (LMP Unknown)   SpO2 98%   BMI 35.20 kg/m²     Physical Exam  Administrative Statements          "

## 2024-07-29 ENCOUNTER — APPOINTMENT (OUTPATIENT)
Dept: LAB | Facility: CLINIC | Age: 79
End: 2024-07-29
Payer: COMMERCIAL

## 2024-07-29 DIAGNOSIS — I65.21 STENOSIS OF RIGHT INTERNAL CAROTID ARTERY: ICD-10-CM

## 2024-07-29 DIAGNOSIS — R79.89 ELEVATED TSH: ICD-10-CM

## 2024-07-29 DIAGNOSIS — N20.0 CALCULUS OF KIDNEY: ICD-10-CM

## 2024-07-29 DIAGNOSIS — N20.0 CALCULUS OF KIDNEY: Primary | ICD-10-CM

## 2024-07-29 LAB
ALBUMIN SERPL BCG-MCNC: 3.8 G/DL (ref 3.5–5)
ALP SERPL-CCNC: 125 U/L (ref 34–104)
ALT SERPL W P-5'-P-CCNC: 13 U/L (ref 7–52)
ANION GAP SERPL CALCULATED.3IONS-SCNC: 9 MMOL/L (ref 4–13)
AST SERPL W P-5'-P-CCNC: 24 U/L (ref 13–39)
BILIRUB SERPL-MCNC: 0.76 MG/DL (ref 0.2–1)
BUN SERPL-MCNC: 12 MG/DL (ref 5–25)
CALCIUM SERPL-MCNC: 9 MG/DL (ref 8.4–10.2)
CHLORIDE SERPL-SCNC: 103 MMOL/L (ref 96–108)
CHOLEST SERPL-MCNC: 163 MG/DL
CO2 SERPL-SCNC: 28 MMOL/L (ref 21–32)
CREAT SERPL-MCNC: 0.73 MG/DL (ref 0.6–1.3)
GFR SERPL CREATININE-BSD FRML MDRD: 78 ML/MIN/1.73SQ M
GLUCOSE P FAST SERPL-MCNC: 78 MG/DL (ref 65–99)
HDLC SERPL-MCNC: 54 MG/DL
LDLC SERPL CALC-MCNC: 85 MG/DL (ref 0–100)
NONHDLC SERPL-MCNC: 109 MG/DL
POTASSIUM SERPL-SCNC: 4.1 MMOL/L (ref 3.5–5.3)
PROT SERPL-MCNC: 6.7 G/DL (ref 6.4–8.4)
SODIUM SERPL-SCNC: 140 MMOL/L (ref 135–147)
T4 FREE SERPL-MCNC: 1.07 NG/DL (ref 0.61–1.12)
TRIGL SERPL-MCNC: 121 MG/DL
TSH SERPL DL<=0.05 MIU/L-ACNC: 2.79 UIU/ML (ref 0.45–4.5)

## 2024-07-29 PROCEDURE — 36415 COLL VENOUS BLD VENIPUNCTURE: CPT

## 2024-07-29 PROCEDURE — 80061 LIPID PANEL: CPT

## 2024-07-29 PROCEDURE — 80053 COMPREHEN METABOLIC PANEL: CPT

## 2024-07-29 PROCEDURE — 84443 ASSAY THYROID STIM HORMONE: CPT

## 2024-07-29 PROCEDURE — 84439 ASSAY OF FREE THYROXINE: CPT

## 2024-08-04 ENCOUNTER — DOCUMENTATION (OUTPATIENT)
Dept: CARDIOLOGY CLINIC | Facility: CLINIC | Age: 79
End: 2024-08-04

## 2024-08-27 ENCOUNTER — TELEPHONE (OUTPATIENT)
Dept: FAMILY MEDICINE CLINIC | Facility: CLINIC | Age: 79
End: 2024-08-27

## 2024-08-27 NOTE — TELEPHONE ENCOUNTER
IEB FORM RECEIVED ON 8/27/2024  GIVEN TO BREANN ESPINOSA TO BE COMPLETED, SIGNED BY MD/ (INCLUDE LISC. NUMBER), AND FAXED BACK IN 3 DAYS

## 2024-08-30 NOTE — TELEPHONE ENCOUNTER
FORM COMPLETED, SIGNED BY MD/ (INCLUDE LISC. NUMBER) FAXED ON 08/30/24 TO SIMEON at [phone]. FAX CONFIRMATION RECEIVED. FORM GIVEN TO CONNIE TO SCAN

## 2024-09-04 ENCOUNTER — OFFICE VISIT (OUTPATIENT)
Dept: FAMILY MEDICINE CLINIC | Facility: CLINIC | Age: 79
End: 2024-09-04

## 2024-09-04 VITALS
OXYGEN SATURATION: 97 % | BODY MASS INDEX: 33.33 KG/M2 | DIASTOLIC BLOOD PRESSURE: 84 MMHG | HEIGHT: 55 IN | RESPIRATION RATE: 18 BRPM | SYSTOLIC BLOOD PRESSURE: 128 MMHG | WEIGHT: 144 LBS | HEART RATE: 74 BPM | TEMPERATURE: 98.4 F

## 2024-09-04 DIAGNOSIS — I65.21 STENOSIS OF RIGHT INTERNAL CAROTID ARTERY: ICD-10-CM

## 2024-09-04 DIAGNOSIS — M47.812 CERVICAL SPONDYLOSIS: ICD-10-CM

## 2024-09-04 DIAGNOSIS — R79.89 ELEVATED TSH: ICD-10-CM

## 2024-09-04 DIAGNOSIS — M51.34 DEGENERATIVE DISC DISEASE, THORACIC: Primary | ICD-10-CM

## 2024-09-04 DIAGNOSIS — G31.84 MILD COGNITIVE IMPAIRMENT: ICD-10-CM

## 2024-09-04 DIAGNOSIS — I49.8 SINUS ARRHYTHMIA: ICD-10-CM

## 2024-09-04 DIAGNOSIS — E78.2 HYPERLIPIDEMIA, MIXED: ICD-10-CM

## 2024-09-04 PROCEDURE — 99214 OFFICE O/P EST MOD 30 MIN: CPT | Performed by: FAMILY MEDICINE

## 2024-09-04 PROCEDURE — G2211 COMPLEX E/M VISIT ADD ON: HCPCS | Performed by: FAMILY MEDICINE

## 2024-09-04 RX ORDER — IBUPROFEN 800 MG/1
800 TABLET, FILM COATED ORAL DAILY PRN
Qty: 30 TABLET | Refills: 1 | Status: SHIPPED | OUTPATIENT
Start: 2024-09-04

## 2024-09-04 NOTE — PROGRESS NOTES
Ambulatory Visit  Name: Shai Osborne      : 1945      MRN: [id number]  Encounter Provider: Jones Harp DO  Encounter Date: 2024   Encounter department: [facility]    Assessment & Plan   1. Degenerative disc disease, thoracic  -     ibuprofen (MOTRIN) 800 mg tablet; Take 1 tablet (800 mg total) by mouth daily as needed for mild pain  2. Stenosis of right internal carotid artery  Assessment & Plan:  Saw neurosurgery In 2024 who suspected chronicity - no surgical intervention recommended  Not on statin since it caused tingling of arms - does not want to restart  LDL goal less than 70  SBP goal less than 150, ideally BP goal <130/80  Continue ASA 81 mg daily  F/U Neurology on 25  3. Mild cognitive impairment  Assessment & Plan:  Saw neurosurgeon in 2024 - mild cognitive impairment versus pseudodementia secondary to her tendency to easily get stressed and potential underlying obstructive sleep apnea causing unrestful sleep  Had recommended she see Sleep Medicine - referral had been placed in 2024 - patient refuses to go at this time  4. Sinus arrhythmia  Assessment & Plan:  Last saw cardiologist in 2024 and had holter monitoring which revealed no significant abnormalities  F/u Cardiology on 12/3/24          5. Cervical spondylosis  Assessment & Plan:  Saw Pain Management on 24 and felt better after completing PT at [facility]  Tylenol as needed  Pain Management as needed  6. Elevated TSH  Assessment & Plan:  TSH 5.237, T4 level 0.93 in 2024  TSH 2.791 in 2024  Continue to monitor yearly and repeat if symptoms occur before then  7. Hyperlipidemia, mixed  Assessment & Plan:  Last lipid panel in 2024  Refuses to take statin since it caused tingling of upper arms  Continue to monitor  Strongly encouraged low cholesterol diet  Explained the risks of not having cholesterol levels under control including heart attack,  stroke and even death         History of Present Illness     Complains of arthritic pain. Requesting ibuprofen 800 mg as needed for her arthritis pain.         Review of Systems   Constitutional:  Negative for chills, fatigue, fever and unexpected weight change.   HENT:  Negative for congestion, ear pain, rhinorrhea and sore throat.    Eyes:  Negative for pain and visual disturbance.   Respiratory:  Negative for cough, chest tightness and shortness of breath.    Cardiovascular:  Negative for chest pain, palpitations and leg swelling.   Gastrointestinal:  Negative for abdominal pain, constipation, diarrhea, nausea and vomiting.   Genitourinary:  Negative for difficulty urinating, dysuria and urgency.   Musculoskeletal:  Positive for arthralgias. Negative for back pain.   Skin:  Negative for rash.   Neurological:  Negative for dizziness, numbness and headaches.   Psychiatric/Behavioral:  Negative for behavioral problems and suicidal ideas. The patient is not nervous/anxious.      Medical History Reviewed by provider this encounter:       Current Outpatient Medications on File Prior to Visit   Medication Sig Dispense Refill    aspirin 81 mg chewable tablet Chew 81 mg daily      cholecalciferol (VITAMIN D3) 1,000 units tablet Take 1,000 Units by mouth daily      cyanocobalamin (VITAMIN B-12) 500 mcg tablet Take 1 tablet (500 mcg total) by mouth daily 90 tablet 3    meclizine (ANTIVERT) 12.5 MG tablet Take 1 tablet (12.5 mg total) by mouth every 12 (twelve) hours as needed for dizziness 10 tablet 0    Multiple Vitamins-Minerals (CENTRUM ADULTS PO) Take by mouth      omeprazole (PriLOSEC) 20 mg delayed release capsule TAKE 1 CAPSULE BY MOUTH EVERY DAY 90 capsule 1    Acetaminophen (ARTHRITIS PAIN PO) Take by mouth       No current facility-administered medications on file prior to visit.      Objective     /84 (BP Location: Left arm, Patient Position: Sitting, Cuff Size: Standard)   Pulse 74   Temp 98.4 °F (36.9  "°C) (Temporal)   Resp 18   Ht 4' 6\" (1.372 m)   Wt 65.3 kg (144 lb)   LMP  (LMP Unknown)   SpO2 97%   BMI 34.72 kg/m²     Physical Exam  Constitutional:       Appearance: She is well-developed.   HENT:      Head: Normocephalic and atraumatic.      Right Ear: External ear normal.      Left Ear: External ear normal.      Nose: Nose normal.   Eyes:      Conjunctiva/sclera: Conjunctivae normal.      Pupils: Pupils are equal, round, and reactive to light.   Cardiovascular:      Rate and Rhythm: Normal rate and regular rhythm.      Heart sounds: Normal heart sounds.   Pulmonary:      Effort: Pulmonary effort is normal.      Breath sounds: Normal breath sounds.   Abdominal:      General: Bowel sounds are normal.      Palpations: Abdomen is soft.   Musculoskeletal:         General: Normal range of motion.      Cervical back: Normal range of motion and neck supple. Normal range of motion.      Thoracic back: No tenderness. Normal range of motion.      Lumbar back: Tenderness present. No spasms. Normal range of motion.   Skin:     General: Skin is warm.   Neurological:      Mental Status: She is alert and oriented to person, place, and time.   Psychiatric:         Behavior: Behavior normal.               "

## 2024-09-29 NOTE — ASSESSMENT & PLAN NOTE
MRI brain neuro quant was ordered to evaluate patient's cognition. Incidental note is made of age-indeterminate loss of the normal flow-void within the right ICA. CTA showed a diffuse, high-grade right cervical and intracranial ICA stenosis with segmental areas of occlusion. Unknown chronicity. Saw neurosurgery on 6/13/24 who agreed it is unclear how long this has been present, suspect this is chronic. There was no suggestion of new stroke on MRI. No neurosurgical intervention recommended given long segment stenoses/occlusions extending intracranially. In previous visits patient reported getting transient episodes of dizziness.  These were made worse with aspirin use.  Patient not on statin medication because she gets tingling of bilateral upper extremities.    Today patient reports that she has been tolerating aspirin without a problem.    Impression: At this time findings likely chronic and patient has developed collaterals.  Need to keep vascular risk factors under control.    Plan:  Continue aspirin 81 mg  No intervention per neurosurgery   Last LDL 89, recommend keeping it less than 70.  Patient was advised to follow-up with PCP about starting atorvastatin  Ideal goal blood pressure would be<130/80 however given that she has right ICA stenosis she is likely perfusion dependent, therefore goal should be where she remains asymptomatic but I would not recommend SBP higher than 150

## 2024-09-29 NOTE — ASSESSMENT & PLAN NOTE
Patient reports that over the year 2023 she started noticing a gradual decline in her cognition.  She mostly reports lapses of memory that are brief causing confusion, disorientation and forgetfulness.  This has resulted in certain situations which could be noted as dangerous such as forgetting to lock the door, leaving the water running in the sink resulting in the kitchen flooding or briefly not knowing which direction she has to drive to in a familiar place.  For the most part patient has good insight into her condition.  She reports that at baseline she has an impatient personality and can get easily stressed.  Patient also reports frequent nighttime awakenings due to pain from her arthritis.  She endorses that she snores and does not wake up feeling well rested.  Otherwise dementia review of systems is negative.  Patient is independent in all activities of daily living.  [place] on initial visit 22/30 with deficits mostly in visuospatial/executive function and delayed recall.  MRI neuro quant ordered which showed normal hippocampal volume.     At this time, I believe that patient has a mild cognitive impairment versus pseudodementia secondary to her tendency to easily get stressed and potential underlying obstructive sleep apnea causing unrestful sleep.      Plan:  Patient should avoid stress   Patient to follow-up with sleep medicine for evaluation of TRISTIAN as untreated TRISTIAN can also cause problems with memory and restless sleep cannot allow time for consolidation of memory.

## 2024-09-29 NOTE — ASSESSMENT & PLAN NOTE
This was noted on cardiac auscultation.  Patient reports palpitations.  Echocardiogram with a normal left atrium and an ejection fraction of 65%.  Patient saw cardiology on 6/6/2024 and ordered a Zio patch x 7 days.     Ziopatch: There were no sustained arrhythmias and no symptoms were reported during the course of monitoring.     Plan:  Follow-up with PCP and cardiology

## 2024-10-01 ENCOUNTER — TELEPHONE (OUTPATIENT)
Age: 79
End: 2024-10-01

## 2024-10-01 ENCOUNTER — OFFICE VISIT (OUTPATIENT)
Dept: OBGYN CLINIC | Facility: CLINIC | Age: 79
End: 2024-10-01

## 2024-10-01 VITALS
SYSTOLIC BLOOD PRESSURE: 147 MMHG | HEIGHT: 55 IN | BODY MASS INDEX: 33.56 KG/M2 | HEART RATE: 84 BPM | DIASTOLIC BLOOD PRESSURE: 96 MMHG | WEIGHT: 145 LBS

## 2024-10-01 DIAGNOSIS — R31.1 BENIGN ESSENTIAL MICROSCOPIC HEMATURIA: ICD-10-CM

## 2024-10-01 DIAGNOSIS — N95.0 PMB (POSTMENOPAUSAL BLEEDING): Primary | ICD-10-CM

## 2024-10-01 LAB
SL AMB  POCT GLUCOSE, UA: NEGATIVE
SL AMB LEUKOCYTE ESTERASE,UA: NEGATIVE
SL AMB POCT BILIRUBIN,UA: NEGATIVE
SL AMB POCT BLOOD,UA: ABNORMAL
SL AMB POCT CLARITY,UA: ABNORMAL
SL AMB POCT COLOR,UA: ABNORMAL
SL AMB POCT KETONES,UA: NEGATIVE
SL AMB POCT NITRITE,UA: NEGATIVE
SL AMB POCT PH,UA: 7
SL AMB POCT SPECIFIC GRAVITY,UA: 1.01
SL AMB POCT URINE PROTEIN: NEGATIVE
SL AMB POCT UROBILINOGEN: 0.2

## 2024-10-01 PROCEDURE — 81003 URINALYSIS AUTO W/O SCOPE: CPT | Performed by: OBSTETRICS & GYNECOLOGY

## 2024-10-01 PROCEDURE — 87086 URINE CULTURE/COLONY COUNT: CPT | Performed by: OBSTETRICS & GYNECOLOGY

## 2024-10-01 PROCEDURE — 99213 OFFICE O/P EST LOW 20 MIN: CPT | Performed by: OBSTETRICS & GYNECOLOGY

## 2024-10-01 NOTE — ASSESSMENT & PLAN NOTE
Saw neurosurgeon in July 2024 - mild cognitive impairment versus pseudodementia secondary to her tendency to easily get stressed and potential underlying obstructive sleep apnea causing unrestful sleep  Had recommended she see Sleep Medicine - referral had been placed in March 2024 - patient refuses to go at this time

## 2024-10-01 NOTE — ASSESSMENT & PLAN NOTE
Last saw cardiologist in June 2024 and had holter monitoring which revealed no significant abnormalities  F/u Cardiology on 12/3/24

## 2024-10-01 NOTE — TELEPHONE ENCOUNTER
If she sure this is a urological source and not a vaginal source?  Has she seen GYN?  If she is having obvious gross hematuria she can have her PCP order a CT renal protocol instead of ultrasound.  Patient could also be scheduled for cystoscopy which does not need to wait for results of imaging.  Please provide ER precautions for any inability urinate or worsening of hematuria

## 2024-10-01 NOTE — PROGRESS NOTES
PROBLEM GYNECOLOGICAL VISIT    Shai Osborne is a 79 y.o. female who presents today with complaint of PMB.  Her general medical history has been reviewed and she reports it as follows:    Past Medical History:   Diagnosis Date    Arthritis     Chronic pain disorder     sciatic    GERD (gastroesophageal reflux disease)     Hydronephrosis with renal and ureteral calculus obstruction 2015    Kidney stone     Kidney stone     Nephrolithiasis     Osteoarthritis     Osteoporosis     Sinus arrhythmia      Past Surgical History:   Procedure Laterality Date    CHOLECYSTECTOMY      FL RETROGRADE PYELOGRAM  3/7/2019    KIDNEY STONE SURGERY      KNEE ARTHROSCOPY Left     ME COLONOSCOPY FLX DX W/COLLJ SPEC WHEN PFRMD N/A 2017    Procedure: COLONOSCOPY with polypectomy x2;  Surgeon: Wild Stanley MD;  Location: [facility];  Service: Gastroenterology    ME CYSTO BLADDER W/URETERAL CATHETERIZATION Left 3/7/2019    Procedure: CYSTOSCOPY, URETEROSCOPY WITH LASER, BASKET STONE EXTRACTION, RETROGRADE PYELOGRAM WITH INSERTION STENT URETERAL;  Surgeon: [place] Mcarthur MD;  Location: [facility];  Service: Urology    TUBAL LIGATION       OB History          3    Para   2    Term   2            AB   1    Living   2         SAB   1    IAB        Ectopic        Multiple        Live Births   2               Social History     Tobacco Use    Smoking status: Never     Passive exposure: Never    Smokeless tobacco: Never   Vaping Use    Vaping status: Never Used   Substance Use Topics    Alcohol use: Not Currently    Drug use: No     Social History     Substance and Sexual Activity   Sexual Activity Not Currently       Current Outpatient Medications   Medication Instructions    Acetaminophen (ARTHRITIS PAIN PO) Oral    aspirin 81 mg, Oral, Daily    cholecalciferol (VITAMIN D3) 1,000 Units, Oral, Daily    ibuprofen (MOTRIN) 800 mg, Oral, Daily PRN    meclizine (ANTIVERT) 12.5 mg, Oral, Every 12 hours PRN    Multiple  "Vitamins-Minerals (CENTRUM ADULTS PO) Oral    omeprazole (PRILOSEC) 20 mg, Oral, Daily    vitamin B-12 (VITAMIN B-12) 500 mcg, Oral, Daily       History of Present Illness:   Patient presents with c/o noted a spotting in her undergarments in the morning which has occurred 2x and no active bleeding noted.  Patient states has h/o kidney stones unsure if it has to do with her kidney here to check if it is coming from her uterus since she has been menopausal for the past 38yrs.    Review of Systems:  Review of Systems   Genitourinary:  Positive for vaginal bleeding. Negative for pelvic pain.   All other systems reviewed and are negative.      Physical Exam:  /96 (BP Location: Left arm, Patient Position: Sitting, Cuff Size: Standard)   Pulse 84   Ht 4' 6\" (1.372 m)   Wt 65.8 kg (145 lb)   LMP  (LMP Unknown)   BMI 34.96 kg/m²   Physical Exam  Constitutional:       Appearance: Normal appearance.   Genitourinary:      Urethral meatus normal.      No lesions in the vagina.      Right Labia: No rash, tenderness, lesions or skin changes.     Left Labia: No tenderness, lesions, skin changes or rash.     No vaginal discharge.      No cervical motion tenderness, discharge or lesion.      No urethral tenderness or mass present.   Neurological:      Mental Status: She is alert.   Vitals reviewed.         Point of Care Testing:   -urine dipstick: hematuria    Assessment:   1. Hematuria   2. PMB to be ruled out    Plan:   1. Cultures ordered: urine   2. Imaging ordered: pelvic sono   3. Refer to urology   4. Return to office prn.       Reviewed with patient that [facility], but that they will not necessarily be reviewed by me immediately.  Explained that I will review results at my earliest opportunity and contact patient appropriately.  "

## 2024-10-01 NOTE — TELEPHONE ENCOUNTER
New Patient    What is the reason for the patient’s appointment?: NP- ASAP referral for Benign essential gross hematuria     Per patient she is actually seeing gross blood in urine and when she wipes. She states this has been going on intermittently for about a week or two now.     History of kidney stones     What office location does the patient prefer?: [place]     Does patient have Imaging/Lab Results:    Urine testing done on 10/1/24     Have patient records been requested?:  If No, are the records showing in Epic: Records in epic     HISTORY:   Has the patient had any previous Urologist(s)?: [facility] 9/28/21    Patient saw  just a little over three years ago and is starting to have gross blood again. PCP office called in to schedule apt for patient STAT. Patient still has to schedule her US that was ordered by PCP. Should patient be waiting to be seen by urology till after US is done or prior.     Please review and call patient to schedule in appropriate time frame based off of  recommendations.     CB: [phone] per PCP office patient does not need

## 2024-10-01 NOTE — ASSESSMENT & PLAN NOTE
Last lipid panel in July 2024  Refuses to take statin since it caused tingling of upper arms  Continue to monitor  Strongly encouraged low cholesterol diet  Explained the risks of not having cholesterol levels under control including heart attack, stroke and even death

## 2024-10-01 NOTE — ASSESSMENT & PLAN NOTE
TSH 5.237, T4 level 0.93 in February 2024  TSH 2.791 in July 2024  Continue to monitor yearly and repeat if symptoms occur before then

## 2024-10-01 NOTE — ASSESSMENT & PLAN NOTE
Saw neurosurgery In July 2024 who suspected chronicity - no surgical intervention recommended  Not on statin since it caused tingling of arms - does not want to restart  LDL goal less than 70  SBP goal less than 150, ideally BP goal <130/80  Continue ASA 81 mg daily  F/U Neurology on 1/20/25

## 2024-10-02 LAB — BACTERIA UR CULT: NORMAL

## 2024-10-02 NOTE — TELEPHONE ENCOUNTER
Call placed to pt and spoke with her. Informed her of the CRNP recommendations. Pt will try and contact PCP office to switch imaging to CT scan. She is already scheduled for US on Saturday.     Offered pt an appointment with Dr. Gutierres on 10-8-2024 for possible cystoscopy and discussion of gross hematuria. Pt is a new patient as she has not been seen in over 3 years.     Pt confirmed this appointment date, time and location.

## 2024-10-05 ENCOUNTER — HOSPITAL ENCOUNTER (OUTPATIENT)
Dept: ULTRASOUND IMAGING | Facility: HOSPITAL | Age: 79
Discharge: HOME | End: 2024-10-05
Payer: COMMERCIAL

## 2024-10-05 DIAGNOSIS — R31.1 BENIGN ESSENTIAL MICROSCOPIC HEMATURIA: ICD-10-CM

## 2024-10-05 DIAGNOSIS — N95.0 PMB (POSTMENOPAUSAL BLEEDING): ICD-10-CM

## 2024-10-05 PROCEDURE — 76830 TRANSVAGINAL US NON-OB: CPT

## 2024-10-05 PROCEDURE — 76856 US EXAM PELVIC COMPLETE: CPT

## 2024-10-08 ENCOUNTER — OFFICE VISIT (OUTPATIENT)
Dept: UROLOGY | Facility: MEDICAL CENTER | Age: 79
End: 2024-10-08
Payer: COMMERCIAL

## 2024-10-08 VITALS
HEIGHT: 55 IN | BODY MASS INDEX: 33.51 KG/M2 | SYSTOLIC BLOOD PRESSURE: 130 MMHG | WEIGHT: 144.8 LBS | HEART RATE: 70 BPM | DIASTOLIC BLOOD PRESSURE: 78 MMHG | OXYGEN SATURATION: 96 %

## 2024-10-08 DIAGNOSIS — R31.1 BENIGN ESSENTIAL MICROSCOPIC HEMATURIA: Primary | ICD-10-CM

## 2024-10-08 DIAGNOSIS — N20.0 KIDNEY STONES: ICD-10-CM

## 2024-10-08 DIAGNOSIS — R31.0 GROSS HEMATURIA: ICD-10-CM

## 2024-10-08 LAB
POST-VOID RESIDUAL VOLUME, ML POC: 24 ML
SL AMB  POCT GLUCOSE, UA: ABNORMAL
SL AMB LEUKOCYTE ESTERASE,UA: ABNORMAL
SL AMB POCT BILIRUBIN,UA: ABNORMAL
SL AMB POCT BLOOD,UA: ABNORMAL
SL AMB POCT CLARITY,UA: ABNORMAL
SL AMB POCT COLOR,UA: YELLOW
SL AMB POCT KETONES,UA: ABNORMAL
SL AMB POCT NITRITE,UA: ABNORMAL
SL AMB POCT PH,UA: 6
SL AMB POCT SPECIFIC GRAVITY,UA: 1.02
SL AMB POCT URINE PROTEIN: 30
SL AMB POCT UROBILINOGEN: 1

## 2024-10-08 PROCEDURE — 51798 US URINE CAPACITY MEASURE: CPT | Performed by: UROLOGY

## 2024-10-08 PROCEDURE — 88112 CYTOPATH CELL ENHANCE TECH: CPT | Performed by: STUDENT IN AN ORGANIZED HEALTH CARE EDUCATION/TRAINING PROGRAM

## 2024-10-08 PROCEDURE — 81001 URINALYSIS AUTO W/SCOPE: CPT | Performed by: UROLOGY

## 2024-10-08 PROCEDURE — 87077 CULTURE AEROBIC IDENTIFY: CPT | Performed by: UROLOGY

## 2024-10-08 PROCEDURE — 99204 OFFICE O/P NEW MOD 45 MIN: CPT | Performed by: UROLOGY

## 2024-10-08 PROCEDURE — 81003 URINALYSIS AUTO W/O SCOPE: CPT | Performed by: UROLOGY

## 2024-10-08 PROCEDURE — 87086 URINE CULTURE/COLONY COUNT: CPT | Performed by: UROLOGY

## 2024-10-08 NOTE — ASSESSMENT & PLAN NOTE
Reports gross hematuria   UA showed 3+ blood, no microscopic analysis  - UA, Ucx  - urine cytology  - BMP  - schedule for CT Urogram  - schedule for cystoscopy

## 2024-10-08 NOTE — ASSESSMENT & PLAN NOTE
History of kidney stones  Previously took potassium citrate  CT scan in 2022 showed stable 6 mm right renal stone  Currently asymptomatic  - CT urogram ordered for gross hematuria and will evaluate for stones

## 2024-10-08 NOTE — PROGRESS NOTES
10/8/2024    Shai Osborne  1945  [id number]    1. Benign essential microscopic hematuria  -     Ambulatory Referral to Urology  -     POCT urine dip auto non-scope  -     POCT Measure PVR  2. Gross hematuria  Assessment & Plan:  Reports gross hematuria   UA showed 3+ blood, no microscopic analysis  - UA, Ucx  - urine cytology  - BMP  - schedule for CT Urogram  - schedule for cystoscopy   Orders:  -     Cytology, urine  -     CT renal protocol; Future; Expected date: 10/08/2024  -     Basic metabolic panel; Future  -     Urine culture  -     Urinalysis with microscopic  3. Kidney stones  Assessment & Plan:  History of kidney stones  Previously took potassium citrate  CT scan in 2022 showed stable 6 mm right renal stone  Currently asymptomatic  - CT urogram ordered for gross hematuria and will evaluate for stones       History of Present Illness  79 y.o. female with a history of kidney stones who presents with hematuria     Last CT scan in 9/2022, images reviewed by me, stable 6 mm right renal stone as compared to CT scan from 2020. No renal masses    Reports spotting of blood in underwear, was evaluated by gynecology and UA at that time was positive for 3+ blood.      No flank pain, no nausea/vomiting, no fevers/chills  Has not passed any kidney stones  No recent stone related events    No smoking history   No urinary symptoms      Review of Systems   All other systems reviewed and are negative.      Past Medical History  Past Medical History:   Diagnosis Date    Arthritis     Chronic pain disorder     sciatic    GERD (gastroesophageal reflux disease)     Hydronephrosis with renal and ureteral calculus obstruction 12/23/2015    Kidney stone     Kidney stone     Nephrolithiasis     Osteoarthritis     Osteoporosis     Sinus arrhythmia        Past Social History  Past Surgical History:   Procedure Laterality Date    CHOLECYSTECTOMY      FL RETROGRADE PYELOGRAM  3/7/2019    KIDNEY STONE SURGERY      KNEE  ARTHROSCOPY Left     OH COLONOSCOPY FLX DX W/COLLJ SPEC WHEN PFRMD N/A 11/2/2017    Procedure: COLONOSCOPY with polypectomy x2;  Surgeon: Wild Stanley MD;  Location: [facility];  Service: Gastroenterology    OH CYSTO BLADDER W/URETERAL CATHETERIZATION Left 3/7/2019    Procedure: CYSTOSCOPY, URETEROSCOPY WITH LASER, BASKET STONE EXTRACTION, RETROGRADE PYELOGRAM WITH INSERTION STENT URETERAL;  Surgeon: [place] Mcarthur MD;  Location[facility];  Service: Urology    TUBAL LIGATION         Past Family History  Family History   Problem Relation Age of Onset    No Known Problems Mother     Mental illness Father     Prostate cancer Father     Colon cancer Sister     No Known Problems Sister     No Known Problems Sister     No Known Problems Sister     Ovarian cancer Daughter     Mental illness Daughter     No Known Problems Maternal Grandmother     No Known Problems Maternal Grandfather     No Known Problems Paternal Grandmother     No Known Problems Paternal Grandfather        Past Social history  Social History     Socioeconomic History    Marital status: /Civil Union     Spouse name: Not on file    Number of children: 2    Years of education: Not on file    Highest education level: Not on file   Occupational History    Occupation: Retired   Tobacco Use    Smoking status: Never     Passive exposure: Never    Smokeless tobacco: Never   Vaping Use    Vaping status: Never Used   Substance and Sexual Activity    Alcohol use: Not Currently    Drug use: No    Sexual activity: Not Currently   Other Topics Concern    Not on file   Social History Narrative    Daily caffeine consumption- 1-2 cups of coffee    Does not have living will    Denied:  History of exercises regularly    Denied:  History of domestic violence    No advance directives    Denied:  History of Power of  in existence    Flu shot: no     Social Determinants of Health     Financial Resource Strain: Low Risk  (1/24/2024)    Overall Financial  Resource Strain (CARDIA)     Difficulty of Paying Living Expenses: Not very hard   Food Insecurity: No Food Insecurity (1/24/2024)    Hunger Vital Sign     Worried About Running Out of Food in the Last Year: Never true     Ran Out of Food in the Last Year: Never true   Transportation Needs: No Transportation Needs (1/24/2024)    PRAPARE - Transportation     Lack of Transportation (Medical): No     Lack of Transportation (Non-Medical): No   Physical Activity: Not on file   Stress: No Stress Concern Present (3/2/2022)    [employer] - Occupational Stress Questionnaire     Feeling of Stress : Only a little   Social Connections: Not on file   Intimate Partner Violence: Not At Risk (3/2/2022)    Humiliation, Afraid, Rape, and Kick questionnaire     Fear of Current or Ex-Partner: No     Emotionally Abused: No     Physically Abused: No     Sexually Abused: No   Housing Stability: Low Risk  (5/29/2024)    Housing Stability Vital Sign     Unable to Pay for Housing in the Last Year: No     Number of Times Moved in the Last Year: 0     Homeless in the Last Year: No       Current Medications  Current Outpatient Medications   Medication Sig Dispense Refill    Acetaminophen (ARTHRITIS PAIN PO) Take by mouth      aspirin 81 mg chewable tablet Chew 81 mg daily      cholecalciferol (VITAMIN D3) 1,000 units tablet Take 1,000 Units by mouth daily      cyanocobalamin (VITAMIN B-12) 500 mcg tablet Take 1 tablet (500 mcg total) by mouth daily 90 tablet 3    ibuprofen (MOTRIN) 800 mg tablet Take 1 tablet (800 mg total) by mouth daily as needed for mild pain 30 tablet 1    meclizine (ANTIVERT) 12.5 MG tablet Take 1 tablet (12.5 mg total) by mouth every 12 (twelve) hours as needed for dizziness 10 tablet 0    Multiple Vitamins-Minerals (CENTRUM ADULTS PO) Take by mouth      omeprazole (PriLOSEC) 20 mg delayed release capsule TAKE 1 CAPSULE BY MOUTH EVERY DAY 90 capsule 1     No current facility-administered  "medications for this visit.       Allergies  Allergies   Allergen Reactions    Diclofenac Sodium      Other reaction(s): GI Upset    Meperidine     Tramadol      Other reaction(s): GI Upset       Past Medical History, Social History, Family History, medications and allergies were reviewed.    Vitals  Vitals:    10/08/24 0856   BP: 130/78   BP Location: Left arm   Patient Position: Sitting   Cuff Size: Large   Pulse: 70   SpO2: 96%   Weight: 65.7 kg (144 lb 12.8 oz)   Height: 4' 6\" (1.372 m)       Physical Exam  Vitals reviewed.   Constitutional:       Appearance: Normal appearance. She is normal weight.   HENT:      Head: Normocephalic.      Nose: Nose normal. No congestion.   Eyes:      Conjunctiva/sclera: Conjunctivae normal.   Cardiovascular:      Rate and Rhythm: Normal rate.   Pulmonary:      Effort: Pulmonary effort is normal. No respiratory distress.   Abdominal:      General: Abdomen is flat. There is no distension.      Palpations: Abdomen is soft.      Tenderness: There is no right CVA tenderness or left CVA tenderness.   Musculoskeletal:         General: No swelling. Normal range of motion.      Comments: Normal gait   Skin:     General: Skin is warm and dry.   Neurological:      General: No focal deficit present.      Mental Status: She is alert and oriented to person, place, and time.   Psychiatric:         Mood and Affect: Mood normal.         Results  No results found for: \"PSA\"  Lab Results   Component Value Date    CALCIUM 9.0 07/29/2024    K 4.1 07/29/2024    CO2 28 07/29/2024     07/29/2024    BUN 12 07/29/2024    CREATININE 0.73 07/29/2024     Lab Results   Component Value Date    WBC 6.70 02/23/2022    HGB 12.9 02/23/2022    HCT 38.5 02/23/2022    MCV 88 02/23/2022     02/23/2022       Office Urine Dip  No results found for this or any previous visit (from the past 1 hour(s)).]    "

## 2024-10-09 LAB
BACTERIA UR QL AUTO: ABNORMAL /HPF
BILIRUB UR QL STRIP: NEGATIVE
CAOX CRY URNS QL MICRO: ABNORMAL /HPF
CLARITY UR: ABNORMAL
COLOR UR: YELLOW
GLUCOSE UR STRIP-MCNC: NEGATIVE MG/DL
HGB UR QL STRIP.AUTO: ABNORMAL
KETONES UR STRIP-MCNC: NEGATIVE MG/DL
LEUKOCYTE ESTERASE UR QL STRIP: ABNORMAL
MUCOUS THREADS UR QL AUTO: ABNORMAL
NITRITE UR QL STRIP: NEGATIVE
NON-SQ EPI CELLS URNS QL MICRO: ABNORMAL /HPF
PH UR STRIP.AUTO: 6 [PH]
PROT UR STRIP-MCNC: ABNORMAL MG/DL
RBC #/AREA URNS AUTO: ABNORMAL /HPF
SP GR UR STRIP.AUTO: 1.02 (ref 1–1.03)
UROBILINOGEN UR STRIP-ACNC: <2 MG/DL
WBC #/AREA URNS AUTO: ABNORMAL /HPF

## 2024-10-11 LAB — BACTERIA UR CULT: ABNORMAL

## 2024-10-11 PROCEDURE — 88112 CYTOPATH CELL ENHANCE TECH: CPT | Performed by: STUDENT IN AN ORGANIZED HEALTH CARE EDUCATION/TRAINING PROGRAM

## 2024-10-30 DIAGNOSIS — M51.34 DEGENERATIVE DISC DISEASE, THORACIC: ICD-10-CM

## 2024-10-30 RX ORDER — IBUPROFEN 800 MG/1
800 TABLET, FILM COATED ORAL DAILY PRN
Qty: 30 TABLET | Refills: 1 | Status: SHIPPED | OUTPATIENT
Start: 2024-10-30

## 2024-11-25 ENCOUNTER — CLINICAL SUPPORT (OUTPATIENT)
Dept: FAMILY MEDICINE CLINIC | Facility: CLINIC | Age: 79
End: 2024-11-25

## 2024-11-25 ENCOUNTER — APPOINTMENT (OUTPATIENT)
Dept: LAB | Facility: CLINIC | Age: 79
End: 2024-11-25
Payer: COMMERCIAL

## 2024-11-25 DIAGNOSIS — Z23 ENCOUNTER FOR IMMUNIZATION: Primary | ICD-10-CM

## 2024-11-25 DIAGNOSIS — R31.0 GROSS HEMATURIA: ICD-10-CM

## 2024-11-25 LAB
ANION GAP SERPL CALCULATED.3IONS-SCNC: 8 MMOL/L (ref 4–13)
BUN SERPL-MCNC: 21 MG/DL (ref 5–25)
CALCIUM SERPL-MCNC: 8.9 MG/DL (ref 8.4–10.2)
CHLORIDE SERPL-SCNC: 104 MMOL/L (ref 96–108)
CO2 SERPL-SCNC: 28 MMOL/L (ref 21–32)
CREAT SERPL-MCNC: 0.74 MG/DL (ref 0.6–1.3)
GFR SERPL CREATININE-BSD FRML MDRD: 76 ML/MIN/1.73SQ M
GLUCOSE P FAST SERPL-MCNC: 84 MG/DL (ref 65–99)
POTASSIUM SERPL-SCNC: 3.6 MMOL/L (ref 3.5–5.3)
SODIUM SERPL-SCNC: 140 MMOL/L (ref 135–147)

## 2024-11-25 PROCEDURE — G0008 ADMIN INFLUENZA VIRUS VAC: HCPCS

## 2024-11-25 PROCEDURE — 36415 COLL VENOUS BLD VENIPUNCTURE: CPT

## 2024-11-25 PROCEDURE — 90662 IIV NO PRSV INCREASED AG IM: CPT

## 2024-11-25 PROCEDURE — 80048 BASIC METABOLIC PNL TOTAL CA: CPT

## 2024-12-03 ENCOUNTER — HOSPITAL ENCOUNTER (OUTPATIENT)
Dept: CT IMAGING | Facility: HOSPITAL | Age: 79
Discharge: HOME | End: 2024-12-03
Attending: UROLOGY
Payer: COMMERCIAL

## 2024-12-03 DIAGNOSIS — R31.0 GROSS HEMATURIA: ICD-10-CM

## 2024-12-03 PROCEDURE — 74178 CT ABD&PLV WO CNTR FLWD CNTR: CPT

## 2024-12-03 RX ADMIN — IOHEXOL 85 ML: 350 INJECTION, SOLUTION INTRAVENOUS at 12:15

## 2024-12-09 ENCOUNTER — TELEPHONE (OUTPATIENT)
Age: 79
End: 2024-12-09

## 2024-12-09 NOTE — TELEPHONE ENCOUNTER
Radiology Test Results - Radiology Calling with report update    Pt under care of:      Imaging Completed: 12/3/24 CT renal protocol     Significant Findings - Please review

## 2024-12-10 ENCOUNTER — PROCEDURE VISIT (OUTPATIENT)
Dept: UROLOGY | Facility: MEDICAL CENTER | Age: 79
End: 2024-12-10
Payer: COMMERCIAL

## 2024-12-10 ENCOUNTER — RESULTS FOLLOW-UP (OUTPATIENT)
Dept: UROLOGY | Facility: MEDICAL CENTER | Age: 79
End: 2024-12-10

## 2024-12-10 VITALS
DIASTOLIC BLOOD PRESSURE: 74 MMHG | BODY MASS INDEX: 33.33 KG/M2 | HEIGHT: 55 IN | WEIGHT: 144 LBS | SYSTOLIC BLOOD PRESSURE: 146 MMHG | OXYGEN SATURATION: 99 %

## 2024-12-10 DIAGNOSIS — R31.0 GROSS HEMATURIA: ICD-10-CM

## 2024-12-10 DIAGNOSIS — R31.1 BENIGN ESSENTIAL MICROSCOPIC HEMATURIA: Primary | ICD-10-CM

## 2024-12-10 DIAGNOSIS — N20.0 KIDNEY STONE: ICD-10-CM

## 2024-12-10 LAB
BACTERIA UR QL AUTO: ABNORMAL /HPF
BILIRUB UR QL STRIP: NEGATIVE
CLARITY UR: CLEAR
COLOR UR: YELLOW
GLUCOSE UR STRIP-MCNC: NEGATIVE MG/DL
HGB UR QL STRIP.AUTO: ABNORMAL
KETONES UR STRIP-MCNC: NEGATIVE MG/DL
LEUKOCYTE ESTERASE UR QL STRIP: ABNORMAL
MUCOUS THREADS UR QL AUTO: ABNORMAL
NITRITE UR QL STRIP: NEGATIVE
NON-SQ EPI CELLS URNS QL MICRO: ABNORMAL /HPF
PH UR STRIP.AUTO: 6.5 [PH]
PROT UR STRIP-MCNC: ABNORMAL MG/DL
RBC #/AREA URNS AUTO: ABNORMAL /HPF
SL AMB  POCT GLUCOSE, UA: ABNORMAL
SL AMB LEUKOCYTE ESTERASE,UA: ABNORMAL
SL AMB POCT BILIRUBIN,UA: ABNORMAL
SL AMB POCT BLOOD,UA: ABNORMAL
SL AMB POCT CLARITY,UA: ABNORMAL
SL AMB POCT COLOR,UA: YELLOW
SL AMB POCT KETONES,UA: ABNORMAL
SL AMB POCT NITRITE,UA: ABNORMAL
SL AMB POCT PH,UA: 7
SL AMB POCT SPECIFIC GRAVITY,UA: 1.02
SL AMB POCT URINE PROTEIN: ABNORMAL
SL AMB POCT UROBILINOGEN: 1
SP GR UR STRIP.AUTO: 1.02 (ref 1–1.03)
UROBILINOGEN UR STRIP-ACNC: <2 MG/DL
WBC #/AREA URNS AUTO: ABNORMAL /HPF

## 2024-12-10 PROCEDURE — 99214 OFFICE O/P EST MOD 30 MIN: CPT | Performed by: UROLOGY

## 2024-12-10 PROCEDURE — 81003 URINALYSIS AUTO W/O SCOPE: CPT | Performed by: UROLOGY

## 2024-12-10 PROCEDURE — 81001 URINALYSIS AUTO W/SCOPE: CPT | Performed by: UROLOGY

## 2024-12-10 PROCEDURE — 87086 URINE CULTURE/COLONY COUNT: CPT | Performed by: UROLOGY

## 2024-12-10 NOTE — PROGRESS NOTES
Name: Shai Osborne      : 1945      MRN: [id number]  Encounter Provider: Onesimo Gutierres MD  Encounter Date: 12/10/2024   Encounter department: [facility]OLOG[facility]  :  Assessment & Plan  Benign essential microscopic hematuria    Orders:    POCT urine dip auto non-scope    Gross hematuria  CTU:  left obstructing 7 mm stone with hydronephrosis   Cytology negative  - cystoscopy deferred to planned ureteroscopy        Kidney stone  CTU: 7 mm left proximal ureteral stone with hydro, 6 mm non-obstructing right renal stone  Asymptomatic other than hematuria  - UA, UCx  - discussed that ureteroscopy and laser lithotripsy is recommended for mid-distal ureteral stones, and that both ureteroscopy and ESWL are options for proximal ureteral stones  - discussed that ESWL is less effective when HU > 1000 and is less effective overall as compared to ureteroscopy, however, does not require a postoperative stent when stone is < 2 cm in size   - discussed that renal stones are non-obstructing and do not require any urgent intervention   - discussed option of observation of small renal stones with potential for subsequent development of stone-related events and increase in size of stone burden    - patient elects to proceed with left ureteroscopy, laser lithotripsy, stent placement  .- risks of infection, bleeding, stent colic, incomplete treatment of stones, injury to ureter or kidney, and need for additional surgery discussed    Orders:    Urinalysis with microscopic    Urine culture    Case request operating room: CYSTOSCOPY URETEROSCOPY WITH LITHOTRIPSY HOLMIUM LASER, RETROGRADE PYELOGRAM AND INSERTION STENT URETERAL; Standing        History of Present Illness   Shai Osborne is a 79 y.o. female who presents for results of CTU for evaluation of hematuria      CTU on 12/3/2024, shown below, shows 7 mm left proximal ureteral stone with associated hydronephrosis         Urine cytology  "negative    Review of Systems   All other systems reviewed and are negative.         Objective   /74 (BP Location: Left arm, Patient Position: Sitting, Cuff Size: Standard)   Ht 4' 5\" (1.346 m)   Wt 65.3 kg (144 lb)   LMP  (LMP Unknown)   SpO2 99%   BMI 36.04 kg/m²     Physical Exam  Vitals and nursing note reviewed.   Constitutional:       General: She is not in acute distress.     Appearance: She is well-developed.   HENT:      Head: Normocephalic and atraumatic.   Eyes:      Conjunctiva/sclera: Conjunctivae normal.   Cardiovascular:      Rate and Rhythm: Normal rate and regular rhythm.      Heart sounds: No murmur heard.  Pulmonary:      Effort: Pulmonary effort is normal. No respiratory distress.      Breath sounds: Normal breath sounds.   Abdominal:      Palpations: Abdomen is soft.      Tenderness: There is no abdominal tenderness. There is no right CVA tenderness or left CVA tenderness.   Musculoskeletal:         General: No swelling.      Cervical back: Neck supple.   Skin:     General: Skin is warm and dry.      Capillary Refill: Capillary refill takes less than 2 seconds.   Neurological:      Mental Status: She is alert.   Psychiatric:         Mood and Affect: Mood normal.          Results  No results found for: \"PSA\"  Lab Results   Component Value Date    CALCIUM 8.9 11/25/2024    K 3.6 11/25/2024    CO2 28 11/25/2024     11/25/2024    BUN 21 11/25/2024    CREATININE 0.74 11/25/2024     Lab Results   Component Value Date    WBC 6.70 02/23/2022    HGB 12.9 02/23/2022    HCT 38.5 02/23/2022    MCV 88 02/23/2022     02/23/2022       Office Urine Dip  No results found for this or any previous visit (from the past hour).  ]      "

## 2024-12-10 NOTE — ASSESSMENT & PLAN NOTE
CTU:  left obstructing 7 mm stone with hydronephrosis   Cytology negative  - cystoscopy deferred to planned ureteroscopy

## 2024-12-11 LAB — BACTERIA UR CULT: NORMAL

## 2024-12-12 ENCOUNTER — PREP FOR PROCEDURE (OUTPATIENT)
Dept: UROLOGY | Facility: MEDICAL CENTER | Age: 79
End: 2024-12-12

## 2024-12-12 ENCOUNTER — TELEPHONE (OUTPATIENT)
Dept: UROLOGY | Facility: MEDICAL CENTER | Age: 79
End: 2024-12-12

## 2024-12-12 DIAGNOSIS — Z01.810 PRE-OPERATIVE CARDIOVASCULAR EXAMINATION: Primary | ICD-10-CM

## 2024-12-12 DIAGNOSIS — N20.0 KIDNEY STONE: ICD-10-CM

## 2024-12-12 NOTE — TELEPHONE ENCOUNTER
Called out to patient to schedule fast track ordered by Dr. Gutierres with [facility] line. Got TORIN, LMOM.

## 2024-12-17 NOTE — TELEPHONE ENCOUNTER
Called out again to schedule, her brother answered and said she was not home. He is aware to have her call me back to discuss scheduling surgery.     Emailed Dr. Alvarez to confirm new date as well.

## 2024-12-18 NOTE — TELEPHONE ENCOUNTER
Patient's grand daughter Shy called with the patient next to her. Pt lost her voice.    They are calling today to speak with SS to schedule surgery and ask for a returned call. They will be mindful of the phone.    Call back [phone]

## 2024-12-19 ENCOUNTER — PREP FOR PROCEDURE (OUTPATIENT)
Dept: UROLOGY | Facility: MEDICAL CENTER | Age: 79
End: 2024-12-19

## 2024-12-19 DIAGNOSIS — R39.89 SUSPECTED UTI: Primary | ICD-10-CM

## 2024-12-19 DIAGNOSIS — Z01.812 PRE-OPERATIVE LABORATORY EXAMINATION: ICD-10-CM

## 2024-12-19 DIAGNOSIS — N20.0 KIDNEY STONE: ICD-10-CM

## 2024-12-19 NOTE — TELEPHONE ENCOUNTER
Spoke with patient and confirmed surgery date of 1/21  Type of surgery: left #5  Operating physician:Dr. Gutierres  Location of surgery: [facility]    Verbally went over prep with patient on 12/19  NPO  Bowel prep? No  Hospital calls afternoon prior with arrival time (calls Friday afternoon for Monday surgery)  Patient needs ride to and from surgery   outpatient  Pre-op testing to be done 2 weeks prior to surgery. All testing can be done as a walk-in. EKG can only be done as a walk-in at any [facility].  Labs needed: UC & EKG  Blood thinners:   None  Clearances needed: None    Mailedto patient on 12/19  Copy of packet scanned into Media  Labs in packet and in electronic record   Soap prep in packet  nurse will call with post-op information

## 2025-01-04 DIAGNOSIS — M51.34 DEGENERATIVE DISC DISEASE, THORACIC: ICD-10-CM

## 2025-01-07 ENCOUNTER — OFFICE VISIT (OUTPATIENT)
Dept: LAB | Facility: HOSPITAL | Age: 80
End: 2025-01-07
Payer: COMMERCIAL

## 2025-01-07 ENCOUNTER — APPOINTMENT (OUTPATIENT)
Dept: LAB | Facility: HOSPITAL | Age: 80
End: 2025-01-07
Payer: COMMERCIAL

## 2025-01-07 DIAGNOSIS — Z01.810 PRE-OPERATIVE CARDIOVASCULAR EXAMINATION: ICD-10-CM

## 2025-01-07 DIAGNOSIS — N20.0 KIDNEY STONE: ICD-10-CM

## 2025-01-07 DIAGNOSIS — Z01.812 PRE-OPERATIVE LABORATORY EXAMINATION: ICD-10-CM

## 2025-01-07 DIAGNOSIS — R39.89 SUSPECTED UTI: ICD-10-CM

## 2025-01-07 PROCEDURE — 93005 ELECTROCARDIOGRAM TRACING: CPT

## 2025-01-07 PROCEDURE — 87086 URINE CULTURE/COLONY COUNT: CPT

## 2025-01-08 RX ORDER — IBUPROFEN 800 MG/1
800 TABLET, FILM COATED ORAL DAILY PRN
Qty: 30 TABLET | Refills: 1 | Status: SHIPPED | OUTPATIENT
Start: 2025-01-08

## 2025-01-09 ENCOUNTER — RESULTS FOLLOW-UP (OUTPATIENT)
Dept: UROLOGY | Facility: MEDICAL CENTER | Age: 80
End: 2025-01-09

## 2025-01-09 DIAGNOSIS — N20.0 KIDNEY STONE: Primary | ICD-10-CM

## 2025-01-09 LAB — BACTERIA UR CULT: NORMAL

## 2025-01-09 RX ORDER — CEPHALEXIN 500 MG/1
500 CAPSULE ORAL EVERY 12 HOURS SCHEDULED
Qty: 14 CAPSULE | Refills: 0 | Status: SHIPPED | OUTPATIENT
Start: 2025-01-09 | End: 2025-01-16

## 2025-01-10 NOTE — PRE-PROCEDURE INSTRUCTIONS
Pre-Surgery Instructions:   Medication Instructions    Acetaminophen (ARTHRITIS PAIN PO) Uses PRN- OK to take day of surgery    aspirin 81 mg chewable tablet Stop taking 5 days prior to surgery.    cholecalciferol (VITAMIN D3) 1,000 units tablet Stop taking 7 days prior to surgery.    cyanocobalamin (VITAMIN B-12) 500 mcg tablet Stop taking 7 days prior to surgery.    ibuprofen (MOTRIN) 800 mg tablet Stop taking 3 days prior to surgery.    meclizine (ANTIVERT) 12.5 MG tablet Uses PRN- OK to take day of surgery    Multiple Vitamins-Minerals (CENTRUM ADULTS PO) Stop taking 7 days prior to surgery.    omeprazole (PriLOSEC) 20 mg delayed release capsule Uses PRN- OK to take day of surgery     Pt verbalizes understanding of the following:    Please reference your “My Surgical Experience Booklet” for additional information to prepare for your upcoming surgery.      - DO NOT EAT OR DRINK ANYTHING after midnight on the evening before your procedure including coffee, tea, gum or hard candy.    - ONLY SIPS OF WATER with your medications are allowed on the morning of your procedure.  - Avoid OTC non-directed Vit/ Suppl/ Herbals 7 days prior to surgery to ensure no drug interactions with perioperative surgical/ anesthetic meds  - Avoid NSAIDs 3 days prior. Tylenol is ok to take as needed.   - Avoid ASA containing products 5 days prior, unless otherwise instructed by your provider   - Bring a list of meds you take at home with your last dose noted    - Follow the pre surgery showering instructions as listed in the “My Surgical Experience Booklet” or otherwise provided by your surgeon's office.  - Bathing instructions, will use dial  - No lotions, powders, sprays, deodorant, perfume, jewelry, body piercings, false lashes or make-up  - Do not use a blade to shave the surgical area 1 week before surgery. It is ok to use clean electric clippers up to 24 hours before surgery. Do not shave any body parts with a razor within 24hrs.  -  Do not use dry shampoo, hair spray, hair gel, or any type of hair products.   - Remove nail polish, including gel polish, and any artificial, gel, or acrylic nails if possible.    - For outpatient surgery, arrange for someone to drive you home after the procedure & stay with you until the next morning. Visitor guidelines discussed.   - Bring insurance cards & photo id    - Leave all valuables such as credit cards, money & jewelry at home  - Wear causal clothing that is easy to take on and off. Consider your type of surgery.    - Notify surgeon if you develop any new illnesses, exposure, develop a rash/ open wounds or have additional questions prior to your surgery.    - Did the surgeon's office give you any other special instructions? no  - Did surgeon require any clearances? no    You will receive a call one business day prior to surgery with an arrival time and hospital directions. If your surgery is scheduled on a Monday, the hospital will be calling you on the Friday prior to your surgery.     Please confirm the visitor policy for the day of your procedure when you receive your phone call with an arrival time.

## 2025-01-14 ENCOUNTER — TELEPHONE (OUTPATIENT)
Dept: NEUROLOGY | Facility: CLINIC | Age: 80
End: 2025-01-14

## 2025-01-14 NOTE — TELEPHONE ENCOUNTER
Confirmed PT appointment on 1/20 at 10:30am with Dr. Carlton at the [address] [place] [address] location.

## 2025-01-15 ENCOUNTER — TELEPHONE (OUTPATIENT)
Age: 80
End: 2025-01-15

## 2025-01-17 LAB
ATRIAL RATE: 77 BPM
P AXIS: 46 DEGREES
PR INTERVAL: 138 MS
QRS AXIS: -22 DEGREES
QRSD INTERVAL: 60 MS
QT INTERVAL: 376 MS
QTC INTERVAL: 426 MS
T WAVE AXIS: 11 DEGREES
VENTRICULAR RATE: 77 BPM

## 2025-01-17 PROCEDURE — 93010 ELECTROCARDIOGRAM REPORT: CPT | Performed by: INTERNAL MEDICINE

## 2025-01-20 NOTE — TELEPHONE ENCOUNTER
Patient called in to re-schedule due to weather today .  Rescheduled for next available and added to wait list.

## 2025-01-21 ENCOUNTER — TELEPHONE (OUTPATIENT)
Dept: UROLOGY | Facility: MEDICAL CENTER | Age: 80
End: 2025-01-21

## 2025-01-21 ENCOUNTER — APPOINTMENT (OUTPATIENT)
Dept: RADIOLOGY | Facility: HOSPITAL | Age: 80
End: 2025-01-21
Payer: COMMERCIAL

## 2025-01-21 ENCOUNTER — HOSPITAL ENCOUNTER (OUTPATIENT)
Facility: HOSPITAL | Age: 80
Setting detail: OUTPATIENT SURGERY
Discharge: HOME | End: 2025-01-21
Attending: UROLOGY | Admitting: UROLOGY
Payer: COMMERCIAL

## 2025-01-21 ENCOUNTER — ANESTHESIA EVENT (OUTPATIENT)
Dept: PERIOP | Facility: HOSPITAL | Age: 80
End: 2025-01-21
Payer: COMMERCIAL

## 2025-01-21 ENCOUNTER — ANESTHESIA (OUTPATIENT)
Dept: PERIOP | Facility: HOSPITAL | Age: 80
End: 2025-01-21
Payer: COMMERCIAL

## 2025-01-21 VITALS
TEMPERATURE: 97.5 F | HEART RATE: 81 BPM | HEIGHT: 55 IN | BODY MASS INDEX: 33.33 KG/M2 | RESPIRATION RATE: 18 BRPM | SYSTOLIC BLOOD PRESSURE: 142 MMHG | WEIGHT: 144 LBS | OXYGEN SATURATION: 97 % | DIASTOLIC BLOOD PRESSURE: 71 MMHG

## 2025-01-21 DIAGNOSIS — N20.0 KIDNEY STONE: Primary | ICD-10-CM

## 2025-01-21 LAB — GLUCOSE SERPL-MCNC: 89 MG/DL (ref 65–140)

## 2025-01-21 PROCEDURE — NC001 PR NO CHARGE: Performed by: UROLOGY

## 2025-01-21 PROCEDURE — 82948 REAGENT STRIP/BLOOD GLUCOSE: CPT

## 2025-01-21 PROCEDURE — 52000 CYSTOURETHROSCOPY: CPT | Performed by: UROLOGY

## 2025-01-21 PROCEDURE — 74018 RADEX ABDOMEN 1 VIEW: CPT

## 2025-01-21 PROCEDURE — C1758 CATHETER, URETERAL: HCPCS | Performed by: UROLOGY

## 2025-01-21 PROCEDURE — C1769 GUIDE WIRE: HCPCS | Performed by: UROLOGY

## 2025-01-21 RX ORDER — PROPOFOL 10 MG/ML
INJECTION, EMULSION INTRAVENOUS AS NEEDED
Status: DISCONTINUED | OUTPATIENT
Start: 2025-01-21 | End: 2025-01-21

## 2025-01-21 RX ORDER — PHENAZOPYRIDINE HYDROCHLORIDE 200 MG/1
200 TABLET, FILM COATED ORAL
Qty: 10 TABLET | Refills: 0 | Status: SHIPPED | OUTPATIENT
Start: 2025-01-21

## 2025-01-21 RX ORDER — METOCLOPRAMIDE HYDROCHLORIDE 5 MG/ML
INJECTION INTRAMUSCULAR; INTRAVENOUS AS NEEDED
Status: DISCONTINUED | OUTPATIENT
Start: 2025-01-21 | End: 2025-01-21

## 2025-01-21 RX ORDER — ALBUTEROL SULFATE 0.83 MG/ML
2.5 SOLUTION RESPIRATORY (INHALATION) ONCE AS NEEDED
Status: DISCONTINUED | OUTPATIENT
Start: 2025-01-21 | End: 2025-01-21 | Stop reason: HOSPADM

## 2025-01-21 RX ORDER — SODIUM CHLORIDE, SODIUM LACTATE, POTASSIUM CHLORIDE, CALCIUM CHLORIDE 600; 310; 30; 20 MG/100ML; MG/100ML; MG/100ML; MG/100ML
INJECTION, SOLUTION INTRAVENOUS CONTINUOUS PRN
Status: DISCONTINUED | OUTPATIENT
Start: 2025-01-21 | End: 2025-01-21

## 2025-01-21 RX ORDER — ONDANSETRON 2 MG/ML
4 INJECTION INTRAMUSCULAR; INTRAVENOUS ONCE AS NEEDED
Status: DISCONTINUED | OUTPATIENT
Start: 2025-01-21 | End: 2025-01-21 | Stop reason: HOSPADM

## 2025-01-21 RX ORDER — DEXAMETHASONE SODIUM PHOSPHATE 10 MG/ML
8 INJECTION, SOLUTION INTRAMUSCULAR; INTRAVENOUS ONCE AS NEEDED
Status: DISCONTINUED | OUTPATIENT
Start: 2025-01-21 | End: 2025-01-21 | Stop reason: HOSPADM

## 2025-01-21 RX ORDER — MAGNESIUM HYDROXIDE 1200 MG/15ML
LIQUID ORAL AS NEEDED
Status: DISCONTINUED | OUTPATIENT
Start: 2025-01-21 | End: 2025-01-21 | Stop reason: HOSPADM

## 2025-01-21 RX ORDER — ONDANSETRON 2 MG/ML
INJECTION INTRAMUSCULAR; INTRAVENOUS AS NEEDED
Status: DISCONTINUED | OUTPATIENT
Start: 2025-01-21 | End: 2025-01-21

## 2025-01-21 RX ORDER — CEFAZOLIN SODIUM 2 G/50ML
2000 SOLUTION INTRAVENOUS ONCE
Status: COMPLETED | OUTPATIENT
Start: 2025-01-21 | End: 2025-01-21

## 2025-01-21 RX ORDER — FENTANYL CITRATE 50 UG/ML
INJECTION, SOLUTION INTRAMUSCULAR; INTRAVENOUS AS NEEDED
Status: DISCONTINUED | OUTPATIENT
Start: 2025-01-21 | End: 2025-01-21

## 2025-01-21 RX ORDER — LIDOCAINE HYDROCHLORIDE 10 MG/ML
INJECTION, SOLUTION EPIDURAL; INFILTRATION; INTRACAUDAL; PERINEURAL AS NEEDED
Status: DISCONTINUED | OUTPATIENT
Start: 2025-01-21 | End: 2025-01-21

## 2025-01-21 RX ORDER — PHENAZOPYRIDINE HYDROCHLORIDE 100 MG/1
100 TABLET, FILM COATED ORAL
Status: DISCONTINUED | OUTPATIENT
Start: 2025-01-21 | End: 2025-01-21 | Stop reason: HOSPADM

## 2025-01-21 RX ORDER — FENTANYL CITRATE/PF 50 MCG/ML
25 SYRINGE (ML) INJECTION
Status: DISCONTINUED | OUTPATIENT
Start: 2025-01-21 | End: 2025-01-21 | Stop reason: HOSPADM

## 2025-01-21 RX ORDER — SODIUM CHLORIDE, SODIUM LACTATE, POTASSIUM CHLORIDE, CALCIUM CHLORIDE 600; 310; 30; 20 MG/100ML; MG/100ML; MG/100ML; MG/100ML
125 INJECTION, SOLUTION INTRAVENOUS CONTINUOUS
Status: DISCONTINUED | OUTPATIENT
Start: 2025-01-21 | End: 2025-01-21 | Stop reason: HOSPADM

## 2025-01-21 RX ADMIN — LIDOCAINE HYDROCHLORIDE 50 MG: 10 SOLUTION INTRAVENOUS at 09:56

## 2025-01-21 RX ADMIN — METOCLOPRAMIDE 5 MG: 5 INJECTION, SOLUTION INTRAMUSCULAR; INTRAVENOUS at 10:01

## 2025-01-21 RX ADMIN — SODIUM CHLORIDE, SODIUM LACTATE, POTASSIUM CHLORIDE, AND CALCIUM CHLORIDE: .6; .31; .03; .02 INJECTION, SOLUTION INTRAVENOUS at 09:40

## 2025-01-21 RX ADMIN — FENTANYL CITRATE 25 MCG: 50 INJECTION, SOLUTION INTRAMUSCULAR; INTRAVENOUS at 10:37

## 2025-01-21 RX ADMIN — DEXMEDETOMIDINE HYDROCHLORIDE 4 MCG: 100 INJECTION, SOLUTION INTRAVENOUS at 10:15

## 2025-01-21 RX ADMIN — ONDANSETRON 4 MG: 2 INJECTION INTRAMUSCULAR; INTRAVENOUS at 10:30

## 2025-01-21 RX ADMIN — DEXMEDETOMIDINE HYDROCHLORIDE 4 MCG: 100 INJECTION, SOLUTION INTRAVENOUS at 10:19

## 2025-01-21 RX ADMIN — FENTANYL CITRATE 25 MCG: 50 INJECTION, SOLUTION INTRAMUSCULAR; INTRAVENOUS at 09:55

## 2025-01-21 RX ADMIN — DEXMEDETOMIDINE HYDROCHLORIDE 4 MCG: 100 INJECTION, SOLUTION INTRAVENOUS at 10:09

## 2025-01-21 RX ADMIN — CEFAZOLIN SODIUM 2000 MG: 2 SOLUTION INTRAVENOUS at 10:01

## 2025-01-21 RX ADMIN — FENTANYL CITRATE 25 MCG: 50 INJECTION, SOLUTION INTRAMUSCULAR; INTRAVENOUS at 09:57

## 2025-01-21 RX ADMIN — FENTANYL CITRATE 25 MCG: 50 INJECTION, SOLUTION INTRAMUSCULAR; INTRAVENOUS at 10:21

## 2025-01-21 RX ADMIN — PROPOFOL 120 MG: 10 INJECTION, EMULSION INTRAVENOUS at 09:56

## 2025-01-21 NOTE — DISCHARGE INSTR - AVS FIRST PAGE
Shai, I was not able to pass a wire or even contrast up the left ureter today.  It seems to be completely blocked.  I have ordered for an interval CT scan.  If we are going to remove the stone we would need interventional radiology to place a tube down the ureter from your kidney.

## 2025-01-21 NOTE — TELEPHONE ENCOUNTER
Unable to get sensor wire or glide wire up the left ureter.  No efflux of urine from left ureter and unable to inject contrast up left ureter.  No angled Berenstein catheter available.      Needs interval CT scan and then consider LRS pending results.  Would have to see if IR could place a left nephroureteral catheter prior to performing ureteroscopy.

## 2025-01-21 NOTE — ANESTHESIA POSTPROCEDURE EVALUATION
Post-Op Assessment Note    CV Status:  Stable    Pain management: satisfactory to patient       Mental Status:  Awake   Hydration Status:  Stable   PONV Controlled:  None   Airway Patency:  Patent     Post Op Vitals Reviewed: Yes    No anethesia notable event occurred.    Staff: Anesthesiologist           Last Filed PACU Vitals:  Vitals Value Taken Time   Temp 97.3 °F (36.3 °C) 01/21/25 1042   Pulse 71 01/21/25 1126   /64 01/21/25 1118   Resp 28 01/21/25 1126   SpO2 88 % 01/21/25 1126   Vitals shown include unfiled device data.    Modified Jagdish:     Vitals Value Taken Time   Activity 2 01/21/25 1110   Respiration 2 01/21/25 1110   Circulation 2 01/21/25 1110   Consciousness 1 01/21/25 1110   Oxygen Saturation 1 01/21/25 1110     Modified Jagdish Score: 8

## 2025-01-21 NOTE — H&P
H&P - Urology   Name: Shai Osborne 79 y.o. female I MRN: [id number]  Unit/Bed#: OR POOL I Date of Admission: 1/21/2025   Date of Service: 1/21/2025 I Hospital Day: 0     Assessment & Plan   This is a 79 y.o. year old female here for left ureteroscopy, laser lithotripsy and stent placement, and she is stable and optimized for her procedure.    History of Present Illness    Shai Osborne is a 79 y.o. year old female who presents for left ureteroscopy for left proximal ureteral stone    REVIEW OF SYSTEMS: Per the HPI, and otherwise unremarkable.    Historical Information   Past Medical History:   Diagnosis Date    Ambulates with cane     Arthritis     Chronic pain disorder     sciatic    GERD (gastroesophageal reflux disease)     Hearing aid worn     Hydronephrosis with renal and ureteral calculus obstruction 12/23/2015    Kidney stone     Nephrolithiasis     Osteoarthritis     Osteoporosis     Sinus arrhythmia      Past Surgical History:   Procedure Laterality Date    CHOLECYSTECTOMY      FL RETROGRADE PYELOGRAM  3/7/2019    KIDNEY STONE SURGERY      KNEE ARTHROSCOPY Left     OH COLONOSCOPY FLX DX W/COLLJ SPEC WHEN PFRMD N/A 11/2/2017    Procedure: COLONOSCOPY with polypectomy x2;  Surgeon: Wild Stanley MD;  Location: [facility];  Service: Gastroenterology    OH CYSTOURETHROSCOPY W/URETERAL CATHETERIZATION Left 3/7/2019    Procedure: CYSTOSCOPY, URETEROSCOPY WITH LASER, BASKET STONE EXTRACTION, RETROGRADE PYELOGRAM WITH INSERTION STENT URETERAL;  Surgeon: Pedro Mcarthur MD;  Location: [facility];  Service: Urology    TUBAL LIGATION       Social History     Tobacco Use    Smoking status: Never     Passive exposure: Never    Smokeless tobacco: Never   Vaping Use    Vaping status: Never Used   Substance and Sexual Activity    Alcohol use: Not Currently    Drug use: No    Sexual activity: Not Currently     E-Cigarette/Vaping    E-Cigarette Use Never User      E-Cigarette/Vaping Substances    Nicotine No     THC  No     CBD No     Flavoring No     Other No     Unknown No      Family History   Problem Relation Age of Onset    No Known Problems Mother     Mental illness Father     Prostate cancer Father     Colon cancer Sister     No Known Problems Sister     No Known Problems Sister     No Known Problems Sister     Ovarian cancer Daughter     Mental illness Daughter     No Known Problems Maternal Grandmother     No Known Problems Maternal Grandfather     No Known Problems Paternal Grandmother     No Known Problems Paternal Grandfather        Meds/Allergies     Current Facility-Administered Medications:     ceFAZolin (ANCEF) IVPB (premix in dextrose) 2,000 mg 50 mL, 2,000 mg, Intravenous, Once  Allergies   Allergen Reactions    Diclofenac Sodium      Other reaction(s): GI Upset    Meperidine GI Intolerance    Tramadol      Other reaction(s): GI Upset       Objective :  Temp:  [97 °F (36.1 °C)] 97 °F (36.1 °C)  HR:  [84] 84  BP: (144)/(86) 144/86  Resp:  [16] 16  SpO2:  [98 %] 98 %  O2 Device: None (Room air)    Physical Exam  Vitals and nursing note reviewed.   Constitutional:       General: She is not in acute distress.     Appearance: She is well-developed.   HENT:      Head: Normocephalic and atraumatic.   Eyes:      Conjunctiva/sclera: Conjunctivae normal.   Cardiovascular:      Rate and Rhythm: Normal rate and regular rhythm.      Heart sounds: No murmur heard.  Pulmonary:      Effort: Pulmonary effort is normal. No respiratory distress.      Breath sounds: Normal breath sounds.   Abdominal:      Palpations: Abdomen is soft.      Tenderness: There is no abdominal tenderness.   Musculoskeletal:         General: No swelling.      Cervical back: Neck supple.   Skin:     General: Skin is warm and dry.      Capillary Refill: Capillary refill takes less than 2 seconds.   Neurological:      Mental Status: She is alert.   Psychiatric:         Mood and Affect: Mood normal.       Gen: NAD  Head: NCAT  CV: RRR  CHEST: Clear  ABD:  soft, NT/ND  EXT: no edema

## 2025-01-21 NOTE — ANESTHESIA PREPROCEDURE EVALUATION
Procedure:  CYSTOSCOPY URETEROSCOPY WITH LITHOTRIPSY HOLMIUM LASER, RETROGRADE PYELOGRAM AND INSERTION STENT URETERAL (Left: Bladder)    Relevant Problems   CARDIO   (+) Hyperlipidemia, mixed   (+) Internal carotid artery stenosis   (+) Sinus arrhythmia      GI/HEPATIC   (+) Gastroesophageal reflux disease without esophagitis      /RENAL   (+) Kidney stones      MUSCULOSKELETAL   (+) Cervical spondylosis   (+) Degenerative disc disease, thoracic   (+) Lumbar spondylosis      NEURO/PSYCH   (+) Chronic foot pain, left      Echo 5/2024    Left Ventricle: Left ventricular cavity size is normal. Wall thickness is normal. The left ventricular ejection fraction is 65%. Systolic function is normal. Wall motion is normal. Diastolic function is mildly abnormal, consistent with grade I (abnormal) relaxation.    Tricuspid Valve: There is mild regurgitation.    Pulmonic Valve: There is mild regurgitation.    Physical Exam    Airway    Mallampati score: II  TM Distance: >3 FB  Neck ROM: full     Dental   No notable dental hx     Cardiovascular      Pulmonary      Other Findings  post-pubertal.      Anesthesia Plan  ASA Score- 3     Anesthesia Type- general with ASA Monitors.         Additional Monitors:     Airway Plan: LMA.           Plan Factors-    Chart reviewed.    Patient summary reviewed.    Patient is not a current smoker.      Obstructive sleep apnea risk education given perioperatively.        Induction- intravenous.    Postoperative Plan-     Perioperative Resuscitation Plan - Level 1 - Full Code.       Informed Consent- Anesthetic plan and risks discussed with patient.  I personally reviewed this patient with the CRNA. Discussed and agreed on the Anesthesia Plan with the CRNA..      NPO Status:  Vitals Value Taken Time   Date of last liquid 01/20/25 01/21/25 0839   Time of last liquid 2300 01/21/25 0839   Date of last solid 01/20/25 01/21/25 0839   Time of last solid 2100 01/21/25 0839

## 2025-01-21 NOTE — ANESTHESIA POSTPROCEDURE EVALUATION
Post-Op Assessment Note    CV Status:  Stable  Pain Score: 0    Pain management: adequate       Mental Status:  Alert and awake   Hydration Status:  Euvolemic   PONV Controlled:  Controlled   Airway Patency:  Patent     Post Op Vitals Reviewed: Yes    No anethesia notable event occurred.    Staff: CRNA           Last Filed PACU Vitals:  Vitals Value Taken Time   Temp 97.3 °F (36.3 °C) 01/21/25 1042   Pulse 71 01/21/25 1126   /64 01/21/25 1118   Resp 28 01/21/25 1126   SpO2 98 % 01/21/25 1126   Vitals shown include unfiled device data.    Modified Jagdish:     Vitals Value Taken Time   Activity 2 01/21/25 1110   Respiration 2 01/21/25 1110   Circulation 2 01/21/25 1110   Consciousness 1 01/21/25 1110   Oxygen Saturation 1 01/21/25 1110     Modified Jagdish Score: 8

## 2025-01-21 NOTE — OP NOTE
OPERATIVE REPORT  PATIENT NAME: Shai Osborne    :  1945  MRN: [id number]  Pt Location:  OR ROOM 10    SURGERY DATE: 2025    Surgeons and Role:     * Onesimo Gutierres MD - Primary    Preop Diagnosis:  Kidney stone [N20.0]    Post-Op Diagnosis Codes:     * Kidney stone [N20.0]    Procedure(s):  Left - CYSTOSCOPY    Specimen(s):  * No specimens in log *    Estimated Blood Loss:   Minimal    Drains:  * No LDAs found *    Anesthesia Type:   General    Operative Indications:  Kidney stone [N20.0]    Operative Findings:  Unable to cannulate left ureteral orifice  No efflux of urine noted from left ureteral orifice  Unable to inject contrast through the ureteral orifice, complete obstruction      Complications:   None    Procedure and Technique:      INDICATIONS FOR PROCEDURE:  Shai Osborne is an 79 y.o. old female with left ureteral nephrolithiasis.  After discussing the options, the patient elected to undergo ureteroscopy and ureteral stent placement.  We discussed the procedure in detail, the alternatives, and the risks, and they signed informed consent to proceed.    PROCEDURE IN DETAIL:   The patient was identified and brought to the OR.  Antibiotic prophylaxis and DVT prophylaxis were administered.  They were placed in the comfortable dorsal lithotomy position with care to pad all pressure points.  They were prepped and draped in the usual sterile fashion using hibiclens.  A surgical time out was performed with all in the room in agreement with the correct patient, procedure, indications, and laterality.  A 21-[place] rigid cystoscope was used to enter the bladder.  The bladder was inspected in its entirety and there were no lesions noted.  The ureteral orifices were identified in their normal orthotopic positions.     The left ureteral orifice was identified, but I was unable to cannulate the left ureteral orifice with either a sensor wire or a glide wire.  I attempted to perform a retrograde  pyelogram, however, there was no passage of contrast up the left ureter with lots of resistance.  There was no angled Berenstein catheter available to attempt additional cannulation of the left ureteral orifice.      The bladder was emptied and the patient was awakened from anesthesia and brought to the recovery room in stable condition.      Patient Disposition:  PACU        Will get CTAP to reevaluate left kidney and kidney stone and consider lasix renal scan pending results.  Would need to place left nephroureteral catheter with IR in order to access stone.      SIGNATURE: Onesimo Gutierres MD  DATE: January 21, 2025  TIME: 10:33 AM

## 2025-01-22 NOTE — TELEPHONE ENCOUNTER
Pt returning call to office. Message relayed from clinical and advised that provider recommended pt to have CT scan completed sooner rather than later.     Pt understood and transferred to CS to schedule appt now.

## 2025-01-22 NOTE — TELEPHONE ENCOUNTER
Call placed. Left message for pt to call the office back. Call back # provided.     When pt calls back, let her know that she needs to call CS to schedule a CT that Dr. Gutierres wants her to get sooner rather than later. Order is in the chart.

## 2025-01-23 NOTE — TELEPHONE ENCOUNTER
Call placed. Spoke to pt and advised that she can stop taking the pyridium. Advised that if she would start having burning painful urination to start taking as needed. Pt understood and was appreciative of the call. Pt confirmed scheduled CT appointment for 1/28.

## 2025-01-23 NOTE — TELEPHONE ENCOUNTER
Pt asking why she was prescribed the Pyridium. She googled the medication and stated that she has no pain with urination  and just wants to make sure that she does not need to take it for some other reason.    Please call pt back at   [phone]

## 2025-01-28 ENCOUNTER — HOSPITAL ENCOUNTER (OUTPATIENT)
Dept: CT IMAGING | Facility: HOSPITAL | Age: 80
Discharge: HOME | End: 2025-01-28
Attending: UROLOGY
Payer: COMMERCIAL

## 2025-01-28 DIAGNOSIS — N20.0 KIDNEY STONE: ICD-10-CM

## 2025-01-28 PROCEDURE — 74176 CT ABD & PELVIS W/O CONTRAST: CPT

## 2025-02-03 ENCOUNTER — RESULTS FOLLOW-UP (OUTPATIENT)
Dept: UROLOGY | Facility: MEDICAL CENTER | Age: 80
End: 2025-02-03

## 2025-02-05 NOTE — TELEPHONE ENCOUNTER
----- Message from Onesimo Gutierres MD sent at 2/3/2025  4:37 PM EST -----  Needs follow up to discuss next steps.  Stone migrated slightly distally on interval CT scan.  I was not able to get up left ureter for ureteroscopy, so would need to be scheduled with IR for left PCNU prior to ureteroscopy.

## 2025-02-10 ENCOUNTER — HOSPITAL ENCOUNTER (OUTPATIENT)
Dept: MAMMOGRAPHY | Facility: CLINIC | Age: 80
Discharge: HOME | End: 2025-02-10
Payer: COMMERCIAL

## 2025-02-10 VITALS — WEIGHT: 144 LBS | HEIGHT: 55 IN | BODY MASS INDEX: 33.33 KG/M2

## 2025-02-10 DIAGNOSIS — Z12.31 ENCOUNTER FOR SCREENING MAMMOGRAM FOR MALIGNANT NEOPLASM OF BREAST: ICD-10-CM

## 2025-02-10 PROCEDURE — 77067 SCR MAMMO BI INCL CAD: CPT

## 2025-02-10 PROCEDURE — 77063 BREAST TOMOSYNTHESIS BI: CPT

## 2025-02-19 ENCOUNTER — RA CDI HCC (OUTPATIENT)
Dept: OTHER | Facility: HOSPITAL | Age: 80
End: 2025-02-19

## 2025-02-19 NOTE — PROGRESS NOTES
HCC coding opportunities          Chart Reviewed number of suggestions sent to Provider: 2   E66.01  Recommend adding N18.2 - Chronic kidney disease stage 2.  Patient's eGFR levels are in the stage 2 CKD range.    Patients Insurance     Medicare Insurance: [employer]

## 2025-02-24 ENCOUNTER — APPOINTMENT (EMERGENCY)
Dept: RADIOLOGY | Facility: HOSPITAL | Age: 80
End: 2025-02-24
Payer: COMMERCIAL

## 2025-02-24 ENCOUNTER — HOSPITAL ENCOUNTER (EMERGENCY)
Facility: HOSPITAL | Age: 80
Discharge: HOME | End: 2025-02-24
Attending: EMERGENCY MEDICINE | Admitting: EMERGENCY MEDICINE
Payer: COMMERCIAL

## 2025-02-24 VITALS
DIASTOLIC BLOOD PRESSURE: 86 MMHG | HEART RATE: 81 BPM | RESPIRATION RATE: 14 BRPM | SYSTOLIC BLOOD PRESSURE: 164 MMHG | OXYGEN SATURATION: 98 % | BODY MASS INDEX: 35.72 KG/M2 | TEMPERATURE: 98.2 F | WEIGHT: 142.7 LBS

## 2025-02-24 DIAGNOSIS — M79.601 RIGHT ARM PAIN: Primary | ICD-10-CM

## 2025-02-24 PROCEDURE — 73060 X-RAY EXAM OF HUMERUS: CPT

## 2025-02-24 PROCEDURE — 99283 EMERGENCY DEPT VISIT LOW MDM: CPT

## 2025-02-24 PROCEDURE — 99284 EMERGENCY DEPT VISIT MOD MDM: CPT

## 2025-02-24 PROCEDURE — 96372 THER/PROPH/DIAG INJ SC/IM: CPT

## 2025-02-24 RX ORDER — METHYLPREDNISOLONE 4 MG/1
TABLET ORAL
Qty: 21 TABLET | Refills: 0 | Status: SHIPPED | OUTPATIENT
Start: 2025-02-24

## 2025-02-24 RX ORDER — KETOROLAC TROMETHAMINE 30 MG/ML
15 INJECTION, SOLUTION INTRAMUSCULAR; INTRAVENOUS ONCE
Status: COMPLETED | OUTPATIENT
Start: 2025-02-24 | End: 2025-02-24

## 2025-02-24 RX ADMIN — KETOROLAC TROMETHAMINE 15 MG: 30 INJECTION, SOLUTION INTRAMUSCULAR; INTRAVENOUS at 10:02

## 2025-02-24 NOTE — DISCHARGE INSTRUCTIONS
Follow-up with PCP, orthopedics and/or spine pain.  Return to ED for new or worsening symptoms as discussed.

## 2025-02-24 NOTE — ED PROVIDER NOTES
"Time reflects when diagnosis was documented in both MDM as applicable and the Disposition within this note       Time User Action Codes Description Comment    2/24/2025 10:01 AM Contreras, Hailey Reji [M79.601] Right arm pain           ED Disposition       ED Disposition   Discharge    Condition   Stable    Date/Time   Mon Feb 24, 2025 10:40 AM    Comment   Shai Guillenjoanie discharge to home/self care.                   Assessment & Plan       Medical Decision Making  Chronic arm pain. Worsening x 3 weeks. Neurovascularly intact. No skin changes or effusion.  Infection including septic arthritis not suspected at this time.  Tenderness to palpation.  No acute fractures on x-ray, calcific tendinosis noted.  Improvement of symptoms and range of motion with Toradol.plan outpatient pain/ortho follow up.     All imaging and/or lab testing discussed with patient, strict return to ED precautions discussed. Patient recommended to follow up promptly with appropriate outpatient provider and risk of morbidity/mortality if patient does not follow up as recommended was discussed. Patient and/or family members verbalizes understanding and agrees with plan. Patient and/or family members were given opportunity to ask questions, all questions were answered at this time. Patient is stable for discharge.     Portions of the record may have been created with voice recognition software. Occasional wrong word or \"sound a like\" substitutions may have occurred due to the inherent limitations of voice recognition software. Read the chart carefully and recognize, using context, where substitutions have occurred.       Amount and/or Complexity of Data Reviewed  Radiology: ordered.    Risk  Prescription drug management.        ED Course as of 02/24/25 1946 Mon Feb 24, 2025   0958 3 weeks of R arm pain. Hx of arthritis. States had trouble sleeping last night because could not get comfortable    1059 She states she feels much better and that her " "range of motion is already improved still unable to go completely above headBecause it \"gets stuck\".  Imaging showing calcific tendonosis at rotator cuff.       Medications   ketorolac (TORADOL) injection 15 mg (15 mg Intramuscular Given 2/24/25 1002)       ED Risk Strat Scores                            SBIRT 22yo+      Flowsheet Row Most Recent Value   Initial Alcohol Screen: US AUDIT-C     1. How often do you have a drink containing alcohol? 0 Filed at: 02/24/2025 0945   2. How many drinks containing alcohol do you have on a typical day you are drinking?  0 Filed at: 02/24/2025 0945   3b. FEMALE Any Age, or MALE 65+: How often do you have 4 or more drinks on one occassion? 0 Filed at: 02/24/2025 0945   Audit-C Score 0 Filed at: 02/24/2025 0945   IVANA: How many times in the past year have you...    Used an illegal drug or used a prescription medication for non-medical reasons? Never Filed at: 02/24/2025 0945                            History of Present Illness       Chief Complaint   Patient presents with    Arm Pain     Pt states she has hx of arthritis, worsening bilateral arm pain R>L. No meds pta. Took motrin 2 days ago with no relief.        Past Medical History:   Diagnosis Date    Ambulates with cane     Arthritis     Chronic pain disorder     sciatic    GERD (gastroesophageal reflux disease)     Hearing aid worn     Hydronephrosis with renal and ureteral calculus obstruction 12/23/2015    Kidney stone     Nephrolithiasis     Osteoarthritis     Osteoporosis     Sinus arrhythmia       Past Surgical History:   Procedure Laterality Date    CHOLECYSTECTOMY      FL RETROGRADE PYELOGRAM  3/7/2019    KIDNEY STONE SURGERY      KNEE ARTHROSCOPY Left     WA COLONOSCOPY FLX DX W/COLLJ SPEC WHEN PFRMD N/A 11/2/2017    Procedure: COLONOSCOPY with polypectomy x2;  Surgeon: Wild Stanley MD;  Location: [facility];  Service: Gastroenterology    WA CYSTO/URETERO W/LITHOTRIPSY &INDWELL STENT INSRT Left 1/21/2025    " "Procedure: CYSTOSCOPY;  Surgeon: Onesimo Gutierres MD;  Location:  MAIN OR;  Service: Urology    TN CYSTOURETHROSCOPY W/URETERAL CATHETERIZATION Left 3/7/2019    Procedure: CYSTOSCOPY, URETEROSCOPY WITH LASER, BASKET STONE EXTRACTION, RETROGRADE PYELOGRAM WITH INSERTION STENT URETERAL;  Surgeon: Pedro Mcarthur MD;  Location: [facility];  Service: Urology    TUBAL LIGATION        Family History   Problem Relation Age of Onset    No Known Problems Mother     Mental illness Father     Prostate cancer Father     Colon cancer Sister     No Known Problems Sister     No Known Problems Sister     No Known Problems Sister     Ovarian cancer Daughter     Mental illness Daughter     No Known Problems Maternal Grandmother     No Known Problems Maternal Grandfather     No Known Problems Paternal Grandmother     No Known Problems Paternal Grandfather     Breast cancer Neg Hx       Social History     Tobacco Use    Smoking status: Never     Passive exposure: Never    Smokeless tobacco: Never   Vaping Use    Vaping status: Never Used   Substance Use Topics    Alcohol use: Not Currently    Drug use: No      E-Cigarette/Vaping    E-Cigarette Use Never User       E-Cigarette/Vaping Substances    Nicotine No     THC No     CBD No     Flavoring No     Other No     Unknown No       I have reviewed and agree with the history as documented.     79-year-old female past medical history of osteoarthritis, chronic pain disorder, cervical spondylolysis, cervical myofascial pain syndrome presents to emergency department complaining of right arm pain for 3 weeks.  She reports chronic bilateral arm pain from her arthritis but the right one has been bothering her more for the past 3 weeks.  States \"I know I have arthritis\" she states that physical therapy did nothing for her.  states decreased range of motion secondary to the pain. Last took Motrin 2 days ago without resolution of symptoms.  No new symptoms including no numbness, weakness, " swelling, color change, neck pain, vision change, headache, tingling, fever, chills, warmth, chest pain, shortness of breath, palpitations.      History provided by:  Patient  Arm Pain  Associated symptoms: no abdominal pain, no chest pain, no fever, no headaches, no nausea, no rash, no shortness of breath, no sore throat and no vomiting        Review of Systems   Constitutional:  Negative for chills, diaphoresis and fever.   HENT:  Negative for sore throat and trouble swallowing.    Eyes:  Negative for photophobia, pain and visual disturbance.   Respiratory:  Negative for shortness of breath.    Cardiovascular:  Negative for chest pain and palpitations.   Gastrointestinal:  Negative for abdominal pain, nausea and vomiting.   Genitourinary:  Negative for decreased urine volume.   Musculoskeletal:  Positive for arthralgias. Negative for back pain, gait problem, joint swelling, neck pain and neck stiffness.   Skin:  Negative for color change, rash and wound.   Neurological:  Negative for dizziness, tremors, syncope, facial asymmetry, speech difficulty, weakness, numbness and headaches.   Psychiatric/Behavioral:  Negative for confusion.    All other systems reviewed and are negative.          Objective       ED Triage Vitals   Temperature Pulse Blood Pressure Respirations SpO2 Patient Position - Orthostatic VS   02/24/25 0942 02/24/25 0942 02/24/25 0942 02/24/25 0942 02/24/25 0942 02/24/25 0942   98.2 °F (36.8 °C) 81 (!) 172/85 14 98 % Sitting      Temp Source Heart Rate Source BP Location FiO2 (%) Pain Score    02/24/25 0942 02/24/25 0942 02/24/25 0942 -- 02/24/25 1002    Oral Monitor Left arm  10 - Worst Possible Pain      Vitals      Date and Time Temp Pulse SpO2 Resp BP Pain Score FACES Pain Rating User   02/24/25 1033 -- -- -- -- 164/86 -- -- SB   02/24/25 1002 -- -- -- -- -- 10 - Worst Possible Pain -- SB   02/24/25 0942 98.2 °F (36.8 °C) 81 98 % 14 172/85 -- -- JW            Physical Exam  Vitals and nursing  note reviewed.   Constitutional:       General: She is awake. She is not in acute distress.     Appearance: Normal appearance. She is not ill-appearing.   HENT:      Head: Normocephalic and atraumatic.      Mouth/Throat:      Lips: Pink.      Mouth: Mucous membranes are moist.   Eyes:      General: Vision grossly intact.      Extraocular Movements: Extraocular movements intact.      Pupils: Pupils are equal, round, and reactive to light.   Cardiovascular:      Rate and Rhythm: Normal rate and regular rhythm.      Pulses:           Radial pulses are 2+ on the right side and 2+ on the left side.      Heart sounds: Normal heart sounds.   Pulmonary:      Effort: Pulmonary effort is normal. No respiratory distress.      Breath sounds: Normal breath sounds.   Abdominal:      General: There is no distension.   Musculoskeletal:         General: Tenderness present. No swelling.      Right shoulder: Tenderness and bony tenderness present. No swelling, effusion or crepitus. Decreased range of motion (above head). Normal strength.      Right upper arm: Tenderness present. No swelling or edema.      Right elbow: No swelling or effusion. Normal range of motion. Tenderness present.      Right hand: No swelling. Normal range of motion. Normal strength. Normal sensation. Normal capillary refill. Normal pulse.        Arms:       Cervical back: Normal range of motion. No tenderness.   Skin:     General: Skin is warm and dry.      Findings: No bruising, erythema or rash.   Neurological:      Mental Status: She is alert and oriented to person, place, and time.      Cranial Nerves: No cranial nerve deficit.      Sensory: No sensory deficit.      Motor: No weakness.      Coordination: Coordination normal.      Gait: Gait normal.         Results Reviewed       None            XR humerus RIGHT   Final Interpretation by Eugenio Parker MD (02/24 0065)      1.  No acute osseous abnormality.      2.  Chronic calcific tendinosis at the  rotator cuff and lateral epicondyle.         Computerized Assisted Algorithm (CAA) may have been used to analyze all applicable images.         Workstation performed: [id number]             Procedures    ED Medication and Procedure Management   Prior to Admission Medications   Prescriptions Last Dose Informant Patient Reported? Taking?   Acetaminophen (ARTHRITIS PAIN PO)  Self Yes No   Sig: Take 1 tablet by mouth if needed   Multiple Vitamins-Minerals (CENTRUM ADULTS PO)  Self Yes No   Sig: Take 1 capsule by mouth in the morning   aspirin 81 mg chewable tablet  Self Yes No   Sig: Chew 81 mg daily   cholecalciferol (VITAMIN D3) 1,000 units tablet  Self Yes No   Sig: Take 1,000 Units by mouth daily   cyanocobalamin (VITAMIN B-12) 500 mcg tablet  Self No No   Sig: Take 1 tablet (500 mcg total) by mouth daily   ibuprofen (MOTRIN) 800 mg tablet   No No   Sig: TAKE 1 TABLET (800 MG TOTAL) BY MOUTH DAILY AS NEEDED FOR MILD PAIN   meclizine (ANTIVERT) 12.5 MG tablet  Self No No   Sig: Take 1 tablet (12.5 mg total) by mouth every 12 (twelve) hours as needed for dizziness   omeprazole (PriLOSEC) 20 mg delayed release capsule  Self No No   Sig: TAKE 1 CAPSULE BY MOUTH EVERY DAY   Patient taking differently: Take 20 mg by mouth if needed   phenazopyridine (PYRIDIUM) 200 mg tablet   No No   Sig: Take 1 tablet (200 mg total) by mouth 3 (three) times a day with meals      Facility-Administered Medications: None     Discharge Medication List as of 2/24/2025 11:04 AM        START taking these medications    Details   methylPREDNISolone 4 MG tablet therapy pack Use as directed on package, Normal           CONTINUE these medications which have NOT CHANGED    Details   Acetaminophen (ARTHRITIS PAIN PO) Take 1 tablet by mouth if needed, Historical Med      aspirin 81 mg chewable tablet Chew 81 mg daily, Historical Med      cholecalciferol (VITAMIN D3) 1,000 units tablet Take 1,000 Units by mouth daily, Historical Med       cyanocobalamin (VITAMIN B-12) 500 mcg tablet Take 1 tablet (500 mcg total) by mouth daily, Starting Tue 3/19/2024, Normal      ibuprofen (MOTRIN) 800 mg tablet TAKE 1 TABLET (800 MG TOTAL) BY MOUTH DAILY AS NEEDED FOR MILD PAIN, Starting Wed 1/8/2025, Normal      meclizine (ANTIVERT) 12.5 MG tablet Take 1 tablet (12.5 mg total) by mouth every 12 (twelve) hours as needed for dizziness, Starting Mon 10/9/2023, Normal      Multiple Vitamins-Minerals (CENTRUM ADULTS PO) Take 1 capsule by mouth in the morning, Historical Med      omeprazole (PriLOSEC) 20 mg delayed release capsule TAKE 1 CAPSULE BY MOUTH EVERY DAY, Starting Fri 3/29/2024, Normal      phenazopyridine (PYRIDIUM) 200 mg tablet Take 1 tablet (200 mg total) by mouth 3 (three) times a day with meals, Starting Tue 1/21/2025, Normal           No discharge procedures on file.  ED SEPSIS DOCUMENTATION   Time reflects when diagnosis was documented in both MDM as applicable and the Disposition within this note       Time User Action Codes Description Comment    2/24/2025 10:01 AM Hailey Contreras Add [M79.601] Right arm pain                  Hailey Contreras PA-C  02/24/25 1946

## 2025-03-05 ENCOUNTER — OFFICE VISIT (OUTPATIENT)
Dept: FAMILY MEDICINE CLINIC | Facility: CLINIC | Age: 80
End: 2025-03-05

## 2025-03-05 VITALS
TEMPERATURE: 98.4 F | OXYGEN SATURATION: 97 % | DIASTOLIC BLOOD PRESSURE: 86 MMHG | BODY MASS INDEX: 32.4 KG/M2 | WEIGHT: 140 LBS | SYSTOLIC BLOOD PRESSURE: 142 MMHG | HEIGHT: 55 IN | HEART RATE: 89 BPM | RESPIRATION RATE: 18 BRPM

## 2025-03-05 DIAGNOSIS — K63.5 HYPERPLASTIC POLYP OF TRANSVERSE COLON: ICD-10-CM

## 2025-03-05 DIAGNOSIS — R79.89 ELEVATED TSH: ICD-10-CM

## 2025-03-05 DIAGNOSIS — M75.31 CALCIFIC TENDINITIS OF RIGHT SHOULDER REGION: ICD-10-CM

## 2025-03-05 DIAGNOSIS — Z00.00 MEDICARE ANNUAL WELLNESS VISIT, SUBSEQUENT: Primary | ICD-10-CM

## 2025-03-05 DIAGNOSIS — I49.8 SINUS ARRHYTHMIA: ICD-10-CM

## 2025-03-05 DIAGNOSIS — G31.84 MILD COGNITIVE IMPAIRMENT: ICD-10-CM

## 2025-03-05 DIAGNOSIS — I65.21 STENOSIS OF RIGHT INTERNAL CAROTID ARTERY: ICD-10-CM

## 2025-03-05 DIAGNOSIS — N20.0 KIDNEY STONE: ICD-10-CM

## 2025-03-05 DIAGNOSIS — E78.2 HYPERLIPIDEMIA, MIXED: ICD-10-CM

## 2025-03-05 PROCEDURE — G2211 COMPLEX E/M VISIT ADD ON: HCPCS | Performed by: FAMILY MEDICINE

## 2025-03-05 PROCEDURE — 99214 OFFICE O/P EST MOD 30 MIN: CPT | Performed by: FAMILY MEDICINE

## 2025-03-05 PROCEDURE — G0439 PPPS, SUBSEQ VISIT: HCPCS | Performed by: FAMILY MEDICINE

## 2025-03-05 NOTE — ASSESSMENT & PLAN NOTE
Saw neurosurgery In July 2024 who suspected chronicity - no surgical intervention recommended  Not on statin since it caused tingling of arms - does not want to restart  LDL goal less than 70  SBP goal less than 150, ideally BP goal <130/80  Continue ASA 81 mg daily  Missed her neurology appointment on 1/20/25 due to snow - rescheduled for June 2025

## 2025-03-05 NOTE — ASSESSMENT & PLAN NOTE
Last saw cardiologist in June 2024 and had holter monitoring which revealed no significant abnormalities  Missed Cardiology appt on 12/3/24 since had CT abdomen/pelvis done on same day - strongly encouraged to call and reschedule cardio appt

## 2025-03-05 NOTE — PATIENT INSTRUCTIONS
Go for fasting blood work one week before next office visit.        Medicare Preventive Visit Patient Instructions  Thank you for completing your Welcome to Medicare Visit or Medicare Annual Wellness Visit today. Your next wellness visit will be due in one year (3/6/2026).  The screening/preventive services that you may require over the next 5-10 years are detailed below. Some tests may not apply to you based off risk factors and/or age. Screening tests ordered at today's visit but not completed yet may show as past due. Also, please note that scanned in results may not display below.  Preventive Screenings:  Service Recommendations Previous Testing/Comments   Colorectal Cancer Screening  * Colonoscopy    * Fecal Occult Blood Test (FOBT)/Fecal Immunochemical Test (FIT)  * Fecal DNA/Cologuard Test  * Flexible Sigmoidoscopy Age: 45-75 years old   Colonoscopy: every 10 years (may be performed more frequently if at higher risk)  OR  FOBT/FIT: every 1 year  OR  Cologuard: every 3 years  OR  Sigmoidoscopy: every 5 years  Screening may be recommended earlier than age 45 if at higher risk for colorectal cancer. Also, an individualized decision between you and your healthcare provider will decide whether screening between the ages of 76-85 would be appropriate. Colonoscopy: 11/02/2017  FOBT/FIT: Not on file  Cologuard: Not on file  Sigmoidoscopy: Not on file          Breast Cancer Screening Age: 40+ years old  Frequency: every 1-2 years  Not required if history of left and right mastectomy Mammogram: 02/10/2025    Screening Current   Cervical Cancer Screening Between the ages of 21-29, pap smear recommended once every 3 years.   Between the ages of 30-65, can perform pap smear with HPV co-testing every 5 years.   Recommendations may differ for women with a history of total hysterectomy, cervical cancer, or abnormal pap smears in past. Pap Smear: 04/12/2023    Screening Not Indicated   Hepatitis C Screening Once for adults  born between 1945 and 1965  More frequently in patients at high risk for Hepatitis C Hep C Antibody: 02/23/2022    Screening Current   Diabetes Screening 1-2 times per year if you're at risk for diabetes or have pre-diabetes Fasting glucose: 84 mg/dL (11/25/2024)  A1C: No results in last 5 years (No results in last 5 years)  Screening Current   Cholesterol Screening Once every 5 years if you don't have a lipid disorder. May order more often based on risk factors. Lipid panel: 07/29/2024    Screening Not Indicated  History Lipid Disorder     Other Preventive Screenings Covered by Medicare:  Abdominal Aortic Aneurysm (AAA) Screening: covered once if your at risk. You're considered to be at risk if you have a family history of AAA.  Lung Cancer Screening: covers low dose CT scan once per year if you meet all of the following conditions: (1) Age 55-77; (2) No signs or symptoms of lung cancer; (3) Current smoker or have quit smoking within the last 15 years; (4) You have a tobacco smoking history of at least 20 pack years (packs per day multiplied by number of years you smoked); (5) You get a written order from a healthcare provider.  Glaucoma Screening: covered annually if you're considered high risk: (1) You have diabetes OR (2) Family history of glaucoma OR (3)  aged 50 and older OR (4)  American aged 65 and older  Osteoporosis Screening: covered every 2 years if you meet one of the following conditions: (1) You're estrogen deficient and at risk for osteoporosis based off medical history and other findings; (2) Have a vertebral abnormality; (3) On glucocorticoid therapy for more than 3 months; (4) Have primary hyperparathyroidism; (5) On osteoporosis medications and need to assess response to drug therapy.   Last bone density test (DXA Scan): 07/22/2020.  HIV Screening: covered annually if you're between the age of 15-65. Also covered annually if you are younger than 15 and older than 65 with  risk factors for HIV infection. For pregnant patients, it is covered up to 3 times per pregnancy.    Immunizations:  Immunization Recommendations   Influenza Vaccine Annual influenza vaccination during flu season is recommended for all persons aged >= 6 months who do not have contraindications   Pneumococcal Vaccine   * Pneumococcal conjugate vaccine = PCV13 (Prevnar 13), PCV15 (Vaxneuvance), PCV20 (Prevnar 20)  * Pneumococcal polysaccharide vaccine = PPSV23 (Pneumovax) Adults 19-63 yo with certain risk factors or if 65+ yo  If never received any pneumonia vaccine: recommend Prevnar 20 (PCV20)  Give PCV20 if previously received 1 dose of PCV13 or PPSV23   Hepatitis B Vaccine 3 dose series if at intermediate or high risk (ex: diabetes, end stage renal disease, liver disease)   Respiratory syncytial virus (RSV) Vaccine - COVERED BY MEDICARE PART D  * RSVPreF3 (Arexvy) CDC recommends that adults 60 years of age and older may receive a single dose of RSV vaccine using shared clinical decision-making (SCDM)   Tetanus (Td) Vaccine - COST NOT COVERED BY MEDICARE PART B Following completion of primary series, a booster dose should be given every 10 years to maintain immunity against tetanus. Td may also be given as tetanus wound prophylaxis.   Tdap Vaccine - COST NOT COVERED BY MEDICARE PART B Recommended at least once for all adults. For pregnant patients, recommended with each pregnancy.   Shingles Vaccine (Shingrix) - COST NOT COVERED BY MEDICARE PART B  2 shot series recommended in those 19 years and older who have or will have weakened immune systems or those 50 years and older     Health Maintenance Due:      Topic Date Due    Breast Cancer Screening: Mammogram  02/10/2026    Hepatitis C Screening  Completed     Immunizations Due:      Topic Date Due    COVID-19 Vaccine (4 - 2024-25 season) 09/01/2024     Advance Directives   What are advance directives?  Advance directives are legal documents that state your wishes  and plans for medical care. These plans are made ahead of time in case you lose your ability to make decisions for yourself. Advance directives can apply to any medical decision, such as the treatments you want, and if you want to donate organs.   What are the types of advance directives?  There are many types of advance directives, and each state has rules about how to use them. You may choose a combination of any of the following:  Living will:  This is a written record of the treatment you want. You can also choose which treatments you do not want, which to limit, and which to stop at a certain time. This includes surgery, medicine, IV fluid, and tube feedings.   Durable power of  for healthcare (DPAHC):  This is a written record that states who you want to make healthcare choices for you when you are unable to make them for yourself. This person, called a proxy, is usually a family member or a friend. You may choose more than 1 proxy.  Do not resuscitate (DNR) order:  A DNR order is used in case your heart stops beating or you stop breathing. It is a request not to have certain forms of treatment, such as CPR. A DNR order may be included in other types of advance directives.  Medical directive:  This covers the care that you want if you are in a coma, near death, or unable to make decisions for yourself. You can list the treatments you want for each condition. Treatment may include pain medicine, surgery, blood transfusions, dialysis, IV or tube feedings, and a ventilator (breathing machine).  Values history:  This document has questions about your views, beliefs, and how you feel and think about life. This information can help others choose the care that you would choose.  Why are advance directives important?  An advance directive helps you control your care. Although spoken wishes may be used, it is better to have your wishes written down. Spoken wishes can be misunderstood, or not followed.  Treatments may be given even if you do not want them. An advance directive may make it easier for your family to make difficult choices about your care.   Urinary Incontinence   Urinary incontinence (UI)  is when you lose control of your bladder. UI develops because your bladder cannot store or empty urine properly. The 3 most common types of UI are stress incontinence, urge incontinence, or both.  Medicines:   May be given to help strengthen your bladder control. Report any side effects of medication to your healthcare provider.  Do pelvic muscle exercises often:  Your pelvic muscles help you stop urinating. Squeeze these muscles tight for 5 seconds, then relax for 5 seconds. Gradually work up to squeezing for 10 seconds. Do 3 sets of 15 repetitions a day, or as directed. This will help strengthen your pelvic muscles and improve bladder control.  Train your bladder:  Go to the bathroom at set times, such as every 2 hours, even if you do not feel the urge to go. You can also try to hold your urine when you feel the urge to go. For example, hold your urine for 5 minutes when you feel the urge to go. As that becomes easier, hold your urine for 10 minutes.   Self-care:   Keep a UI record.  Write down how often you leak urine and how much you leak. Make a note of what you were doing when you leaked urine.  Drink liquids as directed. You may need to limit the amount of liquid you drink to help control your urine leakage. Do not drink any liquid right before you go to bed. Limit or do not have drinks that contain caffeine or alcohol.   Prevent constipation.  Eat a variety of high-fiber foods. Good examples are high-fiber cereals, beans, vegetables, and whole-grain breads. Walking is the best way to trigger your intestines to have a bowel movement.  Exercise regularly and maintain a healthy weight.  Weight loss and exercise will decrease pressure on your bladder and help you control your leakage.   Use a catheter as  directed  to help empty your bladder. A catheter is a tiny, plastic tube that is put into your bladder to drain your urine.   Go to behavior therapy as directed.  Behavior therapy may be used to help you learn to control your urge to urinate.    Weight Management   Why it is important to manage your weight:  Being overweight increases your risk of health conditions such as heart disease, high blood pressure, type 2 diabetes, and certain types of cancer. It can also increase your risk for osteoarthritis, sleep apnea, and other respiratory problems. Aim for a slow, steady weight loss. Even a small amount of weight loss can lower your risk of health problems.  How to lose weight safely:  A safe and healthy way to lose weight is to eat fewer calories and get regular exercise. You can lose up about 1 pound a week by decreasing the number of calories you eat by 500 calories each day.   Healthy meal plan for weight management:  A healthy meal plan includes a variety of foods, contains fewer calories, and helps you stay healthy. A healthy meal plan includes the following:  Eat whole-grain foods more often.  A healthy meal plan should contain fiber. Fiber is the part of grains, fruits, and vegetables that is not broken down by your body. Whole-grain foods are healthy and provide extra fiber in your diet. Some examples of whole-grain foods are whole-wheat breads and pastas, oatmeal, brown rice, and bulgur.  Eat a variety of vegetables every day.  Include dark, leafy greens such as spinach, kale, delmer greens, and mustard greens. Eat yellow and orange vegetables such as carrots, sweet potatoes, and winter squash.   Eat a variety of fruits every day.  Choose fresh or canned fruit (canned in its own juice or light syrup) instead of juice. Fruit juice has very little or no fiber.  Eat low-fat dairy foods.  Drink fat-free (skim) milk or 1% milk. Eat fat-free yogurt and low-fat cottage cheese. Try low-fat cheeses such as  mozzarella and other reduced-fat cheeses.  Choose meat and other protein foods that are low in fat.  Choose beans or other legumes such as split peas or lentils. Choose fish, skinless poultry (chicken or turkey), or lean cuts of red meat (beef or pork). Before you cook meat or poultry, cut off any visible fat.   Use less fat and oil.  Try baking foods instead of frying them. Add less fat, such as margarine, sour cream, regular salad dressing and mayonnaise to foods. Eat fewer high-fat foods. Some examples of high-fat foods include french fries, doughnuts, ice cream, and cakes.  Eat fewer sweets.  Limit foods and drinks that are high in sugar. This includes candy, cookies, regular soda, and sweetened drinks.  Exercise:  Exercise at least 30 minutes per day on most days of the week. Some examples of exercise include walking, biking, dancing, and swimming. You can also fit in more physical activity by taking the stairs instead of the elevator or parking farther away from stores. Ask your healthcare provider about the best exercise plan for you.      © Copyright [employer] 2018 Information is for End User's use only and may not be sold, redistributed or otherwise used for commercial purposes. All illustrations and images included in CareNotes® are the copyrighted property of [employer] Inc[employer] or [employer]

## 2025-03-05 NOTE — ASSESSMENT & PLAN NOTE
Last lipid panel in July 2024  Refuses to take statin since it caused tingling of upper arms  Strongly encouraged low cholesterol diet  Explained the risks of not having cholesterol levels under control including heart attack, stroke and even death  Repeat fasting blood work before next office visit in 3 months    Orders:    Comprehensive metabolic panel; Future    Lipid Panel with Direct LDL reflex; Future

## 2025-03-05 NOTE — ASSESSMENT & PLAN NOTE
Saw neurosurgeon in July 2024 - mild cognitive impairment versus pseudodementia secondary to her tendency to easily get stressed and potential underlying obstructive sleep apnea causing unrestful sleep  Had recommended she see Sleep Medicine - referral had been placed in March 2024 - patient still refuses to go

## 2025-03-10 PROBLEM — M75.31 CALCIFIC TENDINITIS OF RIGHT SHOULDER REGION: Status: ACTIVE | Noted: 2025-03-10

## 2025-03-10 NOTE — ASSESSMENT & PLAN NOTE
-Saw urology for hematuria and history of kidney stones in October 2024.  -Scheduled for left ureteroscopy, laser lithotripsy and stent placement on 1/21/25. Not able to have left ureteroscopy done since left kidney stone migrated distally by 1 cm since CT in December 2024.   -Needs to be scheduled with IR for left PCNU prior to ureteroscopy - sees urologist on 3/17/25 to discuss procedure.

## 2025-03-10 NOTE — ASSESSMENT & PLAN NOTE
Finishing medrodose pack  Continue tylenol+ibuprofen as needed (limit intake)   Declines PT at this time

## 2025-03-17 ENCOUNTER — PREP FOR PROCEDURE (OUTPATIENT)
Dept: INTERVENTIONAL RADIOLOGY/VASCULAR | Facility: CLINIC | Age: 80
End: 2025-03-17

## 2025-03-17 ENCOUNTER — OFFICE VISIT (OUTPATIENT)
Dept: UROLOGY | Facility: MEDICAL CENTER | Age: 80
End: 2025-03-17
Payer: COMMERCIAL

## 2025-03-17 VITALS
SYSTOLIC BLOOD PRESSURE: 140 MMHG | BODY MASS INDEX: 33.33 KG/M2 | HEIGHT: 55 IN | WEIGHT: 144 LBS | DIASTOLIC BLOOD PRESSURE: 90 MMHG | HEART RATE: 80 BPM

## 2025-03-17 DIAGNOSIS — E66.01 SEVERE OBESITY WITH BODY MASS INDEX (BMI) OF 35.0 TO 39.9 WITH COMORBIDITY (HCC): ICD-10-CM

## 2025-03-17 DIAGNOSIS — N20.0 KIDNEY STONES: Primary | ICD-10-CM

## 2025-03-17 DIAGNOSIS — N20.0 KIDNEY STONE: Primary | ICD-10-CM

## 2025-03-17 LAB
SL AMB  POCT GLUCOSE, UA: ABNORMAL
SL AMB LEUKOCYTE ESTERASE,UA: ABNORMAL
SL AMB POCT BILIRUBIN,UA: ABNORMAL
SL AMB POCT BLOOD,UA: ABNORMAL
SL AMB POCT CLARITY,UA: ABNORMAL
SL AMB POCT COLOR,UA: YELLOW
SL AMB POCT KETONES,UA: ABNORMAL
SL AMB POCT NITRITE,UA: ABNORMAL
SL AMB POCT PH,UA: 6.5
SL AMB POCT SPECIFIC GRAVITY,UA: 1.01
SL AMB POCT URINE PROTEIN: ABNORMAL
SL AMB POCT UROBILINOGEN: 2

## 2025-03-17 PROCEDURE — 99214 OFFICE O/P EST MOD 30 MIN: CPT | Performed by: UROLOGY

## 2025-03-17 PROCEDURE — 81003 URINALYSIS AUTO W/O SCOPE: CPT | Performed by: UROLOGY

## 2025-03-17 NOTE — PROGRESS NOTES
Name: Shai Osborne      : 1945      MRN: [id number]  Encounter Provider: Onesimo Gutierres MD  Encounter Date: 3/17/2025   [facility]  :  Assessment & Plan  Kidney stones  History of kidney stones with 9 mm left ureteral stone with moderate to marked left hydronephrosis on CT scan (2/3/2025)  Unable to pass wire through left ureteral orifice on attempted left URS and no passage of contrast up left ureter on retrograde on 2025  Asymptomatic of stone  Had prior left URS in 2019 for stones  - discussed referral to IR to place PCNU if possible, followed by ureteroscopy to treat stone  - discussed that long term obstruction can lead to permanent loss of kidney function  - discussed that her overall GFR was 76 in 2024  - will get interval BMP  - will get interval CT scan given time elapsed prior to proceeding with IR procedure  - referral placed for IR  - case request placed for ureteroscopy after IR procedure    Orders:    POCT urine dip auto non-scope    Basic metabolic panel; Future    CT abdomen pelvis wo contrast; Future    Severe obesity with body mass index (BMI) of 35.0 to 39.9 with comorbidity (HCC)             History of Present Illness   Shai Osborne is a 79 y.o. female who presents for follow up    Unable to intubate left ureteral orifice in OR and no passage of contrast up left ureter on attempted retrograde in 2025    Has 9 mm left ureteral stone with marked hydronephrosis on CT scan     No flank pain, no nausea/vomiting, no gross hematuria, no fevers/chills      Review of Systems   All other systems reviewed and are negative.    Past Medical History   Past Medical History:   Diagnosis Date    Ambulates with cane     Arthritis     Chronic pain disorder     sciatic    GERD (gastroesophageal reflux disease)     Hearing aid worn     Hydronephrosis with renal and ureteral calculus obstruction 2015    Kidney stone      Nephrolithiasis     Osteoarthritis     Osteoporosis     Sinus arrhythmia      Past Surgical History:   Procedure Laterality Date    CHOLECYSTECTOMY      FL RETROGRADE PYELOGRAM  3/7/2019    KIDNEY STONE SURGERY      KNEE ARTHROSCOPY Left     NY COLONOSCOPY FLX DX W/COLLJ SPEC WHEN PFRMD N/A 11/2/2017    Procedure: COLONOSCOPY with polypectomy x2;  Surgeon: Wild Stanley MD;  Location: [facility];  Service: Gastroenterology    NY CYSTO/URETERO W/LITHOTRIPSY &INDWELL STENT INSRT Left 1/21/2025    Procedure: CYSTOSCOPY;  Surgeon: Onesimo Gutierres MD;  Location:  MAIN OR;  Service: Urology    NY CYSTOURETHROSCOPY W/URETERAL CATHETERIZATION Left 3/7/2019    Procedure: CYSTOSCOPY, URETEROSCOPY WITH LASER, BASKET STONE EXTRACTION, RETROGRADE PYELOGRAM WITH INSERTION STENT URETERAL;  Surgeon: Pedro Mcarthur MD;  Location: [facility];  Service: Urology    TUBAL LIGATION       Family History   Problem Relation Age of Onset    No Known Problems Mother     Mental illness Father     Prostate cancer Father     Colon cancer Sister     No Known Problems Sister     No Known Problems Sister     No Known Problems Sister     Ovarian cancer Daughter     Mental illness Daughter     No Known Problems Maternal Grandmother     No Known Problems Maternal Grandfather     No Known Problems Paternal Grandmother     No Known Problems Paternal Grandfather     Breast cancer Neg Hx       reports that she has never smoked. She has never been exposed to tobacco smoke. She has never used smokeless tobacco. She reports that she does not currently use alcohol. She reports that she does not use drugs.  Current Outpatient Medications   Medication Instructions    aspirin 81 mg, Daily    cholecalciferol (VITAMIN D3) 1,000 Units, Daily    Ibuprofen-Acetaminophen (ACETAMINOPHEN-IBUPROFEN PO) Take by mouth    meclizine (ANTIVERT) 12.5 mg, Oral, Every 12 hours PRN    methylPREDNISolone 4 MG tablet therapy pack Use as directed on package    Multiple  "Vitamins-Minerals (CENTRUM ADULTS PO) 1 capsule, Daily    omeprazole (PRILOSEC) 20 mg, Oral, Daily    vitamin B-12 (VITAMIN B-12) 500 mcg, Oral, Daily     Allergies   Allergen Reactions    Diclofenac Sodium      Other reaction(s): GI Upset    Meperidine GI Intolerance    Tramadol      Other reaction(s): GI Upset         Objective   /90 (BP Location: Left arm, Patient Position: Sitting, Cuff Size: Large)   Pulse 80   Ht 4' 5\" (1.346 m)   Wt 65.3 kg (144 lb)   LMP  (LMP Unknown)   BMI 36.04 kg/m²     Physical Exam  Vitals and nursing note reviewed.   Constitutional:       General: She is not in acute distress.     Appearance: She is well-developed.   HENT:      Head: Normocephalic and atraumatic.   Eyes:      Conjunctiva/sclera: Conjunctivae normal.   Cardiovascular:      Rate and Rhythm: Normal rate and regular rhythm.      Heart sounds: No murmur heard.  Pulmonary:      Effort: Pulmonary effort is normal. No respiratory distress.      Breath sounds: Normal breath sounds.   Abdominal:      Palpations: Abdomen is soft.      Tenderness: There is no abdominal tenderness.   Musculoskeletal:         General: No swelling.      Cervical back: Neck supple.   Skin:     General: Skin is warm and dry.      Capillary Refill: Capillary refill takes less than 2 seconds.   Neurological:      Mental Status: She is alert.   Psychiatric:         Mood and Affect: Mood normal.           Results   No results found for: \"PSA\"  Lab Results   Component Value Date    CALCIUM 8.9 11/25/2024    K 3.6 11/25/2024    CO2 28 11/25/2024     11/25/2024    BUN 21 11/25/2024    CREATININE 0.74 11/25/2024     Lab Results   Component Value Date    WBC 6.70 02/23/2022    HGB 12.9 02/23/2022    HCT 38.5 02/23/2022    MCV 88 02/23/2022     02/23/2022       Office Urine Dip  Recent Results (from the past hour)   POCT urine dip auto non-scope    Collection Time: 03/17/25 11:52 AM   Result Value Ref Range     COLOR,UA Yellow     " CLARITY,UA Cloudy     SPECIFIC GRAVITY,UA 1.015      PH,UA 6.5     LEUKOCYTE ESTERASE,UA Small     NITRITE,UA Neg     GLUCOSE, UA Neg     KETONES,UA Neg     BILIRUBIN,UA Neg     BLOOD,UA Small     POCT URINE PROTEIN Neg     SL AMB POCT UROBILINOGEN 2

## 2025-03-18 ENCOUNTER — TELEPHONE (OUTPATIENT)
Dept: UROLOGY | Facility: MEDICAL CENTER | Age: 80
End: 2025-03-18

## 2025-03-18 ENCOUNTER — PREP FOR PROCEDURE (OUTPATIENT)
Dept: UROLOGY | Facility: MEDICAL CENTER | Age: 80
End: 2025-03-18

## 2025-03-18 DIAGNOSIS — N20.0 KIDNEY STONES: ICD-10-CM

## 2025-03-18 DIAGNOSIS — R39.89 SUSPECTED UTI: Primary | ICD-10-CM

## 2025-03-18 DIAGNOSIS — Z01.812 PRE-OPERATIVE LABORATORY EXAMINATION: ICD-10-CM

## 2025-03-18 NOTE — TELEPHONE ENCOUNTER
Case placed by Dr. Gutierres for a left #5. Per Surgery intake form, POC is Shy.     1st call out to schedule surgery.   voicemail left with call back number    HOLDING 4/23 @ [facility] with Dr. Gutierres    -needs  2 weeks prior

## 2025-03-20 ENCOUNTER — APPOINTMENT (OUTPATIENT)
Dept: LAB | Facility: CLINIC | Age: 80
End: 2025-03-20
Payer: COMMERCIAL

## 2025-03-20 DIAGNOSIS — Z01.812 PRE-OPERATIVE LABORATORY EXAMINATION: ICD-10-CM

## 2025-03-20 DIAGNOSIS — N20.0 KIDNEY STONES: ICD-10-CM

## 2025-03-20 DIAGNOSIS — R39.89 SUSPECTED UTI: ICD-10-CM

## 2025-03-20 DIAGNOSIS — R79.89 ELEVATED TSH: ICD-10-CM

## 2025-03-20 DIAGNOSIS — E78.2 HYPERLIPIDEMIA, MIXED: ICD-10-CM

## 2025-03-20 LAB
ALBUMIN SERPL BCG-MCNC: 3.7 G/DL (ref 3.5–5)
ALP SERPL-CCNC: 114 U/L (ref 34–104)
ALT SERPL W P-5'-P-CCNC: 10 U/L (ref 7–52)
ANION GAP SERPL CALCULATED.3IONS-SCNC: 7 MMOL/L (ref 4–13)
AST SERPL W P-5'-P-CCNC: 21 U/L (ref 13–39)
BACTERIA UR QL AUTO: ABNORMAL /HPF
BILIRUB SERPL-MCNC: 0.6 MG/DL (ref 0.2–1)
BILIRUB UR QL STRIP: NEGATIVE
BUN SERPL-MCNC: 19 MG/DL (ref 5–25)
CALCIUM SERPL-MCNC: 9.1 MG/DL (ref 8.4–10.2)
CAOX CRY URNS QL MICRO: ABNORMAL /HPF
CHLORIDE SERPL-SCNC: 105 MMOL/L (ref 96–108)
CHOLEST SERPL-MCNC: 180 MG/DL (ref ?–200)
CLARITY UR: ABNORMAL
CO2 SERPL-SCNC: 27 MMOL/L (ref 21–32)
COLOR UR: YELLOW
CREAT SERPL-MCNC: 0.89 MG/DL (ref 0.6–1.3)
GFR SERPL CREATININE-BSD FRML MDRD: 60 ML/MIN/1.73SQ M
GLUCOSE P FAST SERPL-MCNC: 88 MG/DL (ref 65–99)
GLUCOSE UR STRIP-MCNC: NEGATIVE MG/DL
HDLC SERPL-MCNC: 60 MG/DL
HGB UR QL STRIP.AUTO: ABNORMAL
KETONES UR STRIP-MCNC: NEGATIVE MG/DL
LDLC SERPL CALC-MCNC: 100 MG/DL (ref 0–100)
LEUKOCYTE ESTERASE UR QL STRIP: ABNORMAL
MUCOUS THREADS UR QL AUTO: ABNORMAL
NITRITE UR QL STRIP: NEGATIVE
NON-SQ EPI CELLS URNS QL MICRO: ABNORMAL /HPF
PH UR STRIP.AUTO: 6.5 [PH]
POTASSIUM SERPL-SCNC: 3.6 MMOL/L (ref 3.5–5.3)
PROT SERPL-MCNC: 6.9 G/DL (ref 6.4–8.4)
PROT UR STRIP-MCNC: ABNORMAL MG/DL
RBC #/AREA URNS AUTO: ABNORMAL /HPF
SODIUM SERPL-SCNC: 139 MMOL/L (ref 135–147)
SP GR UR STRIP.AUTO: 1.02 (ref 1–1.03)
TRIGL SERPL-MCNC: 98 MG/DL (ref ?–150)
TSH SERPL DL<=0.05 MIU/L-ACNC: 4.04 UIU/ML (ref 0.45–4.5)
UROBILINOGEN UR STRIP-ACNC: <2 MG/DL
WBC #/AREA URNS AUTO: ABNORMAL /HPF

## 2025-03-20 PROCEDURE — 84443 ASSAY THYROID STIM HORMONE: CPT

## 2025-03-20 PROCEDURE — 87086 URINE CULTURE/COLONY COUNT: CPT

## 2025-03-20 PROCEDURE — 36415 COLL VENOUS BLD VENIPUNCTURE: CPT

## 2025-03-20 PROCEDURE — 80061 LIPID PANEL: CPT

## 2025-03-20 PROCEDURE — 80053 COMPREHEN METABOLIC PANEL: CPT

## 2025-03-21 LAB — BACTERIA UR CULT: NORMAL

## 2025-03-22 ENCOUNTER — HOSPITAL ENCOUNTER (OUTPATIENT)
Dept: CT IMAGING | Facility: HOSPITAL | Age: 80
Discharge: HOME | End: 2025-03-22
Attending: UROLOGY
Payer: COMMERCIAL

## 2025-03-22 DIAGNOSIS — N20.0 KIDNEY STONES: ICD-10-CM

## 2025-03-22 PROCEDURE — 74176 CT ABD & PELVIS W/O CONTRAST: CPT

## 2025-03-26 ENCOUNTER — PREP FOR PROCEDURE (OUTPATIENT)
Dept: UROLOGY | Facility: MEDICAL CENTER | Age: 80
End: 2025-03-26

## 2025-03-26 DIAGNOSIS — N20.0 KIDNEY STONES: ICD-10-CM

## 2025-03-26 DIAGNOSIS — R39.89 SUSPECTED UTI: Primary | ICD-10-CM

## 2025-03-26 DIAGNOSIS — Z01.812 PRE-OPERATIVE LABORATORY EXAMINATION: ICD-10-CM

## 2025-03-26 NOTE — TELEPHONE ENCOUNTER
Pt granddaughter called to clarify what the appt on 4/9 was  advised that was with IR and it was different then the morris that she was scheduled for 4/23/25.  That she does need both appt. She verbalized understanding

## 2025-03-26 NOTE — TELEPHONE ENCOUNTER
Spoke with patient's grand daughter Shy and confirmed surgery date of 4/23  Type of surgery: Left #5  Operating physician:Dr. Gutierres  Location of surgery: [facility]    Verbally went over prep with patient on 3/26  NPO  Bowel prep? No  Hospital calls afternoon prior with arrival time (calls Friday afternoon for Monday surgery)  Patient needs ride to and from surgery   outpatient  Pre-op testing to be done 2 weeks prior to surgery. All testing can be done as a walk-in. EKG can only be done as a walk-in at any [facility].  Pre-op Labs needed: UC    Blood thinners:   None - does not hold Aspirin  Clearances needed: None    Emailed to patient on 3/26  Copy of packet scanned into Media  Labs in packet and in electronic record   Soap prep in packet  nurse will call with post-op information

## 2025-04-02 ENCOUNTER — TELEPHONE (OUTPATIENT)
Dept: RADIOLOGY | Facility: HOSPITAL | Age: 80
End: 2025-04-02

## 2025-04-03 ENCOUNTER — TELEPHONE (OUTPATIENT)
Dept: RADIOLOGY | Facility: HOSPITAL | Age: 80
End: 2025-04-03

## 2025-04-03 RX ORDER — SODIUM CHLORIDE 9 MG/ML
75 INJECTION, SOLUTION INTRAVENOUS CONTINUOUS
Status: CANCELLED | OUTPATIENT
Start: 2025-04-03

## 2025-04-07 ENCOUNTER — APPOINTMENT (OUTPATIENT)
Dept: LAB | Facility: CLINIC | Age: 80
End: 2025-04-07
Payer: COMMERCIAL

## 2025-04-07 DIAGNOSIS — R39.89 SUSPECTED UTI: ICD-10-CM

## 2025-04-07 DIAGNOSIS — Z01.812 PRE-OPERATIVE LABORATORY EXAMINATION: ICD-10-CM

## 2025-04-07 DIAGNOSIS — N20.0 KIDNEY STONES: ICD-10-CM

## 2025-04-07 LAB
BACTERIA UR QL AUTO: ABNORMAL /HPF
BILIRUB UR QL STRIP: NEGATIVE
CLARITY UR: CLEAR
COLOR UR: ABNORMAL
GLUCOSE UR STRIP-MCNC: NEGATIVE MG/DL
HGB UR QL STRIP.AUTO: ABNORMAL
KETONES UR STRIP-MCNC: NEGATIVE MG/DL
LEUKOCYTE ESTERASE UR QL STRIP: ABNORMAL
MUCOUS THREADS UR QL AUTO: ABNORMAL
NITRITE UR QL STRIP: NEGATIVE
NON-SQ EPI CELLS URNS QL MICRO: ABNORMAL /HPF
PH UR STRIP.AUTO: 7 [PH]
PROT UR STRIP-MCNC: NEGATIVE MG/DL
RBC #/AREA URNS AUTO: ABNORMAL /HPF
SP GR UR STRIP.AUTO: 1.01 (ref 1–1.03)
UROBILINOGEN UR STRIP-ACNC: <2 MG/DL
WBC #/AREA URNS AUTO: ABNORMAL /HPF

## 2025-04-07 PROCEDURE — 87077 CULTURE AEROBIC IDENTIFY: CPT

## 2025-04-07 PROCEDURE — 87086 URINE CULTURE/COLONY COUNT: CPT

## 2025-04-07 PROCEDURE — 81001 URINALYSIS AUTO W/SCOPE: CPT

## 2025-04-08 LAB
BACTERIA UR CULT: ABNORMAL
BACTERIA UR CULT: ABNORMAL

## 2025-04-09 ENCOUNTER — ANESTHESIA (OUTPATIENT)
Dept: RADIOLOGY | Facility: HOSPITAL | Age: 80
End: 2025-04-09
Payer: COMMERCIAL

## 2025-04-09 ENCOUNTER — ANESTHESIA EVENT (OUTPATIENT)
Dept: RADIOLOGY | Facility: HOSPITAL | Age: 80
End: 2025-04-09
Payer: COMMERCIAL

## 2025-04-09 ENCOUNTER — HOSPITAL ENCOUNTER (OUTPATIENT)
Dept: RADIOLOGY | Facility: HOSPITAL | Age: 80
Discharge: HOME | End: 2025-04-09
Attending: RADIOLOGY
Payer: COMMERCIAL

## 2025-04-09 VITALS
DIASTOLIC BLOOD PRESSURE: 68 MMHG | SYSTOLIC BLOOD PRESSURE: 128 MMHG | BODY MASS INDEX: 35.48 KG/M2 | TEMPERATURE: 98.1 F | OXYGEN SATURATION: 95 % | HEART RATE: 70 BPM | RESPIRATION RATE: 18 BRPM | WEIGHT: 141.76 LBS

## 2025-04-09 DIAGNOSIS — N20.0 KIDNEY STONE: ICD-10-CM

## 2025-04-09 LAB
ANION GAP SERPL CALCULATED.3IONS-SCNC: 6 MMOL/L (ref 4–13)
BUN SERPL-MCNC: 18 MG/DL (ref 5–25)
CALCIUM SERPL-MCNC: 8.9 MG/DL (ref 8.4–10.2)
CHLORIDE SERPL-SCNC: 106 MMOL/L (ref 96–108)
CO2 SERPL-SCNC: 26 MMOL/L (ref 21–32)
CREAT SERPL-MCNC: 0.95 MG/DL (ref 0.6–1.3)
ERYTHROCYTE [DISTWIDTH] IN BLOOD BY AUTOMATED COUNT: 14.2 % (ref 11.6–15.1)
GFR SERPL CREATININE-BSD FRML MDRD: 56 ML/MIN/1.73SQ M
GLUCOSE P FAST SERPL-MCNC: 90 MG/DL (ref 65–99)
GLUCOSE SERPL-MCNC: 90 MG/DL (ref 65–140)
HCT VFR BLD AUTO: 39.3 % (ref 34.8–46.1)
HGB BLD-MCNC: 12.8 G/DL (ref 11.5–15.4)
INR PPP: 1.02 (ref 0.85–1.19)
MCH RBC QN AUTO: 28.3 PG (ref 26.8–34.3)
MCHC RBC AUTO-ENTMCNC: 32.6 G/DL (ref 31.4–37.4)
MCV RBC AUTO: 87 FL (ref 82–98)
PLATELET # BLD AUTO: 180 THOUSANDS/UL (ref 149–390)
PMV BLD AUTO: 11.3 FL (ref 8.9–12.7)
POTASSIUM SERPL-SCNC: 3.3 MMOL/L (ref 3.5–5.3)
PROTHROMBIN TIME: 13.6 SECONDS (ref 12.3–15)
RBC # BLD AUTO: 4.53 MILLION/UL (ref 3.81–5.12)
SODIUM SERPL-SCNC: 138 MMOL/L (ref 135–147)
WBC # BLD AUTO: 7.77 THOUSAND/UL (ref 4.31–10.16)

## 2025-04-09 PROCEDURE — C1769 GUIDE WIRE: HCPCS

## 2025-04-09 PROCEDURE — C1894 INTRO/SHEATH, NON-LASER: HCPCS

## 2025-04-09 PROCEDURE — C1887 CATHETER, GUIDING: HCPCS

## 2025-04-09 PROCEDURE — 50433 PLMT NEPHROURETERAL CATHETER: CPT

## 2025-04-09 PROCEDURE — 85610 PROTHROMBIN TIME: CPT | Performed by: RADIOLOGY

## 2025-04-09 PROCEDURE — 85027 COMPLETE CBC AUTOMATED: CPT | Performed by: RADIOLOGY

## 2025-04-09 PROCEDURE — 80048 BASIC METABOLIC PNL TOTAL CA: CPT | Performed by: RADIOLOGY

## 2025-04-09 PROCEDURE — C2617 STENT, NON-COR, TEM W/O DEL: HCPCS

## 2025-04-09 PROCEDURE — 50433 PLMT NEPHROURETERAL CATHETER: CPT | Performed by: STUDENT IN AN ORGANIZED HEALTH CARE EDUCATION/TRAINING PROGRAM

## 2025-04-09 RX ORDER — LIDOCAINE WITH 8.4% SOD BICARB 0.9%(10ML)
SYRINGE (ML) INJECTION AS NEEDED
Status: COMPLETED | OUTPATIENT
Start: 2025-04-09 | End: 2025-04-09

## 2025-04-09 RX ORDER — CEFAZOLIN SODIUM 2 G/50ML
2000 SOLUTION INTRAVENOUS ONCE
Status: COMPLETED | OUTPATIENT
Start: 2025-04-09 | End: 2025-04-09

## 2025-04-09 RX ORDER — ONDANSETRON 2 MG/ML
4 INJECTION INTRAMUSCULAR; INTRAVENOUS ONCE
Status: COMPLETED | OUTPATIENT
Start: 2025-04-09 | End: 2025-04-09

## 2025-04-09 RX ORDER — PROPOFOL 10 MG/ML
INJECTION, EMULSION INTRAVENOUS AS NEEDED
Status: DISCONTINUED | OUTPATIENT
Start: 2025-04-09 | End: 2025-04-09

## 2025-04-09 RX ORDER — PROPOFOL 10 MG/ML
INJECTION, EMULSION INTRAVENOUS CONTINUOUS PRN
Status: DISCONTINUED | OUTPATIENT
Start: 2025-04-09 | End: 2025-04-09

## 2025-04-09 RX ORDER — OXYCODONE HYDROCHLORIDE 5 MG/1
5 TABLET ORAL EVERY 4 HOURS PRN
Refills: 0 | Status: DISCONTINUED | OUTPATIENT
Start: 2025-04-09 | End: 2025-04-10 | Stop reason: HOSPADM

## 2025-04-09 RX ORDER — FENTANYL CITRATE 50 UG/ML
INJECTION, SOLUTION INTRAMUSCULAR; INTRAVENOUS AS NEEDED
Status: DISCONTINUED | OUTPATIENT
Start: 2025-04-09 | End: 2025-04-09

## 2025-04-09 RX ORDER — SODIUM CHLORIDE 9 MG/ML
10 INJECTION, SOLUTION INTRAMUSCULAR; INTRAVENOUS; SUBCUTANEOUS DAILY
Qty: 300 ML | Refills: 3 | Status: SHIPPED | OUTPATIENT
Start: 2025-04-09 | End: 2025-04-14 | Stop reason: SDUPTHER

## 2025-04-09 RX ORDER — SODIUM CHLORIDE 9 MG/ML
125 INJECTION, SOLUTION INTRAVENOUS CONTINUOUS
Status: DISCONTINUED | OUTPATIENT
Start: 2025-04-09 | End: 2025-04-10 | Stop reason: HOSPADM

## 2025-04-09 RX ADMIN — DEXMEDETOMIDINE HYDROCHLORIDE 4 MCG: 100 INJECTION, SOLUTION INTRAVENOUS at 08:52

## 2025-04-09 RX ADMIN — Medication 10 ML: at 09:00

## 2025-04-09 RX ADMIN — FENTANYL CITRATE 50 MCG: 50 INJECTION INTRAMUSCULAR; INTRAVENOUS at 08:51

## 2025-04-09 RX ADMIN — IOHEXOL 55 ML: 350 INJECTION, SOLUTION INTRAVENOUS at 09:36

## 2025-04-09 RX ADMIN — FENTANYL CITRATE 50 MCG: 50 INJECTION INTRAMUSCULAR; INTRAVENOUS at 08:53

## 2025-04-09 RX ADMIN — CEFAZOLIN SODIUM 2000 MG: 2 SOLUTION INTRAVENOUS at 08:15

## 2025-04-09 RX ADMIN — PROPOFOL 10 MCG/KG/MIN: 10 INJECTION, EMULSION INTRAVENOUS at 09:10

## 2025-04-09 RX ADMIN — SODIUM CHLORIDE 125 ML/HR: 0.9 INJECTION, SOLUTION INTRAVENOUS at 07:37

## 2025-04-09 RX ADMIN — DEXMEDETOMIDINE HYDROCHLORIDE 4 MCG: 100 INJECTION, SOLUTION INTRAVENOUS at 09:19

## 2025-04-09 RX ADMIN — ONDANSETRON 4 MG: 2 INJECTION INTRAMUSCULAR; INTRAVENOUS at 10:14

## 2025-04-09 RX ADMIN — PROPOFOL 20 MG: 10 INJECTION, EMULSION INTRAVENOUS at 08:52

## 2025-04-09 RX ADMIN — DEXMEDETOMIDINE HYDROCHLORIDE 4 MCG: 100 INJECTION, SOLUTION INTRAVENOUS at 08:53

## 2025-04-09 RX ADMIN — DEXMEDETOMIDINE HYDROCHLORIDE 4 MCG: 100 INJECTION, SOLUTION INTRAVENOUS at 09:04

## 2025-04-09 NOTE — DISCHARGE INSTRUCTIONS
"                                                                      TUBE CARE INSTRUCTIONS    Care after your procedure:    Resume your normal diet. Small sips of flat soda will help with nausea.    1. The properly functioning catheter should be forward flushed once (1x) daily with 10ml of normal saline using clean technique. You will be given a prescription for flushes.   To flush the tube, clean both connections with alcohol swab.Twist off the drainage bag/ bulb  tubing and twist the saline syringe into the drainage tube and flush. Remove the syringe and twist the drainage bag / bulb tubing tubing back on.    2. The drainage bag/bulb may be emptied as necessary. Keep a record of the amount of fluid you drain from your tube. This should be done with clean technique as well.     3. A fresh dressing should be applied daily over the tube insertion site.     4. As the tube is secured to the skin with only a suture,try not to pull on your tube. Tub baths are not permitted. Showers are permitted if the patient's skin entry site is prevented from getting wet. Similarly, washcloth \"baths\" are acceptable.     Contact Interventional Radiology at [phone] if:    1. Leakage or large amounts of liquid around the catheter.    2. Fever of 101 degrees lasting several hours without other obvious cause (such as sore throat, flu, etc).    3. Persistent nausea or vomiting.    4. Diminished drainage, which may be associated with pressure or pain. Or when the     drainage from your tube is less than 10mls for 48 hours.    5. Catheter pulled back or falls out.      The following pharmacies carry the flush syringes.       [facility]                     [facility] [address]                         [phone]  [place]                       [place]  Phone [phone]            Phone [phone]                         "                                                                               Christophe Blanco   [facility]             [facility]                                [phone]  [address] [place]  Phone [phone]            Phone [phone]                      [facility]                                                                                                          [phone]  [facility]  [address]  [place]                                                                               [facility]  Phone [phone]

## 2025-04-09 NOTE — ANESTHESIA PREPROCEDURE EVALUATION
Procedure:  IR NEPHROURETERAL STENT PLACEMENT    Relevant Problems   CARDIO   (+) Hyperlipidemia, mixed   (+) Internal carotid artery stenosis   (+) Sinus arrhythmia      GI/HEPATIC   (+) Gastroesophageal reflux disease without esophagitis      /RENAL   (+) Kidney stone      NEURO/PSYCH   (+) Chronic foot pain, left      Neurology/Sleep   (+) Mild cognitive impairment      Urinary   (+) Gross hematuria      Rheumatology   (+) Cervical spondylosis      Other   (+) Class 2 obesity due to excess calories without serious comorbidity with body mass index (BMI) of 38.0 to 38.9 in adult   (+) Severe obesity with body mass index (BMI) of 35.0 to 39.9 with comorbidity (HCC)    2024 Echo:    Left Ventricle: Left ventricular cavity size is normal. Wall thickness is normal. The left ventricular ejection fraction is 65%. Systolic function is normal. Wall motion is normal. Diastolic function is mildly abnormal, consistent with grade I (abnormal) relaxation.    Tricuspid Valve: There is mild regurgitation.    Pulmonic Valve: There is mild regurgitation.    Physical Exam    Airway    Mallampati score: III  TM Distance: <3 FB       Dental        Cardiovascular  Rhythm: regular, Rate: normal    Pulmonary   Breath sounds clear to auscultation    Other Findings  post-pubertal.      Anesthesia Plan  ASA Score- 3     Anesthesia Type- general with ASA Monitors.         Additional Monitors:     Airway Plan: ETT.           Plan Factors-Exercise tolerance (METS): <4 METS.    Chart reviewed.   Existing labs reviewed.     Patient is not a current smoker.      Obstructive sleep apnea risk education given perioperatively.        Induction- intravenous.    Postoperative Plan- Plan for postoperative opioid use.         Informed Consent- Anesthetic plan and risks discussed with patient.        NPO Status:  Vitals Value Taken Time   Date of last liquid 04/08/25 04/09/25 0718   Time of last liquid 2100 04/09/25 0718   Date of last solid 04/08/25  04/09/25 0718   Time of last solid 1630 04/09/25 0718

## 2025-04-09 NOTE — BRIEF OP NOTE (RAD/CATH)
INTERVENTIONAL RADIOLOGY PROCEDURE NOTE    Date: 4/9/2025    Procedure:   Procedure Summary       Date: 04/09/25 Room / Location: [facility] Interventional Radiology    Anesthesia Start: 0850 Anesthesia Stop:     Procedure: IR NEPHROURETERAL STENT PLACEMENT Diagnosis:       Kidney stone      (left ureteral calculus, urology unable to access ureteral orifice)    Scheduled Providers: Jose Alfredo Patterson MD Responsible Provider: Jose Alfredo Patterson MD    Anesthesia Type: general, IV sedation with anesthesia ASA Status: 3            Preoperative diagnosis:   1. Kidney stone         Postoperative diagnosis: Same.    Surgeon: Onesimo Guillaume MD     Assistant: None. No qualified resident was available.    Blood loss: 5 ml    Specimens: none.     Findings:   L PCNU placement.    Complications: None immediate.    Anesthesia: MAC sedation

## 2025-04-09 NOTE — H&P
Interventional Radiology Preprocedure Note    History/Indication for procedure:   Shai Osborne is a 79 y.o. female with a PMH of obstructive left ureteral calculus who presents for L PCN/PCNU placement.    Relevant past medical history:    Past Medical History:   Diagnosis Date    Ambulates with cane     Arthritis     Chronic pain disorder     sciatic    GERD (gastroesophageal reflux disease)     Hearing aid worn     Hydronephrosis with renal and ureteral calculus obstruction 12/23/2015    Kidney stone     Nephrolithiasis     Osteoarthritis     Osteoporosis     Sinus arrhythmia      Patient Active Problem List   Diagnosis    Sacroiliitis (HCC) - Bilateral    Lumbar spondylosis    Degenerative disc disease, thoracic    Kidney stone    Chronic rhinitis    Umbilical hernia without obstruction and without gangrene    Osteopenia of left hip    Mixed conductive and sensorineural hearing loss of right ear with restricted hearing of left ear    Vertigo    Class 2 obesity due to excess calories without serious comorbidity with body mass index (BMI) of 38.0 to 38.9 in adult    Endometrial polyp    Hyperlipidemia, mixed    Chronic foot pain, left    Gastroesophageal reflux disease without esophagitis    Family history of colon cancer    Cervical spondylosis    Hyperplastic polyp of transverse colon    Dry eye syndrome of both eyes    Mild cognitive impairment    Irregularly irregular heart rhythm    Internal carotid artery stenosis    Sinus arrhythmia    Elevated TSH    Gross hematuria    Calcific tendinitis of right shoulder region    Severe obesity with body mass index (BMI) of 35.0 to 39.9 with comorbidity (HCC)       /68   Pulse 75   Temp 97.5 °F (36.4 °C) (Temporal)   Resp 20   Wt 64.3 kg (141 lb 12.1 oz)   LMP  (LMP Unknown)   SpO2 96%   BMI 35.48 kg/m²     Medications:    Inpatient Medications:     Scheduled Medications:  Current Facility-Administered Medications   Medication Dose Route Frequency  Provider Last Rate    sodium chloride  125 mL/hr Intravenous Continuous Poppy Garsia  mL/hr (04/09/25 0737)       Infusions:  sodium chloride, 125 mL/hr, Last Rate: 125 mL/hr (04/09/25 0737)        PRN:      Outpatient Medications:  Current Outpatient Medications on File Prior to Encounter   Medication Sig Dispense Refill    cholecalciferol (VITAMIN D3) 1,000 units tablet Take 1,000 Units by mouth daily      cyanocobalamin (VITAMIN B-12) 500 mcg tablet Take 1 tablet (500 mcg total) by mouth daily 90 tablet 3    Ibuprofen-Acetaminophen (ACETAMINOPHEN-IBUPROFEN PO) Take by mouth      Multiple Vitamins-Minerals (CENTRUM ADULTS PO) Take 1 capsule by mouth in the morning      aspirin 81 mg chewable tablet Chew 81 mg daily (Patient not taking: Reported on 4/3/2025)      meclizine (ANTIVERT) 12.5 MG tablet Take 1 tablet (12.5 mg total) by mouth every 12 (twelve) hours as needed for dizziness 10 tablet 0    methylPREDNISolone 4 MG tablet therapy pack Use as directed on package 21 tablet 0    omeprazole (PriLOSEC) 20 mg delayed release capsule TAKE 1 CAPSULE BY MOUTH EVERY DAY 90 capsule 1     No current facility-administered medications on file prior to encounter.       Allergies   Allergen Reactions    Diclofenac Sodium      Other reaction(s): GI Upset    Meperidine GI Intolerance    Tramadol      Other reaction(s): GI Upset       Anticoagulants: none    ASA classification: ASA 3 - Patient with moderate systemic disease with functional limitations    Airway Assessment: II (hard and soft palate, upper portion of tonsils anduvula visible)    Relevant family history: None    Relevant review of systems: None.    Prior sedation/anesthesia: yes    Can the patient lie flat? Yes     NPO Status: yes    Labs:   CBC with diff:   Lab Results   Component Value Date    WBC 7.77 04/09/2025    HGB 12.8 04/09/2025    HCT 39.3 04/09/2025    MCV 87 04/09/2025     04/09/2025    RBC 4.53 04/09/2025    MCH 28.3 04/09/2025    [facility]  32.6 04/09/2025    RDW 14.2 04/09/2025    MPV 11.3 04/09/2025    NRBC 0 03/07/2019     BMP/CMP:  Lab Results   Component Value Date    K 3.3 (L) 04/09/2025     04/09/2025    CO2 26 04/09/2025    BUN 18 04/09/2025    CREATININE 0.95 04/09/2025    CALCIUM 8.9 04/09/2025    AST 21 03/20/2025    ALT 10 03/20/2025    ALKPHOS 114 (H) 03/20/2025    EGFR 56 04/09/2025   ,     Coags:   Lab Results   Component Value Date    INR 1.02 04/09/2025   ,   Results from last 7 days   Lab Units 04/09/25  0729   INR  1.02        Relevant imaging studies:   Reviewed.    Directed physical examination:  I agree with the physical exam performed on 3/17/25 and there are no additional changes.    Assessment/Plan:   L PCNU/PCN    Sedation/Anesthesia plan:  MAC sedation will be used as needed for procedure.    Consent with alternatives to the procedure, risks and benefits have been explained and discussed with the patient/patient's family: yes.

## 2025-04-09 NOTE — ANESTHESIA POSTPROCEDURE EVALUATION
Post-Op Assessment Note    CV Status:  Stable  Pain Score: 0    Pain management: adequate       Mental Status:  Alert   Hydration Status:  Stable   PONV Controlled:  Controlled   Airway Patency:  Patent     Post Op Vitals Reviewed: Yes    No anethesia notable event occurred.    Staff: CRNA           Last Filed PACU Vitals:  Vitals Value Taken Time   Temp     Pulse     BP     Resp     SpO2

## 2025-04-09 NOTE — SEDATION DOCUMENTATION
Dr. Guillaume spoke with pt pre procedure, consent and grand daughter will flush her tube per p[patient.

## 2025-04-09 NOTE — ANESTHESIA POSTPROCEDURE EVALUATION
Post-Op Assessment Note    CV Status:  Stable    Pain management: adequate       Mental Status:  Alert and awake   Hydration Status:  Euvolemic   PONV Controlled:  Controlled   Airway Patency:  Patent     Post Op Vitals Reviewed: Yes    No anethesia notable event occurred.    Staff: Anesthesiologist           Last Filed PACU Vitals:  Vitals Value Taken Time   Temp     Pulse 58 04/09/25 1018   /54 04/09/25 1008   Resp 16 04/09/25 1008   SpO2 94 % 04/09/25 1018   Vitals shown include unfiled device data.    Modified Jagdish:     Vitals Value Taken Time   Activity 2 04/09/25 0953   Respiration 2 04/09/25 0953   Circulation 2 04/09/25 0953   Consciousness 2 04/09/25 0953   Oxygen Saturation 2 04/09/25 0953     Modified Jagdish Score: 10

## 2025-04-11 ENCOUNTER — PROCEDURE VISIT (OUTPATIENT)
Dept: UROLOGY | Facility: MEDICAL CENTER | Age: 80
End: 2025-04-11
Payer: COMMERCIAL

## 2025-04-11 ENCOUNTER — TELEPHONE (OUTPATIENT)
Dept: UROLOGY | Facility: MEDICAL CENTER | Age: 80
End: 2025-04-11

## 2025-04-11 VITALS — SYSTOLIC BLOOD PRESSURE: 160 MMHG | DIASTOLIC BLOOD PRESSURE: 96 MMHG

## 2025-04-11 DIAGNOSIS — N20.0 KIDNEY STONE: Primary | ICD-10-CM

## 2025-04-11 DIAGNOSIS — N20.0 KIDNEY STONES: Primary | ICD-10-CM

## 2025-04-11 LAB — POST-VOID RESIDUAL VOLUME, ML POC: 85 ML

## 2025-04-11 PROCEDURE — 51798 US URINE CAPACITY MEASURE: CPT

## 2025-04-11 PROCEDURE — PBNCHG PB NO CHARGE PLACEHOLDER

## 2025-04-14 ENCOUNTER — TELEPHONE (OUTPATIENT)
Age: 80
End: 2025-04-14

## 2025-04-14 DIAGNOSIS — N20.0 KIDNEY STONE: ICD-10-CM

## 2025-04-14 RX ORDER — SODIUM CHLORIDE 9 MG/ML
10 INJECTION, SOLUTION INTRAMUSCULAR; INTRAVENOUS; SUBCUTANEOUS DAILY
Qty: 300 ML | Refills: 3 | Status: SHIPPED | OUTPATIENT
Start: 2025-04-14 | End: 2025-04-14 | Stop reason: SDUPTHER

## 2025-04-14 RX ORDER — SODIUM CHLORIDE 9 MG/ML
10 INJECTION, SOLUTION INTRAMUSCULAR; INTRAVENOUS; SUBCUTANEOUS DAILY
Qty: 300 ML | Refills: 3 | Status: SHIPPED | OUTPATIENT
Start: 2025-04-14 | End: 2025-04-23

## 2025-04-14 NOTE — TELEPHONE ENCOUNTER
Patients granddaughter Shy called rx refill line inquiring on a prescription for Saline flushes for catheter. She was informed that a script would be sent to pharmacy, although never received.   Patient is completely without during this time.   Please send to [facility] pharmacy #[id number].

## 2025-04-14 NOTE — TELEPHONE ENCOUNTER
PT calling that she does not have any thing to do the flush.    Stated she called [facility] in [facility] and they did not receive and script     Please resend to [facility] on [address] in [place]

## 2025-04-15 ENCOUNTER — TELEPHONE (OUTPATIENT)
Dept: UROLOGY | Facility: MEDICAL CENTER | Age: 80
End: 2025-04-15

## 2025-04-15 NOTE — TELEPHONE ENCOUNTER
Patient called today re: the sodium chloride, PF, 0.9 % to say that the [facility] on [address] Cannot fill this script due to them not having it.    Patient states that any local pharmacy is okay with her. She didn't specify a pharmacy of choice.    Patient was warm transferred to [facility] to ask question. Pt is concerned that she has not been able to flush cath since yesterday.     Please review.    Call back [facility]  [phone]

## 2025-04-15 NOTE — TELEPHONE ENCOUNTER
Spoke to pt's granddaughter per comm consent and advised that Pyridium was sent to pharmacy.  Granddaughter stated there was no RX for flush syringes at the pharmacy.  Confirmed that RX was called in with 3 refills.  She is to call the pharmacy to pick them up today.

## 2025-04-15 NOTE — TELEPHONE ENCOUNTER
Patient's granddaughter, Shy, called to verify that it is okay that the patient has not had her tubing has not been flushed since yesterday. I advised to contact the office if she notices any sediment build up or if the urine stops flowing. Shy verbalized understanding.

## 2025-04-15 NOTE — PROGRESS NOTES
4/11/2025  Shai Osborne is a 79 y.o. female  [id number]    Diagnosis:  Chief Complaint    Nephrolithiasis         Patient presents for follow up post void residual managed by Dr. Gutierres    Plan:    Pt scheduled for CYSTOSCOPY URETEROSCOPY WITH LITHOTRIPSY HOLMIUM LASER, RETROGRADE PYELOGRAM AND INSERTION STENT URETERAL (Left: Bladder) on 4/23/25      Assessment:    Pt was concerned about red urine draining for neph tube.  Dr. Tsai was called to the room and advised pt that this was normal.  PVR was performed measuring 85 mls. Pt experiencing burning with urination.  UC from 4/7/25 was reviewed by provider and found  to have no urinary pathogen requiring an antibiotic.  Pt was prescribed Pyridum.  Pt is to hydrate well.      Vitals:    04/11/25 1134   BP: 160/96         Alison Stark RN

## 2025-04-18 ENCOUNTER — TELEPHONE (OUTPATIENT)
Dept: UROLOGY | Facility: MEDICAL CENTER | Age: 80
End: 2025-04-18

## 2025-04-18 NOTE — TELEPHONE ENCOUNTER
Called [facility] pharmacy regarding pt's saline flushes.  Granddaughter states they don't have them in stock.  Left msg on [facility] voicemail asking them to call back to let us know the status of the prescription.

## 2025-04-18 NOTE — PRE-PROCEDURE INSTRUCTIONS
Pre-Surgery Instructions:   Medication Instructions    cholecalciferol (VITAMIN D3) 1,000 units tablet Stop taking 7 days prior to surgery.    cyanocobalamin (VITAMIN B-12) 500 mcg tablet Stop taking 7 days prior to surgery.    Ibuprofen-Acetaminophen (ACETAMINOPHEN-IBUPROFEN PO) Stop taking 7 days prior to surgery.    meclizine (ANTIVERT) 12.5 MG tablet Uses PRN- OK to take day of surgery    Multiple Vitamins-Minerals (CENTRUM ADULTS PO) Stop taking 7 days prior to surgery.    omeprazole (PriLOSEC) 20 mg delayed release capsule Take day of surgery.    phenazopyridine (PYRIDIUM) 100 mg tablet Instructions provided by MD    sodium chloride, PF, 0.9 % Instructions provided by MD    Medication instructions for day of surgery reviewed. Please take all instructed medications with only a sip of water.       You will receive a call one business day prior to surgery with an arrival time and hospital directions. If your surgery is scheduled on a Monday, the hospital will be calling you on the Friday prior to your surgery. If you have not heard from anyone by 8pm, please call the hospital supervisor through the hospital  at [phone]. ([place] [phone] or [place] [phone]).    Do not eat or drink anything after midnight the night before your surgery, including candy, mints, lifesavers, or chewing gum. Do not drink alcohol 24hrs before your surgery. Try not to smoke at least 24hrs before your surgery.       Follow the pre surgery showering instructions as listed in the “My Surgical Experience Booklet” or otherwise provided by your surgeon's office. Do not use a blade to shave the surgical area 1 week before surgery. It is okay to use a clean electric clippers up to 24 hours before surgery. Do not apply any lotions, creams, including makeup, cologne, deodorant, or perfumes after showering on the day of your surgery. Do not use dry shampoo, hair spray, hair gel, or any type of hair products.     No contact  lenses, eye make-up, or artificial eyelashes. Remove nail polish, including gel polish, and any artificial, gel, or acrylic nails if possible. Remove all jewelry including rings and body piercing jewelry.     Wear causal clothing that is easy to take on and off. Consider your type of surgery.    Keep any valuables, jewelry, piercings at home. Please bring any specially ordered equipment (sling, braces) if indicated.    Arrange for a responsible person to drive you to and from the hospital on the day of your surgery. Please confirm the visitor policy for the day of your procedure when you receive your phone call with an arrival time.     Call the surgeon's office with any new illnesses, exposures, or additional questions prior to surgery.    Please reference your “My Surgical Experience Booklet” for additional information to prepare for your upcoming surgery.

## 2025-04-22 ENCOUNTER — ANESTHESIA EVENT (OUTPATIENT)
Dept: PERIOP | Facility: HOSPITAL | Age: 80
End: 2025-04-22
Payer: COMMERCIAL

## 2025-04-22 NOTE — TELEPHONE ENCOUNTER
Pt wont get the flush till after 3 today.  PT asking if she should flush today  because she has surgery tomorrow.  Does not want to waste the medication and get it if she will not need it,    Please call pt back at [phone] or [phone] VM can left

## 2025-04-22 NOTE — TELEPHONE ENCOUNTER
Call placed to pt and spoke with her granddaughter. Informed her of the AP recommendations and plan for flushing. She is aware and will proceed with flushing as planned.

## 2025-04-23 ENCOUNTER — HOSPITAL ENCOUNTER (OUTPATIENT)
Facility: HOSPITAL | Age: 80
Setting detail: OUTPATIENT SURGERY
Discharge: HOME | End: 2025-04-23
Attending: UROLOGY | Admitting: UROLOGY
Payer: COMMERCIAL

## 2025-04-23 ENCOUNTER — ANESTHESIA (OUTPATIENT)
Dept: PERIOP | Facility: HOSPITAL | Age: 80
End: 2025-04-23
Payer: COMMERCIAL

## 2025-04-23 ENCOUNTER — TELEPHONE (OUTPATIENT)
Dept: UROLOGY | Facility: MEDICAL CENTER | Age: 80
End: 2025-04-23

## 2025-04-23 ENCOUNTER — APPOINTMENT (OUTPATIENT)
Dept: RADIOLOGY | Facility: HOSPITAL | Age: 80
End: 2025-04-23
Payer: COMMERCIAL

## 2025-04-23 VITALS
RESPIRATION RATE: 18 BRPM | OXYGEN SATURATION: 97 % | SYSTOLIC BLOOD PRESSURE: 146 MMHG | DIASTOLIC BLOOD PRESSURE: 74 MMHG | HEART RATE: 63 BPM | BODY MASS INDEX: 31.94 KG/M2 | WEIGHT: 138.01 LBS | HEIGHT: 55 IN | TEMPERATURE: 97.3 F

## 2025-04-23 DIAGNOSIS — N20.0 KIDNEY STONES: ICD-10-CM

## 2025-04-23 DIAGNOSIS — N20.0 KIDNEY STONES: Primary | ICD-10-CM

## 2025-04-23 DIAGNOSIS — N20.1 CALCULUS OF URETER: Primary | ICD-10-CM

## 2025-04-23 PROCEDURE — NC001 PR NO CHARGE: Performed by: UROLOGY

## 2025-04-23 PROCEDURE — C1769 GUIDE WIRE: HCPCS | Performed by: UROLOGY

## 2025-04-23 PROCEDURE — C1758 CATHETER, URETERAL: HCPCS | Performed by: UROLOGY

## 2025-04-23 PROCEDURE — 52356 CYSTO/URETERO W/LITHOTRIPSY: CPT | Performed by: UROLOGY

## 2025-04-23 PROCEDURE — C1894 INTRO/SHEATH, NON-LASER: HCPCS | Performed by: UROLOGY

## 2025-04-23 PROCEDURE — 74420 UROGRAPHY RTRGR +-KUB: CPT

## 2025-04-23 PROCEDURE — 82360 CALCULUS ASSAY QUANT: CPT | Performed by: UROLOGY

## 2025-04-23 PROCEDURE — C2625 STENT, NON-COR, TEM W/DEL SY: HCPCS | Performed by: UROLOGY

## 2025-04-23 DEVICE — STENT URETERAL 6FR 22CM INLAY OPTIMA W/NITINOL GDWR: Type: IMPLANTABLE DEVICE | Site: URETER | Status: FUNCTIONAL

## 2025-04-23 RX ORDER — EPHEDRINE SULFATE 50 MG/ML
INJECTION INTRAVENOUS AS NEEDED
Status: DISCONTINUED | OUTPATIENT
Start: 2025-04-23 | End: 2025-04-23

## 2025-04-23 RX ORDER — MAGNESIUM HYDROXIDE 1200 MG/15ML
LIQUID ORAL AS NEEDED
Status: DISCONTINUED | OUTPATIENT
Start: 2025-04-23 | End: 2025-04-23 | Stop reason: HOSPADM

## 2025-04-23 RX ORDER — SODIUM CHLORIDE, SODIUM LACTATE, POTASSIUM CHLORIDE, CALCIUM CHLORIDE 600; 310; 30; 20 MG/100ML; MG/100ML; MG/100ML; MG/100ML
125 INJECTION, SOLUTION INTRAVENOUS CONTINUOUS
Status: DISCONTINUED | OUTPATIENT
Start: 2025-04-23 | End: 2025-04-23 | Stop reason: HOSPADM

## 2025-04-23 RX ORDER — ONDANSETRON 2 MG/ML
4 INJECTION INTRAMUSCULAR; INTRAVENOUS ONCE AS NEEDED
Status: DISCONTINUED | OUTPATIENT
Start: 2025-04-23 | End: 2025-04-23 | Stop reason: HOSPADM

## 2025-04-23 RX ORDER — ACETAMINOPHEN 325 MG/1
975 TABLET ORAL ONCE
Status: COMPLETED | OUTPATIENT
Start: 2025-04-23 | End: 2025-04-23

## 2025-04-23 RX ORDER — TAMSULOSIN HYDROCHLORIDE 0.4 MG/1
0.4 CAPSULE ORAL
Qty: 14 CAPSULE | Refills: 0 | Status: SHIPPED | OUTPATIENT
Start: 2025-04-23

## 2025-04-23 RX ORDER — CEPHALEXIN 500 MG/1
500 CAPSULE ORAL EVERY 12 HOURS SCHEDULED
Qty: 6 CAPSULE | Refills: 0 | Status: SHIPPED | OUTPATIENT
Start: 2025-04-23 | End: 2025-04-26

## 2025-04-23 RX ORDER — HYDROCODONE BITARTRATE AND ACETAMINOPHEN 5; 325 MG/1; MG/1
1 TABLET ORAL EVERY 6 HOURS PRN
Status: DISCONTINUED | OUTPATIENT
Start: 2025-04-23 | End: 2025-04-23 | Stop reason: HOSPADM

## 2025-04-23 RX ORDER — FENTANYL CITRATE 50 UG/ML
INJECTION, SOLUTION INTRAMUSCULAR; INTRAVENOUS AS NEEDED
Status: DISCONTINUED | OUTPATIENT
Start: 2025-04-23 | End: 2025-04-23

## 2025-04-23 RX ORDER — IBUPROFEN 600 MG/1
600 TABLET, FILM COATED ORAL EVERY 6 HOURS PRN
Qty: 30 TABLET | Refills: 0 | Status: SHIPPED | OUTPATIENT
Start: 2025-04-23

## 2025-04-23 RX ORDER — CEFAZOLIN SODIUM 2 G/50ML
2000 SOLUTION INTRAVENOUS ONCE
Status: COMPLETED | OUTPATIENT
Start: 2025-04-23 | End: 2025-04-23

## 2025-04-23 RX ORDER — LIDOCAINE HYDROCHLORIDE 20 MG/ML
INJECTION, SOLUTION EPIDURAL; INFILTRATION; INTRACAUDAL; PERINEURAL AS NEEDED
Status: DISCONTINUED | OUTPATIENT
Start: 2025-04-23 | End: 2025-04-23

## 2025-04-23 RX ORDER — DEXAMETHASONE SODIUM PHOSPHATE 10 MG/ML
INJECTION, SOLUTION INTRAMUSCULAR; INTRAVENOUS AS NEEDED
Status: DISCONTINUED | OUTPATIENT
Start: 2025-04-23 | End: 2025-04-23

## 2025-04-23 RX ORDER — PHENAZOPYRIDINE HYDROCHLORIDE 100 MG/1
100 TABLET, FILM COATED ORAL 3 TIMES DAILY PRN
Qty: 10 TABLET | Refills: 0 | Status: SHIPPED | OUTPATIENT
Start: 2025-04-23

## 2025-04-23 RX ORDER — FENTANYL CITRATE/PF 50 MCG/ML
25 SYRINGE (ML) INJECTION
Status: DISCONTINUED | OUTPATIENT
Start: 2025-04-23 | End: 2025-04-23 | Stop reason: HOSPADM

## 2025-04-23 RX ORDER — PHENAZOPYRIDINE HYDROCHLORIDE 100 MG/1
100 TABLET, FILM COATED ORAL
Status: DISCONTINUED | OUTPATIENT
Start: 2025-04-23 | End: 2025-04-23 | Stop reason: HOSPADM

## 2025-04-23 RX ORDER — ALBUTEROL SULFATE 0.83 MG/ML
2.5 SOLUTION RESPIRATORY (INHALATION) ONCE AS NEEDED
Status: DISCONTINUED | OUTPATIENT
Start: 2025-04-23 | End: 2025-04-23 | Stop reason: HOSPADM

## 2025-04-23 RX ORDER — PROPOFOL 10 MG/ML
INJECTION, EMULSION INTRAVENOUS AS NEEDED
Status: DISCONTINUED | OUTPATIENT
Start: 2025-04-23 | End: 2025-04-23

## 2025-04-23 RX ORDER — ONDANSETRON 2 MG/ML
INJECTION INTRAMUSCULAR; INTRAVENOUS AS NEEDED
Status: DISCONTINUED | OUTPATIENT
Start: 2025-04-23 | End: 2025-04-23

## 2025-04-23 RX ADMIN — ONDANSETRON 4 MG: 2 INJECTION INTRAMUSCULAR; INTRAVENOUS at 12:48

## 2025-04-23 RX ADMIN — PROPOFOL 140 MG: 10 INJECTION, EMULSION INTRAVENOUS at 12:11

## 2025-04-23 RX ADMIN — FENTANYL CITRATE 25 MCG: 50 INJECTION INTRAMUSCULAR; INTRAVENOUS at 12:57

## 2025-04-23 RX ADMIN — LIDOCAINE HYDROCHLORIDE 100 MG: 20 INJECTION, SOLUTION EPIDURAL; INFILTRATION; INTRACAUDAL at 12:11

## 2025-04-23 RX ADMIN — PHENAZOPYRIDINE 100 MG: 100 TABLET ORAL at 15:07

## 2025-04-23 RX ADMIN — ACETAMINOPHEN 975 MG: 325 TABLET, FILM COATED ORAL at 10:34

## 2025-04-23 RX ADMIN — DEXAMETHASONE SODIUM PHOSPHATE 10 MG: 10 INJECTION, SOLUTION INTRAMUSCULAR; INTRAVENOUS at 12:30

## 2025-04-23 RX ADMIN — FENTANYL CITRATE 50 MCG: 50 INJECTION INTRAMUSCULAR; INTRAVENOUS at 12:29

## 2025-04-23 RX ADMIN — SODIUM CHLORIDE, SODIUM LACTATE, POTASSIUM CHLORIDE, AND CALCIUM CHLORIDE 125 ML/HR: .6; .31; .03; .02 INJECTION, SOLUTION INTRAVENOUS at 10:32

## 2025-04-23 RX ADMIN — EPHEDRINE SULFATE 10 MG: 50 INJECTION INTRAVENOUS at 12:26

## 2025-04-23 RX ADMIN — CEFAZOLIN SODIUM 2000 MG: 2 SOLUTION INTRAVENOUS at 12:14

## 2025-04-23 RX ADMIN — SODIUM CHLORIDE, SODIUM LACTATE, POTASSIUM CHLORIDE, AND CALCIUM CHLORIDE: .6; .31; .03; .02 INJECTION, SOLUTION INTRAVENOUS at 12:57

## 2025-04-23 NOTE — TELEPHONE ENCOUNTER
Completed left ureteroscopy, laser lithotripsy, stent placement for impacted left ureteral stone.  Left PCNU was removed.  Can have cystoscopy and left stent removal in 2 weeks.  Renal sonogram 4-6 weeks after stent removal.

## 2025-04-23 NOTE — H&P
H&P - Urology   Name: Shai Osborne 79 y.o. female I MRN: [id number]  Unit/Bed#: OR POOL I Date of Admission: 4/23/2025   Date of Service: 4/23/2025 I Hospital Day: 0     Assessment & Plan   This is a 79 y.o. year old female here for left ureteroscopy, laser lithotripsy, and she is stable and optimized for her procedure.    History of Present Illness    Shai Osborne is a 79 y.o. year old female who presents for left ureteroscopy, laser lithotripsy, stent placement for left ureteral stones s/p left PCNU by IR    REVIEW OF SYSTEMS: Per the HPI, and otherwise unremarkable.    Historical Information   Past Medical History:   Diagnosis Date    Ambulates with cane     Arthritis     Chronic pain disorder     sciatic    GERD (gastroesophageal reflux disease)     Hearing aid worn     Hydronephrosis with renal and ureteral calculus obstruction 12/23/2015    Kidney stone     Nephrolithiasis     Osteoarthritis     Osteoporosis     Sinus arrhythmia      Past Surgical History:   Procedure Laterality Date    CHOLECYSTECTOMY      FL RETROGRADE PYELOGRAM  3/7/2019    IR NEPHROURETERAL STENT PLACEMENT  4/9/2025    KIDNEY STONE SURGERY      KNEE ARTHROSCOPY Left     IA COLONOSCOPY FLX DX W/COLLJ SPEC WHEN PFRMD N/A 11/2/2017    Procedure: COLONOSCOPY with polypectomy x2;  Surgeon: Wild Stanley MD;  Location: [facility];  Service: Gastroenterology    IA CYSTO/URETERO W/LITHOTRIPSY &INDWELL STENT INSRT Left 1/21/2025    Procedure: CYSTOSCOPY;  Surgeon: Onesimo Gutierres MD;  Location:  MAIN OR;  Service: Urology    IA CYSTOURETHROSCOPY W/URETERAL CATHETERIZATION Left 3/7/2019    Procedure: CYSTOSCOPY, URETEROSCOPY WITH LASER, BASKET STONE EXTRACTION, RETROGRADE PYELOGRAM WITH INSERTION STENT URETERAL;  Surgeon: Pedro Mcarthur MD;  Location: [facility];  Service: Urology    TUBAL LIGATION       Social History     Tobacco Use    Smoking status: Never     Passive exposure: Never    Smokeless tobacco: Never   Vaping Use     Vaping status: Never Used   Substance and Sexual Activity    Alcohol use: Not Currently    Drug use: No    Sexual activity: Not Currently     E-Cigarette/Vaping    E-Cigarette Use Never User      E-Cigarette/Vaping Substances    Nicotine No     THC No     CBD No     Flavoring No     Other No     Unknown No      Family History   Problem Relation Age of Onset    No Known Problems Mother     Mental illness Father     Prostate cancer Father     Colon cancer Sister     No Known Problems Sister     No Known Problems Sister     No Known Problems Sister     Ovarian cancer Daughter     Mental illness Daughter     No Known Problems Maternal Grandmother     No Known Problems Maternal Grandfather     No Known Problems Paternal Grandmother     No Known Problems Paternal Grandfather     Breast cancer Neg Hx        Meds/Allergies     Current Facility-Administered Medications:     ceFAZolin (ANCEF) IVPB (premix in dextrose) 2,000 mg 50 mL, 2,000 mg, Intravenous, Once    lactated ringers infusion, 125 mL/hr, Intravenous, Continuous, 125 mL/hr at 04/23/25 1032  Allergies   Allergen Reactions    Diclofenac Sodium GI Intolerance    Meperidine GI Intolerance    Tramadol GI Intolerance       Objective :  Temp:  [97.2 °F (36.2 °C)] 97.2 °F (36.2 °C)  HR:  [69] 69  BP: (146)/(81) 146/81  Resp:  [16] 16  SpO2:  [99 %] 99 %  O2 Device: None (Room air)    Physical Exam  Vitals and nursing note reviewed.   Constitutional:       General: She is not in acute distress.     Appearance: She is well-developed.   HENT:      Head: Normocephalic and atraumatic.   Eyes:      Conjunctiva/sclera: Conjunctivae normal.   Cardiovascular:      Rate and Rhythm: Normal rate and regular rhythm.      Heart sounds: No murmur heard.  Pulmonary:      Effort: Pulmonary effort is normal. No respiratory distress.      Breath sounds: Normal breath sounds.   Abdominal:      Palpations: Abdomen is soft.      Tenderness: There is no abdominal tenderness.   Musculoskeletal:          General: No swelling.      Cervical back: Neck supple.   Skin:     General: Skin is warm and dry.      Capillary Refill: Capillary refill takes less than 2 seconds.   Neurological:      Mental Status: She is alert.   Psychiatric:         Mood and Affect: Mood normal.       Gen: NAD  Head: NCAT  CV: RRR  CHEST: Clear  ABD: soft, NT/ND  EXT: no edema

## 2025-04-23 NOTE — ANESTHESIA PREPROCEDURE EVALUATION
Procedure:  CYSTOSCOPY URETEROSCOPY WITH LITHOTRIPSY HOLMIUM LASER, RETROGRADE PYELOGRAM AND INSERTION STENT URETERAL (Left: Bladder)    Relevant Problems   CARDIO   (+) Hyperlipidemia, mixed   (+) Internal carotid artery stenosis   (+) Sinus arrhythmia      GI/HEPATIC   (+) Gastroesophageal reflux disease without esophagitis      /RENAL   (+) Kidney stone      NEURO/PSYCH   (+) Chronic foot pain, left        Physical Exam    Airway    Mallampati score: III         Dental       Cardiovascular  Rhythm: regular    Pulmonary   Decreased breath sounds    Other Findings  post-pubertal.      Anesthesia Plan  ASA Score- 3     Anesthesia Type- general with ASA Monitors.         Additional Monitors:     Airway Plan:            Plan Factors-Exercise tolerance (METS): <4 METS.    Chart reviewed. EKG reviewed.  Existing labs reviewed. Patient summary reviewed.          Obstructive sleep apnea risk education given perioperatively.        Induction- intravenous.    Postoperative Plan-     Perioperative Resuscitation Plan - Level 1 - Full Code.       Informed Consent- Anesthetic plan and risks discussed with patient.        NPO Status:  No vitals data found for the desired time range.

## 2025-04-23 NOTE — DISCHARGE INSTR - AVS FIRST PAGE
Yaneth, the stone in the left ureter has been removed.  I also removed the tube from your back and placed a dry dressing there.  You have a ureteral stent that can be removed in the office in 2 weeks.      Ureteroscopy   WHAT YOU SHOULD KNOW:   A ureteroscopy is a procedure to examine in the inside of your urinary tract, which includes your urethra, bladder, ureters, and kidneys. A ureteroscope is a small, thin tube with a light and camera on the end. Ureteroscopy can help your healthcare provider diagnose and treat problems in your urinary tract, such as kidney stones.   AFTER YOU LEAVE:   Medicine:   Antibiotics  may be given to treat or prevent an infection.     Take your medicine as directed.  Call your healthcare provider if you think your medicine is not helping or if you have side effects. Tell him if you are allergic to any medicine. Keep a list of the medicines, vitamins, and herbs you take. Include the amounts, and when and why you take them. Bring the list or the pill bottles to follow-up visits. Carry your medicine list with you in case of an emergency.  Follow up with your healthcare provider as directed:  Write down your questions so you remember to ask them during your visits.  Drink liquids as directed.  Liquids can help prevent kidney stones and urinary tract infections. Drink water and limit the amount of caffeine you drink. Caffeine may be found in coffee, tea, soda, sports drinks, and foods. Ask your healthcare provider how much liquid to drink each day.  Contact your healthcare provider if:   You have a fever.     You cannot urinate.    You have blood clots in your urine.    You are vomiting.    You have severe pain in your abdomen or side.     You have questions or concerns about your condition or care.  © 2014 [employer] Information is for End User's use only and may not be sold, redistributed or otherwise used for commercial purposes. All illustrations and images included in  CareNotes® are the copyrighted property of [employer], [employer]. or [employer].  The above information is an  only. It is not intended as medical advice for individual conditions or treatments. Talk to your doctor, nurse or pharmacist before following any medical regimen to see if it is safe and effective for you.

## 2025-04-23 NOTE — OP NOTE
Operative Note     PATIENT:  Shai Osborne (MRN [id number])    DATE OF PROCEDURE:   4/23/2025    PRE-OP DIAGNOSIS:   1) Left ureteral calculus    POST-OP DIAGNOSIS:   1) Left ureteral calculus    PROCEDURES PERFORMED:  1) Cystoscopy  2) Left retrograde pyelography with fluoroscopic interpretation  3) Left ureteroscopy with laser lithotripsy and basket extraction of stone  4) Left ureteral stent placement    SURGEON:  Onesimo Gutierres MD    NOTE:  There were no qualified teaching residents to assist with this case    ANESTHESIA: General     COMPLICATIONS:   None    ANTIBIOTICS:  Ancef    INTRAOPERATIVE THROMBOEMBOLISM PROPHYLAXIS:  Pneumatic compression stockings     FINDINGS:  Left impacted stone in mid-proximal ureter  Left moderate hydronephrosis  Left PCNU removed     INDICATIONS FOR PROCEDURE:  Shai Osborne is an 79 y.o. old female with left ureteral nephrolithiasis s/p prior PCNU placement.  After discussing the options, the patient elected to undergo ureteroscopy and ureteral stent placement.  We discussed the procedure in detail, the alternatives, and the risks, and they signed informed consent to proceed.    PROCEDURE IN DETAIL:   The patient was identified and brought to the OR.  Antibiotic prophylaxis and DVT prophylaxis were administered.  They were placed in the comfortable dorsal lithotomy position with care to pad all pressure points.  They were prepped and draped in the usual sterile fashion using hibiclens.  A surgical time out was performed with all in the room in agreement with the correct patient, procedure, indications, and laterality.      A sensor wire was passed through the left PCNU and into the bladder as confirmed by fluoroscopy and the PCNU was then removed and the sensor wire was secured.      A 22-Austrian rigid cystoscope was used to enter the bladder.  The bladder was inspected in its entirety and there were no lesions noted.  The ureteral orifices were identified in their  normal orthotopic positions. The left ureteral orifice was identified and the sensor wire was identified and pulled through the urethra with a flexible grasper.  A 10-Tunisian dual lumen catheter was passed over the wire and a retrograde pyelogram was performed with the findings as described above.  Leaving this safety wire in place, the bladder was drained.       A   7.5 Tunisian semi-rigid ureteroscope was advanced up the ureter under vision . The stone was encountered in the mid ureter.  The stone was noted to be impacted.      A holmium laser fiber was passed through the ureteroscope and laser lithotripsy was commenced at settings of 1 J and 15 Hz.  The stones were fragmented to very small pieces.      Once the stone had been appropriately fragmented into smaller pieces, a 1.9 Tunisian zero tip nitinol basket was passed through the ureteroscope.  The residual fragments were basketed out and removed sequentially under vision.  I then reintroduced the endoscope confirmed that the patient was completely stone free and there is no injury to the ureter.     A second sensor wire was passed up the left ureter into the left kidney and a 12/14-Tunisian x 35 cm ureteral access sheath was passed over the wire into the proximal ureter.  A flexible 5.3-Tunisian ureteroscope was passed through the sheath and into the kidney and a full pyeloscopy was performed.  Several additional stone fragments were removed with the use of the basket.  There were no large stone fragments remaining in the kidney at the end of the case.      The ureteroscope was backed down the ureter under vision and there were no residual fragments and the ureter was noted to be intact with no injury and moderate edema where the stone was located.   A JJ stent was then passed up the wire  under fluoroscopic guidance into the kidney with a good curl noted in the kidney and in the bladder.   . The bladder was drained.      The patient was placed back supine, awakened  from general anesthesia and brought to recovery room in stable condition.      ESTIMATED BLOOD LOSS:  Minimal      SPECIMENS:   Order Name Source Comment Collection Info Order Time   STONE ANALYSIS Kidney, Left  Collected By: Onesimo Gutierres MD 4/23/2025  1:11 PM        IMPLANTS:   Implant Name Type Inv. Item Serial No.  Lot No. LRB No. Used Action   STENT URETERAL 6FR 22CM INLAY OPTIMA W/NITINOL GDWR - [id number]  STENT URETERAL 6FR 22CM INLAY OPTIMA W/NITINOL GDWR  [employer] [id number] Left 1 Implanted        COMPLICATIONS: None    DISPOSITION: PACU    PLAN:  Will plan for cystoscopy and left ureteral stent removal in 2 weeks.  Then plan for renal sonogram in 4-6 weeks after stent removal.

## 2025-04-23 NOTE — ANESTHESIA POSTPROCEDURE EVALUATION
Post-Op Assessment Note    CV Status:  Stable    Pain management: adequate       Mental Status:  Awake   Hydration Status:  Stable   PONV Controlled:  Controlled   Airway Patency:  Patent     Post Op Vitals Reviewed: Yes    No anethesia notable event occurred.    Staff: Anesthesiologist           Last Filed PACU Vitals:  Vitals Value Taken Time   Temp 97.2 °F (36.2 °C) 04/23/25 1355   Pulse 68 04/23/25 1403   /72 04/23/25 1355   Resp 12 04/23/25 1403   SpO2 93 % 04/23/25 1403   Vitals shown include unfiled device data.    Modified Jagdish:     Vitals Value Taken Time   Activity 2 04/23/25 1355   Respiration 2 04/23/25 1355   Circulation 2 04/23/25 1355   Consciousness 2 04/23/25 1355   Oxygen Saturation 2 04/23/25 1355     Modified Jagdish Score: 10

## 2025-04-24 NOTE — TELEPHONE ENCOUNTER
Post Op Note    Shai Osborne is a 79 y.o. female s/p CYSTOSCOPY URETEROSCOPY WITH LITHOTRIPSY HOLMIUM LASER, RETROGRADE PYELOGRAM AND INSERTION STENT URETERAL (Left: Bladder) performed 4/23/2025.  Shai Osborne is a patient of Dr. Gutierres and is seen at the [place] office.     How would you rate your pain on a scale from 1 to 10, 10 being the worst pain ever? 0  Have you had a fever? No  Have your bowel movements been regular?   Do you have any difficulty urinating? No  If the patient has an incision- do you have any redness around the incision or any drainage from the incision? No  Do you have any other questions or concerns that I can address at this time? None at this time    Call placed. Spoke to pt granddaughter Shy per med com consent. Granddaughter said that pt is doing well. Is having no pain at all, urinating okay, got great energy once she took a nap. Asked if pt had a bowel movement yet. Granddaughter was unsure. Advised if she has not then pt should start utilizing a stool softener and if after 3 days then utilize a laxative (Miralax) to help to decrease pt from straining. Pt granddaughter understood. Advised to continue taking the abx that was prescribed and state that will help pt if an infection would be present. Pt incision site looks from no drainage or any issues. Advised granddaughter to keep an eye on it and if site becomes red and purulent drainage to give our office a call. Advised that pt stent can come out in 2 weeks. Pt is scheduled 5/8 for cysto stent removal. Granddaughter confirmed appointment date and time. Advised if any other questions or concerns prior to that appointment to give our office a call.

## 2025-05-01 LAB
CALCIUM OXALATE DIHYDRATE MFR STONE IR: 80 %
COLOR STONE: NORMAL
COM MFR STONE: 15 %
HYDROXYAPATITE 24H ENGDIFF UR: 5 %
SIZE STONE: NORMAL MM
SPEC SOURCE SUBJ: NORMAL
STONE ANALYSIS-IMP: NORMAL
STONE ANALYSIS-IMP: NORMAL
WT STONE: 26 MG

## 2025-05-08 ENCOUNTER — PROCEDURE VISIT (OUTPATIENT)
Dept: UROLOGY | Facility: MEDICAL CENTER | Age: 80
End: 2025-05-08
Payer: COMMERCIAL

## 2025-05-08 VITALS
WEIGHT: 140.2 LBS | HEIGHT: 55 IN | HEART RATE: 79 BPM | SYSTOLIC BLOOD PRESSURE: 142 MMHG | DIASTOLIC BLOOD PRESSURE: 74 MMHG | OXYGEN SATURATION: 96 % | BODY MASS INDEX: 32.44 KG/M2

## 2025-05-08 DIAGNOSIS — N20.0 KIDNEY STONES: Primary | ICD-10-CM

## 2025-05-08 DIAGNOSIS — N20.1 CALCULUS OF URETER: ICD-10-CM

## 2025-05-08 PROCEDURE — 52310 CYSTOSCOPY AND TREATMENT: CPT

## 2025-05-08 RX ORDER — SULFAMETHOXAZOLE AND TRIMETHOPRIM 800; 160 MG/1; MG/1
1 TABLET ORAL EVERY 12 HOURS SCHEDULED
Qty: 6 TABLET | Refills: 0 | Status: SHIPPED | OUTPATIENT
Start: 2025-05-08 | End: 2025-05-11

## 2025-05-08 NOTE — ASSESSMENT & PLAN NOTE
Status post cystoscopy, left uteroscopy laser lithotripsy, basket extraction, retrograde pyelogram, and left ureteral stent placement for treatment of a 7 mm left mid ureteral calculi performed 4/23/2025  Patient reports tolerating the recent surgical procedure overall well.  We discussed today that she would be undergoing cystoscopy and ureteral stent removal.  The patient was prepped and draped in sterile fashion.  The cystoscope was passed through the urethra into the bladder and the ureteral stent was visualized exiting the left ureteral orifice.  Flexible graspers were passed through the cystoscope and utilized to grasp the ureteral stent.  The ureteral stent was removed from the bladder in its entirety and the patient tolerated the procedure well.  We discussed with the patient should initiate a 3-day course of antibiotics for UTI prophylaxis.  Additionally, the patient should undergo an ultrasound of the kidney and bladder in 4 weeks to ensure resolution of the previously noted hydronephrosis.  Patient will return to the office in 8 weeks to review ultrasound and stone analysis.

## 2025-05-08 NOTE — PROGRESS NOTES
"5/8/2025      Assessment and Plan    79 y.o. female managed by Dr. Gutierres    Calculus of ureter  Status post cystoscopy, left uteroscopy laser lithotripsy, basket extraction, retrograde pyelogram, and left ureteral stent placement for treatment of a 7 mm left mid ureteral calculi performed 4/23/2025  Patient reports tolerating the recent surgical procedure overall well.  We discussed today that she would be undergoing cystoscopy and ureteral stent removal.  The patient was prepped and draped in sterile fashion.  The cystoscope was passed through the urethra into the bladder and the ureteral stent was visualized exiting the left ureteral orifice.  Flexible graspers were passed through the cystoscope and utilized to grasp the ureteral stent.  The ureteral stent was removed from the bladder in its entirety and the patient tolerated the procedure well.  We discussed with the patient should initiate a 3-day course of antibiotics for UTI prophylaxis.  Additionally, the patient should undergo an ultrasound of the kidney and bladder in 4 weeks to ensure resolution of the previously noted hydronephrosis.  Patient will return to the office in 8 weeks to review ultrasound and stone analysis.       Cystoscopy     Date/Time  5/8/2025 11:40 AM     Performed by  Randy Beasley PA-C   Authorized by  Randy Beasley PA-C       Universal Protocol:  procedure performed by consultantConsent: Verbal consent obtained. Written consent obtained.  Risks and benefits: risks, benefits and alternatives were discussed  Consent given by: patient  Time out: Immediately prior to procedure a \"time out\" was called to verify the correct patient, procedure, equipment, support staff and site/side marked as required.  Patient understanding: patient states understanding of the procedure being performed  Patient consent: the patient's understanding of the procedure matches consent given  Procedure consent: procedure consent matches procedure " scheduled  Relevant documents: relevant documents present and verified  Test results: test results available and properly labeled  Required items: required blood products, implants, devices, and special equipment available  Patient identity confirmed: verbally with patient      Procedure Details:  Procedure type: unilateral ureteral stent    Patient tolerance: Patient tolerated the procedure well with no immediate complications    Additional Procedure Details: The patient returns to the office today to undergo cystoscopy with left stent removal. Risk and benefits of the procedure were discussed and informed consent was obtained.  The patient was placed in the modified supine position. The genitalia were prepped and draped in a sterile fashion. Viscous lidocaine was used for local anesthesia. The flexible cystoscope was passed. The bladder was inspected. The stent was identified coming from the left ureteral orifice. The stent was grasped with a flexible grasper and was then removed in its entirety without complications. Overall the patient tolerated the procedure.    The patient was provided with a 3 day prescription of Bactrim for infection prophylaxis following the procedure.    They were made aware to advise our office of any fevers greater than 101 degrees Fahrenheit, malaise, or chills.  They were advised that it is normal to have cramping pain on the ipsilateral side for a day or so after ureteral stent removal.  They are to remain well hydrated in the coming days.        History of Present Illness  Shai Osborne is a 79 y.o. female here for evaluation of nephrolithiasis status post cystoscopy, left uteroscopy laser lithotripsy, basket stone extraction, retrograde pyelogram, and left ureteral stent placement performed 4/23/2025 for treatment of a 7 mm left mid ureteral calculi with moderate to severe left hydroureteronephrosis.    Patient returns to the office today to undergo cystoscopy and left ureteral  "stent removal.  Patient reports that she is overall been doing well following her surgical procedure and denies any ongoing left-sided flank pain.  Patient denies any gross hematuria, dysuria, fevers, or chills.  Patient reported tolerating the recent surgical procedure well.  Patient notes that she would like to not undergo any further surgical procedures if possible following stent removal in the office today.      Review of Systems   Constitutional:  Negative for chills and fever.   HENT:  Negative for ear pain and sore throat.    Eyes:  Negative for pain and visual disturbance.   Respiratory:  Negative for cough and shortness of breath.    Cardiovascular:  Negative for chest pain and palpitations.   Gastrointestinal:  Negative for abdominal pain and vomiting.   Genitourinary:  Negative for decreased urine volume, difficulty urinating, dysuria, flank pain, frequency, hematuria and urgency.   Musculoskeletal:  Negative for arthralgias and back pain.   Skin:  Negative for color change and rash.   Neurological:  Negative for seizures and syncope.   All other systems reviewed and are negative.               Vitals  Vitals:    05/08/25 1126   BP: 142/74   BP Location: Left arm   Patient Position: Sitting   Cuff Size: Large   Pulse: 79   SpO2: 96%   Weight: 63.6 kg (140 lb 3.2 oz)   Height: 4' 5\" (1.346 m)       Physical Exam  Vitals reviewed.   Constitutional:       General: She is not in acute distress.     Appearance: Normal appearance. She is not ill-appearing.   HENT:      Head: Normocephalic and atraumatic.      Nose: Nose normal.   Eyes:      General: No scleral icterus.  Pulmonary:      Effort: No respiratory distress.   Abdominal:      General: Abdomen is flat. There is no distension.      Palpations: Abdomen is soft.      Tenderness: There is no abdominal tenderness.   Musculoskeletal:         General: Normal range of motion.      Cervical back: Normal range of motion.   Skin:     General: Skin is warm.      " Coloration: Skin is not jaundiced.   Neurological:      Mental Status: She is alert and oriented to person, place, and time.      Gait: Gait normal.   Psychiatric:         Mood and Affect: Mood normal.         Behavior: Behavior normal.           Past History  Past Medical History:   Diagnosis Date    Ambulates with cane     Arthritis     Chronic pain disorder     sciatic    GERD (gastroesophageal reflux disease)     Hearing aid worn     Hydronephrosis with renal and ureteral calculus obstruction 12/23/2015    Kidney stone     Nephrolithiasis     Osteoarthritis     Osteoporosis     Sinus arrhythmia      Social History     Socioeconomic History    Marital status: /Civil Union     Spouse name: None    Number of children: 2    Years of education: None    Highest education level: None   Occupational History    Occupation: Retired   Tobacco Use    Smoking status: Never     Passive exposure: Never    Smokeless tobacco: Never   Vaping Use    Vaping status: Never Used   Substance and Sexual Activity    Alcohol use: Not Currently    Drug use: No    Sexual activity: Not Currently   Other Topics Concern    None   Social History Narrative    Daily caffeine consumption- 1-2 cups of coffee    Does not have living will    Denied:  History of exercises regularly    Denied:  History of domestic violence    No advance directives    Denied:  History of Power of  in existence    Flu shot: no     Social Drivers of Health     Financial Resource Strain: Low Risk  (1/24/2024)    Overall Financial Resource Strain (CARDIA)     Difficulty of Paying Living Expenses: Not very hard   Food Insecurity: No Food Insecurity (1/24/2024)    Nursing - Inadequate Food Risk Classification     Worried About Running Out of Food in the Last Year: Never true     Ran Out of Food in the Last Year: Never true     Ran Out of Food in the Last Year: Not on file   Transportation Needs: No Transportation Needs (1/24/2024)    PRAPARE - Transportation     " Lack of Transportation (Medical): No     Lack of Transportation (Non-Medical): No   Physical Activity: Not on file   Stress: No Stress Concern Present (3/2/2022)    [employer] [place] [employer] - Occupational Stress Questionnaire     Feeling of Stress : Only a little   Social Connections: Not on file   Intimate Partner Violence: Not At Risk (3/2/2022)    Humiliation, Afraid, Rape, and Kick questionnaire     Fear of Current or Ex-Partner: No     Emotionally Abused: No     Physically Abused: No     Sexually Abused: No   Housing Stability: Low Risk  (5/29/2024)    Housing Stability Vital Sign     Unable to Pay for Housing in the Last Year: No     Number of Times Moved in the Last Year: 0     Homeless in the Last Year: No     Social History     Tobacco Use   Smoking Status Never    Passive exposure: Never   Smokeless Tobacco Never     Family History   Problem Relation Age of Onset    No Known Problems Mother     Mental illness Father     Prostate cancer Father     Colon cancer Sister     No Known Problems Sister     No Known Problems Sister     No Known Problems Sister     Ovarian cancer Daughter     Mental illness Daughter     No Known Problems Maternal Grandmother     No Known Problems Maternal Grandfather     No Known Problems Paternal Grandmother     No Known Problems Paternal Grandfather     Breast cancer Neg Hx        The following portions of the patient's history were reviewed and updated as appropriate: allergies, current medications, past medical history, past social history, past surgical history and problem list.    Results  No results found for this or any previous visit (from the past hour).]  No results found for: \"PSA\"  Lab Results   Component Value Date    CALCIUM 8.9 04/09/2025    K 3.3 (L) 04/09/2025    CO2 26 04/09/2025     04/09/2025    BUN 18 04/09/2025    CREATININE 0.95 04/09/2025     Lab Results   Component Value Date    WBC 7.77 04/09/2025    HGB 12.8 04/09/2025    HCT 39.3 " 04/09/2025    MCV 87 04/09/2025     04/09/2025

## 2025-05-29 ENCOUNTER — OFFICE VISIT (OUTPATIENT)
Dept: DENTISTRY | Facility: CLINIC | Age: 80
End: 2025-05-29

## 2025-05-29 VITALS — TEMPERATURE: 97.5 F | DIASTOLIC BLOOD PRESSURE: 78 MMHG | HEART RATE: 67 BPM | SYSTOLIC BLOOD PRESSURE: 127 MMHG

## 2025-05-29 DIAGNOSIS — Z01.21 ENCOUNTER FOR DENTAL EXAMINATION AND CLEANING WITH ABNORMAL FINDINGS: Primary | ICD-10-CM

## 2025-05-29 PROCEDURE — D0120 PERIODIC ORAL EVALUATION - ESTABLISHED PATIENT: HCPCS

## 2025-05-29 PROCEDURE — D0230 INTRAORAL - PERIAPICAL EACH ADDITIONAL RADIOGRAPHIC IMAGE: HCPCS

## 2025-05-29 PROCEDURE — D0274 BITEWINGS - 4 RADIOGRAPHIC IMAGES: HCPCS

## 2025-05-29 PROCEDURE — D0220 INTRAORAL - PERIAPICAL FIRST RADIOGRAPHIC IMAGE: HCPCS

## 2025-05-29 NOTE — PROGRESS NOTES
"Procedure Details   - PERIODIC ORAL EVALUATION - ESTABLISHED PATIENT       PERIODIC EXAM, ADULT PROPHY , 4 BWX and PA upper lower anteriors    REVIEWED MED HX: meds, allergies, health changes reviewed in [facility]. All consents signed.  CHIEF CONCERN: \"I do not want any treatment, I just want to come here for cleanings. It has been a while since I had a cleaning and that is all I am coming here for. I don't want the crown or fillings and I know that was recommended but I want to just do the cleaning at this time.\"  PAIN SCALE:  0  ASA CLASS:  ASA 2 - Patient with mild systemic disease with no functional limitations  PLAQUE:  moderate  CALCULUS: Generalized  BLEEDING:  moderate  STAIN : Localized     PERIO: IIIB; Pt has hx of SCRPs in 2021 but pt only returned a few times for periodontal maintance. Deep pockets in posterior dentition--recommended SCRPs. Explained diagnosis and pt fully understood and consented to recommended treatment for SCRPs localized. Pt explained they know they need a crown and I explained why on #19 but pt insisted they do not want this treatment. Pt stated the tooth does not hurt them and they want to just wait at this time. Pt also understood they have decay #28 and #29 but do not want fillings at this time. Normal oral cancer screening.     Oral Hygiene Instruction: Brushing minimum 2x daily for 2 minutes, daily flossing, soft toothbrush, waterpik.       Visual and Tactile Intraoral/ Extraoral evaluation: Oral and Oropharyngeal cancer evaluation. No findings     Dr. Mclaughlin Reviewed with patient clinical and radiographic findings and patient verbalized understanding. All questions and concerns addressed.     REFERRALS: None    CARIES FINDINGS: #28-MO and #29-MO , Fractured lingual cusp #19.       TREATMENT  PLAN :SCRPs       Next Recall: 6 month recall     Last BWX: 5/29/25  Last Panorex/ FMX : 2021  8  - INTRAORAL - PERIAPICAL FIRST RADIOGRAPHIC IMAGE  24  - INTRAORAL - PERIAPICAL " EACH ADDITIONAL RADIOGRAPHIC IMAGE   - BITEWINGS - 4 RADIOGRAPHIC IMAGES

## 2025-05-29 NOTE — DENTAL PROCEDURE DETAILS
"     PERIODIC EXAM, ADULT PROPHY , 4 BWX and PA upper lower anteriors    REVIEWED MED HX: meds, allergies, health changes reviewed in [facility]. All consents signed.  CHIEF CONCERN: \"I do not want any treatment, I just want to come here for cleanings. It has been a while since I had a cleaning and that is all I am coming here for. I don't want the crown or fillings and I know that was recommended but I want to just do the cleaning at this time.\"  PAIN SCALE:  0  ASA CLASS:  ASA 2 - Patient with mild systemic disease with no functional limitations  PLAQUE:  moderate  CALCULUS: Generalized  BLEEDING:  moderate  STAIN : Localized     PERIO: IIIB; Pt has hx of SCRPs in 2021 but pt only returned a few times for periodontal maintance. Deep pockets in posterior dentition--recommended SCRPs. Explained diagnosis and pt fully understood and consented to recommended treatment for SCRPs localized. Pt explained they know they need a crown and I explained why on #19 but pt insisted they do not want this treatment. Pt stated the tooth does not hurt them and they want to just wait at this time. Pt also understood they have decay #28 and #29 but do not want fillings at this time. Normal oral cancer screening.     Oral Hygiene Instruction: Brushing minimum 2x daily for 2 minutes, daily flossing, soft toothbrush, waterpik.       Visual and Tactile Intraoral/ Extraoral evaluation: Oral and Oropharyngeal cancer evaluation. No findings     Dr. Mclaughlin Reviewed with patient clinical and radiographic findings and patient verbalized understanding. All questions and concerns addressed.     REFERRALS: None    CARIES FINDINGS: #28-MO and #29-MO , Fractured lingual cusp #19.       TREATMENT  PLAN :SCRPs       Next Recall: 6 month recall     Last BWX: 5/29/25  Last Panorex/ FMX : 2021  "

## 2025-06-04 ENCOUNTER — OFFICE VISIT (OUTPATIENT)
Dept: FAMILY MEDICINE CLINIC | Facility: CLINIC | Age: 80
End: 2025-06-04

## 2025-06-04 VITALS
DIASTOLIC BLOOD PRESSURE: 70 MMHG | SYSTOLIC BLOOD PRESSURE: 138 MMHG | HEART RATE: 79 BPM | BODY MASS INDEX: 33.3 KG/M2 | RESPIRATION RATE: 16 BRPM | TEMPERATURE: 97.3 F | HEIGHT: 55 IN | WEIGHT: 143.9 LBS | OXYGEN SATURATION: 98 %

## 2025-06-04 DIAGNOSIS — I49.8 SINUS ARRHYTHMIA: ICD-10-CM

## 2025-06-04 DIAGNOSIS — R42 VERTIGO: ICD-10-CM

## 2025-06-04 DIAGNOSIS — I65.21 STENOSIS OF RIGHT INTERNAL CAROTID ARTERY: Primary | ICD-10-CM

## 2025-06-04 DIAGNOSIS — G31.84 MILD COGNITIVE IMPAIRMENT: ICD-10-CM

## 2025-06-04 DIAGNOSIS — N20.1 CALCULUS OF URETER: ICD-10-CM

## 2025-06-04 DIAGNOSIS — R79.89 ELEVATED TSH: ICD-10-CM

## 2025-06-04 DIAGNOSIS — K21.9 GASTROESOPHAGEAL REFLUX DISEASE WITHOUT ESOPHAGITIS: ICD-10-CM

## 2025-06-04 PROBLEM — R31.0 GROSS HEMATURIA: Status: RESOLVED | Noted: 2024-10-08 | Resolved: 2025-06-04

## 2025-06-04 PROBLEM — E66.09 CLASS 2 OBESITY DUE TO EXCESS CALORIES WITHOUT SERIOUS COMORBIDITY WITH BODY MASS INDEX (BMI) OF 38.0 TO 38.9 IN ADULT: Status: RESOLVED | Noted: 2022-02-20 | Resolved: 2025-06-04

## 2025-06-04 PROBLEM — E66.812 CLASS 2 OBESITY DUE TO EXCESS CALORIES WITHOUT SERIOUS COMORBIDITY WITH BODY MASS INDEX (BMI) OF 38.0 TO 38.9 IN ADULT: Status: RESOLVED | Noted: 2022-02-20 | Resolved: 2025-06-04

## 2025-06-04 PROBLEM — N20.0 KIDNEY STONE: Status: RESOLVED | Noted: 2019-03-06 | Resolved: 2025-06-04

## 2025-06-04 PROCEDURE — G2211 COMPLEX E/M VISIT ADD ON: HCPCS | Performed by: FAMILY MEDICINE

## 2025-06-04 PROCEDURE — 99214 OFFICE O/P EST MOD 30 MIN: CPT | Performed by: FAMILY MEDICINE

## 2025-06-04 RX ORDER — OMEPRAZOLE 20 MG/1
20 CAPSULE, DELAYED RELEASE ORAL DAILY
Qty: 90 CAPSULE | Refills: 1 | Status: SHIPPED | OUTPATIENT
Start: 2025-06-04

## 2025-06-04 RX ORDER — MECLIZINE HCL 12.5 MG 12.5 MG/1
12.5 TABLET ORAL EVERY 12 HOURS PRN
Qty: 10 TABLET | Refills: 1 | Status: SHIPPED | OUTPATIENT
Start: 2025-06-04

## 2025-06-04 NOTE — ASSESSMENT & PLAN NOTE
Stable - no new episodes  Orders:    meclizine (ANTIVERT) 12.5 MG tablet; Take 1 tablet (12.5 mg total) by mouth every 12 (twelve) hours as needed for dizziness

## 2025-06-04 NOTE — ASSESSMENT & PLAN NOTE
TSH 5.237, T4 level 0.93 in February 2024  TSH 4.035 in March 2025  Continue to monitor yearly and repeat if symptoms occur before then

## 2025-06-04 NOTE — ASSESSMENT & PLAN NOTE
Urologist sent her for repeat US kidney and bladder which is scheduled on 7/3/25  Stopped flomax and pyridium since the procedure  F/U Urologist on 7/17/25

## 2025-06-04 NOTE — ASSESSMENT & PLAN NOTE
stable  Orders:    omeprazole (PriLOSEC) 20 mg delayed release capsule; Take 1 capsule (20 mg total) by mouth daily

## 2025-06-04 NOTE — ASSESSMENT & PLAN NOTE
Saw neurosurgery in July 2024 who suspected chronicity - no surgical intervention recommended  Not on statin since it caused tingling of arms - does not want to restart  LDL goal less than 70  SBP goal less than 150, ideally BP goal <130/80  Continue ASA 81 mg daily  Missed her neurology appointment on 1/20/25 due to snow -   F/u neurologist on 6/12/25    Orders:    Ambulatory referral to chronic care management; Future

## 2025-06-04 NOTE — ASSESSMENT & PLAN NOTE
Saw neurosurgeon in July 2024 - mild cognitive impairment versus pseudodementia secondary to her tendency to easily get stressed and potential underlying obstructive sleep apnea causing unrestful sleep    Had recommended she see Sleep Medicine - referral had been placed in March 2024 - patient still refuses to go     F/u Neurologist on 6/12/25

## 2025-06-04 NOTE — PROGRESS NOTES
Name: Shai Osborne      : 1945      MRN: [id number]  Encounter Provider: Jones Harp DO  Encounter Date: 2025   Encounter department: [facility]    Assessment & Plan  Stenosis of right internal carotid artery  Saw neurosurgery in 2024 who suspected chronicity - no surgical intervention recommended  Not on statin since it caused tingling of arms - does not want to restart  LDL goal less than 70  SBP goal less than 150, ideally BP goal <130/80  Continue ASA 81 mg daily  Missed her neurology appointment on 25 due to snow -   F/u neurologist on 25    Orders:    Ambulatory referral to chronic care management; Future    Sinus arrhythmia  Last saw cardiologist in 2024 and had holter monitoring which revealed no significant abnormalities  Missed Cardiology appt on 12/3/24 since had CT abdomen/pelvis done on same day - strongly encouraged to call and reschedule cardio appt             Gastroesophageal reflux disease without esophagitis  stable  Orders:    omeprazole (PriLOSEC) 20 mg delayed release capsule; Take 1 capsule (20 mg total) by mouth daily    Vertigo  Stable - no new episodes  Orders:    meclizine (ANTIVERT) 12.5 MG tablet; Take 1 tablet (12.5 mg total) by mouth every 12 (twelve) hours as needed for dizziness    Calculus of ureter  Urologist sent her for repeat US kidney and bladder which is scheduled on 7/3/25  Stopped flomax and pyridium since the procedure  F/U Urologist on 25         Mild cognitive impairment  Saw neurosurgeon in 2024 - mild cognitive impairment versus pseudodementia secondary to her tendency to easily get stressed and potential underlying obstructive sleep apnea causing unrestful sleep    Had recommended she see Sleep Medicine - referral had been placed in 2024 - patient still refuses to go     F/u Neurologist on 25       Elevated TSH  TSH 5.237, T4 level 0.93 in 2024  TSH 4.035 in March  2025  Continue to monitor yearly and repeat if symptoms occur before then              History of Present Illness     Saw urologist in May 2025 where she had cystoscopy to remove ureteral stent that had been placed on 4/23/25 for left mid ureteral calculi. Sent for repeat US kidney and bladder which is scheduled on 7/3/25. Goes back to urologist on 7/17/25.    Offers no complaints.      Review of Systems   Constitutional:  Negative for chills, fatigue, fever and unexpected weight change.   HENT:  Negative for congestion, ear pain, rhinorrhea and sore throat.    Eyes:  Negative for pain and visual disturbance.   Respiratory:  Negative for cough, chest tightness and shortness of breath.    Cardiovascular:  Negative for chest pain, palpitations and leg swelling.   Gastrointestinal:  Negative for abdominal pain, constipation, diarrhea, nausea and vomiting.   Genitourinary:  Negative for difficulty urinating, dysuria and urgency.   Musculoskeletal:  Negative for arthralgias and back pain.   Skin:  Negative for rash.   Neurological:  Negative for dizziness, numbness and headaches.   Psychiatric/Behavioral:  Negative for behavioral problems and suicidal ideas. The patient is not nervous/anxious.      Past Medical History[1]  Past Surgical History[2]  Family History[3]  Social History[4]  Medications[5]  Allergies   Allergen Reactions    Diclofenac Sodium GI Intolerance    Meperidine GI Intolerance    Tramadol GI Intolerance     Immunization History   Administered Date(s) Administered    COVID-19 MODERNA VACC 0.5 ML IM 02/11/2021, 03/10/2021, 11/11/2021    H1N1, All Formulations 01/15/2010    INFLUENZA 10/01/2009, 11/04/2022    Influenza Split High Dose Preservative Free IM 10/16/2014, 11/20/2015, 10/22/2019, 11/25/2024    Influenza, high dose seasonal 0.7 mL 10/16/2020, 02/09/2022, 11/04/2022, 10/09/2023    Pneumococcal Conjugate 13-Valent 11/20/2015    Pneumococcal Polysaccharide PPV23 09/30/2011     Objective   /70  "(BP Location: Left arm, Patient Position: Sitting, Cuff Size: Standard)   Pulse 79   Temp (!) 97.3 °F (36.3 °C) (Temporal)   Resp 16   Ht 4' 5\" (1.346 m)   Wt 65.3 kg (143 lb 14.4 oz)   LMP  (LMP Unknown)   SpO2 98%   BMI 36.02 kg/m²     Physical Exam  Constitutional:       Appearance: Normal appearance. She is normal weight.   HENT:      Head: Normocephalic and atraumatic.      Right Ear: Tympanic membrane, ear canal and external ear normal.      Left Ear: Tympanic membrane, ear canal and external ear normal.      Nose: Nose normal.      Mouth/Throat:      Mouth: Mucous membranes are moist.      Pharynx: Oropharynx is clear.     Eyes:      Extraocular Movements: Extraocular movements intact.      Conjunctiva/sclera: Conjunctivae normal.      Pupils: Pupils are equal, round, and reactive to light.       Cardiovascular:      Rate and Rhythm: Normal rate and regular rhythm.      Pulses: Normal pulses.      Heart sounds: Normal heart sounds.   Pulmonary:      Breath sounds: Normal breath sounds.   Abdominal:      General: Bowel sounds are normal.      Palpations: Abdomen is soft.     Musculoskeletal:         General: Normal range of motion.      Cervical back: Normal range of motion and neck supple.     Skin:     General: Skin is warm.     Neurological:      General: No focal deficit present.      Mental Status: She is alert and oriented to person, place, and time. Mental status is at baseline.     Psychiatric:         Mood and Affect: Mood normal.         Behavior: Behavior normal.         Thought Content: Thought content normal.         Judgment: Judgment normal.              [1]   Past Medical History:  Diagnosis Date    Ambulates with cane     Arthritis     Chronic pain disorder     sciatic    GERD (gastroesophageal reflux disease)     Hearing aid worn     Hydronephrosis with renal and ureteral calculus obstruction 12/23/2015    Kidney stone     Nephrolithiasis     Osteoarthritis     Osteoporosis     Sinus " arrhythmia    [2]   Past Surgical History:  Procedure Laterality Date    CHOLECYSTECTOMY      FL RETROGRADE PYELOGRAM  3/7/2019    FL RETROGRADE PYELOGRAM  4/23/2025    IR NEPHROURETERAL STENT PLACEMENT  4/9/2025    KIDNEY STONE SURGERY      KNEE ARTHROSCOPY Left     SD COLONOSCOPY FLX DX W/COLLJ SPEC WHEN PFRMD N/A 11/2/2017    Procedure: COLONOSCOPY with polypectomy x2;  Surgeon: Wild Stanley MD;  Location: [facility];  Service: Gastroenterology    SD CYSTO/URETERO W/LITHOTRIPSY &INDWELL STENT INSRT Left 1/21/2025    Procedure: CYSTOSCOPY;  Surgeon: Onesimo Gutierres MD;  Location: [facility];  Service: Urology    SD CYSTO/URETERO W/LITHOTRIPSY &INDWELL STENT INSRT Left 4/23/2025    Procedure: CYSTOSCOPY URETEROSCOPY WITH LITHOTRIPSY HOLMIUM LASER, RETROGRADE PYELOGRAM AND INSERTION STENT URETERAL;  Surgeon: Onesimo Gutierres MD;  Location: [facility];  Service: Urology    SD CYSTOURETHROSCOPY W/URETERAL CATHETERIZATION Left 3/7/2019    Procedure: CYSTOSCOPY, URETEROSCOPY WITH LASER, BASKET STONE EXTRACTION, RETROGRADE PYELOGRAM WITH INSERTION STENT URETERAL;  Surgeon: Pedro Mcarthur MD;  Location: [facility];  Service: Urology    TUBAL LIGATION     [3]   Family History  Problem Relation Name Age of Onset    No Known Problems Mother      Mental illness Father      Prostate cancer Father      Colon cancer Sister      No Known Problems Sister      No Known Problems Sister      No Known Problems Sister      Ovarian cancer Daughter      Mental illness Daughter      No Known Problems Maternal Grandmother      No Known Problems Maternal Grandfather      No Known Problems Paternal Grandmother      No Known Problems Paternal Grandfather      Breast cancer Neg Hx     [4]   Social History  Tobacco Use    Smoking status: Never     Passive exposure: Never    Smokeless tobacco: Never   Vaping Use    Vaping status: Never Used   Substance and Sexual Activity    Alcohol use: Not Currently    Drug use: No    Sexual activity: Not  Currently   [5]   Current Outpatient Medications on File Prior to Visit   Medication Sig    aspirin 81 mg chewable tablet Chew 81 mg in the morning.    cholecalciferol (VITAMIN D3) 1,000 units tablet Take 1,000 Units by mouth in the morning.    cyanocobalamin (VITAMIN B-12) 500 mcg tablet Take 1 tablet (500 mcg total) by mouth daily    ibuprofen (MOTRIN) 600 mg tablet Take 1 tablet (600 mg total) by mouth every 6 (six) hours as needed for mild pain    Multiple Vitamins-Minerals (CENTRUM ADULTS PO) Take 1 capsule by mouth in the morning    [DISCONTINUED] omeprazole (PriLOSEC) 20 mg delayed release capsule TAKE 1 CAPSULE BY MOUTH EVERY DAY    [DISCONTINUED] phenazopyridine (PYRIDIUM) 100 mg tablet Take 1 tablet (100 mg total) by mouth 3 (three) times a day as needed for bladder spasms    [DISCONTINUED] tamsulosin (FLOMAX) 0.4 mg Take 1 capsule (0.4 mg total) by mouth daily with dinner    [DISCONTINUED] meclizine (ANTIVERT) 12.5 MG tablet Take 1 tablet (12.5 mg total) by mouth every 12 (twelve) hours as needed for dizziness (Patient not taking: Reported on 5/8/2025)

## 2025-06-05 ENCOUNTER — PATIENT OUTREACH (OUTPATIENT)
Dept: FAMILY MEDICINE CLINIC | Facility: CLINIC | Age: 80
End: 2025-06-05

## 2025-06-05 NOTE — PROGRESS NOTES
Chronic Care Management Program Consent:    Patient informed of availability of Chronic Care Management services. The services will billed monthly by their Primary Care Provider only. Patient is informed there may be a monthly cost sharing associated with the Chronic Care Management services. Patient is aware that financial counseling is available to assist with any co-pay questions or concerns.    Chronic Care Management services include:    24/7 access to care.  Comprehensive plan of care created by the provider.  Individualized care planning by the care manager(s).  Transitional care support.    The patient is informed that they have the right to stop Chronic Care Management services at anytime.       Patient consents to Chronic Care Management services? no      Patient informed that consent is needed only once unless the patient switches qualifying providers.        I called Shy, patient's granddaughter, introduced my role and explained the CCM program which she declines on the patient's behalf.    Chart reviewed.

## 2025-06-08 RX ORDER — SODIUM CHLORIDE 9 MG/ML
10 INJECTION, SOLUTION INTRAMUSCULAR; INTRAVENOUS; SUBCUTANEOUS DAILY
Qty: 300 ML | Refills: 3 | OUTPATIENT
Start: 2025-06-08

## 2025-06-12 ENCOUNTER — OFFICE VISIT (OUTPATIENT)
Dept: NEUROLOGY | Facility: CLINIC | Age: 80
End: 2025-06-12
Payer: COMMERCIAL

## 2025-06-12 VITALS
BODY MASS INDEX: 32.63 KG/M2 | OXYGEN SATURATION: 97 % | SYSTOLIC BLOOD PRESSURE: 138 MMHG | WEIGHT: 141 LBS | HEIGHT: 55 IN | HEART RATE: 76 BPM | DIASTOLIC BLOOD PRESSURE: 70 MMHG | TEMPERATURE: 98.2 F

## 2025-06-12 DIAGNOSIS — G31.84 MILD COGNITIVE IMPAIRMENT: Primary | ICD-10-CM

## 2025-06-12 DIAGNOSIS — I65.29 INTERNAL CAROTID ARTERY STENOSIS: ICD-10-CM

## 2025-06-12 PROCEDURE — G2211 COMPLEX E/M VISIT ADD ON: HCPCS | Performed by: PSYCHIATRY & NEUROLOGY

## 2025-06-12 PROCEDURE — 99213 OFFICE O/P EST LOW 20 MIN: CPT | Performed by: PSYCHIATRY & NEUROLOGY

## 2025-06-12 NOTE — ASSESSMENT & PLAN NOTE
Patient with right ICA stenosis. Maintained on aspirin. Denies any new stroke like sx. Continue current management including taking daily aspirin 81 mg.  Any new stroke like symptom should warrant immediate presentation to the closest ED.

## 2025-06-12 NOTE — ASSESSMENT & PLAN NOTE
Patient with MCI. She has difficulty mostly with short term memory. Her grand daughter has been helping take care of her however patient is still indpendent in ADLs and still drives without a problem. States she takes 4-5 seconds longer to think of things. Patient with good insight. Patient likely with an MCI 2/2 to microangiopathic changes on previous MRI vs normal aging. Will continue current management. I discussed concerning vs not concerning signs and symptoms to watch out for. Follow up in a year or sooner if needed.

## 2025-07-03 ENCOUNTER — HOSPITAL ENCOUNTER (OUTPATIENT)
Dept: ULTRASOUND IMAGING | Facility: HOSPITAL | Age: 80
End: 2025-07-03
Attending: UROLOGY
Payer: COMMERCIAL

## 2025-07-03 PROCEDURE — 76770 US EXAM ABDO BACK WALL COMP: CPT

## 2025-07-11 ENCOUNTER — RESULTS FOLLOW-UP (OUTPATIENT)
Dept: OTHER | Facility: HOSPITAL | Age: 80
End: 2025-07-11

## 2025-07-11 NOTE — TELEPHONE ENCOUNTER
Spoke to patient to let her know her renal US came back norml- no stone on left side and no hydronephrosis.  Patient asked if she should still come to her 7/17 appt with Randy.  I said yes.  We confirmed the address as well.  Patient was appreciative of the call.

## 2025-07-17 ENCOUNTER — OFFICE VISIT (OUTPATIENT)
Dept: UROLOGY | Facility: MEDICAL CENTER | Age: 80
End: 2025-07-17
Payer: COMMERCIAL

## 2025-07-17 VITALS
OXYGEN SATURATION: 97 % | WEIGHT: 141 LBS | HEART RATE: 67 BPM | SYSTOLIC BLOOD PRESSURE: 110 MMHG | BODY MASS INDEX: 32.63 KG/M2 | DIASTOLIC BLOOD PRESSURE: 80 MMHG | HEIGHT: 55 IN

## 2025-07-17 DIAGNOSIS — N20.0 KIDNEY STONES: Primary | ICD-10-CM

## 2025-07-17 PROCEDURE — 99213 OFFICE O/P EST LOW 20 MIN: CPT

## 2025-07-17 NOTE — ASSESSMENT & PLAN NOTE
Status post uteroscopy and laser lithotripsy performed 4/23/2025 for treatment of an obstructing 7 mm left mid ureteral calculi  Stent removal in the office performed 5/8/2025 without complication  Ultrasound of the kidney and bladder performed 7/30/2025 noted nonobstructing 10 mm right renal calculi, but no further stone burden on the left side or hydronephrosis bilaterally.  Patient's stone returned as calcium oxalate.  We discussed that this is a dietary kidney stone and reviewed dietary modifications for kidney stone prevention.  2 handouts on kidney stone prevention were provided today's office visit.  We discussed pursuing an ultrasound of the kidney and bladder in 1 year to continue to monitor for worsening stone burden.  At that time if her right renal stone is unchanged in size and no new stones are seen that we could plan to follow-up as needed at that time.

## 2025-07-17 NOTE — PROGRESS NOTES
7/17/2025      Assessment and Plan    79 y.o. female managed by Dr. Gutierres     Kidney stones  Status post uteroscopy and laser lithotripsy performed 4/23/2025 for treatment of an obstructing 7 mm left mid ureteral calculi  Stent removal in the office performed 5/8/2025 without complication  Ultrasound of the kidney and bladder performed 7/30/2025 noted nonobstructing 10 mm right renal calculi, but no further stone burden on the left side or hydronephrosis bilaterally.  Patient's stone returned as calcium oxalate.  We discussed that this is a dietary kidney stone and reviewed dietary modifications for kidney stone prevention.  2 handouts on kidney stone prevention were provided today's office visit.  We discussed pursuing an ultrasound of the kidney and bladder in 1 year to continue to monitor for worsening stone burden.  At that time if her right renal stone is unchanged in size and no new stones are seen that we could plan to follow-up as needed at that time.        History of Present Illness  Shai Osborne is a 79 y.o. female here for evaluation of nephrolithiasis status post cystoscopy, left uteroscopy laser lithotripsy, basket stone extraction, retrograde pyelogram, and left ureteral stent placement performed 4/23/2025 for treatment of a 7 mm left mid ureteral calculi with moderate to severe left hydroureteronephrosis.     Patient return to the office on 5/8/2025 to undergo cystoscopy and unilateral stent removal.  Patient's stent was removed without complication.  Patient was advised to obtain an ultrasound of the kidney and bladder thereafter.  Ultrasound of the kidney and bladder performed 7/30/2025 which noted a nonobstructing 10 mm right renal calculi, but no further stone burden on the left side or hydronephrosis bilaterally.  Patient's stone was sent for analysis and returned as 80% calcium oxalate.    Today, the patient reports that she is overall doing very well following her surgical procedure and  "stent removal in the office.  Patient denies any lower urinary tract complaints.  Patient denies any new episodes of gross hematuria or kidney stones episodes since her last office appointment.      Review of Systems   Constitutional:  Negative for chills and fever.   HENT:  Negative for ear pain and sore throat.    Eyes:  Negative for pain and visual disturbance.   Respiratory:  Negative for cough and shortness of breath.    Cardiovascular:  Negative for chest pain and palpitations.   Gastrointestinal:  Negative for abdominal pain and vomiting.   Genitourinary:  Negative for decreased urine volume, difficulty urinating, dysuria, flank pain, frequency, hematuria and urgency.   Musculoskeletal:  Negative for arthralgias and back pain.   Skin:  Negative for color change and rash.   Neurological:  Negative for seizures and syncope.   All other systems reviewed and are negative.               Vitals  Vitals:    07/17/25 0956   BP: 110/80   BP Location: Left arm   Patient Position: Sitting   Cuff Size: Standard   Pulse: 67   SpO2: 97%   Weight: 64 kg (141 lb)   Height: 4' 5\" (1.346 m)       Physical Exam  Vitals reviewed.   Constitutional:       General: She is not in acute distress.     Appearance: Normal appearance. She is not ill-appearing.   HENT:      Head: Normocephalic and atraumatic.      Nose: Nose normal.     Eyes:      General: No scleral icterus.    Pulmonary:      Effort: No respiratory distress.   Abdominal:      General: Abdomen is flat. There is no distension.      Palpations: Abdomen is soft.      Tenderness: There is no abdominal tenderness.     Musculoskeletal:         General: Normal range of motion.      Cervical back: Normal range of motion.     Skin:     General: Skin is warm.      Coloration: Skin is not jaundiced.     Neurological:      Mental Status: She is alert and oriented to person, place, and time.      Gait: Gait normal.     Psychiatric:         Mood and Affect: Mood normal.         " "Behavior: Behavior normal.           Past History  Past Medical History[1]  Social History[2]  Tobacco Use History[3]  Family History[4]    The following portions of the patient's history were reviewed and updated as appropriate: allergies, current medications, past medical history, past social history, past surgical history and problem list.    Results  No results found for this or any previous visit (from the past hour).]  No results found for: \"PSA\"  Lab Results   Component Value Date    CALCIUM 8.9 04/09/2025    K 3.3 (L) 04/09/2025    CO2 26 04/09/2025     04/09/2025    BUN 18 04/09/2025    CREATININE 0.95 04/09/2025     Lab Results   Component Value Date    WBC 7.77 04/09/2025    HGB 12.8 04/09/2025    HCT 39.3 04/09/2025    MCV 87 04/09/2025     04/09/2025             [1]   Past Medical History:  Diagnosis Date    Ambulates with cane     Arthritis     Chronic pain disorder     sciatic    GERD (gastroesophageal reflux disease)     Gross hematuria 10/08/2024    Hearing aid worn     Hydronephrosis with renal and ureteral calculus obstruction 12/23/2015    Kidney stone     Nephrolithiasis     Osteoarthritis     Osteoporosis     Sinus arrhythmia    [2]   Social History  Socioeconomic History    Marital status: /Civil Union    Number of children: 2   Occupational History    Occupation: Retired   Tobacco Use    Smoking status: Never     Passive exposure: Never    Smokeless tobacco: Never   Vaping Use    Vaping status: Never Used   Substance and Sexual Activity    Alcohol use: Not Currently    Drug use: No    Sexual activity: Not Currently   Social History Narrative    Daily caffeine consumption- 1-2 cups of coffee    Does not have living will    Denied:  History of exercises regularly    Denied:  History of domestic violence    No advance directives    Denied:  History of Power of  in existence    Flu shot: no     Social Drivers of Health     Financial Resource Strain: Low Risk  " (1/24/2024)    Overall Financial Resource Strain (CARDIA)     Difficulty of Paying Living Expenses: Not very hard   Food Insecurity: No Food Insecurity (1/24/2024)    Nursing - Inadequate Food Risk Classification     Worried About Running Out of Food in the Last Year: Never true     Ran Out of Food in the Last Year: Never true   Transportation Needs: No Transportation Needs (1/24/2024)    PRAPARE - Transportation     Lack of Transportation (Medical): No     Lack of Transportation (Non-Medical): No   Stress: No Stress Concern Present (3/2/2022)    [employer] [place] [employer] - Occupational Stress Questionnaire     Feeling of Stress : Only a little   Intimate Partner Violence: Not At Risk (3/2/2022)    Humiliation, Afraid, Rape, and Kick questionnaire     Fear of Current or Ex-Partner: No     Emotionally Abused: No     Physically Abused: No     Sexually Abused: No   Housing Stability: Low Risk  (5/29/2024)    Housing Stability Vital Sign     Unable to Pay for Housing in the Last Year: No     Number of Times Moved in the Last Year: 0     Homeless in the Last Year: No   [3]   Social History  Tobacco Use   Smoking Status Never    Passive exposure: Never   Smokeless Tobacco Never   [4]   Family History  Problem Relation Name Age of Onset    No Known Problems Mother      Mental illness Father      Prostate cancer Father      Colon cancer Sister      No Known Problems Sister      No Known Problems Sister      No Known Problems Sister      Ovarian cancer Daughter      Mental illness Daughter      No Known Problems Maternal Grandmother      No Known Problems Maternal Grandfather      No Known Problems Paternal Grandmother      No Known Problems Paternal Grandfather      Breast cancer Neg Hx

## 2025-07-17 NOTE — PATIENT INSTRUCTIONS
Dietary Management of Kidney Stone Disease    The dietary recommendations for most people who make kidney stones (especially the most common calcium oxalate stones) are uncomplicated and are not too tedious or bland.  Most importantly, the following recommendations also promote better health for a variety of reasons.    FLUIDS:  The single most important change for the majority patients is the need to greatly increase fluid intake.  You should at least produce two liters (about two quarts) of urine each day.  Depending on the heat outdoors and your level of physical activity, this usually means consuming ten, 10 ounce glasses (100 ounces) of fluid per day.  Water is always a good choice, but other drinks including tea, coffee, soda, and juice are also allowed as long as no one beverage becomes the sole source of fluid.    CALCIUM:  There is excellent evidence that calcium should not be avoided, but instead moderated.  A range of 600 to 1,100 mg of calcium per day, especially consumed at meals is probably a reasonable target. (i.e. 2-3 dairy servings per day) This might include small servings of yogurt, milk or ice cream.  This amount helps avoid over-absorption of oxalate from the digestive tract and also allows for healthy bone maintenance.    SODIUM (SALT):  Too much salt in your diet (both from the shaker and in the prepared foods that we buy) is bad for your blood pressure, bad for your heart, and also increases the amount of calcium in your urine.  A reasonable sodium restriction to 2,000-2,500mg/day (about the amount in one teaspoon) is an excellent target.  You should get into the habit of reading the “Nutrients” labels on all the foods that you eat and watch out for the foods that have a high sodium content (snack foods, smoked or processed foods, caned foods).  Fresh and frozen foods usually have the least amount of sodium.    PROTEIN:  High protein diets from animal meat (beef, chicken, pork, fish) also  "increases the rate of kidney stone formation and is equally unhealthy for your heart.  All patients should moderate their meat intake to 3-7 ounces per day, and particularly stay away from red meat protein.    OXALATE:  Most stone-formers should avoid heavy intake of oxalate-rich foods.  These include green roughage (spinach, mustard, kale), strawberries, chocolate, tea, iced tea, and nuts.  In addition, heavy, excess doses of Vitamin C can also produce surges in urinary oxalate levels and should be avoided.    BARE-BONES RECOMMENDATIONS:  Fluids, fluids, fluids.    Low salt diet (your primary care doctor will love you).  Moderate calcium (dairy products), especially with meals.  Moderate red meat intake.     Patient Education     Dieta para cálculos renales   Conceptos Básicos   Redactado por los médicos y editores de [employer]   ¿Qué son los cálculos renales? -- Los cálculos renales son pequeñas piedras que se thom dentro de los riñones cuando las sales y los minerales que se encuentran normalmente en la orina se acumulan y se endurecen. Pueden estar compuestos por diferentes sustancias.  Los cálculos renales generalmente se expulsan del cuerpo al orinar, aura a veces pueden quedarse atascados en el ailyn (figura 1). Si esto sucede, pueden causar:   Dolor en el costado, la espalda o la parte baja del área del estómago   Osmin en la orina (que puede hacer que la orina luzca rosetta o shaka)   Náuseas o vómitos   Dolor al orinar   Necesidad de orinar en forma urgente  ¿Por qué necesito brielle dieta especial? -- Según de qué estén hechos karely cálculos, cambiar singh dieta podría ayudar a reducir las posibilidades de que se formen nuevos cálculos.  Es posible que singh médico o enfermero le recomiende realizar cambios en singh dieta bao parte del plan de tratamiento si tiene:   Cálculos de oxalato de calcio - Cuando el cuerpo digiere ciertos alimentos, genera un producto de desecho llamado \"oxalato\". Si tiene demasiado oxalato en " "la orina, se pueden formar lanie que se aglutinan y thom un cálculo renal.   Cálculos de ácido úrico - Cuando el cuerpo digiere unas sustancias llamadas purinas, que están presentes en algunos alimentos, genera un producto de desecho llamado \"ácido úrico\". Se pueden formar cálculos renales cuando se acumula demasiado ácido úrico en el cuerpo.   Otros tipos de cálculos - Singh cuerpo necesita un equilibrio de las cantidades adecuadas de líquidos y minerales para funcionar niko. Los cambios en ciertos minerales o la cantidad de líquidos que chino pueden afectar singh riesgo de desarrollar cálculos renales.  ¿Qué puedo comer y beber con brielle dieta para cálculos renales? -- No existe brielle dieta específica para prevenir los cálculos renales. Los alimentos adecuados para un tipo de cálculo renal podrían no ser adecuados para otro. [employer] recomendaciones de dieta podrían basarse en el tipo exacto de cálculo renal que tenga.  Las recomendaciones generales incluyen:   Siga brielle alimentación saludable - Trate de llevar brielle alimentación saludable con muchas verduras, frutas, cereales integrales y productos lácteos descremados. Podría ser útil agregar más frutas y verduras, especialmente aquellas con alto contenido de potasio.   Shira muchos líquidos - Beber más agua nicholas el día es brielle de las mejores maneras de ayudar a reducir el riesgo de padecer todo tipo de cálculos renales.   Limite la sal - Limitar la cantidad de sal en singh dieta también puede reducir el riesgo de cálculos renales. La sal también se llama \"sodio\".  Algunos alimentos que generalmente se pueden comer son:   Cereales - Panes, pastas, cereales, arroces, magdalenas inglesas y bagels integrales. Cereales cocidos bao giovanni y crema de marlon (no cereales instantáneos). Galletas y bocadillos sin alea.   Frutas - La mayoría de las frutas frescas, congeladas o enlatadas en karely propios jugos. Jugos de frutas sin azúcar añadido, cerezas, naranjas, melones, melocotones, " peras, kiwi, manzanas, papaya, plátanos. Frutas secas sin azúcares añadidos.   Verduras - Verduras frescas o congeladas, verduras enlatadas sin alea, aaron, tomates, calabaza, pimientos morrones, remolachas, zanahorias, frijoles.   Lácteos - Leche, yogur y queso bajos en grasa o sin grasa.   Daniel, aves, mariscos y proteínas - Coma brielle cantidad moderada de proteínas. Entre las buenas harrison de proteínas para la mayoría de las personas con cálculos renales se incluyen los frijoles secos, los guisantes, las lentejas, el tofu y las nueces. Otros tahir secos y mantequillas de tahir secos pueden ser buenas harrison de proteínas según el tipo de cálculo renal que tenga.   Condimentos y otros alimentos - Hierbas frescas o secas, jugo de valentín, condimentos sin sal, mostaza, vinagre, salsa picante.   Líquidos - Shira muchos líquidos, bao agua, té sin azúcar y café.  ¿Qué alimentos y bebidas ayesha evitar o limitar con brielle dieta para cálculos renales? -- Según el tipo de cálculo renal que haya tenido, limitar ciertos alimentos podría ayudar a reducir el riesgo de que se formen nuevos cálculos.  Por ejemplo:   Cálculos de oxalato de calcio - Limite los alimentos y las bebidas con mucho oxalato. Los ejemplos incluyen espinacas, ruibarbo, fresas, chocolate, almendras, maní, nueces pecanas, remolacha, té, productos integrales, proteínas animales no lácteas bao carne y huevos, y alimentos con alto contenido de sacarosa y fructosa agregadas, que son tipos de azúcar. No tome vitamina C ni suplementos de calcio.   Cálculos de ácido úrico - Limite los alimentos con purinas. Los ejemplos incluyen giovanni, leche entera y productos lácteos enteros, espárragos, espinacas y ciertas daniel, pescados y aves.  Singh médico o enfermero puede hablar con usted sobre si es conveniente evitar o limitar ciertos alimentos. También puede ser útil que trabaje con un nutricionista (experto en alimentos). Puede ayudarlo a asegurarse de que singh cuerpo obtenga  todos los nutrientes que necesita.  ¿Qué más ayesha saber? -- Obtener la cantidad adecuada de calcio en singh dieta es importante para tener huesos sanos. Vadim tener demasiado o muy poco calcio puede provocar la formación de algunos tipos de cálculos renales. Hable con singh médico, enfermero o nutricionista sobre la cantidad de calcio que debe consumir. Hágalo antes de gustavo suplementos de calcio o vitamina D.  Según singh peso y singh jesus, podría ser útil que trate de bajar de peso. Mantener un peso corporal saludable puede ayudar a prevenir los cálculos renales.  Todos los artículos se actualizan a medida que se descubre nueva evidencia y culmina nuestro proceso de evaluación por homólogos   Kassidy artículo se recuperó de UpToDate el: Mar 20, 2024.  Artículo 606229 Versión [id number].es-419.1  Release: 32.2.4 - C32.78  © 2024 [employer] Inc. Todos los derechos reservados.  figura 1: Anatomía del tracto urinario     La orina se produce en los riñones. De los riñones pasa a la vejiga a través de dos tubos llamados uréteres. Luego, sale de la vejiga por otro tubo llamado uretra.  Gráfico 43969 Versión 8.0  Exención de responsabilidad y uso de la información del consumidor   Descargo de responsabilidad: esta información generalizada es un resumen limitado de información sobre el diagnóstico, el tratamiento y/o los medicamentos. No pretende ser exhaustiva y se debe utilizar bao herramienta para ayudar al usuario a comprender y/o evaluar las posibles opciones de diagnóstico y tratamiento. No incluye toda la información sobre afecciones, tratamientos, medicamentos, efectos secundarios o riesgos puedan ser aplicables a un paciente específico. No tiene el propósito de servir bao recomendación médica ni de sustituir la recomendación médica, el diagnóstico o el tratamiento de un profesional de atención médica que se base en el examen y la evaluación de kassidy profesional de la jesus respecto a las circunstancias específicas y únicas del  paciente. Los pacientes deben hablar con un profesional de atención médica para obtener información completa sobre singh jesus, cuestiones médicas y opciones de tratamiento, incluidos los riesgos o los beneficios relacionados con el uso de medicamentos. Esta información no certifica que los tratamientos o medicamentos adilson seguros, eficaces o estén aprobados para tratar a un paciente específico. [employer] y karely afiliados renuncian a cualquier garantía o responsabilidad relacionada con esta información o el uso de la misma.El uso de esta información está sujeto a las Condiciones de uso, disponibles en [email or web address]. 2024© [employer] y karely afiliados y/o licenciantes. Todos los derechos reservados.  Copyright   © 2024 [employer] Todos los derechos reservados.

## (undated) DEVICE — PREMIUM DRY TRAY LF: Brand: MEDLINE INDUSTRIES, INC.

## (undated) DEVICE — SHEATH URETERAL ACCESS 10/12FR 35CM PROXIS

## (undated) DEVICE — GLOVE SRG BIOGEL 7.5

## (undated) DEVICE — RADIFOCUS GLIDEWIRE: Brand: GLIDEWIRE

## (undated) DEVICE — CATH URET .038 10FR 50CM DUAL LUMEN

## (undated) DEVICE — LUBRICANT JELLY SURGILUBE TUBE 2OZ FLIP TOP

## (undated) DEVICE — SCD SEQUENTIAL COMPRESSION COMFORT SLEEVE MEDIUM KNEE LENGTH: Brand: KENDALL SCD

## (undated) DEVICE — ENDOSCOPIC VALVE WITH ADAPTER.: Brand: SURSEAL® II

## (undated) DEVICE — SINGLE PORT MANIFOLD: Brand: NEPTUNE 2

## (undated) DEVICE — TUBING SUCTION 5MM X 12 FT

## (undated) DEVICE — UROCATCH BAG

## (undated) DEVICE — 3M™ TEGADERM™ TRANSPARENT FILM DRESSING FRAME STYLE, 1624W, 2-3/8 IN X 2-3/4 IN (6 CM X 7 CM), 100/CT 4CT/CASE: Brand: 3M™ TEGADERM™

## (undated) DEVICE — LUBRICANT SURGILUBE TUBE 4 OZ  FLIP TOP

## (undated) DEVICE — PACK TUR

## (undated) DEVICE — SYRINGE 20ML LL

## (undated) DEVICE — SPECIMEN CONTAINER STERILE PEEL PACK

## (undated) DEVICE — CATH URETERAL 5FR X 70 CM FLEX TIP POLYUR BARD

## (undated) DEVICE — GUIDEWIRE STRGHT TIP 0.035 IN  SOLO PLUS

## (undated) DEVICE — SINGLE-USE BIOPSY FORCEPS: Brand: RADIAL JAW 4

## (undated) DEVICE — INVIEW CLEAR LEGGINGS: Brand: CONVERTORS

## (undated) DEVICE — EXIDINE 4 PCT

## (undated) DEVICE — GLOVE PI ULTRA TOUCH SZ.7.5

## (undated) DEVICE — FIBER STD QUANTA 272 MICRON

## (undated) DEVICE — SHEATH URETERAL ACCESS 12/14FR 35CM PROXIS

## (undated) DEVICE — BASKET SPECIMEN RETRIVAL 1.9FR 120CM

## (undated) DEVICE — LASER FIBER HOLMIUM 272MICRON

## (undated) DEVICE — LUBRICANT JELLY SURGILUBE TUBE 4OZ FLIP TOP

## (undated) DEVICE — UROLOGIC DRAIN BAG: Brand: UNBRANDED

## (undated) DEVICE — DUAL LUMEN URETERAL ACCESS CATHETER: Brand: COOK